# Patient Record
Sex: FEMALE | Race: WHITE | NOT HISPANIC OR LATINO | Employment: OTHER | URBAN - METROPOLITAN AREA
[De-identification: names, ages, dates, MRNs, and addresses within clinical notes are randomized per-mention and may not be internally consistent; named-entity substitution may affect disease eponyms.]

---

## 2017-01-16 ENCOUNTER — HOSPITAL ENCOUNTER (OUTPATIENT)
Dept: RADIOLOGY | Facility: HOSPITAL | Age: 77
Discharge: HOME | End: 2017-01-16
Attending: FAMILY MEDICINE
Payer: MEDICARE

## 2017-01-16 ENCOUNTER — OFFICE VISIT (OUTPATIENT)
Dept: FAMILY MEDICINE | Facility: CLINIC | Age: 77
End: 2017-01-16
Payer: MEDICARE

## 2017-01-16 VITALS
TEMPERATURE: 98 F | HEIGHT: 58 IN | BODY MASS INDEX: 32.12 KG/M2 | DIASTOLIC BLOOD PRESSURE: 64 MMHG | OXYGEN SATURATION: 98 % | SYSTOLIC BLOOD PRESSURE: 135 MMHG | WEIGHT: 153 LBS | HEART RATE: 63 BPM

## 2017-01-16 DIAGNOSIS — M81.0 OSTEOPOROSIS: ICD-10-CM

## 2017-01-16 DIAGNOSIS — E78.5 HYPERLIPIDEMIA, UNSPECIFIED HYPERLIPIDEMIA TYPE: ICD-10-CM

## 2017-01-16 DIAGNOSIS — I10 ESSENTIAL HYPERTENSION: ICD-10-CM

## 2017-01-16 DIAGNOSIS — E83.52 HYPERCALCEMIA: ICD-10-CM

## 2017-01-16 DIAGNOSIS — F32.A DEPRESSION, UNSPECIFIED DEPRESSION TYPE: ICD-10-CM

## 2017-01-16 DIAGNOSIS — R05.9 COUGH: ICD-10-CM

## 2017-01-16 DIAGNOSIS — Z86.69 HISTORY OF MIGRAINE HEADACHES: ICD-10-CM

## 2017-01-16 DIAGNOSIS — G47.00 INSOMNIA, UNSPECIFIED TYPE: ICD-10-CM

## 2017-01-16 DIAGNOSIS — R05.9 COUGH: Primary | ICD-10-CM

## 2017-01-16 DIAGNOSIS — F41.9 ANXIETY: ICD-10-CM

## 2017-01-16 PROCEDURE — 71020 XR CHEST PA AND LATERAL: CPT | Mod: TC,PO

## 2017-01-16 PROCEDURE — 99999 PR PBB SHADOW E&M-EST. PATIENT-LVL III: CPT | Mod: PBBFAC,,, | Performed by: FAMILY MEDICINE

## 2017-01-16 PROCEDURE — 99214 OFFICE O/P EST MOD 30 MIN: CPT | Mod: S$PBB,,, | Performed by: FAMILY MEDICINE

## 2017-01-16 PROCEDURE — 71020 XR CHEST PA AND LATERAL: CPT | Mod: 26,,, | Performed by: RADIOLOGY

## 2017-01-16 RX ORDER — TOPIRAMATE SPINKLE 25 MG/1
25 CAPSULE ORAL DAILY
Qty: 90 CAPSULE | Refills: 3 | Status: SHIPPED | OUTPATIENT
Start: 2017-01-16 | End: 2019-02-01 | Stop reason: SDUPTHER

## 2017-01-16 RX ORDER — ATORVASTATIN CALCIUM 40 MG/1
40 TABLET, FILM COATED ORAL DAILY
Qty: 90 TABLET | Refills: 3 | Status: SHIPPED | OUTPATIENT
Start: 2017-01-16 | End: 2017-04-28 | Stop reason: SDUPTHER

## 2017-01-16 RX ORDER — METOPROLOL TARTRATE 100 MG/1
100 TABLET ORAL 2 TIMES DAILY
Qty: 180 TABLET | Refills: 3 | Status: SHIPPED | OUTPATIENT
Start: 2017-01-16 | End: 2017-04-28 | Stop reason: SDUPTHER

## 2017-01-16 RX ORDER — TEMAZEPAM 30 MG/1
30 CAPSULE ORAL NIGHTLY
Qty: 90 CAPSULE | Refills: 1 | Status: SHIPPED | OUTPATIENT
Start: 2017-01-16 | End: 2017-07-27 | Stop reason: SDUPTHER

## 2017-01-16 RX ORDER — CITALOPRAM 10 MG/1
10 TABLET ORAL DAILY
Qty: 90 TABLET | Refills: 3 | Status: SHIPPED | OUTPATIENT
Start: 2017-01-16 | End: 2017-04-28 | Stop reason: SDUPTHER

## 2017-01-16 RX ORDER — AZITHROMYCIN 250 MG/1
TABLET, FILM COATED ORAL
Qty: 6 TABLET | Refills: 0 | Status: SHIPPED | OUTPATIENT
Start: 2017-01-16 | End: 2017-03-29

## 2017-01-16 NOTE — MR AVS SNAPSHOT
[facility]  [address] [facility] [address] [address]  Phone: [phone]  Fax: [phone]                  Suzie Dawkins   2017 11:20 AM   Office Visit    Description:  Female : 1940   Provider:  Eladio Marrero MD   Department:  [facility]           Diagnoses this Visit        Comments    Cough    -  Primary     Essential hypertension         Hyperlipidemia, unspecified hyperlipidemia type         Insomnia, unspecified type         Osteoporosis         Hypercalcemia         Anxiety         Depression, unspecified depression type         History of migraine headaches                To Do List           Future Appointments        Provider Department Dept Phone    2017 12:15 PM [facility] XR1 [facility] [phone]    2017 8:15 AM LABORATORY, [place] [facility] [phone]    3/24/2017 9:15 AM LABORATORY, [place] [facility] [phone]    3/24/2017 9:40 AM [facility] DEXA1 [facility] [phone]    3/31/2017 11:30 AM DO Serene Sood - Endocrinology [phone]      Goals (5 Years of Data)     None       These Medications        Disp Refills Start End    azithromycin (Z-NATALIE) 250 MG tablet 6 tablet 0 2017     Take as directed.    Pharmacy: [facility]  [facility] Ph #: [phone]       temazepam (RESTORIL) 30 mg capsule 90 capsule 1 2017     Take 1 capsule (30 mg total) by mouth nightly. - Oral    [facility] [place] [facility] [address] [facility] Ph #: [phone]       Notes to Pharmacy: This prescription was filled today. Any refills authorized will be placed on file.    atorvastatin (LIPITOR) 40 MG tablet 90 tablet 3 2017     Take 1 tablet (40 mg total) by mouth once daily. - Oral    Pharmacy: [facility] [address] [facility] Ph #: [phone]        Notes to Pharmacy: This prescription was filled today(12/26/2016). Any refills authorized will be placed on file.    citalopram (CELEXA) 10 MG tablet 90 tablet 3 1/16/2017     Take 1 tablet (10 mg total) by mouth once daily. - Oral    Pharmacy: [facility] [address] #: [phone]       Notes to Pharmacy: This prescription was filled today(12/26/2016). Any refills authorized will be placed on file.    metoprolol tartrate (LOPRESSOR) 100 MG tablet 180 tablet 3 1/16/2017     Take 1 tablet (100 mg total) by mouth 2 (two) times daily. - Oral    Pharmacy: [facility] [address] #: [phone]       Notes to Pharmacy: This prescription was filled today(12/26/2016). Any refills authorized will be placed on file.    topiramate 25 mg capsule 90 capsule 3 1/16/2017     Take 1 capsule (25 mg total) by mouth once daily. - Oral    Pharmacy: [facility] [address] #: [phone]         [facility]     [facility] - 24/7 Assistance  Registered nurses in the [facility] provide clinical advisement, health education, appointment booking, and other advisory services.  Call for this free service at [phone].             Medications           Message regarding Medications     Verify the changes and/or additions to your medication regime listed below are the same as discussed with your clinician today.  If any of these changes or additions are incorrect, please notify your healthcare provider.        START taking these NEW medications        Refills    azithromycin (Z-NATALIE) 250 MG tablet 0    Sig: Take as directed.    Class: Normal      CHANGE how you are taking these medications     Start Taking Instead of    atorvastatin (LIPITOR) 40 MG tablet atorvastatin (LIPITOR) 40 MG tablet    Dosage:  Take 1 tablet (40 mg total) by mouth once daily. Dosage:  TAKE ONE TABLET BY  MOUTH ONCE DAILY    Reason for Change:  Reorder     citalopram (CELEXA) 10 MG tablet citalopram (CELEXA) 10 MG tablet    Dosage:  Take 1 tablet (10 mg total) by mouth once daily. Dosage:  TAKE ONE TABLET BY MOUTH ONCE DAILY    Reason for Change:  Reorder     metoprolol tartrate (LOPRESSOR) 100 MG tablet metoprolol tartrate (LOPRESSOR) 100 MG tablet    Dosage:  Take 1 tablet (100 mg total) by mouth 2 (two) times daily. Dosage:  TAKE ONE TABLET BY MOUTH TWICE DAILY    Reason for Change:  Reorder            Verify that the below list of medications is an accurate representation of the medications you are currently taking.  If none reported, the list may be blank. If incorrect, please contact your healthcare provider. Carry this list with you in case of emergency.           Current Medications     alprazolam (XANAX) 0.25 MG tablet TAKE ONE TABLET THREE TIMES DAILY AS NEEDED FOR ANXIETY    atorvastatin (LIPITOR) 40 MG tablet Take 1 tablet (40 mg total) by mouth once daily.    calcium-vitamin D3 500 mg(1,250mg) -200 unit per tablet Take 1 tablet by mouth 2 (two) times daily with meals.    citalopram (CELEXA) 10 MG tablet Take 1 tablet (10 mg total) by mouth once daily.    diphenoxylate-atropine 2.5-0.025 mg (LOMOTIL) 2.5-0.025 mg per tablet Take 1 tablet by mouth 4 (four) times daily as needed.    losartan (COZAAR) 50 MG tablet TAKE ONE TABLET BY MOUTH ONCE DAILY    metoprolol tartrate (LOPRESSOR) 100 MG tablet Take 1 tablet (100 mg total) by mouth 2 (two) times daily.    ondansetron (ZOFRAN-ODT) 4 MG TbDL Every 6 hours prn nausea    temazepam (RESTORIL) 30 mg capsule Take 1 capsule (30 mg total) by mouth nightly.    topiramate 25 mg capsule Take 1 capsule (25 mg total) by mouth once daily.    azithromycin (Z-NATALIE) 250 MG tablet Take as directed.           Clinical Reference Information           Vital Signs - Last Recorded  Most recent update: 1/16/2017 11:20 AM by Ester Ghosh MA    BP Pulse Temp Ht Wt SpO2    135/64  "63 97.7 °F (36.5 °C) (Oral) 4' 10" (1.473 m) 69.4 kg (153 lb) 98%    BMI                31.98 kg/m2          Blood Pressure          Most Recent Value    BP  135/64      Allergies as of 1/16/2017     B12 [Cyanocobalamin-cobamamide]    Codeine    Iodine    Meperidine    Narcotic Antagonist    Penicillins    Propoxyphene    Sulfa (Sulfonamide Antibiotics)      Immunizations Administered on Date of Encounter - 1/16/2017     None      Orders Placed During Today's Visit     Future Labs/Procedures Expected by Expires    X-Ray Chest PA And Lateral  1/16/2017 (Approximate) 2/15/2017      "

## 2017-01-16 NOTE — PROGRESS NOTES
Subjective:      Patient ID: Suzie Dawkins is a 76 y.o. female.    Chief Complaint: cough    HPI Cough: Patient complains of nonproductive cough.  Symptoms began 1 week ago.  The cough is non-productive, without wheezing, dyspnea or hemoptysis and is aggravated by nothing Associated symptoms include:postnasal drip. Patient does not have new pets. Patient does not have a history of asthma. Patient does have a history of environmental allergens. Patient does not have recent travel. Patient does not have a history of smoking. Patient  has previous Chest X-ray.  She quit smoking in the 80's. She has used mucinex and it is not resolved.     The patient presents with hyperlipidemia.  The patient reports tolerating the medication well and is in excellent compliance.  There have been no medication side effects.  The patient denies chest pain, neuropathy, and myalgias.  The patient has reduced fat intake and has been exercising.  Current treatment has included the medications listed in the med card.    Lab Results   Component Value Date    CHOL 165 08/26/2016    CHOL 198 02/03/2016    CHOL 200 (H) 07/30/2015       Lab Results   Component Value Date    HDL 40 08/26/2016    HDL 54 02/03/2016    HDL 53 07/30/2015       Lab Results   Component Value Date    LDLCALC 104.8 08/26/2016    LDLCALC 115.2 02/03/2016    LDLCALC 124.8 07/30/2015       Lab Results   Component Value Date    TRIG 101 08/26/2016    TRIG 144 02/03/2016    TRIG 111 07/30/2015       Lab Results   Component Value Date    CHOLHDL 24.2 08/26/2016    CHOLHDL 27.3 02/03/2016    CHOLHDL 26.5 07/30/2015     Lab Results   Component Value Date    ALT 19 08/26/2016    ALT 17 08/26/2016    AST 21 08/26/2016    AST 21 08/26/2016    ALKPHOS 93 08/26/2016    ALKPHOS 94 08/26/2016    BILITOT 0.8 08/26/2016    BILITOT 0.8 08/26/2016       The patient presents with essential hypertension.  The patient is tolerating the medication well and is in excellent compliance.  The  "patient is experiencing no side effects.  Counseling was offered regarding low salt diets.  The patient has a reduced salt intake.  The patient denies chest pain, palpitations, shortness of breath, dyspnea on exertion, left or murmur neck pain, nausea, vomiting, diaphoresis, paroxysmal nocturnal dyspnea, and orthopnea.     Visit Vitals    /64    Pulse 63    Temp 97.7 °F (36.5 °C) (Oral)    Ht 4' 10" (1.473 m)    Wt 69.4 kg (153 lb)    SpO2 98%    BMI 31.98 kg/m2     She has had hypercalcemia and her last calcium is below:  Dr. Reddy is following this and has follow up scheduled.    CMP  Sodium   Date Value Ref Range Status   08/26/2016 140 136 - 145 mmol/L Final     Potassium   Date Value Ref Range Status   08/26/2016 4.2 3.5 - 5.1 mmol/L Final     Chloride   Date Value Ref Range Status   08/26/2016 108 95 - 110 mmol/L Final     CO2   Date Value Ref Range Status   08/26/2016 25 23 - 29 mmol/L Final     Glucose   Date Value Ref Range Status   08/26/2016 102 70 - 110 mg/dL Final     BUN, Bld   Date Value Ref Range Status   08/26/2016 22 8 - 23 mg/dL Final     Creatinine   Date Value Ref Range Status   08/26/2016 1.0 0.5 - 1.4 mg/dL Final     Calcium   Date Value Ref Range Status   08/26/2016 10.7 (H) 8.7 - 10.5 mg/dL Final     Total Protein   Date Value Ref Range Status   08/26/2016 6.8 6.0 - 8.4 g/dL Final   08/26/2016 6.9 6.0 - 8.4 g/dL Final     Albumin   Date Value Ref Range Status   08/26/2016 3.7 3.5 - 5.2 g/dL Final   08/26/2016 3.8 3.5 - 5.2 g/dL Final     Total Bilirubin   Date Value Ref Range Status   08/26/2016 0.8 0.1 - 1.0 mg/dL Final     Comment:     For infants and newborns, interpretation of results should be based  on gestational age, weight and in agreement with clinical  observations.  Premature Infant recommended reference ranges:  Up to 24 hours.............<8.0 mg/dL  Up to 48 hours............<12.0 mg/dL  3-5 days..................<15.0 mg/dL  6-29 days.................<15.0 mg/dL   "   08/26/2016 0.8 0.1 - 1.0 mg/dL Final     Comment:     For infants and newborns, interpretation of results should be based  on gestational age, weight and in agreement with clinical  observations.  Premature Infant recommended reference ranges:  Up to 24 hours.............<8.0 mg/dL  Up to 48 hours............<12.0 mg/dL  3-5 days..................<15.0 mg/dL  6-29 days.................<15.0 mg/dL       Alkaline Phosphatase   Date Value Ref Range Status   08/26/2016 93 55 - 135 U/L Final   08/26/2016 94 55 - 135 U/L Final     AST   Date Value Ref Range Status   08/26/2016 21 10 - 40 U/L Final   08/26/2016 21 10 - 40 U/L Final     ALT   Date Value Ref Range Status   08/26/2016 19 10 - 44 U/L Final   08/26/2016 17 10 - 44 U/L Final     Anion Gap   Date Value Ref Range Status   08/26/2016 7 (L) 8 - 16 mmol/L Final     eGFR if    Date Value Ref Range Status   08/26/2016 >60.0 >60 mL/min/1.73 m^2 Final     eGFR if non    Date Value Ref Range Status   08/26/2016 55.2 (A) >60 mL/min/1.73 m^2 Final     Comment:     Calculation used to obtain the estimated glomerular filtration  rate (eGFR) is the CKD-EPI equation. Since race is unknown   in our information system, the eGFR values for   -American and Non--American patients are given   for each creatinine result.       She has chronic insomnia and has been on restoril for many years. She is on 30 mg capsule.  She states taht she has tried a lower dose and she states that it is more expensive so she has been using this for a long time.  She is also on celexa and she has had this for depression and anxiety.  She has been stable with this for a long time also.      The patient presents with osteoporosis.  The patient denies back pain, bone pain, hip pain and has not had prior fractures.  The patient has not been performing weight bearing exercises.  Current treatment has not included medications for this condition.  The patient has  had a bone density in the past and it is due to be performed at this time.    She has a dexa scheduled already.  Dr. Reddy is following this and has follow up scheduled.    Health Maintenance Due   Topic Date Due    TETANUS VACCINE  1958    Mammogram  2017       Past Medical History:  Past Medical History   Diagnosis Date    Anxiety     Arthritis     Cancer      carcinoid of the terminal ileum    Depression     GERD (gastroesophageal reflux disease)     Hypercalcemia     Hyperlipidemia     Hypertension     Osteoporosis 10/7/2013    Vitamin D deficiency      Past Surgical History   Procedure Laterality Date    Colon surgery  2007     cancer removal    Hysterectomy       section       two    Tonsillectomy, adenoidectomy, bilateral myringotomy and tubes      Hemorrhoid surgery      Tonsillectomy      Adenoidectomy       Review of patient's allergies indicates:   Allergen Reactions    B12 [cyanocobalamin-cobamamide] Other (See Comments)     Caused flushing and caused skin to peel    Codeine      Other reaction(s): Muscle pain    Iodine      Other reaction(s): Hives    Meperidine      Other reaction(s): Muscle pain    Narcotic antagonist      Other reaction(s): Rash    Penicillins Nausea And Vomiting    Propoxyphene      Other reaction(s): Muscle pain    Sulfa (sulfonamide antibiotics)      Other reaction(s): Muscle pain  Other reaction(s): Hives     Current Outpatient Prescriptions on File Prior to Visit   Medication Sig Dispense Refill    alprazolam (XANAX) 0.25 MG tablet TAKE ONE TABLET THREE TIMES DAILY AS NEEDED FOR ANXIETY 90 tablet 1    calcium-vitamin D3 500 mg(1,250mg) -200 unit per tablet Take 1 tablet by mouth 2 (two) times daily with meals.      diphenoxylate-atropine 2.5-0.025 mg (LOMOTIL) 2.5-0.025 mg per tablet Take 1 tablet by mouth 4 (four) times daily as needed. 180 tablet 1    losartan (COZAAR) 50 MG tablet TAKE ONE TABLET BY MOUTH ONCE DAILY 30  tablet 0    ondansetron (ZOFRAN-ODT) 4 MG TbDL Every 6 hours prn nausea 60 tablet 3    [DISCONTINUED] atorvastatin (LIPITOR) 40 MG tablet TAKE ONE TABLET BY MOUTH ONCE DAILY 30 tablet 0    [DISCONTINUED] citalopram (CELEXA) 10 MG tablet TAKE ONE TABLET BY MOUTH ONCE DAILY 30 tablet 0    [DISCONTINUED] metoprolol tartrate (LOPRESSOR) 100 MG tablet TAKE ONE TABLET BY MOUTH TWICE DAILY 60 tablet 0    [DISCONTINUED] temazepam (RESTORIL) 30 mg capsule Take 1 capsule (30 mg total) by mouth nightly. 30 capsule 0    [DISCONTINUED] topiramate 25 mg capsule Take 1 capsule (25 mg total) by mouth once daily. 90 capsule 3     No current facility-administered medications on file prior to visit.      Social History     Social History    Marital status: Single     Spouse name: N/A    Number of children: N/A    Years of education: N/A     Occupational History    Not on file.     Social History Main Topics    Smoking status: Former Smoker     Types: Cigarettes     Quit date: 1/1/1989    Smokeless tobacco: Not on file    Alcohol use No    Drug use: No    Sexual activity: Not on file     Other Topics Concern    Not on file     Social History Narrative     Family History   Problem Relation Age of Onset    Cancer Father      lung    Arthritis Father     Cancer Maternal Aunt      breast    Arthritis Maternal Aunt     Cancer Maternal Uncle     Arthritis Paternal Aunt     Cancer Maternal Grandfather      glands cancer     Arthritis Paternal Aunt     Arthritis Maternal Aunt            Review of Systems   Constitutional: Negative for fatigue, fever and unexpected weight change.   HENT: Negative for congestion, ear pain, postnasal drip and sore throat.    Eyes: Negative for visual disturbance.   Respiratory: Positive for cough. Negative for chest tightness, shortness of breath and wheezing.    Cardiovascular: Negative for chest pain, palpitations and leg swelling.   Gastrointestinal: Negative for abdominal pain, blood  "in stool, constipation, diarrhea, nausea and vomiting.   Genitourinary: Negative for dysuria and hematuria.   Neurological: Negative for weakness and numbness.       Objective:     Visit Vitals    /64    Pulse 63    Temp 97.7 °F (36.5 °C) (Oral)    Ht 4' 10" (1.473 m)    Wt 69.4 kg (153 lb)    SpO2 98%    BMI 31.98 kg/m2       Physical Exam   Constitutional: She appears well-developed and well-nourished. She is cooperative.   HENT:   Head: Normocephalic and atraumatic.   Right Ear: Tympanic membrane, external ear and ear canal normal.   Left Ear: Tympanic membrane, external ear and ear canal normal.   Nose: Nose normal.   Mouth/Throat: Uvula is midline and mucous membranes are normal. No oral lesions. No oropharyngeal exudate, posterior oropharyngeal edema or posterior oropharyngeal erythema.   Eyes: EOM and lids are normal. Pupils are equal, round, and reactive to light. Right eye exhibits no discharge. Left eye exhibits no discharge. Right conjunctiva is not injected. Right conjunctiva has no hemorrhage. Left conjunctiva is not injected. Left conjunctiva has no hemorrhage. No scleral icterus. Right eye exhibits no nystagmus. Left eye exhibits no nystagmus.   Neck: Normal range of motion and full passive range of motion without pain. Neck supple. No JVD present. No tracheal tenderness present. Carotid bruit is not present. No tracheal deviation present. No thyroid mass and no thyromegaly present.   Cardiovascular: Normal rate, regular rhythm, S1 normal and S2 normal.    No murmur heard.  Pulses:       Carotid pulses are 2+ on the right side, and 2+ on the left side.       Radial pulses are 2+ on the right side, and 2+ on the left side.        Posterior tibial pulses are 2+ on the right side, and 2+ on the left side.   Pulmonary/Chest: Effort normal and breath sounds normal. No respiratory distress. She has no wheezes. She has no rhonchi. She has no rales.   Abdominal: Soft. Normal appearance, normal " aorta and bowel sounds are normal. She exhibits no distension, no abdominal bruit, no pulsatile midline mass and no mass. There is no hepatosplenomegaly. There is no tenderness. There is no rebound.   Musculoskeletal:        Right knee: She exhibits no swelling. No tenderness found.        Left knee: She exhibits no swelling. No tenderness found.   Lymphadenopathy:        Head (right side): No submental and no submandibular adenopathy present.        Head (left side): No submental and no submandibular adenopathy present.     She has no cervical adenopathy.   Neurological: She is alert. She has normal strength. No cranial nerve deficit or sensory deficit.   Skin: Skin is warm and dry. No rash noted. No cyanosis. Nails show no clubbing.   Psychiatric: She has a normal mood and affect. Her speech is normal and behavior is normal. Thought content normal. Cognition and memory are normal.       Assessment:     1. Cough    2. Essential hypertension    3. Hyperlipidemia, unspecified hyperlipidemia type    4. Insomnia, unspecified type    5. Osteoporosis    6. Hypercalcemia    7. Anxiety    8. Depression, unspecified depression type    9. History of migraine headaches        Plan:    Diagnoses and all orders for this visit:    Cough  -     X-Ray Chest PA And Lateral; Future  -     azithromycin (Z-NATALIE) 250 MG tablet; Take as directed.    Essential hypertension  -     metoprolol tartrate (LOPRESSOR) 100 MG tablet; Take 1 tablet (100 mg total) by mouth 2 (two) times daily.    Hyperlipidemia, unspecified hyperlipidemia type  -     atorvastatin (LIPITOR) 40 MG tablet; Take 1 tablet (40 mg total) by mouth once daily.    Insomnia, unspecified type    Osteoporosis    Hypercalcemia    Anxiety  -     citalopram (CELEXA) 10 MG tablet; Take 1 tablet (10 mg total) by mouth once daily.    Depression, unspecified depression type  -     citalopram (CELEXA) 10 MG tablet; Take 1 tablet (10 mg total) by mouth once daily.    History of migraine  headaches  -     topiramate 25 mg capsule; Take 1 capsule (25 mg total) by mouth once daily.    Other orders  -     temazepam (RESTORIL) 30 mg capsule; Take 1 capsule (30 mg total) by mouth nightly.      rtc in 6 months for a follow up on the insomnia.  Cont f/u with Dr. Reddy.

## 2017-01-26 RX ORDER — FLUCONAZOLE 150 MG/1
150 TABLET ORAL DAILY
Qty: 1 TABLET | Refills: 0 | Status: SHIPPED | OUTPATIENT
Start: 2017-01-26 | End: 2017-02-20 | Stop reason: SDUPTHER

## 2017-01-26 NOTE — TELEPHONE ENCOUNTER
----- Message from Aviva Donald sent at 1/26/2017  8:34 AM CST -----  Contact: Patient  Patient states that she has a yeast infection from the antibiotic- [facility]. Please call patient back if needed at [phone]. Thank you

## 2017-02-20 RX ORDER — FLUCONAZOLE 150 MG/1
TABLET ORAL
Qty: 1 TABLET | Refills: 0 | Status: SHIPPED | OUTPATIENT
Start: 2017-02-20 | End: 2017-03-29

## 2017-02-28 ENCOUNTER — LAB VISIT (OUTPATIENT)
Dept: LAB | Facility: HOSPITAL | Age: 77
End: 2017-02-28
Attending: FAMILY MEDICINE
Payer: MEDICARE

## 2017-02-28 DIAGNOSIS — E78.5 HYPERLIPIDEMIA, UNSPECIFIED HYPERLIPIDEMIA TYPE: ICD-10-CM

## 2017-02-28 LAB
ALBUMIN SERPL BCP-MCNC: 3.8 G/DL
ALP SERPL-CCNC: 88 U/L
ALT SERPL W/O P-5'-P-CCNC: 19 U/L
AST SERPL-CCNC: 20 U/L
BILIRUB DIRECT SERPL-MCNC: 0.3 MG/DL
BILIRUB SERPL-MCNC: 0.8 MG/DL
CHOLEST/HDLC SERPL: 3.4 {RATIO}
HDL/CHOLESTEROL RATIO: 29.6 %
HDLC SERPL-MCNC: 169 MG/DL
HDLC SERPL-MCNC: 50 MG/DL
LDLC SERPL CALC-MCNC: 104 MG/DL
NONHDLC SERPL-MCNC: 119 MG/DL
PROT SERPL-MCNC: 6.8 G/DL
TRIGL SERPL-MCNC: 75 MG/DL

## 2017-02-28 PROCEDURE — 80076 HEPATIC FUNCTION PANEL: CPT

## 2017-02-28 PROCEDURE — 80061 LIPID PANEL: CPT

## 2017-02-28 PROCEDURE — 36415 COLL VENOUS BLD VENIPUNCTURE: CPT | Mod: PO

## 2017-03-01 ENCOUNTER — TELEPHONE (OUTPATIENT)
Dept: FAMILY MEDICINE | Facility: CLINIC | Age: 77
End: 2017-03-01

## 2017-03-01 DIAGNOSIS — E78.5 HYPERLIPIDEMIA, UNSPECIFIED HYPERLIPIDEMIA TYPE: Primary | ICD-10-CM

## 2017-03-01 NOTE — TELEPHONE ENCOUNTER
I have signed for the following orders AND/OR meds.  Please call the patient and ask the patient to schedule the testing AND/OR inform about any medications that were sent.      Orders Placed This Encounter   Procedures    Lipid panel     Standing Status:   Future     Standing Expiration Date:   4/30/2018    Hepatic function panel     Standing Status:   Future     Standing Expiration Date:   4/30/2018

## 2017-03-06 ENCOUNTER — PATIENT MESSAGE (OUTPATIENT)
Dept: FAMILY MEDICINE | Facility: CLINIC | Age: 77
End: 2017-03-06

## 2017-03-24 ENCOUNTER — HOSPITAL ENCOUNTER (OUTPATIENT)
Dept: RADIOLOGY | Facility: HOSPITAL | Age: 77
Discharge: HOME | End: 2017-03-24
Attending: INTERNAL MEDICINE
Payer: MEDICARE

## 2017-03-24 DIAGNOSIS — M81.0 OSTEOPOROSIS: ICD-10-CM

## 2017-03-24 DIAGNOSIS — E21.3 HYPERPARATHYROIDISM: ICD-10-CM

## 2017-03-24 PROCEDURE — 77080 DXA BONE DENSITY AXIAL: CPT | Mod: TC,PO

## 2017-03-24 PROCEDURE — 77080 DXA BONE DENSITY AXIAL: CPT | Mod: 26,,, | Performed by: RADIOLOGY

## 2017-03-29 ENCOUNTER — OFFICE VISIT (OUTPATIENT)
Dept: FAMILY MEDICINE | Facility: CLINIC | Age: 77
End: 2017-03-29
Payer: MEDICARE

## 2017-03-29 VITALS
BODY MASS INDEX: 31.75 KG/M2 | TEMPERATURE: 98 F | HEIGHT: 58 IN | DIASTOLIC BLOOD PRESSURE: 73 MMHG | HEART RATE: 55 BPM | WEIGHT: 151.25 LBS | SYSTOLIC BLOOD PRESSURE: 165 MMHG

## 2017-03-29 DIAGNOSIS — N39.0 URINARY TRACT INFECTION WITHOUT HEMATURIA, SITE UNSPECIFIED: Primary | ICD-10-CM

## 2017-03-29 DIAGNOSIS — R30.0 DYSURIA: ICD-10-CM

## 2017-03-29 LAB
BACTERIA #/AREA URNS HPF: ABNORMAL /HPF
BILIRUB UR QL STRIP: NEGATIVE
CLARITY UR: ABNORMAL
COLOR UR: YELLOW
GLUCOSE UR QL STRIP: NEGATIVE
HGB UR QL STRIP: ABNORMAL
HYALINE CASTS #/AREA URNS LPF: 0 /LPF
KETONES UR QL STRIP: NEGATIVE
LEUKOCYTE ESTERASE UR QL STRIP: ABNORMAL
MICROSCOPIC COMMENT: ABNORMAL
NITRITE UR QL STRIP: NEGATIVE
PH UR STRIP: 6 [PH] (ref 5–8)
PROT UR QL STRIP: ABNORMAL
RBC #/AREA URNS HPF: >100 /HPF (ref 0–4)
SP GR UR STRIP: 1.01 (ref 1–1.03)
SQUAMOUS #/AREA URNS HPF: 3 /HPF
URN SPEC COLLECT METH UR: ABNORMAL
WBC #/AREA URNS HPF: >100 /HPF (ref 0–5)
WBC CLUMPS URNS QL MICRO: ABNORMAL

## 2017-03-29 PROCEDURE — 99213 OFFICE O/P EST LOW 20 MIN: CPT | Mod: S$PBB,,, | Performed by: NURSE PRACTITIONER

## 2017-03-29 PROCEDURE — 99999 PR PBB SHADOW E&M-EST. PATIENT-LVL IV: CPT | Mod: PBBFAC,,, | Performed by: NURSE PRACTITIONER

## 2017-03-29 PROCEDURE — 87088 URINE BACTERIA CULTURE: CPT

## 2017-03-29 PROCEDURE — 87077 CULTURE AEROBIC IDENTIFY: CPT

## 2017-03-29 PROCEDURE — 87086 URINE CULTURE/COLONY COUNT: CPT

## 2017-03-29 PROCEDURE — 81000 URINALYSIS NONAUTO W/SCOPE: CPT | Mod: PO

## 2017-03-29 PROCEDURE — 87186 SC STD MICRODIL/AGAR DIL: CPT

## 2017-03-29 PROCEDURE — 99214 OFFICE O/P EST MOD 30 MIN: CPT | Mod: PBBFAC,PO | Performed by: NURSE PRACTITIONER

## 2017-03-29 RX ORDER — CIPROFLOXACIN 500 MG/1
500 TABLET ORAL 2 TIMES DAILY
Qty: 6 TABLET | Refills: 0 | Status: SHIPPED | OUTPATIENT
Start: 2017-03-29 | End: 2017-04-01

## 2017-03-29 RX ORDER — PHENAZOPYRIDINE HYDROCHLORIDE 100 MG/1
100 TABLET, FILM COATED ORAL 2 TIMES DAILY PRN
Qty: 6 TABLET | Refills: 0 | Status: SHIPPED | OUTPATIENT
Start: 2017-03-29 | End: 2017-04-01

## 2017-03-29 RX ORDER — TEMAZEPAM 30 MG/1
CAPSULE ORAL
Qty: 90 CAPSULE | OUTPATIENT
Start: 2017-03-29

## 2017-03-29 NOTE — MR AVS SNAPSHOT
[facility]  [address] [facility] [address]  Phone: [phone]  Fax: [phone]                  Suzie Dawkins   3/29/2017 1:40 PM   Office Visit    Description:  Female : 1940   Provider:  Shandra Freire NP   Department:  [facility]           Diagnoses this Visit        Comments    Urinary tract infection without hematuria, site unspecified    -  Primary     Dysuria                To Do List           Future Appointments        Provider Department Dept Phone    3/31/2017 11:30 AM Anthony Reddy DO [place] - Endocrinology [phone]    2017 8:00 AM LABORATORY, [place] [facility] [phone]      Goals (5 Years of Data)     None       These Medications        Disp Refills Start End    ciprofloxacin HCl (CIPRO) 500 MG tablet 6 tablet 0 3/29/2017 2017    Take 1 tablet (500 mg total) by mouth 2 (two) times daily. - Oral    Pharmacy: [facility] [address] Ph #: [phone]       phenazopyridine (PYRIDIUM) 100 MG tablet 6 tablet 0 3/29/2017 2017    Take 1 tablet (100 mg total) by mouth 2 (two) times daily as needed. - Oral    [facility] [address] Ph #: [phone]         [facility]     [facility] -  Assistance  Registered nurses in the [facility] provide clinical advisement, health education, appointment booking, and other advisory services.  Call for this free service at [phone].             Medications           Message regarding Medications     Verify the changes and/or additions to your medication regime listed below are the same as discussed with your clinician today.  If any of these changes or additions are incorrect, please notify your healthcare provider.        START taking these NEW medications        Refills    ciprofloxacin HCl (CIPRO) 500 MG tablet 0     Sig: Take 1 tablet (500 mg total) by mouth 2 (two) times daily.    Class: Normal    Route: Oral    phenazopyridine (PYRIDIUM) 100 MG tablet 0    Sig: Take 1 tablet (100 mg total) by mouth 2 (two) times daily as needed.    Class: Normal    Route: Oral      STOP taking these medications     azithromycin (Z-NATALIE) 250 MG tablet Take as directed.    fluconazole (DIFLUCAN) 150 MG Tab Take 1 tablet (150 mg total) by mouth once daily.           Verify that the below list of medications is an accurate representation of the medications you are currently taking.  If none reported, the list may be blank. If incorrect, please contact your healthcare provider. Carry this list with you in case of emergency.           Current Medications     alprazolam (XANAX) 0.25 MG tablet TAKE ONE TABLET THREE TIMES DAILY AS NEEDED FOR ANXIETY    atorvastatin (LIPITOR) 40 MG tablet Take 1 tablet (40 mg total) by mouth once daily.    calcium-vitamin D3 500 mg(1,250mg) -200 unit per tablet Take 1 tablet by mouth 2 (two) times daily with meals.    citalopram (CELEXA) 10 MG tablet Take 1 tablet (10 mg total) by mouth once daily.    diphenoxylate-atropine 2.5-0.025 mg (LOMOTIL) 2.5-0.025 mg per tablet Take 1 tablet by mouth 4 (four) times daily as needed.    losartan (COZAAR) 50 MG tablet TAKE ONE TABLET BY MOUTH ONCE DAILY    metoprolol tartrate (LOPRESSOR) 100 MG tablet Take 1 tablet (100 mg total) by mouth 2 (two) times daily.    ondansetron (ZOFRAN-ODT) 4 MG TbDL Every 6 hours prn nausea    temazepam (RESTORIL) 30 mg capsule Take 1 capsule (30 mg total) by mouth nightly.    topiramate 25 mg capsule Take 1 capsule (25 mg total) by mouth once daily.    ciprofloxacin HCl (CIPRO) 500 MG tablet Take 1 tablet (500 mg total) by mouth 2 (two) times daily.    phenazopyridine (PYRIDIUM) 100 MG tablet Take 1 tablet (100 mg total) by mouth 2 (two) times daily as needed.           Clinical Reference Information           Your Vitals Were     BP Pulse Temp  "Height Weight BMI    165/73 55 98.1 °F (36.7 °C) 4' 10" (1.473 m) 68.6 kg (151 lb 3.8 oz) 31.61 kg/m2      Blood Pressure          Most Recent Value    BP  (!)  165/73      Allergies as of 3/29/2017     B12 [Cyanocobalamin-cobamamide]    Codeine    Iodine    Meperidine    Narcotic Antagonist    Penicillins    Propoxyphene    Sulfa (Sulfonamide Antibiotics)      Immunizations Administered on Date of Encounter - 3/29/2017     None      Orders Placed During Today's Visit      Normal Orders This Visit    Urinalysis Microscopic     Urinalysis     Urine culture       Language Assistance Services     ATTENTION: Language assistance services are available, free of charge. Please call [phone].      ATENCIÓN: Si leslie fitch, tiene a crow disposición servicios gratuitos de asistencia lingüística. Llame al [phone].     JAYNA Ý: N?u b?n nói Ti?ng Vi?t, có các d?ch v? h? tr? ngôn ng? mi?n phí dành cho b?n. G?i s? [phone].         [facility] complies with applicable Federal civil rights laws and does not discriminate on the basis of race, color, national origin, age, disability, or sex.        "

## 2017-03-29 NOTE — PROGRESS NOTES
Subjective:       Patient ID: Suzie Dawkins is a 76 y.o. female.    Chief Complaint: No chief complaint on file.    Dysuria    This is a new problem. The current episode started in the past 7 days. The problem occurs every urination. The problem has been unchanged. The quality of the pain is described as burning. The pain is mild. There has been no fever. She is not sexually active. There is a history of pyelonephritis. Associated symptoms include flank pain, frequency and urgency. Pertinent negatives include no behavior changes, chills, discharge, hematuria, hesitancy, nausea, possible pregnancy, sweats, vomiting, weight loss, bubble bath use, constipation, rash or withholding. She has tried nothing for the symptoms. The treatment provided no relief. Her past medical history is significant for hypertension. There is no history of catheterization, diabetes insipidus, diabetes mellitus, genitourinary reflux, kidney stones, recurrent UTIs, a single kidney, STD, urinary stasis or a urological procedure.     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    Depression     GERD (gastroesophageal reflux disease)     Hypercalcemia     Hyperlipidemia     Hypertension     Osteoporosis 10/7/2013    Vitamin D deficiency      Social History     Social History    Marital status: Single     Spouse name: N/A    Number of children: N/A    Years of education: N/A     Occupational History         Social History Main Topics    Smoking status: Former Smoker     Types: Cigarettes     Quit date: 1989    Smokeless tobacco: Not on file    Alcohol use No    Drug use: No    Sexual activity: Not on file     Social History Narrative     Past Surgical History:   Procedure Laterality Date    ADENOIDECTOMY       SECTION      two    COLON SURGERY  2007    cancer removal    HEMORRHOID SURGERY      HYSTERECTOMY      TONSILLECTOMY       TONSILLECTOMY, ADENOIDECTOMY, BILATERAL MYRINGOTOMY AND TUBES         Review of Systems   Constitutional: Negative.  Negative for chills and weight loss.   HENT: Negative.    Eyes: Negative.    Respiratory: Negative.    Cardiovascular: Negative.    Gastrointestinal: Negative.  Negative for constipation, nausea and vomiting.   Endocrine: Negative.    Genitourinary: Positive for dysuria, flank pain, frequency and urgency. Negative for hematuria and hesitancy.   Skin: Negative.  Negative for rash.   Allergic/Immunologic: Negative.    Neurological: Negative.    Psychiatric/Behavioral: Negative.        Objective:      Physical Exam   Constitutional: She is oriented to person, place, and time. She appears well-developed and well-nourished.   HENT:   Head: Normocephalic.   Right Ear: External ear normal.   Left Ear: External ear normal.   Nose: Nose normal.   Mouth/Throat: Oropharynx is clear and moist.   Eyes: Conjunctivae are normal. Pupils are equal, round, and reactive to light.   Neck: Normal range of motion. Neck supple.   Cardiovascular: Normal rate, regular rhythm and normal heart sounds.    Pulmonary/Chest: Effort normal and breath sounds normal.   Abdominal: Soft. Bowel sounds are normal. There is no CVA tenderness.   Musculoskeletal: Normal range of motion.   Neurological: She is alert and oriented to person, place, and time.   Skin: Skin is warm and dry.   Psychiatric: She has a normal mood and affect. Her behavior is normal. Judgment and thought content normal.   Nursing note and vitals reviewed.      Assessment:       1. Urinary tract infection without hematuria, site unspecified    2. Dysuria        Plan:           Diagnoses and all orders for this visit:    Urinary tract infection without hematuria, site unspecified  Dysuria  -     Urine culture  -     Urinalysis  -     Urinalysis Microscopic  -     ciprofloxacin HCl (CIPRO) 500 MG tablet; Take 1 tablet (500 mg total) by mouth 2 (two) times daily.  -      phenazopyridine (PYRIDIUM) 100 MG tablet; Take 1 tablet (100 mg total) by mouth 2 (two) times daily as needed.

## 2017-03-31 ENCOUNTER — OFFICE VISIT (OUTPATIENT)
Dept: ENDOCRINOLOGY | Facility: CLINIC | Age: 77
End: 2017-03-31
Payer: MEDICARE

## 2017-03-31 VITALS
RESPIRATION RATE: 18 BRPM | HEART RATE: 64 BPM | BODY MASS INDEX: 32.21 KG/M2 | SYSTOLIC BLOOD PRESSURE: 158 MMHG | WEIGHT: 154.13 LBS | DIASTOLIC BLOOD PRESSURE: 78 MMHG

## 2017-03-31 DIAGNOSIS — E21.3 HYPERPARATHYROIDISM: Primary | ICD-10-CM

## 2017-03-31 DIAGNOSIS — M81.0 OSTEOPOROSIS: ICD-10-CM

## 2017-03-31 DIAGNOSIS — E55.9 VITAMIN D DEFICIENCY: ICD-10-CM

## 2017-03-31 PROCEDURE — 99999 PR PBB SHADOW E&M-EST. PATIENT-LVL II: CPT | Mod: PBBFAC,,, | Performed by: INTERNAL MEDICINE

## 2017-03-31 PROCEDURE — 99214 OFFICE O/P EST MOD 30 MIN: CPT | Mod: S$PBB,,, | Performed by: INTERNAL MEDICINE

## 2017-03-31 PROCEDURE — 99212 OFFICE O/P EST SF 10 MIN: CPT | Mod: PBBFAC,PO | Performed by: INTERNAL MEDICINE

## 2017-03-31 NOTE — PROGRESS NOTES
CHIEF COMPLAINT: Hypercalcemia  76 year old being seen as a f/u. Discovered to have a high calcium in Oct/13. On Fosamax approx 2 years. Taking 2000 OTC Vit D. Fosamax on hold due to dental work. Has completed top dentures and has a bottom bridge. Has no invasive procedures planned. No fractures. No kidney stones. No falls or issues with balance. Does not want fosamax.               PAST MEDICAL HISTORY/PAST SURGICAL HISTORY:  Reviewed in [facility]    SOCIAL HISTORY: No T/A    FAMILY HISTORY:  No Ca disorders. + osteoporosis. No hip fractures. No thyroid disease or DM.     MEDICATIONS/ALLERGIES: The patient's MedCard has been updated and reviewed.      ROS:   Constitutional: weight stable.   Eyes: No recent visual changes  ENT: No dysphagia  Cardiovascular: Denies current anginal symptoms  Respiratory: Denies current respiratory difficulty  Gastrointestinal: No diarrhea or constipation.   GenitoUrinary - Being treated for UTI  Skin: No new skin rash  Neurologic: No focal neurologic complaints  Remainder ROS negative        PE:    GENERAL: Well developed, well nourished.  NECK: Supple, trachea midline, no palpable thyroid nodules  CHEST: Resp even and unlabored, CTA bilateral.  CARDIAC: RRR, S1, S2 heard, no murmurs, rubs, S3, or S4      Results for URIEL REDDING (MRN [id number]) as of 3/31/2017 11:25   Ref. Range 3/24/2017 09:16   Sodium Latest Ref Range: 136 - 145 mmol/L 144   Potassium Latest Ref Range: 3.5 - 5.1 mmol/L 4.0   Chloride Latest Ref Range: 95 - 110 mmol/L 111 (H)   CO2 Latest Ref Range: 23 - 29 mmol/L 24   Anion Gap Latest Ref Range: 8 - 16 mmol/L 9   BUN, Bld Latest Ref Range: 8 - 23 mg/dL 13   Creatinine Latest Ref Range: 0.5 - 1.4 mg/dL 1.0   eGFR if non African American Latest Ref Range: >60 mL/min/1.73 m^2 55.2 (A)   eGFR if African American Latest Ref Range: >60 mL/min/1.73 m^2 >60.0   Glucose Latest Ref Range: 70 - 110 mg/dL 83   Calcium Latest Ref Range: 8.7 - 10.5 mg/dL 10.0   Phosphorus Latest Ref  Range: 2.7 - 4.5 mg/dL 2.9   Alkaline Phosphatase Latest Ref Range: 55 - 135 U/L 87   Total Protein Latest Ref Range: 6.0 - 8.4 g/dL 6.7   Albumin Latest Ref Range: 3.5 - 5.2 g/dL 3.7   Total Bilirubin Latest Ref Range: 0.1 - 1.0 mg/dL 0.7   AST Latest Ref Range: 10 - 40 U/L 22   ALT Latest Ref Range: 10 - 44 U/L 18   PTH Latest Ref Range: 9.0 - 77.0 pg/mL 95.0 (H)     DEXA:  DXA scan was performed over the left hip and lumbar spine.  Review of the images confirms satisfactory positioning and technique.  Direct data comparison with prior examinations is unavailable.    The L1 to L4 vertebral bone mineral density is equal to 0.875 g/cm squared with a T score of -1.6 and a Z score of 0.9.      The left femoral neck bone mineral density is equal to 0.490 g/cm squared with a T score of -3.2 and a Z score of -1.1.       Impression       Osteoporosis -- there is a 21% risk of a major osteoporotic fracture and a 8.4% risk of hip fracture in the next 10 years (FRAX).             ASSESSMENT/PLAN:  1. Elevated Parathyroid- PTH elevated with a mildly elevated Ca. Based on elevated Ca most likely has primary hyperparathyroidism. As she does not want to do surgery we are treating bone loss. If Ca increases can use sensipar    2. Osteopeorosis- No Hx of fracture. Off Fosamax due to it effecting dentition. Discussed potential loss of bone if has hyperparathyroidism. Will try Prolia. No further dental procedures planned.     3. Vitamin D deficiency-Continue to take take OTC Vit D 2000 daily. Check Vit D at f/u    FOLLOWUP  Prolia  F/U 6 months prior to Prolia with CMP, PTH, Phos, Vit D

## 2017-04-03 ENCOUNTER — TELEPHONE (OUTPATIENT)
Dept: FAMILY MEDICINE | Facility: CLINIC | Age: 77
End: 2017-04-03

## 2017-04-03 NOTE — TELEPHONE ENCOUNTER
----- Message from Gianna Loyd sent at 4/3/2017  2:35 PM CDT -----  Call pt at [phone]//returning your call//kimberley dominique

## 2017-04-12 LAB — BACTERIA UR CULT: NORMAL

## 2017-04-28 ENCOUNTER — INFUSION (OUTPATIENT)
Dept: INFUSION THERAPY | Facility: HOSPITAL | Age: 77
End: 2017-04-28
Attending: INTERNAL MEDICINE
Payer: MEDICARE

## 2017-04-28 DIAGNOSIS — I10 ESSENTIAL HYPERTENSION: ICD-10-CM

## 2017-04-28 DIAGNOSIS — F41.9 ANXIETY: ICD-10-CM

## 2017-04-28 DIAGNOSIS — M81.0 AGE-RELATED OSTEOPOROSIS WITHOUT CURRENT PATHOLOGICAL FRACTURE: Primary | ICD-10-CM

## 2017-04-28 DIAGNOSIS — E78.5 HYPERLIPIDEMIA: ICD-10-CM

## 2017-04-28 DIAGNOSIS — F32.A DEPRESSION: ICD-10-CM

## 2017-04-28 PROCEDURE — 96372 THER/PROPH/DIAG INJ SC/IM: CPT | Mod: PN

## 2017-04-28 PROCEDURE — 63600175 PHARM REV CODE 636 W HCPCS: Mod: PN | Performed by: INTERNAL MEDICINE

## 2017-04-28 RX ORDER — CITALOPRAM 10 MG/1
TABLET ORAL
Qty: 30 TABLET | Refills: 11 | Status: SHIPPED | OUTPATIENT
Start: 2017-04-28 | End: 2018-07-26 | Stop reason: SDUPTHER

## 2017-04-28 RX ORDER — ATORVASTATIN CALCIUM 40 MG/1
TABLET, FILM COATED ORAL
Qty: 30 TABLET | Refills: 11 | Status: SHIPPED | OUTPATIENT
Start: 2017-04-28 | End: 2018-07-26 | Stop reason: SDUPTHER

## 2017-04-28 RX ORDER — LOSARTAN POTASSIUM 50 MG/1
TABLET ORAL
Qty: 30 TABLET | Refills: 11 | Status: SHIPPED | OUTPATIENT
Start: 2017-04-28 | End: 2018-01-18 | Stop reason: SDUPTHER

## 2017-04-28 RX ORDER — METOPROLOL TARTRATE 100 MG/1
TABLET ORAL
Qty: 60 TABLET | Refills: 11 | Status: SHIPPED | OUTPATIENT
Start: 2017-04-28 | End: 2018-07-26 | Stop reason: SDUPTHER

## 2017-04-28 RX ADMIN — DENOSUMAB 60 MG: 60 INJECTION SUBCUTANEOUS at 12:04

## 2017-04-28 NOTE — PLAN OF CARE
Problem: Patient Care Overview  Goal: Plan of Care Review  Outcome: Ongoing (interventions implemented as appropriate)  Pt tolerated Prolia injection well.   Reminded to take daily Vit D and Calcium supplements.  Instructed to call MD with any problems.

## 2017-04-28 NOTE — MR AVS SNAPSHOT
Patient Information     Patient Name Sex Suzie Alexander Female 1940      Visit Information        Provider Department Dept Phone Center    2017 12:30 PM CHAIR 28, [facility] [phone] [facility]      Patient Instructions     None      Your Current Medications Are     alprazolam (XANAX) 0.25 MG tablet    atorvastatin (LIPITOR) 40 MG tablet    calcium-vitamin D3 500 mg(1,250mg) -200 unit per tablet    citalopram (CELEXA) 10 MG tablet    diphenoxylate-atropine 2.5-0.025 mg (LOMOTIL) 2.5-0.025 mg per tablet    losartan (COZAAR) 50 MG tablet    metoprolol tartrate (LOPRESSOR) 100 MG tablet    ondansetron (ZOFRAN-ODT) 4 MG TbDL    temazepam (RESTORIL) 30 mg capsule    topiramate 25 mg capsule    atorvastatin (LIPITOR) 40 MG tablet (Discontinued)    citalopram (CELEXA) 10 MG tablet (Discontinued)    losartan (COZAAR) 50 MG tablet (Discontinued)    metoprolol tartrate (LOPRESSOR) 100 MG tablet (Discontinued)      Facility-Administered Medications     denosumab (PROLIA) injection 60 mg      Appointments for Next Year     2017  8:00 AM FASTING LAB (5 min.) [facility] LABORATORY, [place]    1. Do not eat or drink anything for TEN HOURS (10) PRIOR TO TEST. Do not chew gum or eat candy mints, even those claiming to be sugar free. Water is allowed but do not drink any other fluids 2. Take your regular daily medicines as your doctor has ordered. If you are diabetic, do not take your insulin or other diabetic medication until your blood is drawn and you are ready to eat. Your physician may have special instructions for diabetics. Check with your doctor if you have any questions.3. Alcoholic beverages are not allowed starting at 6:00pm the evening before your appointment.    10/31/2017  4:00 PM INFUSION 060 MIN (60 min.) [facility] CHAIR 08, [facility]    Arrive at check-in approximately 15 minutes before your scheduled  appointment time. Bring all outside medical records and imaging, along with a list of your current medications and insurance card.    1st Floor         Default Flowsheet Data (last 24 hours)      Amb Complex Vitals [place]        04/28/17 1250                Pain Assessment    Pain Score Six        Pain Frequency 2 Continuous        Pain Loc BACK                Allergies     B12 [Cyanocobalamin-cobamamide] Other (See Comments)    Caused flushing and caused skin to peel    Codeine     Other reaction(s): Muscle pain    Iodine     Other reaction(s): Hives    Meperidine     Other reaction(s): Muscle pain    Narcotic Antagonist     Other reaction(s): Rash    Penicillins Nausea And Vomiting    Propoxyphene     Other reaction(s): Muscle pain    Sulfa (Sulfonamide Antibiotics)     Other reaction(s): Muscle pain  Other reaction(s): Hives      Medications You Received from 04/27/2017 1256 to 04/28/2017 1256        Date/Time Order Dose Route Action     04/28/2017 1253 denosumab (PROLIA) injection 60 mg 60 mg Subcutaneous Given      Current Discharge Medication List     Cannot display discharge medications since this is not an admission.

## 2017-05-08 ENCOUNTER — TELEPHONE (OUTPATIENT)
Dept: ENDOCRINOLOGY | Facility: CLINIC | Age: 77
End: 2017-05-08

## 2017-05-08 NOTE — TELEPHONE ENCOUNTER
Called pt regarding message about Prolia appointment.  Pt stated that she received a letter in the mail, after reading me the letter she realized that she had previously misunderstood the letter.  The appointment letter was for her future apt with Dr. Reddy in October.  Pt apologized for the mix up and verbalized understanding.

## 2017-05-08 NOTE — TELEPHONE ENCOUNTER
----- Message from Sunita Diaz sent at 5/8/2017  9:41 AM CDT -----  Pt states that the appointments done for the prolia injections / please call pt at [phone] to explain what happened ?

## 2017-05-10 ENCOUNTER — TELEPHONE (OUTPATIENT)
Dept: ENDOCRINOLOGY | Facility: CLINIC | Age: 77
End: 2017-05-10

## 2017-05-10 NOTE — TELEPHONE ENCOUNTER
----- Message from June Wood sent at 5/10/2017 12:23 PM CDT -----  Contact: self  Patient would like to speak to a nurse She has questions   Please call her at     Thanks

## 2017-05-10 NOTE — TELEPHONE ENCOUNTER
The patient wanted to know if Prolia would interfere with her upcoming cataract surgery. I told her it would not.

## 2017-06-12 ENCOUNTER — HOSPITAL ENCOUNTER (OUTPATIENT)
Dept: RADIOLOGY | Facility: HOSPITAL | Age: 77
Discharge: HOME | End: 2017-06-12
Attending: FAMILY MEDICINE
Payer: MEDICARE

## 2017-06-12 ENCOUNTER — OFFICE VISIT (OUTPATIENT)
Dept: FAMILY MEDICINE | Facility: CLINIC | Age: 77
End: 2017-06-12
Payer: MEDICARE

## 2017-06-12 VITALS — HEIGHT: 58 IN | WEIGHT: 153 LBS | BODY MASS INDEX: 32.12 KG/M2

## 2017-06-12 VITALS
HEIGHT: 58 IN | HEART RATE: 50 BPM | SYSTOLIC BLOOD PRESSURE: 129 MMHG | DIASTOLIC BLOOD PRESSURE: 65 MMHG | TEMPERATURE: 99 F | WEIGHT: 153.13 LBS | BODY MASS INDEX: 32.14 KG/M2

## 2017-06-12 DIAGNOSIS — Z86.010 HISTORY OF COLON POLYPS: ICD-10-CM

## 2017-06-12 DIAGNOSIS — H53.9 VISUAL DISTURBANCE: ICD-10-CM

## 2017-06-12 DIAGNOSIS — R94.31 ABNORMAL EKG: ICD-10-CM

## 2017-06-12 DIAGNOSIS — Z86.012 HISTORY OF BENIGN CARCINOID TUMOR: ICD-10-CM

## 2017-06-12 DIAGNOSIS — R00.1 BRADYCARDIA: ICD-10-CM

## 2017-06-12 DIAGNOSIS — I10 ESSENTIAL HYPERTENSION: ICD-10-CM

## 2017-06-12 DIAGNOSIS — K21.9 GASTROESOPHAGEAL REFLUX DISEASE, ESOPHAGITIS PRESENCE NOT SPECIFIED: ICD-10-CM

## 2017-06-12 DIAGNOSIS — Z01.818 PREOPERATIVE CLEARANCE: Primary | ICD-10-CM

## 2017-06-12 DIAGNOSIS — H26.9 CATARACT, UNSPECIFIED CATARACT TYPE, UNSPECIFIED LATERALITY: ICD-10-CM

## 2017-06-12 DIAGNOSIS — Z12.31 ENCOUNTER FOR SCREENING MAMMOGRAM FOR BREAST CANCER: ICD-10-CM

## 2017-06-12 DIAGNOSIS — F41.9 ANXIETY: ICD-10-CM

## 2017-06-12 PROBLEM — Z86.0100 HISTORY OF COLON POLYPS: Status: ACTIVE | Noted: 2017-06-12

## 2017-06-12 PROBLEM — Z80.9 FAMILY HISTORY OF CARCINOID TUMOR: Status: ACTIVE | Noted: 2017-06-12

## 2017-06-12 PROCEDURE — 77063 BREAST TOMOSYNTHESIS BI: CPT | Mod: 26,,, | Performed by: RADIOLOGY

## 2017-06-12 PROCEDURE — 99214 OFFICE O/P EST MOD 30 MIN: CPT | Mod: S$PBB,,, | Performed by: FAMILY MEDICINE

## 2017-06-12 PROCEDURE — 1159F MED LIST DOCD IN RCRD: CPT | Mod: ,,, | Performed by: FAMILY MEDICINE

## 2017-06-12 PROCEDURE — 93010 ELECTROCARDIOGRAM REPORT: CPT | Mod: ,,, | Performed by: NUCLEAR MEDICINE

## 2017-06-12 PROCEDURE — 77067 SCR MAMMO BI INCL CAD: CPT | Mod: 26,,, | Performed by: RADIOLOGY

## 2017-06-12 PROCEDURE — 99999 PR PBB SHADOW E&M-EST. PATIENT-LVL IV: CPT | Mod: PBBFAC,,, | Performed by: FAMILY MEDICINE

## 2017-06-12 PROCEDURE — 1126F AMNT PAIN NOTED NONE PRSNT: CPT | Mod: ,,, | Performed by: FAMILY MEDICINE

## 2017-06-12 PROCEDURE — 77067 SCR MAMMO BI INCL CAD: CPT | Mod: TC

## 2017-06-12 RX ORDER — PROMETHAZINE HYDROCHLORIDE 25 MG/1
25 TABLET ORAL EVERY 6 HOURS PRN
Qty: 6 TABLET | Refills: 0 | Status: SHIPPED | OUTPATIENT
Start: 2017-06-12 | End: 2017-06-20 | Stop reason: SDUPTHER

## 2017-06-12 RX ORDER — SODIUM, POTASSIUM,MAG SULFATES 17.5-3.13G
SOLUTION, RECONSTITUTED, ORAL ORAL
Qty: 354 ML | Refills: 0 | Status: SHIPPED | OUTPATIENT
Start: 2017-06-12 | End: 2017-06-12 | Stop reason: SDUPTHER

## 2017-06-12 RX ORDER — PROMETHAZINE HYDROCHLORIDE 25 MG/1
25 TABLET ORAL EVERY 6 HOURS PRN
Qty: 6 TABLET | Refills: 0 | Status: SHIPPED | OUTPATIENT
Start: 2017-06-12 | End: 2017-06-12 | Stop reason: SDUPTHER

## 2017-06-12 RX ORDER — SODIUM, POTASSIUM,MAG SULFATES 17.5-3.13G
SOLUTION, RECONSTITUTED, ORAL ORAL
Qty: 354 ML | Refills: 0 | Status: SHIPPED | OUTPATIENT
Start: 2017-06-12 | End: 2018-05-18

## 2017-06-12 NOTE — PROGRESS NOTES
Subjective:      Patient ID: Suzie Dawkins is a 76 y.o. female.    Chief Complaint: Pre-op Exam    HPI   The patient is here for a preop clearance to have surgery on both eyes for cataracts.    The physician that is performing the surgery is Dr. Barriga.    The surgery is being planned for 6/15/2017.    I will send a copy of the referral to the surgeon at fax # [phone].    The patient states that there has not been previous problems with sedation.  She is having visual disturbances.     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    Depression     GERD (gastroesophageal reflux disease)     Hypercalcemia     Hyperlipidemia     Hypertension     Osteoporosis 10/7/2013    Vitamin D deficiency      Past Surgical History:   Procedure Laterality Date    ADENOIDECTOMY       SECTION      two    COLON SURGERY  2007    cancer removal    HEMORRHOID SURGERY      HYSTERECTOMY      TONSILLECTOMY      TONSILLECTOMY, ADENOIDECTOMY, BILATERAL MYRINGOTOMY AND TUBES       Review of patient's allergies indicates:   Allergen Reactions    B12 [cyanocobalamin-cobamamide] Other (See Comments)     Caused flushing and caused skin to peel    Codeine      Other reaction(s): Muscle pain    Iodine      Other reaction(s): Hives    Meperidine      Other reaction(s): Muscle pain    Narcotic antagonist      Other reaction(s): Rash    Penicillins Nausea And Vomiting    Propoxyphene      Other reaction(s): Muscle pain    Sulfa (sulfonamide antibiotics)      Other reaction(s): Muscle pain  Other reaction(s): Hives     Current Outpatient Prescriptions on File Prior to Visit   Medication Sig Dispense Refill    alprazolam (XANAX) 0.25 MG tablet TAKE ONE TABLET THREE TIMES DAILY AS NEEDED FOR ANXIETY 90 tablet 1    atorvastatin (LIPITOR) 40 MG tablet TAKE ONE TABLET BY MOUTH ONCE DAILY 30 tablet 11    calcium-vitamin D3 500  mg(1,250mg) -200 unit per tablet Take 1 tablet by mouth 2 (two) times daily with meals.      citalopram (CELEXA) 10 MG tablet TAKE ONE TABLET BY MOUTH ONCE DAILY 30 tablet 11    diphenoxylate-atropine 2.5-0.025 mg (LOMOTIL) 2.5-0.025 mg per tablet Take 1 tablet by mouth 4 (four) times daily as needed. 180 tablet 1    losartan (COZAAR) 50 MG tablet TAKE ONE TABLET BY MOUTH ONCE DAILY 30 tablet 11    metoprolol tartrate (LOPRESSOR) 100 MG tablet TAKE ONE TABLET BY MOUTH TWICE DAILY 60 tablet 11    ondansetron (ZOFRAN-ODT) 4 MG TbDL Every 6 hours prn nausea 60 tablet 3    temazepam (RESTORIL) 30 mg capsule Take 1 capsule (30 mg total) by mouth nightly. 90 capsule 1    topiramate 25 mg capsule Take 1 capsule (25 mg total) by mouth once daily. 90 capsule 3     No current facility-administered medications on file prior to visit.      Social History     Social History    Marital status: Single     Spouse name: N/A    Number of children: N/A    Years of education: N/A     Occupational History    Not on file.     Social History Main Topics    Smoking status: Former Smoker     Types: Cigarettes     Quit date: 1/1/1989    Smokeless tobacco: Not on file    Alcohol use No    Drug use: No    Sexual activity: Not on file     Other Topics Concern    Not on file     Social History Narrative    No narrative on file     Family History   Problem Relation Age of Onset    Cancer Father      lung    Arthritis Father     Cancer Maternal Aunt      breast    Arthritis Maternal Aunt     Cancer Maternal Uncle     Arthritis Paternal Aunt     Cancer Maternal Grandfather      glands cancer     Arthritis Paternal Aunt     Arthritis Maternal Aunt          Review of Systems   Constitutional: Negative for fatigue, fever and unexpected weight change.   HENT: Negative for congestion, ear pain, postnasal drip and sore throat.    Eyes: Positive for visual disturbance.   Respiratory: Negative for cough, chest tightness,  "shortness of breath and wheezing.    Cardiovascular: Negative for chest pain, palpitations and leg swelling.   Gastrointestinal: Negative for abdominal pain, blood in stool, constipation, diarrhea, nausea and vomiting.   Genitourinary: Negative for dysuria and hematuria.   Neurological: Negative for weakness and numbness.       Objective:   /65   Pulse (!) 50   Temp 98.5 °F (36.9 °C) (Oral)   Ht 4' 10" (1.473 m)   Wt 69.4 kg (153 lb 1.8 oz)   BMI 32.00 kg/m²     Physical Exam   Constitutional: She appears well-developed and well-nourished. She is cooperative.   HENT:   Head: Normocephalic and atraumatic.   Right Ear: Tympanic membrane, external ear and ear canal normal.   Left Ear: Tympanic membrane, external ear and ear canal normal.   Nose: Nose normal.   Mouth/Throat: Uvula is midline and mucous membranes are normal. No oral lesions. No oropharyngeal exudate, posterior oropharyngeal edema or posterior oropharyngeal erythema.   Eyes: EOM and lids are normal. Pupils are equal, round, and reactive to light. Right eye exhibits no discharge. Left eye exhibits no discharge. Right conjunctiva is not injected. Right conjunctiva has no hemorrhage. Left conjunctiva is not injected. Left conjunctiva has no hemorrhage. No scleral icterus. Right eye exhibits no nystagmus. Left eye exhibits no nystagmus.   Neck: Normal range of motion and full passive range of motion without pain. Neck supple. No JVD present. No tracheal tenderness present. Carotid bruit is not present. No tracheal deviation present. No thyroid mass and no thyromegaly present.   Cardiovascular: Normal rate, regular rhythm, S1 normal and S2 normal.    No murmur heard.  Pulses:       Carotid pulses are 2+ on the right side, and 2+ on the left side.       Radial pulses are 2+ on the right side, and 2+ on the left side.        Posterior tibial pulses are 2+ on the right side, and 2+ on the left side.   Pulmonary/Chest: Effort normal and breath sounds " normal. No respiratory distress. She has no wheezes. She has no rhonchi. She has no rales.   Abdominal: Soft. Normal appearance, normal aorta and bowel sounds are normal. She exhibits no distension, no abdominal bruit, no pulsatile midline mass and no mass. There is no hepatosplenomegaly. There is no tenderness. There is no rebound.   Musculoskeletal:        Right knee: She exhibits no swelling. No tenderness found.        Left knee: She exhibits no swelling. No tenderness found.   Lymphadenopathy:        Head (right side): No submental and no submandibular adenopathy present.        Head (left side): No submental and no submandibular adenopathy present.     She has no cervical adenopathy.   Neurological: She is alert. She has normal strength. No cranial nerve deficit or sensory deficit.   Skin: Skin is warm and dry. No rash noted. No cyanosis. Nails show no clubbing.   Psychiatric: She has a normal mood and affect. Her speech is normal and behavior is normal. Thought content normal. Cognition and memory are normal.     The EKG shows a bradycardia of 49 and there are some areas that a P wave is not discernable but in other leads, she appears to have p waves.  This led to the machine interpreting that she had a junctional rhythm.  Assessment:     1. Preoperative clearance    2. Visual disturbance    3. Cataract, unspecified cataract type, unspecified laterality    4. Essential hypertension    5. Gastroesophageal reflux disease, esophagitis presence not specified    6. Anxiety    7. Encounter for screening mammogram for breast cancer    8. History of colon polyps    9. History of benign carcinoid tumor    10. Bradycardia    11. Abnormal EKG        Plan:   Suzie was seen today for pre-op exam.    Diagnoses and all orders for this visit:    Preoperative clearance  -     EKG 12-lead; Future    Visual disturbance    Cataract, unspecified cataract type, unspecified laterality    Essential hypertension    Gastroesophageal  reflux disease, esophagitis presence not specified    Anxiety    Encounter for screening mammogram for breast cancer  -     Mammo Digital Screening Bilat with CAD; Future    History of colon polyps  -     Case request GI: COLONOSCOPY    History of benign carcinoid tumor  -     Case request GI: COLONOSCOPY    Bradycardia  -     Ambulatory referral to Cardiology    Abnormal EKG  -     Ambulatory referral to Cardiology    Other orders  -     sodium,potassium,mag sulfates (SUPREP BOWEL PREP KIT) 17.5-3.13-1.6 gram SolR; Take as instructed on prep sheet  -     promethazine (PHENERGAN) 25 MG tablet; Take 1 tablet (25 mg total) by mouth every 6 (six) hours as needed for Nausea.    I will have Dr. Kraus look at her and her EKG tomorrow AM for surgical clearance.  I feel that this is probably the fact that the EKG machine has low lead and they could not be changed and she is on a beta blocker.  HOwever, I would like for her to be evaluated and cleared for her surgery.

## 2017-06-13 ENCOUNTER — OFFICE VISIT (OUTPATIENT)
Dept: CARDIOLOGY | Facility: CLINIC | Age: 77
End: 2017-06-13
Payer: MEDICARE

## 2017-06-13 ENCOUNTER — TELEPHONE (OUTPATIENT)
Dept: FAMILY MEDICINE | Facility: CLINIC | Age: 77
End: 2017-06-13

## 2017-06-13 VITALS
BODY MASS INDEX: 31.93 KG/M2 | SYSTOLIC BLOOD PRESSURE: 128 MMHG | HEIGHT: 58 IN | DIASTOLIC BLOOD PRESSURE: 67 MMHG | WEIGHT: 152.13 LBS | HEART RATE: 87 BPM

## 2017-06-13 DIAGNOSIS — Z01.810 PREOP CARDIOVASCULAR EXAM: ICD-10-CM

## 2017-06-13 DIAGNOSIS — E78.5 HYPERLIPIDEMIA, UNSPECIFIED HYPERLIPIDEMIA TYPE: Primary | ICD-10-CM

## 2017-06-13 DIAGNOSIS — R00.1 SINUS BRADYCARDIA BY ELECTROCARDIOGRAM: ICD-10-CM

## 2017-06-13 PROCEDURE — 99999 PR PBB SHADOW E&M-EST. PATIENT-LVL III: CPT | Mod: PBBFAC,,, | Performed by: INTERNAL MEDICINE

## 2017-06-13 PROCEDURE — 99213 OFFICE O/P EST LOW 20 MIN: CPT | Mod: PBBFAC,PO | Performed by: INTERNAL MEDICINE

## 2017-06-13 PROCEDURE — 1159F MED LIST DOCD IN RCRD: CPT | Mod: ,,, | Performed by: INTERNAL MEDICINE

## 2017-06-13 PROCEDURE — 99204 OFFICE O/P NEW MOD 45 MIN: CPT | Mod: S$PBB,,, | Performed by: INTERNAL MEDICINE

## 2017-06-13 PROCEDURE — 1126F AMNT PAIN NOTED NONE PRSNT: CPT | Mod: ,,, | Performed by: INTERNAL MEDICINE

## 2017-06-13 NOTE — LETTER
June 13, 2017      Eladio Marrero MD  [address] [facility] [address]           [facility]  [address] [facility] [address]  Phone: [phone]  Fax: [phone]          Patient: Suzie Dawkins   MR Number: [id number]   YOB: 1940   Date of Visit: 6/13/2017       Dear Dr. Eladio Marrero:    Thank you for referring Suzie Dawkins to me for evaluation. Attached you will find relevant portions of my assessment and plan of care.    If you have questions, please do not hesitate to call me. I look forward to following Suzie Dawkins along with you.    Sincerely,    Shane Kraus Jr., MD    Enclosure  CC:  No Recipients    If you would like to receive this communication electronically, please contact [email or web address] or [phone] to request more information on [facility] [employer] access.    For providers and/or their staff who would like to refer a patient to [facility], please contact us through our one-stop-shop provider referral line, [facility], at [phone].    If you feel you have received this communication in error or would no longer like to receive these types of communications, please e-mail [email or web address]

## 2017-06-13 NOTE — TELEPHONE ENCOUNTER
This patient has had a cardiac evaluation.  She is cleared for surgery Thursday.  Please print this, my last note and the cardiology notes and fax it to her surgeon.

## 2017-06-20 NOTE — TELEPHONE ENCOUNTER
----- Message from Jazzy Kinney sent at 6/20/2017  2:46 PM CDT -----  Contact: patient  States she called for a medication for nausea and pharmacy said they never received the order. Please call patient ASAP today @ [phone]./ thanks, avery      [facility] [place] [address]  [address] [facility]  [place] [address]  Phone: [phone] Fax: [phone]

## 2017-06-21 RX ORDER — PROMETHAZINE HYDROCHLORIDE 25 MG/1
25 TABLET ORAL EVERY 6 HOURS PRN
Qty: 6 TABLET | Refills: 0 | Status: SHIPPED | OUTPATIENT
Start: 2017-06-21 | End: 2018-08-09 | Stop reason: SDUPTHER

## 2017-06-30 RX ORDER — ONDANSETRON 4 MG/1
TABLET, ORALLY DISINTEGRATING ORAL
Qty: 30 TABLET | Refills: 0 | Status: SHIPPED | OUTPATIENT
Start: 2017-06-30 | End: 2018-09-10 | Stop reason: SDUPTHER

## 2017-07-27 RX ORDER — TEMAZEPAM 30 MG/1
30 CAPSULE ORAL NIGHTLY
Qty: 90 CAPSULE | Refills: 1 | Status: SHIPPED | OUTPATIENT
Start: 2017-07-27 | End: 2018-01-18 | Stop reason: SDUPTHER

## 2017-09-01 ENCOUNTER — LAB VISIT (OUTPATIENT)
Dept: LAB | Facility: HOSPITAL | Age: 77
End: 2017-09-01
Attending: FAMILY MEDICINE
Payer: MEDICARE

## 2017-09-01 DIAGNOSIS — E78.5 HYPERLIPIDEMIA, UNSPECIFIED HYPERLIPIDEMIA TYPE: ICD-10-CM

## 2017-09-01 LAB
ALBUMIN SERPL BCP-MCNC: 3.6 G/DL
ALP SERPL-CCNC: 77 U/L
ALT SERPL W/O P-5'-P-CCNC: 19 U/L
AST SERPL-CCNC: 22 U/L
BILIRUB DIRECT SERPL-MCNC: 0.3 MG/DL
BILIRUB SERPL-MCNC: 0.9 MG/DL
CHOLEST SERPL-MCNC: 163 MG/DL
CHOLEST/HDLC SERPL: 3.3 {RATIO}
HDLC SERPL-MCNC: 50 MG/DL
HDLC SERPL: 30.7 %
LDLC SERPL CALC-MCNC: 92.6 MG/DL
NONHDLC SERPL-MCNC: 113 MG/DL
PROT SERPL-MCNC: 6.9 G/DL
TRIGL SERPL-MCNC: 102 MG/DL

## 2017-09-01 PROCEDURE — 80076 HEPATIC FUNCTION PANEL: CPT

## 2017-09-01 PROCEDURE — 80061 LIPID PANEL: CPT

## 2017-09-01 PROCEDURE — 36415 COLL VENOUS BLD VENIPUNCTURE: CPT | Mod: PO

## 2017-10-12 ENCOUNTER — LAB VISIT (OUTPATIENT)
Dept: LAB | Facility: HOSPITAL | Age: 77
End: 2017-10-12
Attending: FAMILY MEDICINE
Payer: MEDICARE

## 2017-10-12 DIAGNOSIS — E21.3 HYPERPARATHYROIDISM: ICD-10-CM

## 2017-10-12 DIAGNOSIS — E55.9 VITAMIN D DEFICIENCY: ICD-10-CM

## 2017-10-12 DIAGNOSIS — M81.0 OSTEOPOROSIS: ICD-10-CM

## 2017-10-12 LAB
25(OH)D3+25(OH)D2 SERPL-MCNC: 61 NG/ML
ALBUMIN SERPL BCP-MCNC: 3.8 G/DL
ALP SERPL-CCNC: 77 U/L
ALT SERPL W/O P-5'-P-CCNC: 22 U/L
ANION GAP SERPL CALC-SCNC: 6 MMOL/L
AST SERPL-CCNC: 24 U/L
BILIRUB SERPL-MCNC: 0.7 MG/DL
BUN SERPL-MCNC: 19 MG/DL
CALCIUM SERPL-MCNC: 10.8 MG/DL
CHLORIDE SERPL-SCNC: 105 MMOL/L
CO2 SERPL-SCNC: 30 MMOL/L
CREAT SERPL-MCNC: 1.2 MG/DL
EST. GFR  (AFRICAN AMERICAN): 50.7 ML/MIN/1.73 M^2
EST. GFR  (NON AFRICAN AMERICAN): 44 ML/MIN/1.73 M^2
GLUCOSE SERPL-MCNC: 137 MG/DL
PHOSPHATE SERPL-MCNC: 3.7 MG/DL
POTASSIUM SERPL-SCNC: 4.7 MMOL/L
PROT SERPL-MCNC: 7.2 G/DL
PTH-INTACT SERPL-MCNC: 98 PG/ML
SODIUM SERPL-SCNC: 141 MMOL/L

## 2017-10-12 PROCEDURE — 36415 COLL VENOUS BLD VENIPUNCTURE: CPT | Mod: PO

## 2017-10-12 PROCEDURE — 84100 ASSAY OF PHOSPHORUS: CPT

## 2017-10-12 PROCEDURE — 82306 VITAMIN D 25 HYDROXY: CPT

## 2017-10-12 PROCEDURE — 83970 ASSAY OF PARATHORMONE: CPT

## 2017-10-12 PROCEDURE — 80053 COMPREHEN METABOLIC PANEL: CPT

## 2017-10-19 ENCOUNTER — OFFICE VISIT (OUTPATIENT)
Dept: ENDOCRINOLOGY | Facility: CLINIC | Age: 77
End: 2017-10-19
Payer: MEDICARE

## 2017-10-19 VITALS
BODY MASS INDEX: 32.04 KG/M2 | HEART RATE: 52 BPM | HEIGHT: 58 IN | DIASTOLIC BLOOD PRESSURE: 72 MMHG | WEIGHT: 152.63 LBS | SYSTOLIC BLOOD PRESSURE: 130 MMHG

## 2017-10-19 DIAGNOSIS — M81.0 AGE-RELATED OSTEOPOROSIS WITHOUT CURRENT PATHOLOGICAL FRACTURE: Primary | ICD-10-CM

## 2017-10-19 DIAGNOSIS — E21.3 HYPERPARATHYROIDISM: ICD-10-CM

## 2017-10-19 DIAGNOSIS — E83.52 HYPERCALCEMIA: ICD-10-CM

## 2017-10-19 DIAGNOSIS — E55.9 VITAMIN D DEFICIENCY: ICD-10-CM

## 2017-10-19 PROCEDURE — 99999 PR PBB SHADOW E&M-EST. PATIENT-LVL III: CPT | Mod: PBBFAC,,, | Performed by: INTERNAL MEDICINE

## 2017-10-19 PROCEDURE — 99213 OFFICE O/P EST LOW 20 MIN: CPT | Mod: PBBFAC,PO | Performed by: INTERNAL MEDICINE

## 2017-10-19 PROCEDURE — 99214 OFFICE O/P EST MOD 30 MIN: CPT | Mod: S$PBB,,, | Performed by: INTERNAL MEDICINE

## 2017-10-19 NOTE — PROGRESS NOTES
CHIEF COMPLAINT: Hypercalcemia    76 year old being seen as a f/u. Discovered to have a high calcium in Oct/13. Previously on Fosamax for two years then started Prolia in April. Next injection will be 10/31/2017. States she started having joint pain in her hips in her back about 1 week after the injection. States she is having issues with balance and walking since the injection. No steriod injections. She is not planning on having any dental procedures soon and has not had any in the last three months. No exercise due to cost of joining a gym.  Has completed top dentures and has a bottom bridge. Taking 2000 OTC Vit D. No fractures or falls. No kidney stones.     PAST MEDICAL HISTORY/PAST SURGICAL HISTORY:  Reviewed in EPIC    SOCIAL HISTORY: No T/A    FAMILY HISTORY:  No Ca disorders. + osteoporosis. No hip fractures. No thyroid disease or DM.     MEDICATIONS/ALLERGIES: The patient's MedCard has been updated and reviewed.      ROS:   Constitutional: weight stable, no changes in energy.    Eyes: No recent visual changes, recent cataract surgery in August and September.   ENT: + postnasal drip, No dysphagia.  Cardiovascular: Denies chest pain, palpitaions, leg swelling.  Respiratory: + cough, no SOB.   Gastrointestinal: + intermittent diarrhea (due to colon surgery), no  N/v.  GenitoUrinary - Being treated for UTI  Skin: No new skin rash  Neurologic: + vertigo and headaces.  Remainder ROS negative    PE:    GENERAL: Well developed, well nourished.  NECK: Supple, trachea midline, no palpable thyroid nodules.  CHEST: Resp even and unlabored, CTA bilateral.  CARDIAC: RRR, S1, S2 heard, no murmurs, rubs or gallops, trace edema BLE.    Results for URIEL REDDING (MRN [id number]) as of 10/19/2017 08:56   Ref. Range 10/12/2017 10:09   Sodium Latest Ref Range: 136 - 145 mmol/L 141   Potassium Latest Ref Range: 3.5 - 5.1 mmol/L 4.7   Chloride Latest Ref Range: 95 - 110 mmol/L 105   CO2 Latest Ref Range: 23 - 29 mmol/L 30 (H)    Anion Gap Latest Ref Range: 8 - 16 mmol/L 6 (L)   BUN, Bld Latest Ref Range: 8 - 23 mg/dL 19   Creatinine Latest Ref Range: 0.5 - 1.4 mg/dL 1.2   eGFR if non African American Latest Ref Range: >60 mL/min/1.73 m^2 44.0 (A)   eGFR if African American Latest Ref Range: >60 mL/min/1.73 m^2 50.7 (A)   Glucose Latest Ref Range: 70 - 110 mg/dL 137 (H)   Calcium Latest Ref Range: 8.7 - 10.5 mg/dL 10.8 (H)   Phosphorus Latest Ref Range: 2.7 - 4.5 mg/dL 3.7   Alkaline Phosphatase Latest Ref Range: 55 - 135 U/L 77   Total Protein Latest Ref Range: 6.0 - 8.4 g/dL 7.2   Albumin Latest Ref Range: 3.5 - 5.2 g/dL 3.8   Total Bilirubin Latest Ref Range: 0.1 - 1.0 mg/dL 0.7   AST Latest Ref Range: 10 - 40 U/L 24   ALT Latest Ref Range: 10 - 44 U/L 22   Vit D, 25-Hydroxy Latest Ref Range: 30 - 96 ng/mL 61   PTH Latest Ref Range: 9.0 - 77.0 pg/mL 98.0 (H)     DEXA:  DXA scan was performed over the left hip and lumbar spine.  Review of the images confirms satisfactory positioning and technique.  Direct data comparison with prior examinations is unavailable.    The L1 to L4 vertebral bone mineral density is equal to 0.875 g/cm squared with a T score of -1.6 and a Z score of 0.9.      The left femoral neck bone mineral density is equal to 0.490 g/cm squared with a T score of -3.2 and a Z score of -1.1.       Impression       Osteoporosis -- there is a 21% risk of a major osteoporotic fracture and a 8.4% risk of hip fracture in the next 10 years (FRAX).       ASSESSMENT/PLAN:  1. Osteopeorosis- No Hx of fracture. She will try Prolia one more time to see if she still experiences side effects. Advised to take tylenol for joint pain. Discussed potential loss of bone if has hyperparathyroidism. No further dental procedures planned. Discussed resistance exercises. She is taking calcium and Vitamin D.    2. Hyperparathyroidism/Hypercalcemia- PTH elevated with a mildly elevated Ca. She most likely has primary hyperparathyroidism and does not  want surgery. May use sensipar if Ca increases.    3. Vitamin D deficiency-Continue to take OTC Vit D 2000 daily. Vitamin D WNL.    FOLLOWUP  F/U 6 months prior to Prolia with CMP, PTH, Phos, DEXA      ATTESTATION:  Independently examined patient. She had some joint pain after Prolia but also has OA. Cannot take oral bisphosphonates. At this point will try Prolia again and monitor closely for symptoms. Ca adequate for prolia. Ca mildly elevated. Continue to monitor. Vit D WNL.

## 2017-10-30 ENCOUNTER — TELEPHONE (OUTPATIENT)
Dept: ENDOCRINOLOGY | Facility: CLINIC | Age: 77
End: 2017-10-30

## 2017-10-30 ENCOUNTER — TELEPHONE (OUTPATIENT)
Dept: INFUSION THERAPY | Facility: HOSPITAL | Age: 77
End: 2017-10-30

## 2017-10-30 NOTE — TELEPHONE ENCOUNTER
----- Message from Ilene Penn RN sent at 10/30/2017 10:53 AM CDT -----  Please see message below, would ya'll like us to hold the prolia injection?  ----- Message -----  From: Yasmin Mitchell MA  Sent: 10/30/2017   9:51 AM  To: [facility]    Pt has a cold and she is wondering if that affects her prolia shot.  [phone]

## 2017-10-30 NOTE — TELEPHONE ENCOUNTER
Dr. Barry Odom says it should be ok to get the Prolia now, but if pt prefers to wait a week or 2 that will be fine.

## 2017-10-30 NOTE — TELEPHONE ENCOUNTER
, would there be any contraindications for pt getting Prolia if she is sick with a cold?  Should she reschedule?

## 2017-11-27 ENCOUNTER — TELEPHONE (OUTPATIENT)
Dept: ENDOCRINOLOGY | Facility: CLINIC | Age: 77
End: 2017-11-27

## 2017-11-27 NOTE — TELEPHONE ENCOUNTER
----- Message from Geovanna Cheema sent at 11/27/2017 11:48 AM CST -----  please call patient in regards to Prolia injection, she states she can't get the injection scheduled &# needs help in doing so, [phone] (home)

## 2017-11-27 NOTE — TELEPHONE ENCOUNTER
Pt needs to be rescheduled for Prolia.  The appt was cancelled due to illness.  Please call her with appt info.  Thanks!

## 2017-11-29 ENCOUNTER — TELEPHONE (OUTPATIENT)
Dept: INFUSION THERAPY | Facility: HOSPITAL | Age: 77
End: 2017-11-29

## 2017-11-29 NOTE — TELEPHONE ENCOUNTER
----- Message from Zoie Christy sent at 11/13/2017  1:47 PM CST -----  Contact: patient  Patient Suzie Dawkins, [phone] is calling to schedule her PROLIA injection.  Patient had an appointment on 10/31, but was sick and had to cancel.  Please advise.  Thanks!

## 2017-12-05 ENCOUNTER — INFUSION (OUTPATIENT)
Dept: INFUSION THERAPY | Facility: HOSPITAL | Age: 77
End: 2017-12-05
Attending: INTERNAL MEDICINE
Payer: MEDICARE

## 2017-12-05 VITALS
DIASTOLIC BLOOD PRESSURE: 69 MMHG | WEIGHT: 153.19 LBS | OXYGEN SATURATION: 98 % | HEIGHT: 58 IN | RESPIRATION RATE: 16 BRPM | TEMPERATURE: 98 F | BODY MASS INDEX: 32.16 KG/M2 | SYSTOLIC BLOOD PRESSURE: 143 MMHG | HEART RATE: 54 BPM

## 2017-12-05 DIAGNOSIS — M81.0 AGE-RELATED OSTEOPOROSIS WITHOUT CURRENT PATHOLOGICAL FRACTURE: Primary | ICD-10-CM

## 2017-12-05 PROCEDURE — 63600175 PHARM REV CODE 636 W HCPCS: Mod: PN | Performed by: INTERNAL MEDICINE

## 2017-12-05 PROCEDURE — 96372 THER/PROPH/DIAG INJ SC/IM: CPT | Mod: PN

## 2017-12-05 RX ADMIN — DENOSUMAB 60 MG: 60 INJECTION SUBCUTANEOUS at 12:12

## 2017-12-05 NOTE — PATIENT INSTRUCTIONS
"  Preventing Falls: Are You At Risk of Falling?     Ask for help to reduce risk of falling in your home.     As you get older, you're not as steady on your feet as you once were. And you may have health problems you didn't have when you were younger. So, it's not surprising that older people are more likely to trip and fall. Falling can be very serious. It can change your overall health and quality of life. That's why it's important to be aware of your own risk of falling.  The dangers of falling  Falls are one of the main causes of injury in people over age 65. An older person who falls may take longer to get better than a younger person. And, after a fall, an older person is more likely to have problems that don't go away. So, preventing falls can help you avoid serious health problems.  Are you at risk of falling?  Answer these questions to rate your level of risk.  · Are you a woman?  · Have you fallen or stumbled in the last year?  · Are you over age 65?  · Are you ever dizzy or lightheaded with standing?  · Do you have a hard time getting in and out of the bathtub or on and off the toilet?  · Do you lean on objects to help you get around? Or do you use a cane or walker?  · Do you have vision or hearing problems? For example, do you need new glasses or hearing aids?  · Do you have 2 or more long-lasting (chronic) medical conditions?  · Do you take 3 or more medicines?  · Have you felt depressed recently?  · Have you had more trouble with your memory in recent months?  · Are there hazards in your home that might cause you to fall, such as loose rugs or poor lighting?  · Do you have a pet that jumps on you or might trip you?  · Have you stopped getting regular exercise?  · Do you have diabetes?   · Do you have a neurologic disease, such as Parkinson or Alzheimer disease?   · Do you drink alcohol?  · Do you wear athletic shoes or slippers, or go barefoot at home?  You can help prevent falls  If you answered "yes" " to any of the above questions, you should take steps to reduce your risk of a fall. Monitoring health conditions and keeping walkways in your home free of clutter are just 2 ways. Changing is sometimes easier said than done. But keep in mind that even small changes can make you less likely to fall.  The fear of falling  It's normal to be scared of falling, especially if you've fallen before. But being afraid can actually make you more likely to fall. This is because:  · Fear might cause you to become less active. Being less active can lead to a loss of strength and balance.  · Fear can lead to isolation from others, depression, or the use of more medicines or alcohol. And all these things make falling even more likely.  To break the cycle, learn more about ways to avoid falling. As you take control, you may find yourself feeling less afraid.   Date Last Reviewed: 6/12/2015  © 6182-1823 [employer]. [address] [place] [address]. All rights reserved. This information is not intended as a substitute for professional medical care. Always follow your healthcare professional's instructions.        What Is Osteoporosis?  Osteoporosis is a disease that weakens the bones. Weakened bones are more likely to fracture (break). Osteoporosis affects men and women, but postmenopausal women are most at risk. To help prevent osteoporosis, you need to exercise and nourish your bones throughout your life.    Childhood  The body builds the most bone during these years. That's why boys and girls need foods rich in calcium. They also need plenty of exercise. A healthy diet and exercise helps bones grow strong.  Young adulthood to age 30  During young adulthood, bones become their strongest. This is called peak bone mass. The same good habits that kept bones healthy in childhood help keep bone healthy in adulthood.  Age 30 to menopause  Bone mass declines slightly during these years. Your body makes just enough  new bone to maintain peak bone mass. To keep your bones at their peak mass, be sure to exercise and get plenty of calcium.  After menopause  Menopause is when a woman stops having monthly periods. After menopause, the body makes less estrogen (female hormone). This increases bone loss. At this point, treatment may be needed to reduce the risk for fracture. Exercise and calcium can also help keep your bones strong.  Later in life  In later years, both men and women need to take extra care of their bones. By this point, the body loses more bone than it makes. If too much bone is lost, you may be at risk for fractures. With age, the quality and quantity of bone declines. You can lessen bone loss by staying active and increasing your calcium intake. Calcium supplements and other osteoporosis treatments do have risks, so talk to your healthcare provider if you have concerns. If you have osteoporosis, you can also learn ways to increase everyday safety.  Date Last Reviewed: 10/17/2015  ©2007 [employer]. [address] [place] [address], [place] [address]. All Rights reserved. This information is not intended as a substitute for professional medical care. Always follow your healthcare providers instructions.        Denosumab injection  What is this medicine?  DENOSUMAB (den oh harry mab) slows bone breakdown. Prolia is used to treat osteoporosis in women after menopause and in men. Xgeva is used to prevent bone fractures and other bone problems caused by cancer bone metastases. Xgeva is also used to treat giant cell tumor of the bone.  How should I use this medicine?  This medicine is for injection under the skin. It is given by a health care professional in a hospital or clinic setting.  If you are getting Prolia, a special MedGuide will be given to you by the pharmacist with each prescription and refill. Be sure to read this information carefully each time.  For Prolia, talk to your pediatrician regarding the use of  this medicine in children. Special care may be needed. For Xgeva, talk to your pediatrician regarding the use of this medicine in children. While this drug may be prescribed for children as young as 13 years for selected conditions, precautions do apply.  What side effects may I notice from receiving this medicine?  Side effects that you should report to your doctor or health care professional as soon as possible:  · allergic reactions like skin rash, itching or hives, swelling of the face, lips, or tongue  · breathing problems  · chest pain  · fast, irregular heartbeat  · feeling faint or lightheaded, falls  · fever, chills, or any other sign of infection  · muscle spasms, tightening, or twitches  · numbness or tingling  · skin blisters or bumps, or is dry, peels, or red  · slow healing or unexplained pain in the mouth or jaw  · unusual bleeding or bruising  Side effects that usually do not require medical attention (Report these to your doctor or health care professional if they continue or are bothersome.):  · muscle pain  · stomach upset, gas  What may interact with this medicine?  Do not take this medicine with any of the following medications:  · other medicines containing denosumab  This medicine may also interact with the following medications:  · medicines that suppress the immune system  · medicines that treat cancer  · steroid medicines like prednisone or cortisone  What if I miss a dose?  It is important not to miss your dose. Call your doctor or health care professional if you are unable to keep an appointment.  Where should I keep my medicine?  This medicine is only given in a clinic, doctor's office, or other health care setting and will not be stored at home.  What should I tell my health care provider before I take this medicine?  They need to know if you have any of these conditions:  · dental disease  · eczema  · infection or history of infections  · kidney disease or on dialysis  · low blood  calcium or vitamin D  · malabsorption syndrome  · scheduled to have surgery or tooth extraction  · taking medicine that contains denosumab  · thyroid or parathyroid disease  · an unusual reaction to denosumab, other medicines, foods, dyes, or preservatives  · pregnant or trying to get pregnant  · breast-feeding  What should I watch for while using this medicine?  Visit your doctor or health care professional for regular checks on your progress. Your doctor or health care professional may order blood tests and other tests to see how you are doing.  Call your doctor or health care professional if you get a cold or other infection while receiving this medicine. Do not treat yourself. This medicine may decrease your body's ability to fight infection.  You should make sure you get enough calcium and vitamin D while you are taking this medicine, unless your doctor tells you not to. Discuss the foods you eat and the vitamins you take with your health care professional.  See your dentist regularly. Brush and floss your teeth as directed. Before you have any dental work done, tell your dentist you are receiving this medicine.  Do not become pregnant while taking this medicine or for 5 months after stopping it. Women should inform their doctor if they wish to become pregnant or think they might be pregnant. There is a potential for serious side effects to an unborn child. Talk to your health care professional or pharmacist for more information.  NOTE:This sheet is a summary. It may not cover all possible information. If you have questions about this medicine, talk to your doctor, pharmacist, or health care provider. Copyright© 2017 [employer]

## 2017-12-05 NOTE — PLAN OF CARE
Problem: Patient Care Overview  Goal: Plan of Care Review  Outcome: Ongoing (interventions implemented as appropriate)  Pt tolerated injection well without complaints. D/C to home with steady gait. AVS printed and reviewed. Pt verbalized understanding.

## 2018-01-18 ENCOUNTER — PATIENT MESSAGE (OUTPATIENT)
Dept: FAMILY MEDICINE | Facility: CLINIC | Age: 78
End: 2018-01-18

## 2018-01-18 DIAGNOSIS — I10 ESSENTIAL HYPERTENSION: ICD-10-CM

## 2018-01-18 RX ORDER — LOSARTAN POTASSIUM 50 MG/1
TABLET ORAL
Qty: 90 TABLET | Refills: 0 | Status: SHIPPED | OUTPATIENT
Start: 2018-01-18 | End: 2018-07-26 | Stop reason: SDUPTHER

## 2018-01-18 RX ORDER — TEMAZEPAM 30 MG/1
30 CAPSULE ORAL NIGHTLY
Qty: 90 CAPSULE | Refills: 0 | Status: SHIPPED | OUTPATIENT
Start: 2018-01-18 | End: 2018-05-29 | Stop reason: SDUPTHER

## 2018-01-18 NOTE — TELEPHONE ENCOUNTER
I have refilled the patient's requested medication x 1 month.  However, the patient is due for an evaluation in the office.  Call the patient on the phone and book the patient with EITHER Danica Joaquin NP or Fabien Espinoza NP for a visit.    PLEASE DOCUMENT THE FACT THAT YOU HAVE CONTACTED THE PATIENT IN THE CHART FOR FUTURE REFERENCE.    Health Maintenance Due   Topic Date Due    TETANUS VACCINE  08[birth year]17/1959    Colonoscopy  02/08/2014    Influenza Vaccine  08/01/2017

## 2018-02-09 RX ORDER — FLUCONAZOLE 150 MG/1
TABLET ORAL
Qty: 1 TABLET | Refills: 1 | Status: SHIPPED | OUTPATIENT
Start: 2018-02-09 | End: 2018-05-18

## 2018-03-28 ENCOUNTER — HOSPITAL ENCOUNTER (OUTPATIENT)
Dept: RADIOLOGY | Facility: HOSPITAL | Age: 78
Discharge: HOME | End: 2018-03-28
Attending: PHYSICIAN ASSISTANT
Payer: MEDICARE

## 2018-03-28 DIAGNOSIS — K52.9 CHRONIC DIARRHEA: ICD-10-CM

## 2018-03-28 DIAGNOSIS — E21.3 HYPERPARATHYROIDISM: ICD-10-CM

## 2018-03-28 PROCEDURE — 77080 DXA BONE DENSITY AXIAL: CPT | Mod: TC,PO

## 2018-03-28 PROCEDURE — 77080 DXA BONE DENSITY AXIAL: CPT | Mod: 26,,, | Performed by: RADIOLOGY

## 2018-03-28 RX ORDER — DIPHENOXYLATE HYDROCHLORIDE AND ATROPINE SULFATE 2.5; .025 MG/1; MG/1
TABLET ORAL
Qty: 180 TABLET | Refills: 1 | Status: SHIPPED | OUTPATIENT
Start: 2018-03-28 | End: 2019-02-19 | Stop reason: SDUPTHER

## 2018-04-03 ENCOUNTER — TELEPHONE (OUTPATIENT)
Dept: ENDOCRINOLOGY | Facility: CLINIC | Age: 78
End: 2018-04-03

## 2018-04-03 NOTE — TELEPHONE ENCOUNTER
----- Message from Bethany Yeung sent at 4/3/2018 12:30 PM CDT -----  Contact: SELF  Patient would like to reschedule her appointment that is scheduled for April 4, 2018. Please call the patient.

## 2018-04-13 ENCOUNTER — OFFICE VISIT (OUTPATIENT)
Dept: ENDOCRINOLOGY | Facility: CLINIC | Age: 78
End: 2018-04-13
Payer: MEDICARE

## 2018-04-13 DIAGNOSIS — M81.0 OSTEOPOROSIS, UNSPECIFIED OSTEOPOROSIS TYPE, UNSPECIFIED PATHOLOGICAL FRACTURE PRESENCE: Primary | ICD-10-CM

## 2018-04-13 DIAGNOSIS — E55.9 VITAMIN D DEFICIENCY: ICD-10-CM

## 2018-04-13 DIAGNOSIS — E21.3 HYPERPARATHYROIDISM: ICD-10-CM

## 2018-04-13 PROCEDURE — 99999 PR PBB SHADOW E&M-EST. PATIENT-LVL I: CPT | Mod: PBBFAC,,, | Performed by: INTERNAL MEDICINE

## 2018-04-13 PROCEDURE — 99211 OFF/OP EST MAY X REQ PHY/QHP: CPT | Mod: PBBFAC,PO | Performed by: INTERNAL MEDICINE

## 2018-04-13 PROCEDURE — 99214 OFFICE O/P EST MOD 30 MIN: CPT | Mod: S$PBB,,, | Performed by: INTERNAL MEDICINE

## 2018-04-13 RX ORDER — CHOLECALCIFEROL (VITAMIN D3) 125 MCG
5000 CAPSULE ORAL DAILY
COMMUNITY
End: 2018-08-09

## 2018-04-13 NOTE — PROGRESS NOTES
CHIEF COMPLAINT: Hypercalcemia  77 year old being seen as a f/u. Discovered to have a high calcium in Oct/13. Previously on Fosamax for two years then started Prolia. States she started having joint pain in her hips in her back about 1 week after the injection. Next prolia Injection in June. No fractures. No kidney stones. Taking Ca + D. He does do stretches but no significant exercise. No falls. No dental procedures.         PAST MEDICAL HISTORY/PAST SURGICAL HISTORY:  Reviewed in [facility]    SOCIAL HISTORY: No T/A    FAMILY HISTORY:  No Ca disorders. + osteoporosis. No hip fractures. No thyroid disease or DM.     MEDICATIONS/ALLERGIES: The patient's MedCard has been updated and reviewed.      ROS:   Constitutional: weight stable, no changes in energy.    Eyes: No recent visual changes, recent cataract surgery in August and September.   ENT: + postnasal drip, No dysphagia.  Cardiovascular: Denies chest pain, palpitaions, leg swelling.  Respiratory: + cough, no SOB.   Gastrointestinal: + intermittent diarrhea (due to colon surgery), no  N/v.  GenitoUrinary - Being treated for UTI  Skin: No new skin rash  Neurologic: + vertigo and headaces.  Remainder ROS negative    PE:    GENERAL: Well developed, well nourished.  NECK: Supple, trachea midline, no palpable thyroid nodules.  CHEST: Resp even and unlabored, CTA bilateral.  CARDIAC: RRR, S1, S2 heard, no murmurs, rubs or gallops, trace edema BLE.      Results for URIEL REDDING (MRN [id number]) as of 4/13/2018 13:58   Ref. Range 3/28/2018 09:00   Sodium Latest Ref Range: 136 - 145 mmol/L 143   Potassium Latest Ref Range: 3.5 - 5.1 mmol/L 4.1   Chloride Latest Ref Range: 95 - 110 mmol/L 107   CO2 Latest Ref Range: 23 - 29 mmol/L 31 (H)   Anion Gap Latest Ref Range: 8 - 16 mmol/L 5 (L)   BUN, Bld Latest Ref Range: 8 - 23 mg/dL 12   Creatinine Latest Ref Range: 0.5 - 1.4 mg/dL 1.2   eGFR if non African American Latest Ref Range: >60 mL/min/1.73 m^2 44.0 (A)   eGFR if African  American Latest Ref Range: >60 mL/min/1.73 m^2 50.7 (A)   Glucose Latest Ref Range: 70 - 110 mg/dL 98   Calcium Latest Ref Range: 8.7 - 10.5 mg/dL 10.8 (H)   Phosphorus Latest Ref Range: 2.7 - 4.5 mg/dL 3.6   Alkaline Phosphatase Latest Ref Range: 55 - 135 U/L 74   Total Protein Latest Ref Range: 6.0 - 8.4 g/dL 7.2   Albumin Latest Ref Range: 3.5 - 5.2 g/dL 4.1   Total Bilirubin Latest Ref Range: 0.1 - 1.0 mg/dL 1.0   AST Latest Ref Range: 10 - 40 U/L 28   ALT Latest Ref Range: 10 - 44 U/L 27   PTH Latest Ref Range: 9.0 - 77.0 pg/mL 93.0 (H)           ASSESSMENT/PLAN:  1. Osteopeorosis- No Hx of fracture. Discussed options limited on tx due to CKD. Would continue Prolia. There is some mild loss on DEXA.     2. Hyperparathyroidism/Hypercalcemia- PTH elevated with a mildly elevated Ca. She most likely has primary hyperparathyroidism and does not want surgery. May use sensipar if Ca increases.    3. Vitamin D deficiency-Continue to take OTC Vit D 2000 daily. Vitamin D WNL.    FOLLOWUP  F/U 6 months prior to Prolia with CMP, PTH, Phos

## 2018-04-26 ENCOUNTER — TELEPHONE (OUTPATIENT)
Dept: FAMILY MEDICINE | Facility: CLINIC | Age: 78
End: 2018-04-26

## 2018-04-26 NOTE — TELEPHONE ENCOUNTER
----- Message from Annia Marshall sent at 4/26/2018 12:05 PM CDT -----  Contact: [employer]  Request a call concerning the pt Temazepam medication, states the info is due today, can be reached at [phone]///[email or web address]

## 2018-04-26 NOTE — TELEPHONE ENCOUNTER
Returned call to [facility] and answered the clinical questions needed.

## 2018-05-18 ENCOUNTER — LAB VISIT (OUTPATIENT)
Dept: LAB | Facility: HOSPITAL | Age: 78
End: 2018-05-18
Attending: FAMILY MEDICINE
Payer: MEDICARE

## 2018-05-18 ENCOUNTER — TELEPHONE (OUTPATIENT)
Dept: FAMILY MEDICINE | Facility: CLINIC | Age: 78
End: 2018-05-18

## 2018-05-18 ENCOUNTER — OFFICE VISIT (OUTPATIENT)
Dept: FAMILY MEDICINE | Facility: CLINIC | Age: 78
End: 2018-05-18
Payer: MEDICARE

## 2018-05-18 VITALS
SYSTOLIC BLOOD PRESSURE: 126 MMHG | DIASTOLIC BLOOD PRESSURE: 71 MMHG | WEIGHT: 154.63 LBS | HEART RATE: 54 BPM | BODY MASS INDEX: 32.46 KG/M2 | TEMPERATURE: 98 F | HEIGHT: 58 IN

## 2018-05-18 DIAGNOSIS — R30.0 DYSURIA: ICD-10-CM

## 2018-05-18 DIAGNOSIS — Z86.010 HISTORY OF COLON POLYPS: ICD-10-CM

## 2018-05-18 DIAGNOSIS — R30.0 DYSURIA: Primary | ICD-10-CM

## 2018-05-18 DIAGNOSIS — Z12.31 ENCOUNTER FOR SCREENING MAMMOGRAM FOR BREAST CANCER: ICD-10-CM

## 2018-05-18 DIAGNOSIS — N30.00 ACUTE CYSTITIS WITHOUT HEMATURIA: Primary | ICD-10-CM

## 2018-05-18 LAB
AMORPH CRY URNS QL MICRO: ABNORMAL
BACTERIA #/AREA URNS HPF: ABNORMAL /HPF
BILIRUB UR QL STRIP: NEGATIVE
CLARITY UR: CLEAR
COLOR UR: YELLOW
GLUCOSE UR QL STRIP: NEGATIVE
HGB UR QL STRIP: NEGATIVE
HYALINE CASTS #/AREA URNS LPF: 2 /LPF
KETONES UR QL STRIP: NEGATIVE
LEUKOCYTE ESTERASE UR QL STRIP: ABNORMAL
MICROSCOPIC COMMENT: ABNORMAL
NITRITE UR QL STRIP: NEGATIVE
NON-SQ EPI CELLS #/AREA URNS HPF: 2 /HPF
PH UR STRIP: 8 [PH] (ref 5–8)
PROT UR QL STRIP: ABNORMAL
RBC #/AREA URNS HPF: 1 /HPF (ref 0–4)
SP GR UR STRIP: 1.01 (ref 1–1.03)
SQUAMOUS #/AREA URNS HPF: 5 /HPF
URN SPEC COLLECT METH UR: ABNORMAL
WBC #/AREA URNS HPF: 8 /HPF (ref 0–5)

## 2018-05-18 PROCEDURE — 81000 URINALYSIS NONAUTO W/SCOPE: CPT | Mod: PO

## 2018-05-18 PROCEDURE — 99213 OFFICE O/P EST LOW 20 MIN: CPT | Mod: PBBFAC,PO | Performed by: FAMILY MEDICINE

## 2018-05-18 PROCEDURE — 87086 URINE CULTURE/COLONY COUNT: CPT

## 2018-05-18 PROCEDURE — 99213 OFFICE O/P EST LOW 20 MIN: CPT | Mod: S$PBB,,, | Performed by: FAMILY MEDICINE

## 2018-05-18 PROCEDURE — 99999 PR PBB SHADOW E&M-EST. PATIENT-LVL III: CPT | Mod: PBBFAC,,, | Performed by: FAMILY MEDICINE

## 2018-05-18 RX ORDER — PROMETHAZINE HYDROCHLORIDE 25 MG/1
25 TABLET ORAL EVERY 6 HOURS PRN
Qty: 6 TABLET | Refills: 0 | Status: SHIPPED | OUTPATIENT
Start: 2018-05-18 | End: 2020-06-02

## 2018-05-18 RX ORDER — NITROFURANTOIN 25; 75 MG/1; MG/1
100 CAPSULE ORAL 2 TIMES DAILY
Qty: 20 CAPSULE | Refills: 0 | Status: SHIPPED | OUTPATIENT
Start: 2018-05-18 | End: 2018-07-31 | Stop reason: SDUPTHER

## 2018-05-18 RX ORDER — SODIUM, POTASSIUM,MAG SULFATES 17.5-3.13G
SOLUTION, RECONSTITUTED, ORAL ORAL
Qty: 354 ML | Refills: 0 | Status: SHIPPED | OUTPATIENT
Start: 2018-05-18 | End: 2020-06-02

## 2018-05-18 NOTE — TELEPHONE ENCOUNTER
----- Message from Skylar Rhodes sent at 5/18/2018  4:13 PM CDT -----  Contact: pt  She's calling in regards to Rx pt stated all the computers are down at the pharm.  pls call pt back at [phone] (home)

## 2018-05-18 NOTE — PROGRESS NOTES
CC:Dysuria.  Suzie Dawkins is a 77 y.o. female with a new complaint of dysuria, lower abdominal pain, urinary incontinence, urinary urgency and lower back pain .   The patient denies chills, fever  , nausea, urinary retention and vomiting.  Symptoms have been present for 1 week(s).   Treatments tried include:pyridium and this has not resolved but it did temporarily help the symptoms.  The patient's Health Maintenance was reviewed and the following appears to be due at this time:  Health Maintenance Due   Topic Date Due    TETANUS VACCINE  08[birth year]17/1958    Colonoscopy  02/08/2014    Mammogram  06/12/2018       The current allergy list that we have since it was last reconciled is as follows:  Review of patient's allergies indicates:   Allergen Reactions    B12 [cyanocobalamin-cobamamide] Other (See Comments)     Caused flushing and caused skin to peel    Codeine      Other reaction(s): Muscle pain    Iodine      Other reaction(s): Hives    Meperidine      Other reaction(s): Muscle pain    Narcotic antagonist      Other reaction(s): Rash    Penicillins Nausea And Vomiting    Propoxyphene      Other reaction(s): Muscle pain    Sulfa (sulfonamide antibiotics)      Other reaction(s): Muscle pain  Other reaction(s): Hives     Outpatient Medications Prior to Visit   Medication Sig Dispense Refill    alprazolam (XANAX) 0.25 MG tablet TAKE ONE TABLET THREE TIMES DAILY AS NEEDED FOR ANXIETY 90 tablet 1    atorvastatin (LIPITOR) 40 MG tablet TAKE ONE TABLET BY MOUTH ONCE DAILY 30 tablet 11    calcium-vitamin D3 500 mg(1,250mg) -200 unit per tablet Take 1 tablet by mouth 2 (two) times daily with meals.      cholecalciferol, vitamin D3, 5,000 unit capsule Take 5,000 Units by mouth once daily.      citalopram (CELEXA) 10 MG tablet TAKE ONE TABLET BY MOUTH ONCE DAILY 30 tablet 11    diphenoxylate-atropine 2.5-0.025 mg (LOMOTIL) 2.5-0.025 mg per tablet TAKE ONE TABLET BY MOUTH FOUR TIMES DAILY AS NEEDED 180 tablet 1  "   losartan (COZAAR) 50 MG tablet TAKE ONE TABLET BY MOUTH ONCE DAILY 90 tablet 0    metoprolol tartrate (LOPRESSOR) 100 MG tablet TAKE ONE TABLET BY MOUTH TWICE DAILY 60 tablet 11    MULTIVIT,CALC,MINS/IRON/FOLIC (ONE-A-DAY WOMENS FORMULA ORAL) Take 1 tablet by mouth once daily.      ondansetron (ZOFRAN-ODT) 4 MG TbDL DISSOLVE ONE TABLET BY MOUTH EVERY 6 HOURS AS NEEDED FOR NAUSEA 30 tablet 0    promethazine (PHENERGAN) 25 MG tablet Take 1 tablet (25 mg total) by mouth every 6 (six) hours as needed for Nausea. 6 tablet 0    temazepam (RESTORIL) 30 mg capsule Take 1 capsule (30 mg total) by mouth nightly. 90 capsule 0    topiramate 25 mg capsule Take 1 capsule (25 mg total) by mouth once daily. 90 capsule 3    VITAMIN E ACETATE (VITAMIN E ORAL) Take 1 capsule by mouth once daily.      fluconazole (DIFLUCAN) 150 MG Tab TAKE ONE TABLET BY MOUTH ONCE DAILY 1 tablet 1    sodium,potassium,mag sulfates (SUPREP BOWEL PREP KIT) 17.5-3.13-1.6 gram SolR Take as instructed on prep sheet 354 mL 0     No facility-administered medications prior to visit.         Physical Exam   /71 (BP Location: Left arm, Patient Position: Sitting, BP Method: Small (Automatic))   Pulse (!) 54   Temp 97.8 °F (36.6 °C)   Ht 4' 10" (1.473 m)   Wt 70.1 kg (154 lb 9.6 oz)   BMI 32.31 kg/m²   Constitutional: The patient appears well-developed and well-nourished. No distress.   Cardiovascular: Normal rate, regular rhythm and normal heart sounds.  Exam reveals no gallop and no friction rub.    No murmur heard.  Pulmonary/Chest: Effort normal and breath sounds normal. No respiratory distress. She has no wheezes. She has no rales.   Abdominal: Soft. Bowel sounds are normal. The patient exhibits no distension and no mass. There is tenderness in the suprapubic area. There is no rebound, no guarding and no CVA tenderness. No hernia.   Skin: The patient is not diaphoretic.     The patient had a urinalysis performed today and it was " abnormal.     Encounter Diagnosis   Name Primary?    Acute cystitis without hematuria Yes       PLAN:  I have discontinued Ms. Dawkins's (sodium,potassium,mag sulfates) and fluconazole. I am also having her start on nitrofurantoin (macrocrystal-monohydrate), promethazine, and (sodium,potassium,mag sulfates). Additionally, I am having her maintain her calcium-vitamin D3, ALPRAZolam, topiramate, atorvastatin, citalopram, metoprolol tartrate, promethazine, ondansetron, losartan, temazepam, diphenoxylate-atropine 2.5-0.025 mg, VITAMIN E ACETATE (VITAMIN E ORAL), cholecalciferol (vitamin D3), and (MULTIVIT,CALC,MINS/IRON/FOLIC (ONE-A-DAY WOMENS FORMULA ORAL)).    Suzie was seen today for dysuria, back pain and urinary incontinence.    Diagnoses and all orders for this visit:    Acute cystitis without hematuria  -     nitrofurantoin, macrocrystal-monohydrate, (MACROBID) 100 MG capsule; Take 1 capsule (100 mg total) by mouth 2 (two) times daily.  -     Urine culture; Future         The patient was instructed to stop the following meds:  Medications Discontinued During This Encounter   Medication Reason    fluconazole (DIFLUCAN) 150 MG Tab Patient no longer taking    sodium,potassium,mag sulfates (SUPREP BOWEL PREP KIT) 17.5-3.13-1.6 gram SolR Patient no longer taking     Orders Placed This Encounter   Procedures    Urine culture     Standing Status:   Future     Standing Expiration Date:   7/17/2019    Mammo Digital Screening Bilat with CAD     Standing Status:   Future     Standing Expiration Date:   5/18/2019     Order Specific Question:   Reason for Exam:     Answer:   screening     RTC if symptoms are worsening or changing significantly or if not improved by the end of therapy.

## 2018-05-19 LAB — BACTERIA UR CULT: NORMAL

## 2018-05-30 RX ORDER — TEMAZEPAM 30 MG/1
30 CAPSULE ORAL NIGHTLY
Qty: 90 CAPSULE | Refills: 0 | Status: SHIPPED | OUTPATIENT
Start: 2018-05-30 | End: 2018-06-01 | Stop reason: SDUPTHER

## 2018-06-02 RX ORDER — TEMAZEPAM 30 MG/1
30 CAPSULE ORAL NIGHTLY
Qty: 90 CAPSULE | Refills: 0 | Status: SHIPPED | OUTPATIENT
Start: 2018-06-02 | End: 2018-11-26 | Stop reason: SDUPTHER

## 2018-06-08 ENCOUNTER — TELEPHONE (OUTPATIENT)
Dept: FAMILY MEDICINE | Facility: CLINIC | Age: 78
End: 2018-06-08

## 2018-06-08 ENCOUNTER — TELEPHONE (OUTPATIENT)
Dept: INFUSION THERAPY | Facility: HOSPITAL | Age: 78
End: 2018-06-08

## 2018-06-08 RX ORDER — SCOLOPAMINE TRANSDERMAL SYSTEM 1 MG/1
1 PATCH, EXTENDED RELEASE TRANSDERMAL
Qty: 4 PATCH | Refills: 0 | Status: SHIPPED | OUTPATIENT
Start: 2018-06-08 | End: 2018-10-31

## 2018-06-08 NOTE — TELEPHONE ENCOUNTER
----- Message from Jaci Loyd sent at 6/8/2018 11:26 AM CDT -----  Contact: pt  She's calling stating that she will be going on a cruise and she wanted to see if she can get nausea patches prescribed, please advise [phone] (home)

## 2018-06-08 NOTE — TELEPHONE ENCOUNTER
Smiley Garg, RN  [facility] Staff (Endo)   Cc: [facility]             Pt coming for prolia 6/12/18.  Needs labs prior (last labs done in March too old to use)     Thanks

## 2018-06-08 NOTE — TELEPHONE ENCOUNTER
Pt states she is having some trouble with urine incontinency. She asks if there is a medication that she can take to help this so that she doesn't have this problem while she is on her cruise.

## 2018-06-08 NOTE — TELEPHONE ENCOUNTER
I have signed for the following orders AND/OR meds.  Please call the patient and ask the patient to schedule the testing AND/OR inform about any medications that were sent.      No orders of the defined types were placed in this encounter.        Medications Ordered This Encounter      scopolamine (TRANSDERM-SCOP) 1.3-1.5 mg (1 mg over 3 days)          Sig: Place 1 patch onto the skin every 72 hours.          Dispense:  4 patch          Refill:  0

## 2018-06-11 ENCOUNTER — TELEPHONE (OUTPATIENT)
Dept: INFUSION THERAPY | Facility: HOSPITAL | Age: 78
End: 2018-06-11

## 2018-06-11 DIAGNOSIS — M85.80 BONE LOSS: Primary | ICD-10-CM

## 2018-06-11 NOTE — TELEPHONE ENCOUNTER
----- Message from Francisco J Daugherty LPN sent at 6/11/2018  1:47 PM CDT -----  Regarding: RE: labs needed  Pt does not want to take the medication  States she is having issues feels it is hurting her more instead of helping   ----- Message -----  From: Smiley Garg RN  Sent: 6/8/2018   5:03 PM  To: [facility], #  Subject: labs needed                                      Pt coming for prolia 6/12/18.  Needs labs prior (last labs done in March too old to use)    Thanks

## 2018-06-15 ENCOUNTER — HOSPITAL ENCOUNTER (OUTPATIENT)
Dept: RADIOLOGY | Facility: HOSPITAL | Age: 78
Discharge: HOME | End: 2018-06-15
Attending: FAMILY MEDICINE
Payer: MEDICARE

## 2018-06-15 VITALS — WEIGHT: 154.56 LBS | BODY MASS INDEX: 32.44 KG/M2 | HEIGHT: 58 IN

## 2018-06-15 DIAGNOSIS — Z12.31 ENCOUNTER FOR SCREENING MAMMOGRAM FOR BREAST CANCER: ICD-10-CM

## 2018-06-15 PROCEDURE — 77063 BREAST TOMOSYNTHESIS BI: CPT | Mod: 26,,, | Performed by: RADIOLOGY

## 2018-06-15 PROCEDURE — 77067 SCR MAMMO BI INCL CAD: CPT | Mod: 26,,, | Performed by: RADIOLOGY

## 2018-06-15 PROCEDURE — 77067 SCR MAMMO BI INCL CAD: CPT | Mod: TC,PO

## 2018-06-16 ENCOUNTER — PATIENT MESSAGE (OUTPATIENT)
Dept: FAMILY MEDICINE | Facility: CLINIC | Age: 78
End: 2018-06-16

## 2018-06-19 ENCOUNTER — PATIENT MESSAGE (OUTPATIENT)
Dept: FAMILY MEDICINE | Facility: CLINIC | Age: 78
End: 2018-06-19

## 2018-06-20 ENCOUNTER — PATIENT MESSAGE (OUTPATIENT)
Dept: FAMILY MEDICINE | Facility: CLINIC | Age: 78
End: 2018-06-20

## 2018-06-20 ENCOUNTER — LAB VISIT (OUTPATIENT)
Dept: LAB | Facility: HOSPITAL | Age: 78
End: 2018-06-20
Attending: FAMILY MEDICINE
Payer: MEDICARE

## 2018-06-20 ENCOUNTER — TELEPHONE (OUTPATIENT)
Dept: FAMILY MEDICINE | Facility: CLINIC | Age: 78
End: 2018-06-20

## 2018-06-20 DIAGNOSIS — R30.0 DYSURIA: Primary | ICD-10-CM

## 2018-06-20 DIAGNOSIS — R30.0 DYSURIA: ICD-10-CM

## 2018-06-20 LAB
AMORPH CRY URNS QL MICRO: ABNORMAL
BACTERIA #/AREA URNS HPF: ABNORMAL /HPF
BILIRUB UR QL STRIP: NEGATIVE
CLARITY UR: CLEAR
COLOR UR: YELLOW
GLUCOSE UR QL STRIP: NEGATIVE
HGB UR QL STRIP: ABNORMAL
KETONES UR QL STRIP: NEGATIVE
LEUKOCYTE ESTERASE UR QL STRIP: ABNORMAL
MICROSCOPIC COMMENT: ABNORMAL
NITRITE UR QL STRIP: NEGATIVE
PH UR STRIP: 7 [PH] (ref 5–8)
PROT UR QL STRIP: NEGATIVE
RBC #/AREA URNS HPF: 2 /HPF (ref 0–4)
SP GR UR STRIP: 1.01 (ref 1–1.03)
SQUAMOUS #/AREA URNS HPF: 7 /HPF
URN SPEC COLLECT METH UR: ABNORMAL
WBC #/AREA URNS HPF: 20 /HPF (ref 0–5)

## 2018-06-20 PROCEDURE — 81000 URINALYSIS NONAUTO W/SCOPE: CPT | Mod: PO

## 2018-06-20 RX ORDER — VALACYCLOVIR HYDROCHLORIDE 1 G/1
1000 TABLET, FILM COATED ORAL 2 TIMES DAILY
Qty: 20 TABLET | Refills: 0 | Status: SHIPPED | OUTPATIENT
Start: 2018-06-20 | End: 2018-08-09

## 2018-06-20 RX ORDER — FLUCONAZOLE 150 MG/1
150 TABLET ORAL ONCE
Qty: 1 TABLET | Refills: 0 | Status: SHIPPED | OUTPATIENT
Start: 2018-06-20 | End: 2018-06-20

## 2018-06-21 NOTE — PROGRESS NOTES
This is a contaminated specimen with cells from outside the bladder.  No decision can be made on this. Please repeat the urinalysis tomorrow to determine if this is normal or not.

## 2018-07-26 ENCOUNTER — TELEPHONE (OUTPATIENT)
Dept: FAMILY MEDICINE | Facility: CLINIC | Age: 78
End: 2018-07-26

## 2018-07-26 DIAGNOSIS — F32.A DEPRESSION, UNSPECIFIED DEPRESSION TYPE: ICD-10-CM

## 2018-07-26 DIAGNOSIS — F32.A DEPRESSION: ICD-10-CM

## 2018-07-26 DIAGNOSIS — E78.5 HYPERLIPIDEMIA: ICD-10-CM

## 2018-07-26 DIAGNOSIS — E78.5 HYPERLIPIDEMIA, UNSPECIFIED HYPERLIPIDEMIA TYPE: ICD-10-CM

## 2018-07-26 DIAGNOSIS — I10 ESSENTIAL HYPERTENSION: ICD-10-CM

## 2018-07-26 DIAGNOSIS — F41.9 ANXIETY: ICD-10-CM

## 2018-07-26 RX ORDER — ATORVASTATIN CALCIUM 40 MG/1
40 TABLET, FILM COATED ORAL DAILY
Qty: 30 TABLET | Refills: 11 | Status: SHIPPED | OUTPATIENT
Start: 2018-07-26 | End: 2018-07-26

## 2018-07-26 RX ORDER — METOPROLOL TARTRATE 100 MG/1
100 TABLET ORAL 2 TIMES DAILY
Qty: 60 TABLET | Refills: 11 | Status: SHIPPED | OUTPATIENT
Start: 2018-07-26 | End: 2019-02-18 | Stop reason: SDUPTHER

## 2018-07-26 RX ORDER — LOSARTAN POTASSIUM 50 MG/1
TABLET ORAL
Qty: 90 TABLET | Refills: 3 | Status: SHIPPED | OUTPATIENT
Start: 2018-07-26 | End: 2019-05-21

## 2018-07-26 RX ORDER — ATORVASTATIN CALCIUM 40 MG/1
TABLET, FILM COATED ORAL
Qty: 90 TABLET | Refills: 3 | Status: SHIPPED | OUTPATIENT
Start: 2018-07-26 | End: 2019-02-18 | Stop reason: SDUPTHER

## 2018-07-26 RX ORDER — CITALOPRAM 10 MG/1
10 TABLET ORAL DAILY
Qty: 30 TABLET | Refills: 11 | Status: SHIPPED | OUTPATIENT
Start: 2018-07-26 | End: 2018-07-26

## 2018-07-26 RX ORDER — LOSARTAN POTASSIUM 50 MG/1
50 TABLET ORAL DAILY
Qty: 90 TABLET | Refills: 0 | Status: SHIPPED | OUTPATIENT
Start: 2018-07-26 | End: 2018-07-26

## 2018-07-26 RX ORDER — CITALOPRAM 10 MG/1
TABLET ORAL
Qty: 90 TABLET | Refills: 3 | Status: SHIPPED | OUTPATIENT
Start: 2018-07-26 | End: 2019-05-21

## 2018-07-26 NOTE — TELEPHONE ENCOUNTER
----- Message from Aviva Grimes sent at 7/26/2018 10:31 AM CDT -----  Contact: Patient  Patient is checking on status of her 3 blood pressure medications, she does not know then name, please call her back at  [phone]. Thank you

## 2018-07-26 NOTE — TELEPHONE ENCOUNTER
----- Message from Brandee Lambert sent at 7/26/2018  3:27 PM CDT -----  Contact: [facility]  Caller needs call back dom pt rx refill [phone]

## 2018-07-31 ENCOUNTER — OFFICE VISIT (OUTPATIENT)
Dept: FAMILY MEDICINE | Facility: CLINIC | Age: 78
End: 2018-07-31
Payer: MEDICARE

## 2018-07-31 VITALS
HEIGHT: 58 IN | SYSTOLIC BLOOD PRESSURE: 155 MMHG | HEART RATE: 55 BPM | TEMPERATURE: 99 F | WEIGHT: 152 LBS | DIASTOLIC BLOOD PRESSURE: 85 MMHG | BODY MASS INDEX: 31.91 KG/M2

## 2018-07-31 DIAGNOSIS — N30.01 ACUTE CYSTITIS WITH HEMATURIA: Primary | ICD-10-CM

## 2018-07-31 DIAGNOSIS — N39.41 URGE INCONTINENCE OF URINE: ICD-10-CM

## 2018-07-31 DIAGNOSIS — I10 ESSENTIAL HYPERTENSION: ICD-10-CM

## 2018-07-31 LAB
AMORPH CRY URNS QL MICRO: ABNORMAL
BACTERIA #/AREA URNS HPF: ABNORMAL /HPF
BILIRUB UR QL STRIP: NEGATIVE
CLARITY UR: ABNORMAL
COLOR UR: YELLOW
GLUCOSE UR QL STRIP: NEGATIVE
HGB UR QL STRIP: ABNORMAL
HYALINE CASTS #/AREA URNS LPF: 0 /LPF
KETONES UR QL STRIP: NEGATIVE
LEUKOCYTE ESTERASE UR QL STRIP: ABNORMAL
MICROSCOPIC COMMENT: ABNORMAL
NITRITE UR QL STRIP: NEGATIVE
PH UR STRIP: 8 [PH] (ref 5–8)
PROT UR QL STRIP: ABNORMAL
RBC #/AREA URNS HPF: 4 /HPF (ref 0–4)
SP GR UR STRIP: 1.01 (ref 1–1.03)
SQUAMOUS #/AREA URNS HPF: 9 /HPF
URN SPEC COLLECT METH UR: ABNORMAL
WBC #/AREA URNS HPF: 60 /HPF (ref 0–5)
WBC CLUMPS URNS QL MICRO: ABNORMAL

## 2018-07-31 PROCEDURE — 87077 CULTURE AEROBIC IDENTIFY: CPT

## 2018-07-31 PROCEDURE — 87186 SC STD MICRODIL/AGAR DIL: CPT

## 2018-07-31 PROCEDURE — 87086 URINE CULTURE/COLONY COUNT: CPT

## 2018-07-31 PROCEDURE — 99213 OFFICE O/P EST LOW 20 MIN: CPT | Mod: S$PBB,,, | Performed by: NURSE PRACTITIONER

## 2018-07-31 PROCEDURE — 99214 OFFICE O/P EST MOD 30 MIN: CPT | Mod: PBBFAC,PO | Performed by: NURSE PRACTITIONER

## 2018-07-31 PROCEDURE — 81000 URINALYSIS NONAUTO W/SCOPE: CPT | Mod: PO

## 2018-07-31 PROCEDURE — 87088 URINE BACTERIA CULTURE: CPT

## 2018-07-31 PROCEDURE — 99999 PR PBB SHADOW E&M-EST. PATIENT-LVL IV: CPT | Mod: PBBFAC,,, | Performed by: NURSE PRACTITIONER

## 2018-07-31 RX ORDER — HYOSCYAMINE SULFATE 0.125 MG
125 TABLET ORAL EVERY 4 HOURS PRN
Qty: 30 TABLET | Refills: 0 | Status: SHIPPED | OUTPATIENT
Start: 2018-07-31 | End: 2020-07-22 | Stop reason: CLARIF

## 2018-07-31 RX ORDER — NITROFURANTOIN 25; 75 MG/1; MG/1
100 CAPSULE ORAL 2 TIMES DAILY
Qty: 20 CAPSULE | Refills: 0 | Status: SHIPPED | OUTPATIENT
Start: 2018-07-31 | End: 2018-08-10

## 2018-07-31 NOTE — PROGRESS NOTES
"Subjective:      Patient ID: Suzie Dawkins is a 77 y.o. female.    Chief Complaint: Dysuria    Dysuria    This is a new problem. The current episode started in the past 7 days. The problem occurs every urination. The problem has been gradually worsening. Quality: spasms. The pain is at a severity of 5/10. The pain is moderate. There has been no fever. She is not sexually active. Associated symptoms include frequency, hesitancy and urgency. Pertinent negatives include no chills, discharge, flank pain, hematuria, constipation or rash. Associated symptoms comments: Urge incontinence. She has tried nothing for the symptoms. The treatment provided no relief. Her past medical history is significant for recurrent UTIs.     Review of Systems   Constitutional: Negative for chills, fatigue and fever.   Respiratory: Negative for cough, shortness of breath and wheezing.    Cardiovascular: Negative for chest pain, palpitations and leg swelling.   Gastrointestinal: Negative for constipation.   Genitourinary: Positive for difficulty urinating, dysuria, frequency, hesitancy and urgency. Negative for flank pain and hematuria.        Urge incontinence    Skin: Negative for rash and wound.   Neurological: Negative for weakness and numbness.   Psychiatric/Behavioral: The patient is not nervous/anxious.        Objective:     BP (!) 155/85   Pulse (!) 55   Temp 98.5 °F (36.9 °C) (Oral)   Ht 4' 10" (1.473 m)   Wt 68.9 kg (152 lb)   BMI 31.77 kg/m²     Physical Exam   Constitutional: She is oriented to person, place, and time. She appears well-developed and well-nourished.   HENT:   Head: Normocephalic.   Eyes: Pupils are equal, round, and reactive to light.   Neck: Normal range of motion. Neck supple.   Cardiovascular: Normal rate, regular rhythm and normal heart sounds.    Pulmonary/Chest: Effort normal and breath sounds normal. No respiratory distress. She has no decreased breath sounds. She has no wheezes. She has no rhonchi. " She has no rales.   Abdominal: Soft. Bowel sounds are normal. She exhibits no distension and no mass. There is no tenderness. There is no rigidity, no rebound, no guarding and no CVA tenderness.   Neurological: She is alert and oriented to person, place, and time.   Skin: Skin is warm and dry. No rash noted.   Psychiatric: She has a normal mood and affect. Her behavior is normal. Judgment and thought content normal.   Vitals reviewed.    Assessment:     1. Acute cystitis with hematuria    2. Urge incontinence of urine    3. Essential hypertension        Plan:     Problem List Items Addressed This Visit        Cardiac/Vascular    Hypertension      Other Visit Diagnoses     Acute cystitis with hematuria    -  Primary    Relevant Medications    nitrofurantoin, macrocrystal-monohydrate, (MACROBID) 100 MG capsule    hyoscyamine (ANASPAZ,LEVSIN) 0.125 mg Tab    Other Relevant Orders    Urinalysis (Completed)    Urine culture    Urinalysis Microscopic (Completed)    Ambulatory referral to Urology    Urge incontinence of urine        Relevant Medications    hyoscyamine (ANASPAZ,LEVSIN) 0.125 mg Tab    Other Relevant Orders    Ambulatory referral to Urology      Hydrate  Reduce sodium intake  F/u 2 weeks for blood pressure check  Symptomatic care discussed and educational handout provided  Report to ER if symptoms worsen    Follow-up in about 2 weeks (around 8/14/2018), or if symptoms worsen or fail to improve.        Parts of this note was dictated using voice recognition software. Please excuse any grammatical or typographical errors.

## 2018-07-31 NOTE — PATIENT INSTRUCTIONS

## 2018-08-03 LAB — BACTERIA UR CULT: NORMAL

## 2018-08-09 ENCOUNTER — INITIAL CONSULT (OUTPATIENT)
Dept: UROLOGY | Facility: CLINIC | Age: 78
End: 2018-08-09
Payer: MEDICARE

## 2018-08-09 ENCOUNTER — PATIENT MESSAGE (OUTPATIENT)
Dept: FAMILY MEDICINE | Facility: CLINIC | Age: 78
End: 2018-08-09

## 2018-08-09 VITALS
DIASTOLIC BLOOD PRESSURE: 72 MMHG | WEIGHT: 151.88 LBS | SYSTOLIC BLOOD PRESSURE: 151 MMHG | HEIGHT: 58 IN | HEART RATE: 52 BPM | BODY MASS INDEX: 31.88 KG/M2

## 2018-08-09 DIAGNOSIS — N95.2 ATROPHIC VAGINITIS: ICD-10-CM

## 2018-08-09 DIAGNOSIS — N39.0 URINARY TRACT INFECTION WITHOUT HEMATURIA, SITE UNSPECIFIED: Primary | ICD-10-CM

## 2018-08-09 LAB
BILIRUB SERPL-MCNC: NORMAL MG/DL
BLOOD URINE, POC: NORMAL
COLOR, POC UA: YELLOW
GLUCOSE UR QL STRIP: NORMAL
KETONES UR QL STRIP: NORMAL
LEUKOCYTE ESTERASE URINE, POC: NORMAL
NITRITE, POC UA: NORMAL
PH, POC UA: 8.5
PROTEIN, POC: 100
SPECIFIC GRAVITY, POC UA: 1.01
UROBILINOGEN, POC UA: 0.2

## 2018-08-09 PROCEDURE — 99999 PR PBB SHADOW E&M-EST. PATIENT-LVL III: CPT | Mod: PBBFAC,,, | Performed by: UROLOGY

## 2018-08-09 PROCEDURE — 81002 URINALYSIS NONAUTO W/O SCOPE: CPT | Mod: PBBFAC,PO | Performed by: UROLOGY

## 2018-08-09 PROCEDURE — 99213 OFFICE O/P EST LOW 20 MIN: CPT | Mod: PBBFAC,PO | Performed by: UROLOGY

## 2018-08-09 PROCEDURE — 99204 OFFICE O/P NEW MOD 45 MIN: CPT | Mod: S$PBB,,, | Performed by: UROLOGY

## 2018-08-09 PROCEDURE — 87086 URINE CULTURE/COLONY COUNT: CPT

## 2018-08-09 RX ORDER — CHOLECALCIFEROL (VITAMIN D3) 25 MCG
1000 TABLET ORAL DAILY
COMMUNITY
End: 2023-05-24

## 2018-08-09 RX ORDER — CIPROFLOXACIN 500 MG/1
500 TABLET ORAL 2 TIMES DAILY
Qty: 20 TABLET | Refills: 0 | Status: SHIPPED | OUTPATIENT
Start: 2018-08-09 | End: 2018-08-19

## 2018-08-09 RX ORDER — ESTRADIOL 0.1 MG/G
CREAM VAGINAL
COMMUNITY
End: 2018-10-31

## 2018-08-09 NOTE — LETTER
August 9, 2018      [place]  [address] [facility] [address]           [facility]  [address] [facility] [address] [place] [address]  Phone: [phone]          Patient: Suzie Dawkins   MR Number: [id number]   YOB: 1940   Date of Visit: 8/9/2018       Dear Fabien Espinoza:    Thank you for referring Suzie Dawkins to me for evaluation. Attached you will find relevant portions of my assessment and plan of care.    If you have questions, please do not hesitate to call me. I look forward to following Suzie Dawkins along with you.    Sincerely,    Dylan Sibley MD    Enclosure  CC:  No Recipients    If you would like to receive this communication electronically, please contact [email or web address] or [phone] to request more information on [facility] access.    For providers and/or their staff who would like to refer a patient to [facility], please contact us through our one-stop-shop provider referral line, Ana Kay, at [phone].    If you feel you have received this communication in error or would no longer like to receive these types of communications, please e-mail [email or web address]

## 2018-08-09 NOTE — PROGRESS NOTES
[facility] [place]  8 9 18    Cc cystitis with hematuria    Age  78 , comes in with concern that she recently started six weeks ago with pressure on the suprapubic area, off and on, and burning during voiding. Has having urgency and urge incontinence. Was not having any fever, nausea, vomiting. Went to her PCP, who checked her urine and told her that she had a uti, and gave her macrobid. She completed the course of antibiotics but after that she felt she was still infected, with continued symptoms. She was seen again and was given a new prescription for the same medication and pt is taking it but she still does not feel like she is getting well. Her culture was positive for E coli esbl, sensitive to macrobid.     At the current time she is still on the macrobid and put also on hyosciamine 0.125 mg po q 4 hours as needed. Pt says her medication is not helping.     Pt had a hysterectomy in her 30s and never took any hormonal replacement.     PMH    Surgical:  has a past surgical history that includes Colon surgery (); Hysterectomy;  section; TONSILLECTOMY, ADENOIDECTOMY, BILATERAL yringotomy and tubes; Hemorrhoid surgery; Tonsillectomy; Adenoidectomy; and Oophorectomy.    Medical:  has a past medical history of Anxiety; Arthritis; Cancer; Depression; GERD (gastroesophageal reflux disease); Hypercalcemia; Hyperlipidemia; Hypertension; Osteoporosis; and Vitamin D deficiency.    Familial: father had lung cancer, maternal grandfather had lymphoma    Social: , lives in [place], [place]    Meds:   Current Outpatient Prescriptions on File Prior to Visit   Medication Sig Dispense Refill    atorvastatin (LIPITOR) 40 MG tablet TAKE ONE TABLET BY MOUTH 90 tablet 3    calcium-vitamin D3 500 mg(1,250mg) -200 uni Take 1 tablet by mouth 2 (two) times      cholecalciferol, vitamin D3, 5,000 unit capsule Take 5,000 Units by mouth once daily.      citalopram (CELEXA) 10 MG tablet TAKE ONE TABLET BY 90 tablet 3     diphenoxylate-atropine 2.5-0.025 mg (LOMOTI TAKE ONE TABLET BY YOLANDA 180 tablet 1    hyoscyamine (ANASPAZ,LEVSIN) 0.12 Take 1 tablet (125 mcg total) by mouth  30 tablet 0    losartan (COZAAR)  TAKE ONE 90 tablet 3    metoprolol tartrate (LOPRESSOR) 100 MG tablet Take 1 tablet (100 mg total) by mouth 2  60 tablet 11    MULTIVIT,CALC,MINS/IRON/FOLIC (ONE-A-DAY WOME Take 1 tablet by mouth once daily.      nitrofurantoin, macrocrystal-monohydrate, (MACROBID) 100 MG capsule Take 1 capsule (100 mg tota 20 capsule 0    ondansetron (ZOFRAN-ODT) 4 MG TbDL DISSOLVE ONE TABLET B 30 tablet 0    promethazine (PHENERGAN) 25 MG tablet Take 1 tablet (25 mg total) by mouth michael 6 tablet 0    promethazine (PHENERGAN) 25 MG tablet Take 1 tablet (25 mg tot. 6 tablet 0    scopolamine (TRANSDERM-SCOP) 1.3-1.5 mg (1 mg over 3 days) Place 1 patch onto the skin every 72 hours. 4 patch 0    sodium,potassium,mag sulfates (SUPREP CHAVEZ Take as instruct 354 mL 0    temazepam (RESTORIL) 30 mg capsule Take 1 capsule (3 90 capsule 0    topiramate 25 mg capsule Take 1 capsule  90 capsule 3    valACYclovir (VALTREX) 1000 MG tablet Take 1 tablet (1,000 mg total) by  20 tablet 0    VITAMIN E ACETATE (VITAMIN E ORAL) Take 1 capsule by mouth once daily.         REVIEW OF SYSTEMS  GENERAL: has some headache history but better now. Having vertigo  HEENT: vision Wears glasses. Sinuses: has complaints.   CARDIOPULMONARY: No swelling of the legs; no chest pain. No shortness of breath, no wheezing.   GASTROINTESTINAL: has heartburn. Has diarrhea depending on what she eats; occasionally constipation, no blood or mucus in stools.   GENITOURINARY: feels like she has a uti.  MUSCULOSKELETAL: has arthritic complaints such as pain or stiffness.   PSYCHIATRIC: No history of depression or anxiety.   ENDOCRINOLOGIC: No reports of sweating, cold or heat intolerance. No polyuria or polydipsia.   NEUROLOGICAL: No dizziness, syncope, paralysis  LYMPHATICS: No  enlarged nodes. No history of splenectomy.  ==    Pt alert, oriented, no distress  HEENT: wnl.  Neck: supple, no JVD, no lymphadenopathy  Chest: CV NSR  Lungs: normal chest expansion, no labored breathing  Abdomen flat, nontender, no organomegaly, no masses.  Extremities: no edema, peripheral pulses nl  Neuro: preserved    IMP  uti with recurrence   Her urine still has pyuria today, I am sending a second culture  I am starting her on cipro 500 bid to replace her macrobid  I am also starting her on estrace vaginal cream to apply three nights a week with fingertip  RTC 6 mo

## 2018-08-11 LAB — BACTERIA UR CULT: NO GROWTH

## 2018-09-10 RX ORDER — ONDANSETRON 4 MG/1
TABLET, ORALLY DISINTEGRATING ORAL
Qty: 60 TABLET | Refills: 1 | Status: SHIPPED | OUTPATIENT
Start: 2018-09-10 | End: 2020-07-20 | Stop reason: SDUPTHER

## 2018-10-22 ENCOUNTER — PES CALL (OUTPATIENT)
Dept: ADMINISTRATIVE | Facility: CLINIC | Age: 78
End: 2018-10-22

## 2018-10-24 ENCOUNTER — LAB VISIT (OUTPATIENT)
Dept: LAB | Facility: HOSPITAL | Age: 78
End: 2018-10-24
Attending: INTERNAL MEDICINE
Payer: MEDICARE

## 2018-10-24 DIAGNOSIS — M81.0 OSTEOPOROSIS, UNSPECIFIED OSTEOPOROSIS TYPE, UNSPECIFIED PATHOLOGICAL FRACTURE PRESENCE: ICD-10-CM

## 2018-10-24 DIAGNOSIS — E21.3 HYPERPARATHYROIDISM: ICD-10-CM

## 2018-10-24 LAB
ALBUMIN SERPL BCP-MCNC: 3.9 G/DL
ALP SERPL-CCNC: 83 U/L
ALT SERPL W/O P-5'-P-CCNC: 22 U/L
ANION GAP SERPL CALC-SCNC: 7 MMOL/L
AST SERPL-CCNC: 24 U/L
BILIRUB SERPL-MCNC: 0.7 MG/DL
BUN SERPL-MCNC: 13 MG/DL
CALCIUM SERPL-MCNC: 11.4 MG/DL
CHLORIDE SERPL-SCNC: 107 MMOL/L
CO2 SERPL-SCNC: 27 MMOL/L
CREAT SERPL-MCNC: 1.1 MG/DL
EST. GFR  (AFRICAN AMERICAN): 56 ML/MIN/1.73 M^2
EST. GFR  (NON AFRICAN AMERICAN): 48.5 ML/MIN/1.73 M^2
GLUCOSE SERPL-MCNC: 100 MG/DL
PHOSPHATE SERPL-MCNC: 3.5 MG/DL
POTASSIUM SERPL-SCNC: 3.6 MMOL/L
PROT SERPL-MCNC: 7 G/DL
PTH-INTACT SERPL-MCNC: 60 PG/ML
SODIUM SERPL-SCNC: 141 MMOL/L

## 2018-10-24 PROCEDURE — 83970 ASSAY OF PARATHORMONE: CPT

## 2018-10-24 PROCEDURE — 84100 ASSAY OF PHOSPHORUS: CPT

## 2018-10-24 PROCEDURE — 80053 COMPREHEN METABOLIC PANEL: CPT

## 2018-10-24 PROCEDURE — 36415 COLL VENOUS BLD VENIPUNCTURE: CPT | Mod: PO

## 2018-10-31 ENCOUNTER — OFFICE VISIT (OUTPATIENT)
Dept: ENDOCRINOLOGY | Facility: CLINIC | Age: 78
End: 2018-10-31
Payer: MEDICARE

## 2018-10-31 VITALS
HEIGHT: 58 IN | SYSTOLIC BLOOD PRESSURE: 132 MMHG | HEART RATE: 57 BPM | DIASTOLIC BLOOD PRESSURE: 62 MMHG | WEIGHT: 153.31 LBS | BODY MASS INDEX: 32.18 KG/M2

## 2018-10-31 DIAGNOSIS — M81.0 OSTEOPOROSIS, UNSPECIFIED OSTEOPOROSIS TYPE, UNSPECIFIED PATHOLOGICAL FRACTURE PRESENCE: Primary | ICD-10-CM

## 2018-10-31 DIAGNOSIS — E83.52 HYPERCALCEMIA: ICD-10-CM

## 2018-10-31 DIAGNOSIS — E21.3 HYPERPARATHYROIDISM: ICD-10-CM

## 2018-10-31 PROCEDURE — 99213 OFFICE O/P EST LOW 20 MIN: CPT | Mod: PBBFAC,PO | Performed by: INTERNAL MEDICINE

## 2018-10-31 PROCEDURE — 99214 OFFICE O/P EST MOD 30 MIN: CPT | Mod: S$PBB,,, | Performed by: INTERNAL MEDICINE

## 2018-10-31 PROCEDURE — 99999 PR PBB SHADOW E&M-EST. PATIENT-LVL III: CPT | Mod: PBBFAC,,, | Performed by: INTERNAL MEDICINE

## 2018-10-31 RX ORDER — CINACALCET 30 MG/1
30 TABLET, FILM COATED ORAL
Qty: 30 TABLET | Refills: 11 | Status: SHIPPED | OUTPATIENT
Start: 2018-10-31 | End: 2020-07-22 | Stop reason: CLARIF

## 2018-10-31 NOTE — PROGRESS NOTES
CHIEF COMPLAINT: Hypercalcemia  78 year old being seen as a f/u. Discovered to have a high calcium in Oct/13. Previously on Fosamax for two years then started Prolia. States she started having joint pain in her hips in her back about 1 week after the injection. Last dose was approx 2 months ago. Has been urinating more. No n/v          PAST MEDICAL HISTORY/PAST SURGICAL HISTORY:  Reviewed in [facility]    SOCIAL HISTORY: No T/A    FAMILY HISTORY:  No Ca disorders. + osteoporosis. No hip fractures. No thyroid disease or DM.     MEDICATIONS/ALLERGIES: The patient's MedCard has been updated and reviewed.      ROS:   Constitutional: weight stable,  Eyes: No recent visual changes  ENT: No dysphagia.  Cardiovascular: Denies chest pain  Respiratory: No SOB  Gastrointestinal: No N/V currently  GenitoUrinary - No dysuria  Skin: No new skin rash  Neurologic: No headaces.  Remainder ROS negative    PE:    GENERAL: Well developed, well nourished.  NECK: Supple, trachea midline, no palpable thyroid nodules.  CHEST: Resp even and unlabored, CTA bilateral.  CARDIAC: RRR, S1, S2 heard, no murmurs, rubs or gallops, trace     Results for URIEL REDDING (MRN [id number]) as of 10/31/2018 13:51   Ref. Range 10/24/2018 10:02   Sodium Latest Ref Range: 136 - 145 mmol/L 141   Potassium Latest Ref Range: 3.5 - 5.1 mmol/L 3.6   Chloride Latest Ref Range: 95 - 110 mmol/L 107   CO2 Latest Ref Range: 23 - 29 mmol/L 27   Anion Gap Latest Ref Range: 8 - 16 mmol/L 7 (L)   BUN, Bld Latest Ref Range: 8 - 23 mg/dL 13   Creatinine Latest Ref Range: 0.5 - 1.4 mg/dL 1.1   eGFR if non African American Latest Ref Range: >60 mL/min/1.73 m^2 48.5 (A)   eGFR if African American Latest Ref Range: >60 mL/min/1.73 m^2 56.0 (A)   Glucose Latest Ref Range: 70 - 110 mg/dL 100   Calcium Latest Ref Range: 8.7 - 10.5 mg/dL 11.4 (H)   Phosphorus Latest Ref Range: 2.7 - 4.5 mg/dL 3.5   Alkaline Phosphatase Latest Ref Range: 55 - 135 U/L 83   Total Protein Latest Ref Range: 6.0  - 8.4 g/dL 7.0   Albumin Latest Ref Range: 3.5 - 5.2 g/dL 3.9   Total Bilirubin Latest Ref Range: 0.1 - 1.0 mg/dL 0.7   AST Latest Ref Range: 10 - 40 U/L 24   ALT Latest Ref Range: 10 - 44 U/L 22   PTH Latest Ref Range: 9.0 - 77.0 pg/mL 60.0           ASSESSMENT/PLAN:  1. Osteopeorosis- No Hx of fracture. Discussed options limited on tx due to CKD. appears she stopped Prolia. Discussed risks of vertebral compression fractures with stopping Prolia. At this point with CKD and Increased Ca will start sensipar 1st. Can rediscuss Prolia after seeing what Ca levels are after starting sensipar.     2. Hyperparathyroidism/Hypercalcemia- PTH elevated with a mildly elevated Ca. She most likely has primary hyperparathyroidism and does not want surgery. Redicussed surgery. Will start sensipar 30 mg daily Discussed s/e.     3. Vitamin D deficiency-Continue to take OTC Vit D 2000 daily. Vitamin D WNL.    FOLLOWUP  1 month CMP,   F/U 6 months

## 2018-11-01 ENCOUNTER — PATIENT MESSAGE (OUTPATIENT)
Dept: FAMILY MEDICINE | Facility: CLINIC | Age: 78
End: 2018-11-01

## 2018-11-12 ENCOUNTER — TELEPHONE (OUTPATIENT)
Dept: ENDOCRINOLOGY | Facility: CLINIC | Age: 78
End: 2018-11-12

## 2018-11-12 NOTE — TELEPHONE ENCOUNTER
Can she go back to being on half tablet.   This is the only medication. I am not aware of a generic. Could do pharmacy patient assistance to see if can get the med cheaper  The only other option would be surgery.

## 2018-11-12 NOTE — TELEPHONE ENCOUNTER
----- Message from Ameena Shaikh sent at 11/12/2018 12:16 PM CST -----  Contact: Suzie  Type: Needs Medical Advice    Who Called:  patient  Symptoms (please be specific):  Restlessness, shaking  How long has patient had these symptoms:  Three days  Pharmacy name and phone #:  na  Best Call Back Number: [phone]  Additional Information:  Please call before prescribing any other medication. Thanks!

## 2018-11-12 NOTE — TELEPHONE ENCOUNTER
"Pt states she has been taking 1/2 of sensipar as directed then progressed to whole pill. Since then, three days ago, she has had hand trembling, restless and jittery, "feel like i'm going to burst"  States she can't take it (stopped today).   Please advise    Also requesting meds moving forward pls Rx generic (due to cost)  Reported constipations- given relief recommendations  Verbalized understanding  "

## 2018-11-13 NOTE — TELEPHONE ENCOUNTER
Left message per Dr. Reddy for pt:  Can she go back to being on half tablet.   This is the only medication. I am not aware of a generic. Could do pharmacy patient assistance to see if can get the med cheaper  The only other option would be surgery.     Asked pt to call back clinic to discuss

## 2018-11-16 ENCOUNTER — TELEPHONE (OUTPATIENT)
Dept: ENDOCRINOLOGY | Facility: CLINIC | Age: 78
End: 2018-11-16

## 2018-11-16 ENCOUNTER — TELEPHONE (OUTPATIENT)
Dept: PHARMACY | Facility: AMBULARY SURGERY CENTER | Age: 78
End: 2018-11-16

## 2018-11-16 NOTE — TELEPHONE ENCOUNTER
----- Message from James Willoughby sent at 11/16/2018  9:29 AM CST -----  Contact: patient  Type:  Patient Returning Call    Who Called:  patient  Who Left Message for Patient:  portia  Does the patient know what this is regarding?:  yes  Best Call Back Number:  [phone]  Additional Information:  Requesting a call back

## 2018-11-16 NOTE — TELEPHONE ENCOUNTER
Spoke with pt about Dr. Reddy's instructions  States she has side effects (nausea) but will cont to tolerate half sensipar to avoid surgery.    Kimberlyn - per Dr. Reddy could you please see if pt qualifies for pharmacy assistance for Sensipar?

## 2018-11-26 RX ORDER — TEMAZEPAM 30 MG/1
30 CAPSULE ORAL NIGHTLY
Qty: 90 CAPSULE | Refills: 0 | Status: SHIPPED | OUTPATIENT
Start: 2018-11-26 | End: 2019-02-19 | Stop reason: SDUPTHER

## 2018-11-29 ENCOUNTER — PATIENT MESSAGE (OUTPATIENT)
Dept: FAMILY MEDICINE | Facility: CLINIC | Age: 78
End: 2018-11-29

## 2018-11-29 DIAGNOSIS — E78.5 HYPERLIPIDEMIA, UNSPECIFIED HYPERLIPIDEMIA TYPE: Primary | ICD-10-CM

## 2018-11-30 ENCOUNTER — LAB VISIT (OUTPATIENT)
Dept: LAB | Facility: HOSPITAL | Age: 78
End: 2018-11-30
Attending: INTERNAL MEDICINE
Payer: MEDICARE

## 2018-11-30 DIAGNOSIS — M85.80 BONE LOSS: ICD-10-CM

## 2018-11-30 LAB
ALBUMIN SERPL BCP-MCNC: 4 G/DL
ALP SERPL-CCNC: 84 U/L
ALT SERPL W/O P-5'-P-CCNC: 18 U/L
ANION GAP SERPL CALC-SCNC: 8 MMOL/L
AST SERPL-CCNC: 24 U/L
BILIRUB SERPL-MCNC: 0.8 MG/DL
BUN SERPL-MCNC: 12 MG/DL
CALCIUM SERPL-MCNC: 10 MG/DL
CHLORIDE SERPL-SCNC: 106 MMOL/L
CO2 SERPL-SCNC: 29 MMOL/L
CREAT SERPL-MCNC: 1.1 MG/DL
EST. GFR  (AFRICAN AMERICAN): 56 ML/MIN/1.73 M^2
EST. GFR  (NON AFRICAN AMERICAN): 48.5 ML/MIN/1.73 M^2
GLUCOSE SERPL-MCNC: 101 MG/DL
POTASSIUM SERPL-SCNC: 3.8 MMOL/L
PROT SERPL-MCNC: 7.2 G/DL
SODIUM SERPL-SCNC: 143 MMOL/L

## 2018-11-30 PROCEDURE — 80053 COMPREHEN METABOLIC PANEL: CPT

## 2018-12-01 ENCOUNTER — PATIENT MESSAGE (OUTPATIENT)
Dept: FAMILY MEDICINE | Facility: CLINIC | Age: 78
End: 2018-12-01

## 2018-12-07 RX ORDER — FLUCONAZOLE 150 MG/1
150 TABLET ORAL ONCE
Qty: 1 TABLET | Refills: 1 | Status: SHIPPED | OUTPATIENT
Start: 2018-12-07 | End: 2018-12-07

## 2018-12-07 RX ORDER — FLUCONAZOLE 150 MG/1
150 TABLET ORAL DAILY
Refills: 1 | COMMUNITY
Start: 2018-12-06 | End: 2018-12-07 | Stop reason: SDUPTHER

## 2019-02-01 DIAGNOSIS — Z86.69 HISTORY OF MIGRAINE HEADACHES: ICD-10-CM

## 2019-02-01 RX ORDER — TOPIRAMATE SPINKLE 25 MG/1
25 CAPSULE ORAL DAILY
Qty: 90 CAPSULE | Refills: 3 | Status: SHIPPED | OUTPATIENT
Start: 2019-02-01 | End: 2019-11-12 | Stop reason: SDUPTHER

## 2019-02-18 DIAGNOSIS — I10 ESSENTIAL HYPERTENSION: ICD-10-CM

## 2019-02-18 DIAGNOSIS — E78.5 HYPERLIPIDEMIA, UNSPECIFIED HYPERLIPIDEMIA TYPE: ICD-10-CM

## 2019-02-18 RX ORDER — ATORVASTATIN CALCIUM 40 MG/1
40 TABLET, FILM COATED ORAL DAILY
Qty: 90 TABLET | Refills: 3 | Status: SHIPPED | OUTPATIENT
Start: 2019-02-18 | End: 2020-06-02 | Stop reason: SDUPTHER

## 2019-02-18 RX ORDER — METOPROLOL TARTRATE 100 MG/1
100 TABLET ORAL 2 TIMES DAILY
Qty: 180 TABLET | Refills: 3 | Status: SHIPPED | OUTPATIENT
Start: 2019-02-18 | End: 2020-03-19

## 2019-02-19 DIAGNOSIS — K52.9 CHRONIC DIARRHEA: ICD-10-CM

## 2019-02-19 RX ORDER — DIPHENOXYLATE HYDROCHLORIDE AND ATROPINE SULFATE 2.5; .025 MG/1; MG/1
TABLET ORAL
Qty: 180 TABLET | Refills: 1 | Status: SHIPPED | OUTPATIENT
Start: 2019-02-19 | End: 2020-02-11

## 2019-02-19 RX ORDER — TEMAZEPAM 30 MG/1
30 CAPSULE ORAL NIGHTLY
Qty: 90 CAPSULE | Refills: 3 | Status: SHIPPED | OUTPATIENT
Start: 2019-02-19 | End: 2019-11-12 | Stop reason: SDUPTHER

## 2019-02-28 ENCOUNTER — TELEPHONE (OUTPATIENT)
Dept: FAMILY MEDICINE | Facility: CLINIC | Age: 79
End: 2019-02-28

## 2019-02-28 NOTE — TELEPHONE ENCOUNTER
----- Message from Radha Mack sent at 2/28/2019  3:33 PM CST -----  Type:  Needs Medical Advice    Who Called: pt  Symptoms (please be specific): uti  How long has patient had these symptoms:  2 days  Pharmacy name and phone #: .  [facility] [place] [address]  [address] [facility] [address]  Phone: [phone] Fax: [phone]    Would the patient rather a call back or a response via [facility]? call  Best Call Back Number: .[phone] (home)   Additonal Information: please work in tomorrow if need be but prefers meds

## 2019-03-01 ENCOUNTER — PATIENT MESSAGE (OUTPATIENT)
Dept: FAMILY MEDICINE | Facility: CLINIC | Age: 79
End: 2019-03-01

## 2019-03-01 ENCOUNTER — TELEPHONE (OUTPATIENT)
Dept: FAMILY MEDICINE | Facility: CLINIC | Age: 79
End: 2019-03-01

## 2019-03-01 RX ORDER — NITROFURANTOIN 25; 75 MG/1; MG/1
100 CAPSULE ORAL 2 TIMES DAILY
Qty: 20 CAPSULE | Refills: 0 | Status: SHIPPED | OUTPATIENT
Start: 2019-03-01 | End: 2019-03-12

## 2019-03-01 NOTE — TELEPHONE ENCOUNTER
Pt states:     I  have a urinary tract infection and want you to please call in a prescription as I can't come in because I'm am urinating on myself and I'm embarrassed .        Please Advise.

## 2019-03-01 NOTE — TELEPHONE ENCOUNTER
I have signed for the following orders AND/OR meds.  Please call the patient and ask the patient to schedule the testing AND/OR inform about any medications that were sent.      No orders of the defined types were placed in this encounter.      Medications Ordered This Encounter   Medications    nitrofurantoin, macrocrystal-monohydrate, (MACROBID) 100 MG capsule     Sig: Take 1 capsule (100 mg total) by mouth 2 (two) times daily. for 10 days     Dispense:  20 capsule     Refill:  0

## 2019-03-11 ENCOUNTER — PATIENT MESSAGE (OUTPATIENT)
Dept: FAMILY MEDICINE | Facility: CLINIC | Age: 79
End: 2019-03-11

## 2019-03-12 ENCOUNTER — OFFICE VISIT (OUTPATIENT)
Dept: FAMILY MEDICINE | Facility: CLINIC | Age: 79
End: 2019-03-12
Payer: MEDICARE

## 2019-03-12 VITALS
SYSTOLIC BLOOD PRESSURE: 136 MMHG | TEMPERATURE: 98 F | BODY MASS INDEX: 30.32 KG/M2 | WEIGHT: 150.38 LBS | DIASTOLIC BLOOD PRESSURE: 77 MMHG | HEART RATE: 50 BPM | HEIGHT: 59 IN

## 2019-03-12 DIAGNOSIS — N39.0 URINARY TRACT INFECTION WITHOUT HEMATURIA, SITE UNSPECIFIED: Primary | ICD-10-CM

## 2019-03-12 LAB
BILIRUB UR QL STRIP: NEGATIVE
CLARITY UR: CLEAR
COLOR UR: YELLOW
GLUCOSE UR QL STRIP: NEGATIVE
HGB UR QL STRIP: ABNORMAL
KETONES UR QL STRIP: NEGATIVE
LEUKOCYTE ESTERASE UR QL STRIP: NEGATIVE
NITRITE UR QL STRIP: NEGATIVE
PH UR STRIP: 7 [PH] (ref 5–8)
PROT UR QL STRIP: NEGATIVE
SP GR UR STRIP: 1.01 (ref 1–1.03)
URN SPEC COLLECT METH UR: ABNORMAL

## 2019-03-12 PROCEDURE — 99999 PR PBB SHADOW E&M-EST. PATIENT-LVL III: ICD-10-PCS | Mod: PBBFAC,,, | Performed by: NURSE PRACTITIONER

## 2019-03-12 PROCEDURE — 99213 PR OFFICE/OUTPT VISIT, EST, LEVL III, 20-29 MIN: ICD-10-PCS | Mod: S$PBB,,, | Performed by: NURSE PRACTITIONER

## 2019-03-12 PROCEDURE — 87086 URINE CULTURE/COLONY COUNT: CPT

## 2019-03-12 PROCEDURE — 87077 CULTURE AEROBIC IDENTIFY: CPT

## 2019-03-12 PROCEDURE — 99999 PR PBB SHADOW E&M-EST. PATIENT-LVL III: CPT | Mod: PBBFAC,,, | Performed by: NURSE PRACTITIONER

## 2019-03-12 PROCEDURE — 87088 URINE BACTERIA CULTURE: CPT

## 2019-03-12 PROCEDURE — 99213 OFFICE O/P EST LOW 20 MIN: CPT | Mod: PBBFAC,PO | Performed by: NURSE PRACTITIONER

## 2019-03-12 PROCEDURE — 99213 OFFICE O/P EST LOW 20 MIN: CPT | Mod: S$PBB,,, | Performed by: NURSE PRACTITIONER

## 2019-03-12 PROCEDURE — 81002 URINALYSIS NONAUTO W/O SCOPE: CPT | Mod: PO

## 2019-03-12 PROCEDURE — 87186 SC STD MICRODIL/AGAR DIL: CPT

## 2019-03-12 RX ORDER — PHENAZOPYRIDINE HYDROCHLORIDE 200 MG/1
200 TABLET, FILM COATED ORAL 3 TIMES DAILY PRN
Qty: 9 TABLET | Refills: 0 | Status: SHIPPED | OUTPATIENT
Start: 2019-03-12 | End: 2019-03-22

## 2019-03-12 NOTE — PROGRESS NOTES
Subjective:       Patient ID: Suzie Dawkins is a 78 y.o. female.    Chief Complaint: Urinary Tract Infection (complains of bladder spasms causing bladder leakage)    Urinary Tract Infection    The current episode started in the past 7 days. The problem occurs every urination. The problem has been gradually improving. The quality of the pain is described as burning. The pain is at a severity of 2/10. There has been no fever. Associated symptoms include frequency and urgency. Pertinent negatives include no vomiting or rash. Treatments tried: macrobid. The treatment provided no relief.       Review of Systems   Constitutional: Negative for fatigue, fever and unexpected weight change.   HENT: Negative for ear pain and sore throat.    Eyes: Negative for pain and visual disturbance.   Respiratory: Negative for cough and shortness of breath.    Cardiovascular: Negative for chest pain and palpitations.   Gastrointestinal: Negative for abdominal pain, diarrhea and vomiting.   Genitourinary: Positive for dysuria, frequency and urgency.   Musculoskeletal: Negative for arthralgias and myalgias.   Skin: Negative for color change and rash.   Neurological: Negative for dizziness and headaches.   Psychiatric/Behavioral: Negative for dysphoric mood and sleep disturbance. The patient is not nervous/anxious.        Vitals:    03/12/19 1441   BP: 136/77   Pulse: (!) 50   Temp: 98 °F (36.7 °C)       Objective:     Current Outpatient Medications   Medication Sig Dispense Refill    atorvastatin (LIPITOR) 40 MG tablet Take 1 tablet (40 mg total) by mouth once daily. 90 tablet 3    calcium carbonate (CALCIUM 500 ORAL) Take by mouth.      cinacalcet (SENSIPAR) 30 MG Tab Take 1 tablet (30 mg total) by mouth daily with breakfast. 30 tablet 11    citalopram (CELEXA) 10 MG tablet TAKE ONE TABLET BY MOUTH ONCE DAILY 90 tablet 3    diphenoxylate-atropine 2.5-0.025 mg (LOMOTIL) 2.5-0.025 mg per tablet TAKE ONE TABLET BY MOUTH FOUR TIMES  DAILY AS NEEDED 180 tablet 1    losartan (COZAAR) 50 MG tablet TAKE ONE TABLET BY MOUTH ONCE DAILY 90 tablet 3    metoprolol tartrate (LOPRESSOR) 100 MG tablet Take 1 tablet (100 mg total) by mouth 2 (two) times daily. 180 tablet 3    MULTIVIT,CALC,MINS/IRON/FOLIC (ONE-A-DAY WOMENS FORMULA ORAL) Take 1 tablet by mouth once daily.      ondansetron (ZOFRAN-ODT) 4 MG TbDL DISSOLVE ONE TABLET BY MOUTH EVERY 6 HOURS AS NEEDED FOR NAUSEA 60 tablet 1    promethazine (PHENERGAN) 25 MG tablet Take 1 tablet (25 mg total) by mouth every 6 (six) hours as needed for Nausea. 6 tablet 0    sodium,potassium,mag sulfates (SUPREP BOWEL PREP KIT) 17.5-3.13-1.6 gram SolR Take as instructed on prep sheet 354 mL 0    temazepam (RESTORIL) 30 mg capsule Take 1 capsule (30 mg total) by mouth nightly. 90 capsule 3    topiramate 25 mg capsule Take 1 capsule (25 mg total) by mouth once daily. 90 capsule 3    vitamin D 1000 units Tab Take 1,000 Units by mouth once daily.      VITAMIN E ACETATE (VITAMIN E ORAL) Take 1 capsule by mouth once daily.      hyoscyamine (ANASPAZ,LEVSIN) 0.125 mg Tab Take 1 tablet (125 mcg total) by mouth every 4 (four) hours as needed. 30 tablet 0    phenazopyridine (PYRIDIUM) 200 MG tablet Take 1 tablet (200 mg total) by mouth 3 (three) times daily as needed for Pain. 9 tablet 0     No current facility-administered medications for this visit.        Physical Exam   Constitutional: She is oriented to person, place, and time. She appears well-developed and well-nourished. No distress.   HENT:   Head: Normocephalic and atraumatic.   Eyes: EOM are normal. Pupils are equal, round, and reactive to light.   Neck: Normal range of motion. Neck supple.   Cardiovascular: Normal rate and regular rhythm.   Pulmonary/Chest: Effort normal and breath sounds normal.   Abdominal: Soft. Normal appearance and bowel sounds are normal. There is tenderness in the suprapubic area.   Musculoskeletal: Normal range of motion.    Neurological: She is alert and oriented to person, place, and time.   Skin: Skin is warm and dry. No rash noted.   Psychiatric: She has a normal mood and affect. Judgment normal.   Nursing note and vitals reviewed.      Lab Results   Component Value Date    COLORU Yellow 03/12/2019    APPEARANCEUA Clear 03/12/2019    GLUCUA Negative 03/12/2019    SPECGRAV 1.010 03/12/2019    PHUR 7.0 03/12/2019    WBCUR trace 08/09/2018    RBCUR n 08/09/2018    NITRITE Negative 03/12/2019    KETONESU Negative 03/12/2019    BILIRUBINUA Negative 03/12/2019    UROBILINOGEN 0.2 08/09/2018    PROTEINUR 100 08/09/2018    OCCULTUA Trace (A) 03/12/2019    LEUKOCYTESUR Negative 03/12/2019    GLUCOSEUR n 08/09/2018     Assessment:       1. Urinary tract infection without hematuria, site unspecified        Plan:   Urinary tract infection without hematuria, site unspecified  -     Urinalysis  -     Urine culture    Other orders  -     phenazopyridine (PYRIDIUM) 200 MG tablet; Take 1 tablet (200 mg total) by mouth 3 (three) times daily as needed for Pain.  Dispense: 9 tablet; Refill: 0        No Follow-up on file.    There are no Patient Instructions on file for this visit.

## 2019-03-14 LAB — BACTERIA UR CULT: NORMAL

## 2019-03-18 RX ORDER — NITROFURANTOIN 25; 75 MG/1; MG/1
100 CAPSULE ORAL 2 TIMES DAILY
Qty: 14 CAPSULE | Refills: 0 | Status: SHIPPED | OUTPATIENT
Start: 2019-03-18 | End: 2019-05-13

## 2019-04-01 ENCOUNTER — PATIENT MESSAGE (OUTPATIENT)
Dept: FAMILY MEDICINE | Facility: CLINIC | Age: 79
End: 2019-04-01

## 2019-04-01 RX ORDER — FLUCONAZOLE 200 MG/1
200 TABLET ORAL ONCE
Qty: 1 TABLET | Refills: 0 | Status: SHIPPED | OUTPATIENT
Start: 2019-04-01 | End: 2019-04-01

## 2019-05-13 ENCOUNTER — OFFICE VISIT (OUTPATIENT)
Dept: ENDOCRINOLOGY | Facility: CLINIC | Age: 79
End: 2019-05-13
Payer: MEDICARE

## 2019-05-13 VITALS
SYSTOLIC BLOOD PRESSURE: 128 MMHG | BODY MASS INDEX: 30.04 KG/M2 | WEIGHT: 149 LBS | DIASTOLIC BLOOD PRESSURE: 82 MMHG | HEIGHT: 59 IN | HEART RATE: 51 BPM

## 2019-05-13 DIAGNOSIS — E55.9 VITAMIN D DEFICIENCY: ICD-10-CM

## 2019-05-13 DIAGNOSIS — E21.0 PRIMARY HYPERPARATHYROIDISM: Primary | ICD-10-CM

## 2019-05-13 DIAGNOSIS — M81.0 OSTEOPOROSIS, UNSPECIFIED OSTEOPOROSIS TYPE, UNSPECIFIED PATHOLOGICAL FRACTURE PRESENCE: ICD-10-CM

## 2019-05-13 PROCEDURE — 99999 PR PBB SHADOW E&M-EST. PATIENT-LVL III: CPT | Mod: PBBFAC,,, | Performed by: INTERNAL MEDICINE

## 2019-05-13 PROCEDURE — 99214 PR OFFICE/OUTPT VISIT, EST, LEVL IV, 30-39 MIN: ICD-10-PCS | Mod: S$PBB,,, | Performed by: INTERNAL MEDICINE

## 2019-05-13 PROCEDURE — 99213 OFFICE O/P EST LOW 20 MIN: CPT | Mod: PBBFAC,PO | Performed by: INTERNAL MEDICINE

## 2019-05-13 PROCEDURE — 99999 PR PBB SHADOW E&M-EST. PATIENT-LVL III: ICD-10-PCS | Mod: PBBFAC,,, | Performed by: INTERNAL MEDICINE

## 2019-05-13 PROCEDURE — 99214 OFFICE O/P EST MOD 30 MIN: CPT | Mod: S$PBB,,, | Performed by: INTERNAL MEDICINE

## 2019-05-13 NOTE — PROGRESS NOTES
CHIEF COMPLAINT: Hypercalcemia  78 year old being seen as a f/u. Discovered to have a high calcium in Oct/13. Previously on Fosamax for two years then started Prolia. Was on sensipar. States could not tolerate the 1/2 pill of sensipar. States had nausea. No Fractures. No kidney stones. No current N/V            PAST MEDICAL HISTORY/PAST SURGICAL HISTORY:  Reviewed in [facility]    SOCIAL HISTORY: No T/A    FAMILY HISTORY:  No Ca disorders. + osteoporosis. No hip fractures. No thyroid disease or DM.     MEDICATIONS/ALLERGIES: The patient's MedCard has been updated and reviewed.      ROS:   Constitutional: weight stable,  Eyes: No recent visual changes  ENT: No dysphagia.  Cardiovascular: Denies chest pain  Respiratory: No SOB  Gastrointestinal: No N/V currently  GenitoUrinary - No dysuria  Skin: No new skin rash  Neurologic: No headaces.  Remainder ROS negative    PE:    GENERAL: Well developed, well nourished.  NECK: Supple, trachea midline, no palpable thyroid nodules.  CHEST: Resp even and unlabored, CTA bilateral.  CARDIAC: RRR, S1, S2 heard, no murmurs, rubs or gallops, trace               ASSESSMENT/PLAN:  1. Osteopeorosis- No Hx of fracture. States could not tolerate Prolia. Has difficulty tolerating multiple medications. Will rediscuss after DEXA     2. Hyperparathyroidism/Hypercalcemia- States cannot tolerate sensipar to Tx hypercalcemia. Does not want surgery. Will redo w/u as seen below. Discussed no other options to treat other than surgery or can use sensipar for hypercalcemia.     3. Vitamin D deficiency-Continue to take OTC Vit D 2000 daily. Vitamin D WNL.    FOLLOWUP  DEXA  CMP, PTH, Phos, 24 hour urine Ca/Cr

## 2019-05-21 DIAGNOSIS — F41.9 ANXIETY: ICD-10-CM

## 2019-05-21 DIAGNOSIS — I10 ESSENTIAL HYPERTENSION: ICD-10-CM

## 2019-05-21 DIAGNOSIS — F32.A DEPRESSION, UNSPECIFIED DEPRESSION TYPE: ICD-10-CM

## 2019-05-21 RX ORDER — LOSARTAN POTASSIUM 50 MG/1
50 TABLET ORAL DAILY
Qty: 90 TABLET | Refills: 0 | Status: SHIPPED | OUTPATIENT
Start: 2019-05-21 | End: 2019-11-12 | Stop reason: SDUPTHER

## 2019-05-21 RX ORDER — CITALOPRAM 10 MG/1
10 TABLET ORAL DAILY
Qty: 90 TABLET | Refills: 3 | Status: SHIPPED | OUTPATIENT
Start: 2019-05-21 | End: 2020-06-02 | Stop reason: SDUPTHER

## 2019-11-12 DIAGNOSIS — I10 ESSENTIAL HYPERTENSION: ICD-10-CM

## 2019-11-12 DIAGNOSIS — Z86.69 HISTORY OF MIGRAINE HEADACHES: ICD-10-CM

## 2019-11-12 RX ORDER — TOPIRAMATE 25 MG/1
TABLET ORAL
Qty: 90 TABLET | Refills: 0 | Status: SHIPPED | OUTPATIENT
Start: 2019-11-12 | End: 2020-03-19

## 2019-11-12 RX ORDER — TEMAZEPAM 30 MG/1
CAPSULE ORAL
Qty: 90 CAPSULE | Refills: 0 | Status: SHIPPED | OUTPATIENT
Start: 2019-11-12 | End: 2020-03-19

## 2019-11-12 RX ORDER — LOSARTAN POTASSIUM 50 MG/1
50 TABLET ORAL DAILY
Qty: 90 TABLET | Refills: 0 | Status: SHIPPED | OUTPATIENT
Start: 2019-11-12 | End: 2019-12-17

## 2019-11-12 NOTE — TELEPHONE ENCOUNTER
I refilled the requested medication x 1 month.    The patient is due for an visit in the office.  Call the patient on the phone and book the patient with Danica Joaquin NP for a visit.     PLEASE DOCUMENT THE FACT THAT YOU HAVE CONTACTED THE PATIENT IN THE CHART FOR FUTURE REFERENCE.    Health Maintenance Due   Topic Date Due    TETANUS VACCINE  08/17/1959    Colonoscopy  02/08/2014    Mammogram  06/15/2019

## 2019-12-03 ENCOUNTER — TELEPHONE (OUTPATIENT)
Dept: FAMILY MEDICINE | Facility: CLINIC | Age: 79
End: 2019-12-03

## 2019-12-03 ENCOUNTER — OFFICE VISIT (OUTPATIENT)
Dept: FAMILY MEDICINE | Facility: CLINIC | Age: 79
End: 2019-12-03
Payer: MEDICARE

## 2019-12-03 VITALS
SYSTOLIC BLOOD PRESSURE: 160 MMHG | WEIGHT: 156.19 LBS | TEMPERATURE: 98 F | HEIGHT: 59 IN | BODY MASS INDEX: 31.49 KG/M2 | HEART RATE: 53 BPM | DIASTOLIC BLOOD PRESSURE: 82 MMHG

## 2019-12-03 DIAGNOSIS — R31.9 HEMATURIA, UNSPECIFIED TYPE: Primary | ICD-10-CM

## 2019-12-03 DIAGNOSIS — N39.0 RECURRENT UTI: ICD-10-CM

## 2019-12-03 PROBLEM — R25.1 TREMOR: Status: ACTIVE | Noted: 2018-09-27

## 2019-12-03 PROBLEM — E78.5 HYPERLIPIDEMIA: Status: ACTIVE | Noted: 2018-09-27

## 2019-12-03 PROBLEM — I10 ESSENTIAL HYPERTENSION: Status: ACTIVE | Noted: 2018-09-27

## 2019-12-03 LAB
BACTERIA #/AREA URNS HPF: ABNORMAL /HPF
BILIRUB UR QL STRIP: NEGATIVE
CLARITY UR: ABNORMAL
COLOR UR: YELLOW
GLUCOSE UR QL STRIP: NEGATIVE
HGB UR QL STRIP: ABNORMAL
HYALINE CASTS #/AREA URNS LPF: 0 /LPF
KETONES UR QL STRIP: NEGATIVE
LEUKOCYTE ESTERASE UR QL STRIP: ABNORMAL
MICROSCOPIC COMMENT: ABNORMAL
NITRITE UR QL STRIP: NEGATIVE
NON-SQ EPI CELLS #/AREA URNS HPF: 1 /HPF
PH UR STRIP: 7.5 [PH] (ref 5–8)
PROT UR QL STRIP: ABNORMAL
RBC #/AREA URNS HPF: >100 /HPF (ref 0–4)
SP GR UR STRIP: 1.01 (ref 1–1.03)
SQUAMOUS #/AREA URNS HPF: 1 /HPF
URN SPEC COLLECT METH UR: ABNORMAL
WBC #/AREA URNS HPF: >100 /HPF (ref 0–5)
WBC CLUMPS URNS QL MICRO: ABNORMAL

## 2019-12-03 PROCEDURE — 1159F PR MEDICATION LIST DOCUMENTED IN MEDICAL RECORD: ICD-10-PCS | Mod: ,,, | Performed by: FAMILY MEDICINE

## 2019-12-03 PROCEDURE — 1125F AMNT PAIN NOTED PAIN PRSNT: CPT | Mod: ,,, | Performed by: FAMILY MEDICINE

## 2019-12-03 PROCEDURE — 1125F PR PAIN SEVERITY QUANTIFIED, PAIN PRESENT: ICD-10-PCS | Mod: ,,, | Performed by: FAMILY MEDICINE

## 2019-12-03 PROCEDURE — 99213 PR OFFICE/OUTPT VISIT, EST, LEVL III, 20-29 MIN: ICD-10-PCS | Mod: S$PBB,,, | Performed by: FAMILY MEDICINE

## 2019-12-03 PROCEDURE — 81000 URINALYSIS NONAUTO W/SCOPE: CPT | Mod: PO

## 2019-12-03 PROCEDURE — 99214 OFFICE O/P EST MOD 30 MIN: CPT | Mod: PBBFAC,PO | Performed by: FAMILY MEDICINE

## 2019-12-03 PROCEDURE — 1159F MED LIST DOCD IN RCRD: CPT | Mod: ,,, | Performed by: FAMILY MEDICINE

## 2019-12-03 PROCEDURE — 99213 OFFICE O/P EST LOW 20 MIN: CPT | Mod: S$PBB,,, | Performed by: FAMILY MEDICINE

## 2019-12-03 PROCEDURE — 99999 PR PBB SHADOW E&M-EST. PATIENT-LVL IV: ICD-10-PCS | Mod: PBBFAC,,, | Performed by: FAMILY MEDICINE

## 2019-12-03 PROCEDURE — 99999 PR PBB SHADOW E&M-EST. PATIENT-LVL IV: CPT | Mod: PBBFAC,,, | Performed by: FAMILY MEDICINE

## 2019-12-03 RX ORDER — NITROFURANTOIN 25; 75 MG/1; MG/1
100 CAPSULE ORAL 2 TIMES DAILY
Qty: 20 CAPSULE | Refills: 0 | Status: SHIPPED | OUTPATIENT
Start: 2019-12-03 | End: 2019-12-13

## 2019-12-03 RX ORDER — PHENAZOPYRIDINE HYDROCHLORIDE 200 MG/1
200 TABLET, FILM COATED ORAL 3 TIMES DAILY PRN
Qty: 9 TABLET | Refills: 0 | Status: SHIPPED | OUTPATIENT
Start: 2019-12-03 | End: 2019-12-13

## 2019-12-03 RX ORDER — NITROFURANTOIN 25; 75 MG/1; MG/1
100 CAPSULE ORAL 2 TIMES DAILY
Qty: 20 CAPSULE | Refills: 0 | Status: SHIPPED | OUTPATIENT
Start: 2019-12-03 | End: 2019-12-03

## 2019-12-03 RX ORDER — PHENAZOPYRIDINE HYDROCHLORIDE 200 MG/1
200 TABLET, FILM COATED ORAL 3 TIMES DAILY PRN
Qty: 9 TABLET | Refills: 0 | Status: SHIPPED | OUTPATIENT
Start: 2019-12-03 | End: 2019-12-03

## 2019-12-03 NOTE — PROGRESS NOTES
PLAN:      Problem List Items Addressed This Visit     Hematuria - Primary    Relevant Medications    nitrofurantoin, macrocrystal-monohydrate, (MACROBID) 100 MG capsule    phenazopyridine (PYRIDIUM) 200 MG tablet    Other Relevant Orders    URINALYSIS (Completed)    Urinalysis Microscopic (Completed)    Urine culture    Ambulatory referral to Urology    Recurrent UTI     Urine culture was recently performed earlier this year was sensitive to Macrobid.  Patient cannot take Bactrim.  Of note patient has had ESBL in 2017.    Will follow urine culture.  Referral to Urology.         Relevant Medications    nitrofurantoin, macrocrystal-monohydrate, (MACROBID) 100 MG capsule    phenazopyridine (PYRIDIUM) 200 MG tablet    Other Relevant Orders    Ambulatory referral to Urology        Future Appointments     Date Provider Specialty Appt Notes    12/3/2019 Bunny Moses MD Family Medicine blood in urine/ok per dr moses    12/17/2019  Family Medicine bp check           Medication List with Changes/Refills   New Medications    NITROFURANTOIN, MACROCRYSTAL-MONOHYDRATE, (MACROBID) 100 MG CAPSULE    Take 1 capsule (100 mg total) by mouth 2 (two) times daily. for 10 days    PHENAZOPYRIDINE (PYRIDIUM) 200 MG TABLET    Take 1 tablet (200 mg total) by mouth 3 (three) times daily as needed for Pain.   Current Medications    ATORVASTATIN (LIPITOR) 40 MG TABLET    Take 1 tablet (40 mg total) by mouth once daily.    CALCIUM CARBONATE (CALCIUM 500 ORAL)    Take by mouth.    CINACALCET (SENSIPAR) 30 MG TAB    Take 1 tablet (30 mg total) by mouth daily with breakfast.    CITALOPRAM (CELEXA) 10 MG TABLET    Take 1 tablet (10 mg total) by mouth once daily.    DIPHENOXYLATE-ATROPINE 2.5-0.025 MG (LOMOTIL) 2.5-0.025 MG PER TABLET    TAKE ONE TABLET BY MOUTH FOUR TIMES DAILY AS NEEDED    FLUAD 1039-2963, 65 YR UP,,PF, 45 MCG (15 MCG X 3)/0.5 ML SYRG    ADM 0.5ML IM UTD    HYOSCYAMINE (ANASPAZ,LEVSIN) 0.125 MG TAB    Take 1 tablet (125  mcg total) by mouth every 4 (four) hours as needed.    LOSARTAN (COZAAR) 50 MG TABLET    Take 1 tablet (50 mg total) by mouth once daily.    METOPROLOL TARTRATE (LOPRESSOR) 100 MG TABLET    Take 1 tablet (100 mg total) by mouth 2 (two) times daily.    MULTIVIT,CALC,MINS/IRON/FOLIC (ONE-A-DAY WOMENS FORMULA ORAL)    Take 1 tablet by mouth once daily.    ONDANSETRON (ZOFRAN-ODT) 4 MG TBDL    DISSOLVE ONE TABLET BY MOUTH EVERY 6 HOURS AS NEEDED FOR NAUSEA    PROMETHAZINE (PHENERGAN) 25 MG TABLET    Take 1 tablet (25 mg total) by mouth every 6 (six) hours as needed for Nausea.    SODIUM,POTASSIUM,MAG SULFATES (SUPREP BOWEL PREP KIT) 17.5-3.13-1.6 GRAM SOLR    Take as instructed on prep sheet    TEMAZEPAM (RESTORIL) 30 MG CAPSULE    TAKE ONE CAPSULE BY MOUTH NIGHTLY    TOPIRAMATE (TOPAMAX) 25 MG TABLET    TAKE ONE TABLET ONCE DAILY    VITAMIN D 1000 UNITS TAB    Take 1,000 Units by mouth once daily.    VITAMIN E ACETATE (VITAMIN E ORAL)    Take 1 capsule by mouth once daily.       Bunny Moses M.D.     ==========================================================================  Subjective:      Patient ID: Uriel Dawkins is a 79 y.o. female.  has a past medical history of Anxiety, Arthritis, Cancer (2007), Depression, GERD (gastroesophageal reflux disease), Hypercalcemia, Hyperlipidemia, Hypertension, Osteoporosis (10/7/2013), and Vitamin D deficiency.     Chief Complaint: Urinary Tract Infection      Problem List Items Addressed This Visit     Hematuria - Primary    Overview     Acute.  Started few days ago.  Recurrent.  Patient with recurrent UTIs.  Patient does not have a urologist.  Associated with burning and increased frequency.  Patient has not been on antibiotics within the last few weeks.         Recurrent UTI    Overview     Results for URIEL DAWKINS (MRN [id number]) as of 12/3/2019 14:25   Ref. Range 12/3/2019 13:23   Specimen UA Unknown Urine, Clean Catch   Color, UA Latest Ref Range: Yellow, Straw,  Magalis  Yellow   Appearance, UA Latest Ref Range: Clear  Cloudy (A)   Specific Avalon, UA Latest Ref Range: 1.005 - 1.030  1.015   pH, UA Latest Ref Range: 5.0 - 8.0  7.5   Protein, UA Latest Ref Range: Negative  1+ (A)   Glucose, UA Latest Ref Range: Negative  Negative   Ketones, UA Latest Ref Range: Negative  Negative   Occult Blood UA Latest Ref Range: Negative  3+ (A)   NITRITE UA Latest Ref Range: Negative  Negative   Bilirubin (UA) Latest Ref Range: Negative  Negative   Leukocytes, UA Latest Ref Range: Negative  3+ (A)   RBC, UA Latest Ref Range: 0 - 4 /hpf >100 (H)   WBC, UA Latest Ref Range: 0 - 5 /hpf >100 (H)   Bacteria, UA Latest Ref Range: None-Occ /hpf Few (A)   Squam Epithel, UA Latest Units: /hpf 1   WBC Clumps, UA Latest Ref Range: None-Rare  Occasional (A)   Hyaline Casts, UA Latest Ref Range: 0-1/lpf /lpf 0   Non-Squam Epith Latest Ref Range: <1/hpf /hpf 1 (A)   Microscopic Comment Unknown SEE COMMENT            Current Assessment & Plan     Urine culture was recently performed earlier this year was sensitive to Macrobid.  Patient cannot take Bactrim.  Of note patient has had ESBL in 2017.    Will follow urine culture.  Referral to Urology.                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    Depression     GERD (gastroesophageal reflux disease)     Hypercalcemia     Hyperlipidemia     Hypertension     Osteoporosis 10/7/2013    Vitamin D deficiency      Past Surgical History:   Procedure Laterality Date    ADENOIDECTOMY       SECTION      two    COLON SURGERY  2007    cancer removal    HEMORRHOID SURGERY      HYSTERECTOMY      OOPHORECTOMY      TONSILLECTOMY      TONSILLECTOMY, ADENOIDECTOMY, BILATERAL MYRINGOTOMY AND TUBES       Review of patient's allergies indicates:   Allergen Reactions    Latex Hives    B12 [cyanocobalamin-cobamamide] Other (See Comments)      Caused flushing and caused skin to peel    Codeine      Other reaction(s): Muscle pain    Iodine      Other reaction(s): Hives    Meperidine      Other reaction(s): Muscle pain    Narcotic antagonist      Other reaction(s): Rash    Penicillins Nausea And Vomiting    Propoxyphene      Other reaction(s): Muscle pain    Sulfa (sulfonamide antibiotics)      Other reaction(s): Muscle pain  Other reaction(s): Hives     Medication List with Changes/Refills   New Medications    NITROFURANTOIN, MACROCRYSTAL-MONOHYDRATE, (MACROBID) 100 MG CAPSULE    Take 1 capsule (100 mg total) by mouth 2 (two) times daily. for 10 days    PHENAZOPYRIDINE (PYRIDIUM) 200 MG TABLET    Take 1 tablet (200 mg total) by mouth 3 (three) times daily as needed for Pain.   Current Medications    ATORVASTATIN (LIPITOR) 40 MG TABLET    Take 1 tablet (40 mg total) by mouth once daily.    CALCIUM CARBONATE (CALCIUM 500 ORAL)    Take by mouth.    CINACALCET (SENSIPAR) 30 MG TAB    Take 1 tablet (30 mg total) by mouth daily with breakfast.    CITALOPRAM (CELEXA) 10 MG TABLET    Take 1 tablet (10 mg total) by mouth once daily.    DIPHENOXYLATE-ATROPINE 2.5-0.025 MG (LOMOTIL) 2.5-0.025 MG PER TABLET    TAKE ONE TABLET BY MOUTH FOUR TIMES DAILY AS NEEDED    FLUAD 5616-4830, 65 YR UP,,PF, 45 MCG (15 MCG X 3)/0.5 ML SYRG    ADM 0.5ML IM UTD    HYOSCYAMINE (ANASPAZ,LEVSIN) 0.125 MG TAB    Take 1 tablet (125 mcg total) by mouth every 4 (four) hours as needed.    LOSARTAN (COZAAR) 50 MG TABLET    Take 1 tablet (50 mg total) by mouth once daily.    METOPROLOL TARTRATE (LOPRESSOR) 100 MG TABLET    Take 1 tablet (100 mg total) by mouth 2 (two) times daily.    MULTIVIT,CALC,MINS/IRON/FOLIC (ONE-A-DAY WOMENS FORMULA ORAL)    Take 1 tablet by mouth once daily.    ONDANSETRON (ZOFRAN-ODT) 4 MG TBDL    DISSOLVE ONE TABLET BY MOUTH EVERY 6 HOURS AS NEEDED FOR NAUSEA    PROMETHAZINE (PHENERGAN) 25 MG TABLET    Take 1 tablet (25 mg total) by mouth every 6 (six) hours as  needed for Nausea.    SODIUM,POTASSIUM,MAG SULFATES (SUPREP BOWEL PREP KIT) 17.5-3.13-1.6 GRAM SOLR    Take as instructed on prep sheet    TEMAZEPAM (RESTORIL) 30 MG CAPSULE    TAKE ONE CAPSULE BY MOUTH NIGHTLY    TOPIRAMATE (TOPAMAX) 25 MG TABLET    TAKE ONE TABLET ONCE DAILY    VITAMIN D 1000 UNITS TAB    Take 1,000 Units by mouth once daily.    VITAMIN E ACETATE (VITAMIN E ORAL)    Take 1 capsule by mouth once daily.      Social History     Tobacco Use    Smoking status: Former Smoker     Types: Cigarettes     Last attempt to quit: 1989     Years since quittin.9    Smokeless tobacco: Never Used   Substance Use Topics    Alcohol use: No      Family History   Problem Relation Age of Onset    Cancer Father         lung    Arthritis Father     Cancer Maternal Aunt         breast    Arthritis Maternal Aunt     Breast cancer Maternal Aunt     Cancer Maternal Uncle     Arthritis Paternal Aunt     Cancer Maternal Grandfather         glands cancer     Arthritis Paternal Aunt     Arthritis Maternal Aunt        I have reviewed the complete PMH, social history, surgical history, allergies and medications.  As well as family history.    Review of Systems   Constitutional: Negative for activity change and fatigue.   HENT: Negative for congestion and sinus pain.    Eyes: Negative for visual disturbance.   Respiratory: Negative for chest tightness and shortness of breath.    Cardiovascular: Negative for palpitations and leg swelling.   Gastrointestinal: Negative for abdominal pain, diarrhea and nausea.   Endocrine: Negative for polyuria.   Genitourinary: Positive for dysuria, hematuria and urgency. Negative for difficulty urinating and frequency.   Musculoskeletal: Negative for arthralgias and joint swelling.   Skin: Negative for rash.   Neurological: Negative for dizziness and headaches.   Psychiatric/Behavioral: Negative for agitation. The patient is not nervous/anxious.      Objective:   BP (!) 160/82  "Comment: manual  Pulse (!) 53   Temp 98.2 °F (36.8 °C) (Oral)   Ht 4' 11" (1.499 m)   Wt 70.9 kg (156 lb 3.2 oz)   BMI 31.55 kg/m²   Physical Exam   Constitutional: She is oriented to person, place, and time. She appears well-developed and well-nourished. No distress.   HENT:   Head: Normocephalic and atraumatic.   Eyes: Pupils are equal, round, and reactive to light. EOM are normal.   Neck: Normal range of motion. Neck supple.   Cardiovascular: Normal rate, regular rhythm, normal heart sounds and intact distal pulses.   No murmur heard.  Pulmonary/Chest: Effort normal and breath sounds normal. No respiratory distress. She has no wheezes.   Abdominal: Soft. Bowel sounds are normal.   Musculoskeletal: Normal range of motion. She exhibits no edema.   Neurological: She is alert and oriented to person, place, and time. No cranial nerve deficit.   Skin: Skin is warm and dry. Capillary refill takes less than 2 seconds.   Psychiatric: She has a normal mood and affect. Her behavior is normal.   Nursing note and vitals reviewed.      Assessment:     1. Hematuria, unspecified type    2. Recurrent UTI      MDM:     I have Reviewed and summarized old records.  I have reviewed labs and discussed with patient.  All questions were answered.    I have signed for the following orders AND/OR meds.  Orders Placed This Encounter   Procedures    Urine culture     Standing Status:   Future     Number of Occurrences:   1     Standing Expiration Date:   12/2/2020    URINALYSIS     Order Specific Question:   Collection Type     Answer:   Urine, Clean Catch    Urinalysis Microscopic     Order Specific Question:   Specimen Source     Answer:   Urine    Ambulatory referral to Urology     Referral Priority:   Routine     Referral Type:   Consultation     Referral Reason:   Specialty Services Required     Referred to Provider:   Kameron Sunshine MD     Requested Specialty:   Urology     Number of Visits Requested:   1     Medications " Ordered This Encounter   Medications    nitrofurantoin, macrocrystal-monohydrate, (MACROBID) 100 MG capsule     Sig: Take 1 capsule (100 mg total) by mouth 2 (two) times daily. for 10 days     Dispense:  20 capsule     Refill:  0    phenazopyridine (PYRIDIUM) 200 MG tablet     Sig: Take 1 tablet (200 mg total) by mouth 3 (three) times daily as needed for Pain.     Dispense:  9 tablet     Refill:  0        Follow up if symptoms worsen or fail to improve.    If no improvement in symptoms or symptoms worsen, advised to call/follow-up at clinic or go to ER. Patient voiced understanding and all questions/concerns were addressed.     DISCLAIMER: This note was compiled by using a speech recognition dictation system and therefore please be aware that typographical / speech recognition errors can and do occur.  Please contact me if you see any errors specifically.    Bunny Moses M.D.       Office: [phone]   [address] [place] [address]  FAX: [phone]

## 2019-12-03 NOTE — PATIENT INSTRUCTIONS
Follow up if symptoms worsen or fail to improve.     If no improvement in symptoms or symptoms worsen, please be advised to call MD, follow-up at clinic and/or go to ER if becomes severe.    Bunny Moses M.D.         We Offer TELEHEATLH & Same Day Appointments!   Book your Telehealth appointment with me through my nurse or   Clinic appointments on [facility]!    [address] [place] [address]    Office: [phone]     FAX: [phone]    Check out my Facebook Page and Follow Me at: CLICK HERE    Check out my website at [employer] by clicking on: CLICK HERE    To Schedule appointments online, go to [facility]: CLICK HERE     Location: [email or web address]

## 2019-12-03 NOTE — TELEPHONE ENCOUNTER
----- Message from Kasey Morin sent at 12/3/2019 10:15 AM CST -----  Contact: Pt   Pt is calling .Type:  Patient Returning Call    Who Called: Pt   Who Left Message for Patient: Nurse   Does the patient know what this is regarding?: Returning missed call   Would the patient rather a call back or a response via [facility]? Call back   Best Call Back Number:[phone]           .Thank You  Kasey Morin

## 2019-12-03 NOTE — ASSESSMENT & PLAN NOTE
Urine culture was recently performed earlier this year was sensitive to Macrobid.  Patient cannot take Bactrim.  Of note patient has had ESBL in 2017.    Will follow urine culture.  Referral to Urology.

## 2019-12-03 NOTE — TELEPHONE ENCOUNTER
----- Message from Evangelina Cantor sent at 12/3/2019  9:29 AM CST -----  Contact: pt  Type:  Same Day Appointment Request    Caller is requesting a same day appointment.  Caller declined first available appointment listed below.    Name of Caller:Patient  When is the first available appointment?12/4  Symptoms:UTI,bleeding,burning  Best Call Back Number:[phone]  Additional Information: na

## 2019-12-04 NOTE — PROGRESS NOTES
Please call to make sure patient get the results.Your urinalysis is abnormal.  Showing protein, blood, leukocytes consistent with urinary tract infection.  Please take your antibiotics as prescribed.  We sent the urine for culture so we should have that data in a few days.  Please let me know if you are not feeling better.

## 2019-12-17 ENCOUNTER — TELEPHONE (OUTPATIENT)
Dept: ENDOSCOPY | Facility: HOSPITAL | Age: 79
End: 2019-12-17

## 2019-12-17 ENCOUNTER — CLINICAL SUPPORT (OUTPATIENT)
Dept: FAMILY MEDICINE | Facility: CLINIC | Age: 79
End: 2019-12-17
Payer: MEDICARE

## 2019-12-17 VITALS — DIASTOLIC BLOOD PRESSURE: 70 MMHG | HEART RATE: 56 BPM | SYSTOLIC BLOOD PRESSURE: 141 MMHG

## 2019-12-17 DIAGNOSIS — Z01.30 BP CHECK: Primary | ICD-10-CM

## 2019-12-17 PROCEDURE — 99999 PR PBB SHADOW E&M-EST. PATIENT-LVL III: ICD-10-PCS | Mod: PBBFAC,,,

## 2019-12-17 PROCEDURE — 99999 PR PBB SHADOW E&M-EST. PATIENT-LVL III: CPT | Mod: PBBFAC,,,

## 2019-12-17 PROCEDURE — 99213 OFFICE O/P EST LOW 20 MIN: CPT | Mod: PBBFAC,PO

## 2019-12-17 RX ORDER — LOSARTAN POTASSIUM 100 MG/1
100 TABLET ORAL DAILY
Qty: 30 TABLET | Refills: 11 | Status: SHIPPED | OUTPATIENT
Start: 2019-12-17 | End: 2020-03-19

## 2019-12-17 NOTE — TELEPHONE ENCOUNTER
Her blood pressure remains high.  I change her losartan from 50 mg once a day to 100 mg once a day.  Please call her and ask her to double up on what she has at this time taking 2 of the 50 mg tablets once a day until she runs out and she can get the new ones at her pharmacy.  In addition, schedule her with Keri next week to be recheck on the blood pressure on the new dosage so that we can document a normal blood pressure before the end of the year.

## 2019-12-27 ENCOUNTER — OFFICE VISIT (OUTPATIENT)
Dept: FAMILY MEDICINE | Facility: CLINIC | Age: 79
End: 2019-12-27
Payer: MEDICARE

## 2019-12-27 VITALS
HEIGHT: 59 IN | DIASTOLIC BLOOD PRESSURE: 71 MMHG | BODY MASS INDEX: 30.84 KG/M2 | HEART RATE: 48 BPM | TEMPERATURE: 98 F | SYSTOLIC BLOOD PRESSURE: 154 MMHG | WEIGHT: 153 LBS

## 2019-12-27 DIAGNOSIS — I10 ESSENTIAL HYPERTENSION: ICD-10-CM

## 2019-12-27 DIAGNOSIS — N39.0 RECURRENT UTI: Primary | ICD-10-CM

## 2019-12-27 LAB
BACTERIA #/AREA URNS HPF: ABNORMAL /HPF
BILIRUB UR QL STRIP: NEGATIVE
CLARITY UR: CLEAR
COLOR UR: YELLOW
GLUCOSE UR QL STRIP: NEGATIVE
HGB UR QL STRIP: ABNORMAL
KETONES UR QL STRIP: NEGATIVE
LEUKOCYTE ESTERASE UR QL STRIP: ABNORMAL
MICROSCOPIC COMMENT: ABNORMAL
NITRITE UR QL STRIP: NEGATIVE
PH UR STRIP: 6 [PH] (ref 5–8)
PROT UR QL STRIP: ABNORMAL
RBC #/AREA URNS HPF: 4 /HPF (ref 0–4)
SP GR UR STRIP: 1.02 (ref 1–1.03)
SQUAMOUS #/AREA URNS HPF: 7 /HPF
URN SPEC COLLECT METH UR: ABNORMAL
WBC #/AREA URNS HPF: 15 /HPF (ref 0–5)

## 2019-12-27 PROCEDURE — 99213 OFFICE O/P EST LOW 20 MIN: CPT | Mod: S$PBB,,, | Performed by: INTERNAL MEDICINE

## 2019-12-27 PROCEDURE — 87086 URINE CULTURE/COLONY COUNT: CPT

## 2019-12-27 PROCEDURE — 99214 OFFICE O/P EST MOD 30 MIN: CPT | Mod: PBBFAC,PO | Performed by: INTERNAL MEDICINE

## 2019-12-27 PROCEDURE — 1159F MED LIST DOCD IN RCRD: CPT | Mod: ,,, | Performed by: INTERNAL MEDICINE

## 2019-12-27 PROCEDURE — 1126F AMNT PAIN NOTED NONE PRSNT: CPT | Mod: ,,, | Performed by: INTERNAL MEDICINE

## 2019-12-27 PROCEDURE — 99999 PR PBB SHADOW E&M-EST. PATIENT-LVL IV: CPT | Mod: PBBFAC,,, | Performed by: INTERNAL MEDICINE

## 2019-12-27 PROCEDURE — 1159F PR MEDICATION LIST DOCUMENTED IN MEDICAL RECORD: ICD-10-PCS | Mod: ,,, | Performed by: INTERNAL MEDICINE

## 2019-12-27 PROCEDURE — 1126F PR PAIN SEVERITY QUANTIFIED, NO PAIN PRESENT: ICD-10-PCS | Mod: ,,, | Performed by: INTERNAL MEDICINE

## 2019-12-27 PROCEDURE — 99213 PR OFFICE/OUTPT VISIT, EST, LEVL III, 20-29 MIN: ICD-10-PCS | Mod: S$PBB,,, | Performed by: INTERNAL MEDICINE

## 2019-12-27 PROCEDURE — 99999 PR PBB SHADOW E&M-EST. PATIENT-LVL IV: ICD-10-PCS | Mod: PBBFAC,,, | Performed by: INTERNAL MEDICINE

## 2019-12-27 PROCEDURE — 81000 URINALYSIS NONAUTO W/SCOPE: CPT | Mod: PO

## 2019-12-27 RX ORDER — AMLODIPINE BESYLATE 5 MG/1
5 TABLET ORAL DAILY
Qty: 30 TABLET | Refills: 11 | Status: SHIPPED | OUTPATIENT
Start: 2019-12-27 | End: 2020-01-07 | Stop reason: SDUPTHER

## 2019-12-27 NOTE — ASSESSMENT & PLAN NOTE
Blood pressure still elevated despite increasing Losartan to 100mg. Also reports that it is elevated at home as well, SBP up to 180.  Remains on lopressor 100mg BID. Noted to be bradycardic today. Patient reports normal HR at home. Remains asymptomatic. Plan to start on Norvasc 5mg daily. Return for BP check with nursing in 2 weeks. Also asked patient to bring home BP cuff with her to that visit to calibrate. If pulse still low at nursing visit, may need to decrease beta blocker dose

## 2019-12-27 NOTE — PROGRESS NOTES
Assessment/Plan:    Essential hypertension  Blood pressure still elevated despite increasing Losartan to 100mg. Also reports that it is elevated at home as well, SBP up to 180.  Remains on lopressor 100mg BID. Noted to be bradycardic today. Patient reports normal HR at home. Remains asymptomatic. Plan to start on Norvasc 5mg daily. Return for BP check with nursing in 2 weeks. Also asked patient to bring home BP cuff with her to that visit to calibrate. If pulse still low at nursing visit, may need to decrease beta blocker dose    _____________________________________________________________________________________________________________________________________________________    Orders this visit:    Recurrent UTI  -     Urinalysis, Reflex to Urine Culture Urine, Clean Catch    Essential hypertension  -     amLODIPine (NORVASC) 5 MG tablet; Take 1 tablet (5 mg total) by mouth once daily.  Dispense: 30 tablet; Refill: 11    Other orders  -     Urinalysis Microscopic  -     Urine culture      Follow up in about 2 weeks (around 1/10/2020).    Yeny Mcqueen MD  _____________________________________________________________________________________________________________________________________________________    HPI:    Patient is in clinic today as an established patient, new to me, here for follow up of her blood pressure.  Patient has had recent difficulty with blood pressure control.  Recently had losartan dose increased 100 mg.  Remains on Lopressor 100 mg b.i.d..  Patient here today and blood pressure is elevated.  Denies symptoms of headache, chest pain, palpitations.  Patient also noted to be bradycardic today.  Denies any symptoms of fatigue, dizziness, lightheadedness, or presyncopal symptoms.    Patient also reports urinary incontinence.  She reports that this usually happens with bladder infections.  She has been treated multiple times recently for recurrent bladder infections.  She has CT scan scheduled  for next week for further evaluation.  She is requesting repeat urine studies today.    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    Depression     GERD (gastroesophageal reflux disease)     Hypercalcemia     Hyperlipidemia     Hypertension     Osteoporosis 10/7/2013    Vitamin D deficiency      Past Surgical History:   Procedure Laterality Date    ADENOIDECTOMY       SECTION      two    COLON SURGERY  2007    cancer removal    HEMORRHOID SURGERY      HYSTERECTOMY      OOPHORECTOMY      TONSILLECTOMY      TONSILLECTOMY, ADENOIDECTOMY, BILATERAL MYRINGOTOMY AND TUBES       Review of patient's allergies indicates:   Allergen Reactions    Latex Hives    B12 [cyanocobalamin-cobamamide] Other (See Comments)     Caused flushing and caused skin to peel    Codeine      Other reaction(s): Muscle pain    Iodine      Other reaction(s): Hives    Meperidine      Other reaction(s): Muscle pain    Narcotic antagonist      Other reaction(s): Rash    Penicillins Nausea And Vomiting    Propoxyphene      Other reaction(s): Muscle pain    Sulfa (sulfonamide antibiotics)      Other reaction(s): Muscle pain  Other reaction(s): Hives     Social History     Tobacco Use    Smoking status: Former Smoker     Types: Cigarettes     Last attempt to quit: 1989     Years since quittin.0    Smokeless tobacco: Never Used   Substance Use Topics    Alcohol use: No    Drug use: No     Family History   Problem Relation Age of Onset    Cancer Father         lung    Arthritis Father     Cancer Maternal Aunt         breast    Arthritis Maternal Aunt     Breast cancer Maternal Aunt     Cancer Maternal Uncle     Arthritis Paternal Aunt     Cancer Maternal Grandfather         glands cancer     Arthritis Paternal Aunt     Arthritis Maternal Aunt      Current Outpatient Medications on File Prior to Visit    Medication Sig Dispense Refill    atorvastatin (LIPITOR) 40 MG tablet Take 1 tablet (40 mg total) by mouth once daily. 90 tablet 3    calcium carbonate (CALCIUM 500 ORAL) Take by mouth.      citalopram (CELEXA) 10 MG tablet Take 1 tablet (10 mg total) by mouth once daily. 90 tablet 3    diphenoxylate-atropine 2.5-0.025 mg (LOMOTIL) 2.5-0.025 mg per tablet TAKE ONE TABLET BY MOUTH FOUR TIMES DAILY AS NEEDED 180 tablet 1    FLUAD 0248-6513, 65 YR UP,,PF, 45 mcg (15 mcg x 3)/0.5 mL Syrg ADM 0.5ML IM UTD  0    losartan (COZAAR) 100 MG tablet Take 1 tablet (100 mg total) by mouth once daily. 30 tablet 11    metoprolol tartrate (LOPRESSOR) 100 MG tablet Take 1 tablet (100 mg total) by mouth 2 (two) times daily. 180 tablet 3    MULTIVIT,CALC,MINS/IRON/FOLIC (ONE-A-DAY WOMENS FORMULA ORAL) Take 1 tablet by mouth once daily.      ondansetron (ZOFRAN-ODT) 4 MG TbDL DISSOLVE ONE TABLET BY MOUTH EVERY 6 HOURS AS NEEDED FOR NAUSEA 60 tablet 1    promethazine (PHENERGAN) 25 MG tablet Take 1 tablet (25 mg total) by mouth every 6 (six) hours as needed for Nausea. 6 tablet 0    sodium,potassium,mag sulfates (SUPREP BOWEL PREP KIT) 17.5-3.13-1.6 gram SolR Take as instructed on prep sheet 354 mL 0    temazepam (RESTORIL) 30 mg capsule TAKE ONE CAPSULE BY MOUTH NIGHTLY 90 capsule 0    topiramate (TOPAMAX) 25 MG tablet TAKE ONE TABLET ONCE DAILY 90 tablet 0    vitamin D 1000 units Tab Take 1,000 Units by mouth once daily.      VITAMIN E ACETATE (VITAMIN E ORAL) Take 1 capsule by mouth once daily.      cinacalcet (SENSIPAR) 30 MG Tab Take 1 tablet (30 mg total) by mouth daily with breakfast. 30 tablet 11    hyoscyamine (ANASPAZ,LEVSIN) 0.125 mg Tab Take 1 tablet (125 mcg total) by mouth every 4 (four) hours as needed. 30 tablet 0     No current facility-administered medications on file prior to visit.        Review of Systems   Constitutional: Negative for chills, diaphoresis, fatigue and fever.   HENT: Negative for  "congestion, ear pain, postnasal drip, sinus pain and sore throat.    Eyes: Negative for pain and redness.   Respiratory: Negative for cough, chest tightness and shortness of breath.    Cardiovascular: Negative for chest pain and leg swelling.   Gastrointestinal: Negative for abdominal pain, constipation, diarrhea, nausea and vomiting.   Genitourinary: Negative for dysuria and hematuria.   Musculoskeletal: Negative for arthralgias and joint swelling.   Skin: Negative for rash.   Neurological: Negative for dizziness, syncope and headaches.       Vitals:    12/27/19 1253   BP: (!) 154/71   Pulse: (!) 48   Temp: 97.8 °F (36.6 °C)   Weight: 69.4 kg (153 lb)   Height: 4' 11" (1.499 m)       Wt Readings from Last 3 Encounters:   12/27/19 69.4 kg (153 lb)   12/03/19 70.9 kg (156 lb 3.2 oz)   05/13/19 67.6 kg (149 lb)       Physical Exam   Constitutional: She is oriented to person, place, and time. She appears well-developed and well-nourished. No distress.   HENT:   Head: Normocephalic and atraumatic.   Eyes: Conjunctivae and EOM are normal.   Neck: Normal range of motion.   Cardiovascular: Normal rate, regular rhythm, normal heart sounds and intact distal pulses.   No murmur heard.  Pulmonary/Chest: Effort normal and breath sounds normal. No respiratory distress.   Abdominal: She exhibits no distension.   Musculoskeletal: Normal range of motion.   Neurological: She is alert and oriented to person, place, and time.   Skin: Skin is warm and dry.   Psychiatric: She has a normal mood and affect.       "

## 2019-12-29 LAB
BACTERIA UR CULT: NORMAL
BACTERIA UR CULT: NORMAL

## 2020-01-03 ENCOUNTER — CLINICAL SUPPORT (OUTPATIENT)
Dept: FAMILY MEDICINE | Facility: CLINIC | Age: 80
End: 2020-01-03
Payer: MEDICARE

## 2020-01-03 VITALS — SYSTOLIC BLOOD PRESSURE: 162 MMHG | DIASTOLIC BLOOD PRESSURE: 70 MMHG | HEART RATE: 57 BPM

## 2020-01-03 DIAGNOSIS — Z01.30 BP CHECK: Primary | ICD-10-CM

## 2020-01-03 PROCEDURE — 99999 PR PBB SHADOW E&M-EST. PATIENT-LVL III: ICD-10-PCS | Mod: PBBFAC,,,

## 2020-01-03 PROCEDURE — 99213 OFFICE O/P EST LOW 20 MIN: CPT | Mod: PBBFAC,PO

## 2020-01-03 PROCEDURE — 99999 PR PBB SHADOW E&M-EST. PATIENT-LVL III: CPT | Mod: PBBFAC,,,

## 2020-01-03 NOTE — PROGRESS NOTES
Pt in clinic for BP check and to compare home machine to clinic machine. Manual /70 HR 54. Home machine 162/70 HR 57. Pt denies any signs or symptoms of elevated BP. PCP notified. Advised pt per Dr Moses increase Amlodipine to 10 mg. Pt verbalized understanding. Advised pt to follow up with Dr Moses in 2 weeks to evaluate BP. Pt states she would like a sooner appointment for symptoms of UTI. Pt states she is having symptoms of UTI and believes this is causing increase in BP. Pt would like PCP notified that she had CT scan performed. Advised I would send visit note from today's appointment to Dr Moses. Pt appointment scheduled for 1/7/2020. Confirmed appointment date, time, and location. Pt verbalized understanding. Visit note sent to PCP per pt request.

## 2020-01-07 ENCOUNTER — OFFICE VISIT (OUTPATIENT)
Dept: FAMILY MEDICINE | Facility: CLINIC | Age: 80
End: 2020-01-07
Payer: MEDICARE

## 2020-01-07 VITALS
HEIGHT: 59 IN | SYSTOLIC BLOOD PRESSURE: 119 MMHG | WEIGHT: 155.19 LBS | TEMPERATURE: 98 F | DIASTOLIC BLOOD PRESSURE: 56 MMHG | HEART RATE: 54 BPM | BODY MASS INDEX: 31.28 KG/M2

## 2020-01-07 DIAGNOSIS — R33.9 INCOMPLETE EMPTYING OF BLADDER: ICD-10-CM

## 2020-01-07 DIAGNOSIS — D73.89 SPLENIC CALCIFICATION: ICD-10-CM

## 2020-01-07 DIAGNOSIS — R00.1 BRADYCARDIA: ICD-10-CM

## 2020-01-07 DIAGNOSIS — N28.1 SIMPLE RENAL CYST: ICD-10-CM

## 2020-01-07 DIAGNOSIS — I10 ESSENTIAL HYPERTENSION: Primary | Chronic | ICD-10-CM

## 2020-01-07 DIAGNOSIS — I70.0 ATHEROSCLEROSIS OF ABDOMINAL AORTA: ICD-10-CM

## 2020-01-07 DIAGNOSIS — M54.50 LUMBAR BACK PAIN: ICD-10-CM

## 2020-01-07 PROBLEM — Z85.9 PERSONAL HISTORY OF MALIGNANT CARCINOID TUMOR: Status: ACTIVE | Noted: 2019-12-10

## 2020-01-07 PROBLEM — R31.0 GROSS HEMATURIA: Status: ACTIVE | Noted: 2019-12-09

## 2020-01-07 PROCEDURE — 99214 PR OFFICE/OUTPT VISIT, EST, LEVL IV, 30-39 MIN: ICD-10-PCS | Mod: S$PBB,,, | Performed by: FAMILY MEDICINE

## 2020-01-07 PROCEDURE — 1159F MED LIST DOCD IN RCRD: CPT | Mod: ,,, | Performed by: FAMILY MEDICINE

## 2020-01-07 PROCEDURE — 99214 OFFICE O/P EST MOD 30 MIN: CPT | Mod: S$PBB,,, | Performed by: FAMILY MEDICINE

## 2020-01-07 PROCEDURE — 99999 PR PBB SHADOW E&M-EST. PATIENT-LVL III: CPT | Mod: PBBFAC,,, | Performed by: FAMILY MEDICINE

## 2020-01-07 PROCEDURE — 99999 PR PBB SHADOW E&M-EST. PATIENT-LVL III: ICD-10-PCS | Mod: PBBFAC,,, | Performed by: FAMILY MEDICINE

## 2020-01-07 PROCEDURE — 1159F PR MEDICATION LIST DOCUMENTED IN MEDICAL RECORD: ICD-10-PCS | Mod: ,,, | Performed by: FAMILY MEDICINE

## 2020-01-07 PROCEDURE — 99213 OFFICE O/P EST LOW 20 MIN: CPT | Mod: PBBFAC,PO | Performed by: FAMILY MEDICINE

## 2020-01-07 RX ORDER — AMLODIPINE BESYLATE 10 MG/1
10 TABLET ORAL DAILY
Qty: 90 TABLET | Refills: 3 | Status: ON HOLD | OUTPATIENT
Start: 2020-01-07 | End: 2020-07-24 | Stop reason: HOSPADM

## 2020-01-07 NOTE — PROGRESS NOTES
Patient doing well on 10 mg of amlodipine.  Saw patient today and blood pressure was normalized.  Patient still having urinary complaints and is scheduled for cystoscopy with Urology.  Patient had abnormal imaging of the bladder showing a potential mass.

## 2020-01-07 NOTE — PROGRESS NOTES
PLAN:      Problem List Items Addressed This Visit     Essential hypertension - Primary (Chronic)     Continue with amlodipine 10 mg.  Continue metoprolol and losartan.Discussed hypertension disease course, DASH-diet and exercise.  Discussed medication regimen importance of treating high blood pressure.  Patient advised of risk of untreated blood pressure.    ER precautions were given for symptoms of hypertensive urgency and emergency.           Relevant Medications    amLODIPine (NORVASC) 10 MG tablet    Incomplete emptying of bladder     Close follow-up with Urology concerning cystoscope and evaluation of mass.  Requesting records.  ER precautions.    Symptoms likely related to the mass of the bladder.  Last cultures are negative for any significant urinary tract infection.         Bradycardia     Continue to monitor.  Blood pressure is controlled on current regimen.  Patient not having any symptoms of dizziness or lightheadedness.  ER precautions.  If developing symptoms will decrease dose of metoprolol.         Lumbar back pain     Patient advised to use Tylenol and anti-inflammatories needed.  ER precautions.         Atherosclerosis of abdominal aorta    Simple renal cyst    Splenic calcification     Will monitor.Discussed condition course and signs and symptoms to expect.  ER precautions.  Call MD or follow-up to clinic if not improving or worsening symptoms.               Future Appointments     Date Provider Specialty Appt Notes    4/7/2020 Bunny Moses MD Family Medicine 3 mo f/u           Medication List with Changes/Refills   Current Medications    ATORVASTATIN (LIPITOR) 40 MG TABLET    Take 1 tablet (40 mg total) by mouth once daily.    CALCIUM CARBONATE (CALCIUM 500 ORAL)    Take by mouth.    CINACALCET (SENSIPAR) 30 MG TAB    Take 1 tablet (30 mg total) by mouth daily with breakfast.    CITALOPRAM (CELEXA) 10 MG TABLET    Take 1 tablet (10 mg total) by mouth once daily.    DIPHENOXYLATE-ATROPINE  2.5-0.025 MG (LOMOTIL) 2.5-0.025 MG PER TABLET    TAKE ONE TABLET BY MOUTH FOUR TIMES DAILY AS NEEDED    FLUAD 2150-0786, 65 YR UP,,PF, 45 MCG (15 MCG X 3)/0.5 ML SYRG    ADM 0.5ML IM UTD    HYOSCYAMINE (ANASPAZ,LEVSIN) 0.125 MG TAB    Take 1 tablet (125 mcg total) by mouth every 4 (four) hours as needed.    LOSARTAN (COZAAR) 100 MG TABLET    Take 1 tablet (100 mg total) by mouth once daily.    METOPROLOL TARTRATE (LOPRESSOR) 100 MG TABLET    Take 1 tablet (100 mg total) by mouth 2 (two) times daily.    MULTIVIT,CALC,MINS/IRON/FOLIC (ONE-A-DAY WOMENS FORMULA ORAL)    Take 1 tablet by mouth once daily.    ONDANSETRON (ZOFRAN-ODT) 4 MG TBDL    DISSOLVE ONE TABLET BY MOUTH EVERY 6 HOURS AS NEEDED FOR NAUSEA    PROMETHAZINE (PHENERGAN) 25 MG TABLET    Take 1 tablet (25 mg total) by mouth every 6 (six) hours as needed for Nausea.    SODIUM,POTASSIUM,MAG SULFATES (SUPREP BOWEL PREP KIT) 17.5-3.13-1.6 GRAM SOLR    Take as instructed on prep sheet    TEMAZEPAM (RESTORIL) 30 MG CAPSULE    TAKE ONE CAPSULE BY MOUTH NIGHTLY    TOPIRAMATE (TOPAMAX) 25 MG TABLET    TAKE ONE TABLET ONCE DAILY    VITAMIN D 1000 UNITS TAB    Take 1,000 Units by mouth once daily.    VITAMIN E ACETATE (VITAMIN E ORAL)    Take 1 capsule by mouth once daily.   Changed and/or Refilled Medications    Modified Medication Previous Medication    AMLODIPINE (NORVASC) 10 MG TABLET amLODIPine (NORVASC) 5 MG tablet       Take 1 tablet (10 mg total) by mouth once daily.    Take 1 tablet (5 mg total) by mouth once daily.       Bunny Moses M.D.     ==========================================================================  Subjective:      Patient ID: Suzie Dawkins is a 79 y.o. female.  has a past medical history of Anxiety, Arthritis, Cancer (2007), Depression, GERD (gastroesophageal reflux disease), Hypercalcemia, Hyperlipidemia, Hypertension, Osteoporosis (10/7/2013), and Vitamin D deficiency.     Chief Complaint: Follow-up (BP check); Results; and  Hypertension      Problem List Items Addressed This Visit     Essential hypertension - Primary (Chronic)    Overview     CHRONIC. STABLE. BP Reviewed.  Compliant with BP medications. No SE reported.   =======================================================  JANUARY 2020:  Patient currently on metoprolol 100 mg twice a day, losartan 100 mg daily, amlodipine 5 mg daily was recently increased to 10 mg after nursing visit blood pressure check.  ======================================================  (-) CP, SOB, palpitations, dizziness, lightheadedness, HA, arm numbness, tingling or weakness, syncope.  Creatinine   Date Value Ref Range Status   11/30/2018 1.1 0.5 - 1.4 mg/dL Final              Current Assessment & Plan     Continue with amlodipine 10 mg.  Continue metoprolol and losartan.Discussed hypertension disease course, DASH-diet and exercise.  Discussed medication regimen importance of treating high blood pressure.  Patient advised of risk of untreated blood pressure.    ER precautions were given for symptoms of hypertensive urgency and emergency.           Incomplete emptying of bladder    Overview     Patient continues to have urinary symptoms.  Patient reports that Urology is planning a cystoscope to evaluate mass that was found on imaging.  Will request records.         Current Assessment & Plan     Close follow-up with Urology concerning cystoscope and evaluation of mass.  Requesting records.  ER precautions.    Symptoms likely related to the mass of the bladder.  Last cultures are negative for any significant urinary tract infection.         Bradycardia    Overview     New problem.  Patient is on metoprolol 100 mg twice a day for hypertension.  Patient is asymptomatic in regards to dizziness or lightheadedness.  No falls noted.         Current Assessment & Plan     Continue to monitor.  Blood pressure is controlled on current regimen.  Patient not having any symptoms of dizziness or lightheadedness.  ER  precautions.  If developing symptoms will decrease dose of metoprolol.         Lumbar back pain    Overview     Chronic.  Abnormal finding on CT scan.         Current Assessment & Plan     Patient advised to use Tylenol and anti-inflammatories needed.  ER precautions.         Atherosclerosis of abdominal aorta    Simple renal cyst    Overview     Incidental finding on recent CT scan.  Will monitor.  Continue follow-up with Urology.         Splenic calcification    Overview     Incidental finding on CT scan.  Patient denies any history of TB or histoplasmosis.         Current Assessment & Plan     Will monitor.Discussed condition course and signs and symptoms to expect.  ER precautions.  Call MD or follow-up to clinic if not improving or worsening symptoms.                  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    Depression     GERD (gastroesophageal reflux disease)     Hypercalcemia     Hyperlipidemia     Hypertension     Osteoporosis 10/7/2013    Vitamin D deficiency      Past Surgical History:   Procedure Laterality Date    ADENOIDECTOMY       SECTION      two    COLON SURGERY  2007    cancer removal    HEMORRHOID SURGERY      HYSTERECTOMY      OOPHORECTOMY      TONSILLECTOMY      TONSILLECTOMY, ADENOIDECTOMY, BILATERAL MYRINGOTOMY AND TUBES       Review of patient's allergies indicates:   Allergen Reactions    Latex Hives    B12 [cyanocobalamin-cobamamide] Other (See Comments)     Caused flushing and caused skin to peel    Codeine      Other reaction(s): Muscle pain    Iodine      Other reaction(s): Hives    Meperidine      Other reaction(s): Muscle pain    Narcotic antagonist      Other reaction(s): Rash    Penicillins Nausea And Vomiting    Propoxyphene      Other reaction(s): Muscle pain    Sulfa (sulfonamide antibiotics)      Other reaction(s): Muscle pain  Other reaction(s):  Hives     Medication List with Changes/Refills   Current Medications    ATORVASTATIN (LIPITOR) 40 MG TABLET    Take 1 tablet (40 mg total) by mouth once daily.    CALCIUM CARBONATE (CALCIUM 500 ORAL)    Take by mouth.    CINACALCET (SENSIPAR) 30 MG TAB    Take 1 tablet (30 mg total) by mouth daily with breakfast.    CITALOPRAM (CELEXA) 10 MG TABLET    Take 1 tablet (10 mg total) by mouth once daily.    DIPHENOXYLATE-ATROPINE 2.5-0.025 MG (LOMOTIL) 2.5-0.025 MG PER TABLET    TAKE ONE TABLET BY MOUTH FOUR TIMES DAILY AS NEEDED    FLUAD 9060-3487, 65 YR UP,,PF, 45 MCG (15 MCG X 3)/0.5 ML SYRG    ADM 0.5ML IM UTD    HYOSCYAMINE (ANASPAZ,LEVSIN) 0.125 MG TAB    Take 1 tablet (125 mcg total) by mouth every 4 (four) hours as needed.    LOSARTAN (COZAAR) 100 MG TABLET    Take 1 tablet (100 mg total) by mouth once daily.    METOPROLOL TARTRATE (LOPRESSOR) 100 MG TABLET    Take 1 tablet (100 mg total) by mouth 2 (two) times daily.    MULTIVIT,CALC,MINS/IRON/FOLIC (ONE-A-DAY WOMENS FORMULA ORAL)    Take 1 tablet by mouth once daily.    ONDANSETRON (ZOFRAN-ODT) 4 MG TBDL    DISSOLVE ONE TABLET BY MOUTH EVERY 6 HOURS AS NEEDED FOR NAUSEA    PROMETHAZINE (PHENERGAN) 25 MG TABLET    Take 1 tablet (25 mg total) by mouth every 6 (six) hours as needed for Nausea.    SODIUM,POTASSIUM,MAG SULFATES (SUPREP BOWEL PREP KIT) 17.5-3.13-1.6 GRAM SOLR    Take as instructed on prep sheet    TEMAZEPAM (RESTORIL) 30 MG CAPSULE    TAKE ONE CAPSULE BY MOUTH NIGHTLY    TOPIRAMATE (TOPAMAX) 25 MG TABLET    TAKE ONE TABLET ONCE DAILY    VITAMIN D 1000 UNITS TAB    Take 1,000 Units by mouth once daily.    VITAMIN E ACETATE (VITAMIN E ORAL)    Take 1 capsule by mouth once daily.   Changed and/or Refilled Medications    Modified Medication Previous Medication    AMLODIPINE (NORVASC) 10 MG TABLET amLODIPine (NORVASC) 5 MG tablet       Take 1 tablet (10 mg total) by mouth once daily.    Take 1 tablet (5 mg total) by mouth once daily.      Social History  "    Tobacco Use    Smoking status: Former Smoker     Types: Cigarettes     Last attempt to quit: 1989     Years since quittin.0    Smokeless tobacco: Never Used   Substance Use Topics    Alcohol use: No      Family History   Problem Relation Age of Onset    Cancer Father         lung    Arthritis Father     Cancer Maternal Aunt         breast    Arthritis Maternal Aunt     Breast cancer Maternal Aunt     Cancer Maternal Uncle     Arthritis Paternal Aunt     Cancer Maternal Grandfather         glands cancer     Arthritis Paternal Aunt     Arthritis Maternal Aunt        I have reviewed the complete PMH, social history, surgical history, allergies and medications.  As well as family history.    Review of Systems   Constitutional: Negative for activity change and fatigue.   HENT: Negative for congestion and sinus pain.    Eyes: Negative for visual disturbance.   Respiratory: Negative for chest tightness and shortness of breath.    Cardiovascular: Negative for palpitations and leg swelling.   Gastrointestinal: Negative for abdominal pain, diarrhea and nausea.   Endocrine: Negative for polyuria.   Genitourinary: Positive for frequency and urgency. Negative for difficulty urinating.   Musculoskeletal: Negative for arthralgias and joint swelling.   Skin: Negative for rash.   Neurological: Negative for dizziness and headaches.   Psychiatric/Behavioral: Negative for agitation. The patient is not nervous/anxious.      Objective:   BP (!) 119/56   Pulse (!) 54   Temp 98.1 °F (36.7 °C) (Oral)   Ht 4' 11" (1.499 m)   Wt 70.4 kg (155 lb 3.2 oz)   BMI 31.35 kg/m²   Physical Exam   Constitutional: She is oriented to person, place, and time. She appears well-developed and well-nourished. No distress.   HENT:   Head: Normocephalic and atraumatic.   Eyes: Pupils are equal, round, and reactive to light. EOM are normal.   Neck: Normal range of motion. Neck supple.   Cardiovascular: Normal rate, regular rhythm, " normal heart sounds and intact distal pulses.   No murmur heard.  Pulmonary/Chest: Effort normal and breath sounds normal. No respiratory distress. She has no wheezes.   Abdominal: Soft. Bowel sounds are normal.   No CVA tenderness. No suprapubic tenderness.   Musculoskeletal: Normal range of motion. She exhibits no edema.   Neurological: She is alert and oriented to person, place, and time. No cranial nerve deficit.   Skin: Skin is warm and dry. Capillary refill takes less than 2 seconds.   Psychiatric: She has a normal mood and affect. Her behavior is normal.   Nursing note and vitals reviewed.    Imaging from Care everywhere:  REASON FOR EXAM: [R31.0]-Gross hematuria / gross hematuria, recurrent UTIs     TECHNICAL FACTORS: Multiple contiguous axial CT images were obtained of the abdomen and pelvis before intravenous contrast. Following administration of IV contrast images were obtained of the kidneys with a 70 second delay and of the abdomen and pelvis   with a10 minute delay. 2D reformatted images were performed. 3D images are reconstructed, requiring an independent work station and permanently archived in PACS. Automated exposure control was utilized for radiation dose reduction.     COMPARISON: None    FINDINGS:     Soft tissues/Musculature: Unremarkable  Osseous Structures: Multilevel degenerative disc disease. There is 7 mm (grade 1/2) anterolisthesis of L4 on L5. Multilevel facet arthropathy.  Lung bases: Mild bibasilar atelectasis. Mitral valve calcification.  Aorta/Vasculature: Moderate atherosclerotic disease.   Lymph nodes/Mesentery: Unremarkable  Liver: Unremarkable  Gallbladder/Biliary System: Unremarkable  Pancreas: Unremarkable  Spleen: Splenic calcifications suggest old granulomatous disease.  Adrenal glands: Unremarkable  Kidneys/Ureters: There is a 2 mm calcification immediately adjacent to the right mid ureter (2-62), likely a phlebolith. There are bilateral renal simple cysts, as well as  low-density lesions too small to characterize. The left ureter is poorly opacified   on the excretory phase, most likely felt to be due to physiologic peristalsis. The left renal papillae appear somewhat irregular, without evidence of focal mass or filling defect.  Urinary bladder: Concentric thickening of the bladder wall is likely at least in part due to underdistention. There is trace air within the bladder.  Reproductive organs: Status post hysterectomy  Bowel/Stomach: Status post right hemicolectomy. Small bowel and stomach are unremarkable.  Appendix: Surgically absent      IMPRESSION:  1. There is no evidence of focal, urothelial mass. The left renal papillae appear somewhat irregular, possibly due to scarring or acute tubular necrosis. Correlate clinically. Note that the distal bilateral ureters are poorly opacified on the excretory   phase.   2. Concentric thickening of the bladder wall is likely at least in part due to underdistention. There is trace air within the bladder, which may be due to recent instrumentation. Correlate with urinalysis.  3. There is 7 mm (Grade 1/2) anterolisthesis of L4 on L5.   4. Additional age-related and incidental findings, as above.        Electronically signed by Gabriela Simons MD on 12/30/2019 9:54 AM    Assessment:     1. Essential hypertension    2. Incomplete emptying of bladder    3. Bradycardia    4. Lumbar back pain    5. Atherosclerosis of abdominal aorta    6. Simple renal cyst    7. Splenic calcification      MDM:   Patient has moderate complexity  I have Reviewed and summarized old records.  I have performed thorough medication reconciliation today and discussed risk and benefits of each medication.  I have reviewed cultures and imaging and labs and discussed with patient.  All questions were answered.  I am requesting old records and will review them once they are available.    I have signed for the following orders AND/OR meds.  No orders of the defined types  were placed in this encounter.    Medications Ordered This Encounter   Medications    amLODIPine (NORVASC) 10 MG tablet     Sig: Take 1 tablet (10 mg total) by mouth once daily.     Dispense:  90 tablet     Refill:  3        Follow up in about 3 months (around 4/7/2020), or if symptoms worsen or fail to improve, for LAB RESULTS.    If no improvement in symptoms or symptoms worsen, advised to call/follow-up at clinic or go to ER. Patient voiced understanding and all questions/concerns were addressed.     DISCLAIMER: This note was compiled by using a speech recognition dictation system and therefore please be aware that typographical / speech recognition errors can and do occur.  Please contact me if you see any errors specifically.    Bunny Moses M.D.       Office: [phone] [address] [place] [address]  FAX: [phone]

## 2020-01-07 NOTE — PATIENT INSTRUCTIONS
Follow up in about 3 months (around 4/7/2020), or if symptoms worsen or fail to improve, for LAB RESULTS.     If no improvement in symptoms or symptoms worsen, please be advised to call MD, follow-up at clinic and/or go to ER if becomes severe.    Bunny Moses M.D.         We Offer TELEHEALTH & Same Day Appointments!   Book your Telehealth appointment with me through my nurse or   Clinic appointments on [employer]!    [address] [place] [address]    Office: [phone]     FAX: [phone]    Check out my Facebook Page and Follow Me at: CLICK HERE    Check out my website at [employer] by clicking on: CLICK HERE    To Schedule appointments online, go to [employer]: CLICK HERE     Location: [email or web address]

## 2020-01-09 PROBLEM — I70.0 ATHEROSCLEROSIS OF ABDOMINAL AORTA: Status: ACTIVE | Noted: 2020-01-09

## 2020-01-09 PROBLEM — D73.89 SPLENIC CALCIFICATION: Status: ACTIVE | Noted: 2020-01-09

## 2020-01-09 PROBLEM — R00.1 BRADYCARDIA: Status: ACTIVE | Noted: 2020-01-09

## 2020-01-09 PROBLEM — N28.1 SIMPLE RENAL CYST: Status: ACTIVE | Noted: 2020-01-09

## 2020-01-09 PROBLEM — M54.50 LUMBAR BACK PAIN: Status: ACTIVE | Noted: 2020-01-09

## 2020-01-09 NOTE — ASSESSMENT & PLAN NOTE
Continue to monitor.  Blood pressure is controlled on current regimen.  Patient not having any symptoms of dizziness or lightheadedness.  ER precautions.  If developing symptoms will decrease dose of metoprolol.

## 2020-01-09 NOTE — ASSESSMENT & PLAN NOTE
Will monitor.Discussed condition course and signs and symptoms to expect.  ER precautions.  Call MD or follow-up to clinic if not improving or worsening symptoms.

## 2020-01-09 NOTE — ASSESSMENT & PLAN NOTE
Close follow-up with Urology concerning cystoscope and evaluation of mass.  Requesting records.  ER precautions.    Symptoms likely related to the mass of the bladder.  Last cultures are negative for any significant urinary tract infection.

## 2020-01-09 NOTE — ASSESSMENT & PLAN NOTE
Continue with amlodipine 10 mg.  Continue metoprolol and losartan.Discussed hypertension disease course, DASH-diet and exercise.  Discussed medication regimen importance of treating high blood pressure.  Patient advised of risk of untreated blood pressure.    ER precautions were given for symptoms of hypertensive urgency and emergency.

## 2020-01-21 ENCOUNTER — TELEPHONE (OUTPATIENT)
Dept: FAMILY MEDICINE | Facility: CLINIC | Age: 80
End: 2020-01-21

## 2020-01-21 NOTE — TELEPHONE ENCOUNTER
Called and rescheduled the time of patient's appointment on 04/07/2020, patient verbalized understanding of the new time of her appointment.

## 2020-01-28 ENCOUNTER — TELEPHONE (OUTPATIENT)
Dept: FAMILY MEDICINE | Facility: CLINIC | Age: 80
End: 2020-01-28

## 2020-01-28 NOTE — TELEPHONE ENCOUNTER
Patient states that you recently started her on Amlodipine and since she began taking this medication her hands, feet and legs have been swollen.  Patient wants to discontinue this medication and see if you can give her something else in its place that will not cause her to retain fluid.  Patient states that she did not have these symptoms prior to starting this medication.  Please advise.

## 2020-01-28 NOTE — TELEPHONE ENCOUNTER
Please advise the patient to go back to 5 mg of amlodipine which she was on previous to the swelling. We will have to monitor her blood pressure to make sure that she is less than goal less than 150/90.  She can either cut these in half or we can send a new prescription to the pharmacy.  Please let me know what she decides.

## 2020-02-11 DIAGNOSIS — K52.9 CHRONIC DIARRHEA: ICD-10-CM

## 2020-02-11 RX ORDER — DIPHENOXYLATE HYDROCHLORIDE AND ATROPINE SULFATE 2.5; .025 MG/1; MG/1
TABLET ORAL
Qty: 180 TABLET | Refills: 0 | Status: SHIPPED | OUTPATIENT
Start: 2020-02-11 | End: 2020-06-02

## 2020-02-19 ENCOUNTER — TELEPHONE (OUTPATIENT)
Dept: FAMILY MEDICINE | Facility: CLINIC | Age: 80
End: 2020-02-19

## 2020-02-19 NOTE — TELEPHONE ENCOUNTER
----- Message from Ml Mendoza sent at 2/19/2020 11:15 AM CST -----  Contact: [facility]   [phone] - wendy Type:  Needs Medical Advice    Who Called:  wendy  Symptoms (please be specific):   How long has patient had these symptoms:   Pharmacy name and phone #:    Would the patient rather a call back or a response via [facility]? Call   Best Call Back Number: [phone]  Additional Information:   caller is working on the pt appeal for the med Lomotil

## 2020-02-19 NOTE — TELEPHONE ENCOUNTER
Spoke with Carmen @ [employer] to give additional information needed for Appeal on Lomotil.  They are processing the appeal and will notify us via fax of the determination.

## 2020-03-19 DIAGNOSIS — I10 ESSENTIAL HYPERTENSION: ICD-10-CM

## 2020-03-19 DIAGNOSIS — Z86.69 HISTORY OF MIGRAINE HEADACHES: ICD-10-CM

## 2020-03-19 RX ORDER — METOPROLOL TARTRATE 100 MG/1
TABLET ORAL
Qty: 180 TABLET | Refills: 3 | Status: SHIPPED | OUTPATIENT
Start: 2020-03-19 | End: 2020-06-02 | Stop reason: SDUPTHER

## 2020-03-19 RX ORDER — TOPIRAMATE 25 MG/1
TABLET ORAL
Qty: 90 TABLET | Refills: 0 | Status: SHIPPED | OUTPATIENT
Start: 2020-03-19 | End: 2020-06-02 | Stop reason: SDUPTHER

## 2020-03-19 RX ORDER — TEMAZEPAM 30 MG/1
CAPSULE ORAL
Qty: 90 CAPSULE | Refills: 0 | Status: SHIPPED | OUTPATIENT
Start: 2020-03-19 | End: 2020-06-02 | Stop reason: SDUPTHER

## 2020-03-19 RX ORDER — LOSARTAN POTASSIUM 50 MG/1
50 TABLET ORAL DAILY
Qty: 90 TABLET | Refills: 0 | Status: SHIPPED | OUTPATIENT
Start: 2020-03-19 | End: 2020-06-02 | Stop reason: SDUPTHER

## 2020-06-02 ENCOUNTER — OFFICE VISIT (OUTPATIENT)
Dept: FAMILY MEDICINE | Facility: CLINIC | Age: 80
End: 2020-06-02
Payer: MEDICARE

## 2020-06-02 VITALS
SYSTOLIC BLOOD PRESSURE: 139 MMHG | DIASTOLIC BLOOD PRESSURE: 62 MMHG | WEIGHT: 154 LBS | HEART RATE: 59 BPM | TEMPERATURE: 99 F | BODY MASS INDEX: 31.1 KG/M2

## 2020-06-02 DIAGNOSIS — Z79.899 ENCOUNTER FOR LONG-TERM (CURRENT) USE OF MEDICATIONS: Primary | ICD-10-CM

## 2020-06-02 DIAGNOSIS — Z86.69 HISTORY OF MIGRAINE HEADACHES: ICD-10-CM

## 2020-06-02 DIAGNOSIS — F41.9 ANXIETY: ICD-10-CM

## 2020-06-02 DIAGNOSIS — F32.A DEPRESSION, UNSPECIFIED DEPRESSION TYPE: ICD-10-CM

## 2020-06-02 DIAGNOSIS — Z12.31 ENCOUNTER FOR SCREENING MAMMOGRAM FOR BREAST CANCER: ICD-10-CM

## 2020-06-02 DIAGNOSIS — E78.5 HYPERLIPIDEMIA, UNSPECIFIED HYPERLIPIDEMIA TYPE: ICD-10-CM

## 2020-06-02 DIAGNOSIS — Z90.49 HISTORY OF BOWEL RESECTION: ICD-10-CM

## 2020-06-02 DIAGNOSIS — I10 ESSENTIAL HYPERTENSION: ICD-10-CM

## 2020-06-02 DIAGNOSIS — M81.0 AGE-RELATED OSTEOPOROSIS WITHOUT CURRENT PATHOLOGICAL FRACTURE: ICD-10-CM

## 2020-06-02 DIAGNOSIS — K52.9 CHRONIC DIARRHEA: ICD-10-CM

## 2020-06-02 DIAGNOSIS — Z13.6 ENCOUNTER FOR LIPID SCREENING FOR CARDIOVASCULAR DISEASE: ICD-10-CM

## 2020-06-02 DIAGNOSIS — Z13.220 ENCOUNTER FOR LIPID SCREENING FOR CARDIOVASCULAR DISEASE: ICD-10-CM

## 2020-06-02 PROBLEM — Z87.898 HISTORY OF GROSS HEMATURIA: Status: ACTIVE | Noted: 2020-02-17

## 2020-06-02 PROCEDURE — 99999 PR PBB SHADOW E&M-EST. PATIENT-LVL III: ICD-10-PCS | Mod: PBBFAC,,, | Performed by: FAMILY MEDICINE

## 2020-06-02 PROCEDURE — 99214 OFFICE O/P EST MOD 30 MIN: CPT | Mod: S$PBB,,, | Performed by: FAMILY MEDICINE

## 2020-06-02 PROCEDURE — 99213 OFFICE O/P EST LOW 20 MIN: CPT | Mod: PBBFAC,PO | Performed by: FAMILY MEDICINE

## 2020-06-02 PROCEDURE — 99999 PR PBB SHADOW E&M-EST. PATIENT-LVL III: CPT | Mod: PBBFAC,,, | Performed by: FAMILY MEDICINE

## 2020-06-02 PROCEDURE — 99214 PR OFFICE/OUTPT VISIT, EST, LEVL IV, 30-39 MIN: ICD-10-PCS | Mod: S$PBB,,, | Performed by: FAMILY MEDICINE

## 2020-06-02 RX ORDER — CITALOPRAM 10 MG/1
10 TABLET ORAL DAILY
Qty: 90 TABLET | Refills: 3 | Status: SHIPPED | OUTPATIENT
Start: 2020-06-02 | End: 2021-06-26

## 2020-06-02 RX ORDER — TEMAZEPAM 30 MG/1
30 CAPSULE ORAL NIGHTLY
Qty: 90 CAPSULE | Refills: 1 | Status: SHIPPED | OUTPATIENT
Start: 2020-06-02 | End: 2020-09-22 | Stop reason: SDUPTHER

## 2020-06-02 RX ORDER — ASPIRIN 81 MG/1
81 TABLET ORAL DAILY
Qty: 90 TABLET | Refills: 3 | Status: SHIPPED | OUTPATIENT
Start: 2020-06-02 | End: 2022-12-12 | Stop reason: SDUPTHER

## 2020-06-02 RX ORDER — ATORVASTATIN CALCIUM 40 MG/1
40 TABLET, FILM COATED ORAL DAILY
Qty: 90 TABLET | Refills: 3 | Status: SHIPPED | OUTPATIENT
Start: 2020-06-02 | End: 2021-06-26

## 2020-06-02 RX ORDER — METOPROLOL TARTRATE 100 MG/1
100 TABLET ORAL 2 TIMES DAILY
Qty: 180 TABLET | Refills: 3 | Status: SHIPPED | OUTPATIENT
Start: 2020-06-02 | End: 2020-12-23 | Stop reason: SDUPTHER

## 2020-06-02 RX ORDER — DIPHENOXYLATE HYDROCHLORIDE AND ATROPINE SULFATE 2.5; .025 MG/1; MG/1
1 TABLET ORAL 4 TIMES DAILY PRN
Qty: 90 TABLET | Refills: 5 | Status: SHIPPED | OUTPATIENT
Start: 2020-06-02 | End: 2021-04-30 | Stop reason: SDUPTHER

## 2020-06-02 RX ORDER — TOPIRAMATE 25 MG/1
25 TABLET ORAL DAILY
Qty: 90 TABLET | Refills: 3 | Status: SHIPPED | OUTPATIENT
Start: 2020-06-02 | End: 2021-08-28 | Stop reason: SDUPTHER

## 2020-06-02 RX ORDER — LOSARTAN POTASSIUM 50 MG/1
50 TABLET ORAL DAILY
Qty: 90 TABLET | Refills: 3 | Status: SHIPPED | OUTPATIENT
Start: 2020-06-02 | End: 2021-06-26

## 2020-06-02 NOTE — PATIENT INSTRUCTIONS
Follow up in about 6 months (around 12/2/2020), or if symptoms worsen or fail to improve, for Med refills, LAB RESULTS.     If no improvement in symptoms or symptoms worsen, please be advised to call MD, follow-up at clinic and/or go to ER if becomes severe.    Bunny Moses M.D.        We Offer TELEHEALTH & Same Day Appointments!   Book your Telehealth appointment with me through my nurse or   Clinic appointments on [employer]!    [address] [place] [address]    Office: [phone]   FAX: [phone]    Check out my Facebook Page and Follow Me at: [email or web address]    Check out my website at [employer] by clicking on: [email or web address]    To Schedule appointments online, go to [employer]: [email or web address]

## 2020-06-02 NOTE — ASSESSMENT & PLAN NOTE
Refill medications.  No medication abuse suspected.Discussed anxiety condition course.  Discussed SSRI as first-line treatment for this condition.  Discussed risk of discontinuing this medication without tapering.  Patient was educated, advised of side effects, and all questions were answered.  Patient voiced understanding.  Patient will follow up routinely and notify us if having any side effects or worsening or persistent symptoms.  ER precautions were given.

## 2020-06-02 NOTE — ASSESSMENT & PLAN NOTE
Update labs.Complete history and physical was completed today.  Complete and thorough medication reconciliation was performed.  Discussed risks and benefits of medications.  Advised patient on orders and health maintenance.  We discussed old records and old labs if available.  Will request any records not available through epic.  Continue current medications listed on your summary sheet.

## 2020-06-02 NOTE — ASSESSMENT & PLAN NOTE
Requesting records from Endocrinology.  Update bone density scan.  Continue vitamin-D.  Monitor calcium level.

## 2020-06-02 NOTE — ASSESSMENT & PLAN NOTE
Discussed hyperlipidemia disease course, healthy diet and increased need for exercise.  Discussed the risk of cardiovascular disease, increase stroke and heart attack risk.    Patient voiced understanding and understood the treatment plan. All questions were answered.

## 2020-06-02 NOTE — PROGRESS NOTES
PLAN:      Problem List Items Addressed This Visit     Essential hypertension (Chronic)     Continue with amlodipine 10 mg.  Continue metoprolol and losartan.Discussed hypertension disease course, DASH-diet and exercise.  Discussed medication regimen importance of treating high blood pressure.  Patient advised of risk of untreated blood pressure.    ER precautions were given for symptoms of hypertensive urgency and emergency.           Relevant Medications    metoprolol tartrate (LOPRESSOR) 100 MG tablet    losartan (COZAAR) 50 MG tablet    Depression (Chronic)    Relevant Medications    citalopram (CELEXA) 10 MG tablet    Anxiety (Chronic)     Refill medications.  No medication abuse suspected.Discussed anxiety condition course.  Discussed SSRI as first-line treatment for this condition.  Discussed risk of discontinuing this medication without tapering.  Patient was educated, advised of side effects, and all questions were answered.  Patient voiced understanding.  Patient will follow up routinely and notify us if having any side effects or worsening or persistent symptoms.  ER precautions were given.           Relevant Medications    citalopram (CELEXA) 10 MG tablet    Hyperlipidemia (Chronic)     Discussed hyperlipidemia disease course, healthy diet and increased need for exercise.  Discussed the risk of cardiovascular disease, increase stroke and heart attack risk.    Patient voiced understanding and understood the treatment plan. All questions were answered.              Relevant Medications    atorvastatin (LIPITOR) 40 MG tablet    aspirin (ECOTRIN) 81 MG EC tablet    Osteoporosis (Chronic)     Requesting records from Endocrinology.  Update bone density scan.  Continue vitamin-D.  Monitor calcium level.         Relevant Orders    DXA Bone Density Spine And Hip    Chronic diarrhea (Chronic)     Refill Lomotil         Relevant Medications    diphenoxylate-atropine 2.5-0.025 mg (LOMOTIL) 2.5-0.025 mg per tablet     Encounter for long-term (current) use of medications - Primary (Chronic)     Update labs.Complete history and physical was completed today.  Complete and thorough medication reconciliation was performed.  Discussed risks and benefits of medications.  Advised patient on orders and health maintenance.  We discussed old records and old labs if available.  Will request any records not available through epic.  Continue current medications listed on your summary sheet.           Relevant Medications    diphenoxylate-atropine 2.5-0.025 mg (LOMOTIL) 2.5-0.025 mg per tablet    citalopram (CELEXA) 10 MG tablet    metoprolol tartrate (LOPRESSOR) 100 MG tablet    losartan (COZAAR) 50 MG tablet    topiramate (TOPAMAX) 25 MG tablet    atorvastatin (LIPITOR) 40 MG tablet    temazepam (RESTORIL) 30 mg capsule    aspirin (ECOTRIN) 81 MG EC tablet    Other Relevant Orders    CBC Without Differential    TSH    Comprehensive metabolic panel    Hemoglobin A1C    Lipid Panel    DXA Bone Density Spine And Hip    History of bowel resection (Chronic)    Relevant Medications    diphenoxylate-atropine 2.5-0.025 mg (LOMOTIL) 2.5-0.025 mg per tablet      Other Visit Diagnoses     Encounter for lipid screening for cardiovascular disease        Relevant Orders    Lipid Panel    History of migraine headaches        Relevant Medications    topiramate (TOPAMAX) 25 MG tablet    Encounter for screening mammogram for breast cancer        Relevant Orders    Mammo Digital Screening Bilat        Future Appointments     Date Provider Specialty Appt Notes    6/23/2020  Radiology     6/23/2020  Radiology     6/23/2020  Lab     9/3/2020 Bunny Moses MD Family Medicine 3 mo f/u           Medication List with Changes/Refills   New Medications    ASPIRIN (ECOTRIN) 81 MG EC TABLET    Take 1 tablet (81 mg total) by mouth once daily.   Current Medications    AMLODIPINE (NORVASC) 10 MG TABLET    Take 1 tablet (10 mg total) by mouth once daily.    CALCIUM  CARBONATE (CALCIUM 500 ORAL)    Take by mouth.    CINACALCET (SENSIPAR) 30 MG TAB    Take 1 tablet (30 mg total) by mouth daily with breakfast.    HYOSCYAMINE (ANASPAZ,LEVSIN) 0.125 MG TAB    Take 1 tablet (125 mcg total) by mouth every 4 (four) hours as needed.    MULTIVIT,CALC,MINS/IRON/FOLIC (ONE-A-DAY WOMENS FORMULA ORAL)    Take 1 tablet by mouth once daily.    ONDANSETRON (ZOFRAN-ODT) 4 MG TBDL    DISSOLVE ONE TABLET BY MOUTH EVERY 6 HOURS AS NEEDED FOR NAUSEA    VITAMIN D 1000 UNITS TAB    Take 1,000 Units by mouth once daily.    VITAMIN E ACETATE (VITAMIN E ORAL)    Take 1 capsule by mouth once daily.   Changed and/or Refilled Medications    Modified Medication Previous Medication    ATORVASTATIN (LIPITOR) 40 MG TABLET atorvastatin (LIPITOR) 40 MG tablet       Take 1 tablet (40 mg total) by mouth once daily.    Take 1 tablet (40 mg total) by mouth once daily.    CITALOPRAM (CELEXA) 10 MG TABLET citalopram (CELEXA) 10 MG tablet       Take 1 tablet (10 mg total) by mouth once daily.    Take 1 tablet (10 mg total) by mouth once daily.    DIPHENOXYLATE-ATROPINE 2.5-0.025 MG (LOMOTIL) 2.5-0.025 MG PER TABLET diphenoxylate-atropine 2.5-0.025 mg (LOMOTIL) 2.5-0.025 mg per tablet       Take 1 tablet by mouth 4 (four) times daily as needed.    TAKE ONE TABLET BY MOUTH FOUR TIMES DAILY AS NEEDED    LOSARTAN (COZAAR) 50 MG TABLET losartan (COZAAR) 50 MG tablet       Take 1 tablet (50 mg total) by mouth once daily.    Take 1 tablet (50 mg total) by mouth once daily.    METOPROLOL TARTRATE (LOPRESSOR) 100 MG TABLET metoprolol tartrate (LOPRESSOR) 100 MG tablet       Take 1 tablet (100 mg total) by mouth 2 (two) times daily.    TAKE ONE TABLET BY MOUTH TWICE DAILY    TEMAZEPAM (RESTORIL) 30 MG CAPSULE temazepam (RESTORIL) 30 mg capsule       Take 1 capsule (30 mg total) by mouth nightly.    TAKE ONE CAPSULE BY MOUTH NIGHTLY    TOPIRAMATE (TOPAMAX) 25 MG TABLET topiramate (TOPAMAX) 25 MG tablet       Take 1 tablet (25 mg  total) by mouth once daily.    TAKE ONE TABLET ONCE DAILY   Discontinued Medications    FLUAD 7569-6416, 65 YR UP,,PF, 45 MCG (15 MCG X 3)/0.5 ML SYRG    ADM 0.5ML IM UTD    PROMETHAZINE (PHENERGAN) 25 MG TABLET    Take 1 tablet (25 mg total) by mouth every 6 (six) hours as needed for Nausea.    SODIUM,POTASSIUM,MAG SULFATES (SUPREP BOWEL PREP KIT) 17.5-3.13-1.6 GRAM SOLR    Take as instructed on prep sheet       Bunny Moses M.D.     ==========================================================================  Subjective:      Patient ID: Suzie Dawkins is a 79 y.o. female.  has a past medical history of Anxiety, Arthritis, Cancer (2007), Depression, GERD (gastroesophageal reflux disease), Hypercalcemia, Hyperlipidemia, Hypertension, Osteoporosis (10/7/2013), and Vitamin D deficiency.     Chief Complaint: Follow-up      Problem List Items Addressed This Visit     Essential hypertension (Chronic)    Overview     CHRONIC. STABLE. BP Reviewed.  Compliant with BP medications. No SE reported.   =======================================================  JUNE 2020:  Blood pressure is below goal today.  Continue current medication regimen.  ======================================================  =======================================================  JANUARY 2020:  Patient currently on metoprolol 100 mg twice a day, losartan 100 mg daily, amlodipine 5 mg daily was recently increased to 10 mg after nursing visit blood pressure check.  ======================================================  (-) CP, SOB, palpitations, dizziness, lightheadedness, HA, arm numbness, tingling or weakness, syncope.  Creatinine   Date Value Ref Range Status   11/30/2018 1.1 0.5 - 1.4 mg/dL Final              Current Assessment & Plan     Continue with amlodipine 10 mg.  Continue metoprolol and losartan.Discussed hypertension disease course, DASH-diet and exercise.  Discussed medication regimen importance of treating high blood  pressure.  Patient advised of risk of untreated blood pressure.    ER precautions were given for symptoms of hypertensive urgency and emergency.           Depression (Chronic)    Anxiety (Chronic)    Overview     Chronic.  Stable.  Patient using medication appropriately denies any side effects.         Current Assessment & Plan     Refill medications.  No medication abuse suspected.Discussed anxiety condition course.  Discussed SSRI as first-line treatment for this condition.  Discussed risk of discontinuing this medication without tapering.  Patient was educated, advised of side effects, and all questions were answered.  Patient voiced understanding.  Patient will follow up routinely and notify us if having any side effects or worsening or persistent symptoms.  ER precautions were given.           Hyperlipidemia (Chronic)    Overview     CHRONIC. STABLE. Lab analysis reviewed.   (-) CP, SOB, abdominal pain, N/V/D, constipation, jaundice, skin changes.  (-) Myalgias  Lab Results   Component Value Date    CHOL 175 11/30/2018    CHOL 163 09/01/2017    CHOL 169 02/28/2017     Lab Results   Component Value Date    HDL 61 11/30/2018    HDL 50 09/01/2017    HDL 50 02/28/2017     Lab Results   Component Value Date    LDLCALC 92.6 11/30/2018    LDLCALC 92.6 09/01/2017    LDLCALC 104.0 02/28/2017     Lab Results   Component Value Date    TRIG 107 11/30/2018    TRIG 102 09/01/2017    TRIG 75 02/28/2017     Lab Results   Component Value Date    CHOLHDL 34.9 11/30/2018    CHOLHDL 30.7 09/01/2017    CHOLHDL 29.6 02/28/2017     Lab Results   Component Value Date    TOTALCHOLEST 2.9 11/30/2018    TOTALCHOLEST 3.3 09/01/2017    TOTALCHOLEST 3.4 02/28/2017     Lab Results   Component Value Date    ALT 18 11/30/2018    AST 24 11/30/2018    ALKPHOS 84 11/30/2018    BILITOT 0.8 11/30/2018     ======================================================           Current Assessment & Plan     Discussed hyperlipidemia disease course, healthy  diet and increased need for exercise.  Discussed the risk of cardiovascular disease, increase stroke and heart attack risk.    Patient voiced understanding and understood the treatment plan. All questions were answered.              Osteoporosis (Chronic)    Overview     Chronic.  Control is uncertain.  Patient has been seeing Endocrinology.  Patient requesting updated bone density scan.         Current Assessment & Plan     Requesting records from Endocrinology.  Update bone density scan.  Continue vitamin-D.  Monitor calcium level.         Chronic diarrhea (Chronic)    Overview     Chronic.  Uncontrolled occasionally.  Patient needs Lomotil which was denied by insurance previously.  Patient with history of bowel resection in gets severe diarrhea that is not controlled with over-the-counter medications.         Current Assessment & Plan     Refill Lomotil         Encounter for long-term (current) use of medications - Primary (Chronic)    Overview     CHRONIC. Stable. Compliant with medications for managed conditions. See medication list. No SE reported.   Routine lab analysis is being monitored. Refills were addressed.  Lab Results   Component Value Date    WBC 9.68 10/08/2013    HGB 13.8 10/08/2013    HCT 41.6 10/08/2013    MCV 91 10/08/2013     10/08/2013         Chemistry        Component Value Date/Time     11/30/2018 1045    K 3.8 11/30/2018 1045     11/30/2018 1045    CO2 29 11/30/2018 1045    BUN 12 11/30/2018 1045    CREATININE 1.1 11/30/2018 1045     11/30/2018 1045        Component Value Date/Time    CALCIUM 10.0 11/30/2018 1045    ALKPHOS 84 11/30/2018 1045    AST 24 11/30/2018 1045    ALT 18 11/30/2018 1045    BILITOT 0.8 11/30/2018 1045    ESTGFRAFRICA 56.0 (A) 11/30/2018 1045    EGFRNONAA 48.5 (A) 11/30/2018 1045          Lab Results   Component Value Date    TSH 2.810 10/08/2013              Current Assessment & Plan     Update labs.Complete history and physical was  completed today.  Complete and thorough medication reconciliation was performed.  Discussed risks and benefits of medications.  Advised patient on orders and health maintenance.  We discussed old records and old labs if available.  Will request any records not available through epic.  Continue current medications listed on your summary sheet.           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    Depression     GERD (gastroesophageal reflux disease)     Hypercalcemia     Hyperlipidemia     Hypertension     Osteoporosis 10/7/2013    Vitamin D deficiency      Past Surgical History:   Procedure Laterality Date    ADENOIDECTOMY       SECTION      two    COLON SURGERY  2007    cancer removal    HEMORRHOID SURGERY      HYSTERECTOMY      OOPHORECTOMY      TONSILLECTOMY      TONSILLECTOMY, ADENOIDECTOMY, BILATERAL MYRINGOTOMY AND TUBES       Review of patient's allergies indicates:   Allergen Reactions    Latex Hives    B12 [cyanocobalamin-cobamamide] Other (See Comments)     Caused flushing and caused skin to peel    Codeine      Other reaction(s): Muscle pain    Iodine      Other reaction(s): Hives    Meperidine      Other reaction(s): Muscle pain    Narcotic antagonist      Other reaction(s): Rash    Penicillins Nausea And Vomiting    Propoxyphene      Other reaction(s): Muscle pain    Sulfa (sulfonamide antibiotics)      Other reaction(s): Muscle pain  Other reaction(s): Hives     Medication List with Changes/Refills   New Medications    ASPIRIN (ECOTRIN) 81 MG EC TABLET    Take 1 tablet (81 mg total) by mouth once  daily.   Current Medications    AMLODIPINE (NORVASC) 10 MG TABLET    Take 1 tablet (10 mg total) by mouth once daily.    CALCIUM CARBONATE (CALCIUM 500 ORAL)    Take by mouth.    CINACALCET (SENSIPAR) 30 MG TAB    Take 1 tablet (30 mg total) by mouth daily with breakfast.    HYOSCYAMINE (ANASPAZ,LEVSIN) 0.125 MG TAB    Take 1 tablet (125 mcg total) by mouth every 4 (four) hours as needed.    MULTIVIT,CALC,MINS/IRON/FOLIC (ONE-A-DAY WOMENS FORMULA ORAL)    Take 1 tablet by mouth once daily.    ONDANSETRON (ZOFRAN-ODT) 4 MG TBDL    DISSOLVE ONE TABLET BY MOUTH EVERY 6 HOURS AS NEEDED FOR NAUSEA    VITAMIN D 1000 UNITS TAB    Take 1,000 Units by mouth once daily.    VITAMIN E ACETATE (VITAMIN E ORAL)    Take 1 capsule by mouth once daily.   Changed and/or Refilled Medications    Modified Medication Previous Medication    ATORVASTATIN (LIPITOR) 40 MG TABLET atorvastatin (LIPITOR) 40 MG tablet       Take 1 tablet (40 mg total) by mouth once daily.    Take 1 tablet (40 mg total) by mouth once daily.    CITALOPRAM (CELEXA) 10 MG TABLET citalopram (CELEXA) 10 MG tablet       Take 1 tablet (10 mg total) by mouth once daily.    Take 1 tablet (10 mg total) by mouth once daily.    DIPHENOXYLATE-ATROPINE 2.5-0.025 MG (LOMOTIL) 2.5-0.025 MG PER TABLET diphenoxylate-atropine 2.5-0.025 mg (LOMOTIL) 2.5-0.025 mg per tablet       Take 1 tablet by mouth 4 (four) times daily as needed.    TAKE ONE TABLET BY MOUTH FOUR TIMES DAILY AS NEEDED    LOSARTAN (COZAAR) 50 MG TABLET losartan (COZAAR) 50 MG tablet       Take 1 tablet (50 mg total) by mouth once daily.    Take 1 tablet (50 mg total) by mouth once daily.    METOPROLOL TARTRATE (LOPRESSOR) 100 MG TABLET metoprolol tartrate (LOPRESSOR) 100 MG tablet       Take 1 tablet (100 mg total) by mouth 2 (two) times daily.    TAKE ONE TABLET BY MOUTH TWICE DAILY    TEMAZEPAM (RESTORIL) 30 MG CAPSULE temazepam (RESTORIL) 30 mg capsule       Take 1 capsule (30 mg total) by mouth nightly.    TAKE  ONE CAPSULE BY MOUTH NIGHTLY    TOPIRAMATE (TOPAMAX) 25 MG TABLET topiramate (TOPAMAX) 25 MG tablet       Take 1 tablet (25 mg total) by mouth once daily.    TAKE ONE TABLET ONCE DAILY   Discontinued Medications    FLUAD [id number], 65 YR UP,,PF, 45 MCG (15 MCG X 3)/0.5 ML SYRG    ADM 0.5ML IM UTD    PROMETHAZINE (PHENERGAN) 25 MG TABLET    Take 1 tablet (25 mg total) by mouth every 6 (six) hours as needed for Nausea.    SODIUM,POTASSIUM,MAG SULFATES (SUPREP BOWEL PREP KIT) 17.5-3.13-1.6 GRAM SOLR    Take as instructed on prep sheet      Social History     Tobacco Use    Smoking status: Former Smoker     Types: Cigarettes     Last attempt to quit: 1989     Years since quittin.4    Smokeless tobacco: Never Used   Substance Use Topics    Alcohol use: No      Family History   Problem Relation Age of Onset    Cancer Father         lung    Arthritis Father     Cancer Maternal Aunt         breast    Arthritis Maternal Aunt     Breast cancer Maternal Aunt     Cancer Maternal Uncle     Arthritis Paternal Aunt     Cancer Maternal Grandfather         glands cancer     Arthritis Paternal Aunt     Arthritis Maternal Aunt        I have reviewed the complete PMH, social history, surgical history, allergies and medications.  As well as family history.    Review of Systems see HPI otherwise negative  Objective:   /62   Pulse (!) 59   Temp 99.1 °F (37.3 °C)   Wt 69.9 kg (154 lb)   BMI 31.10 kg/m²   Physical Exam   Constitutional: She is oriented to person, place, and time. She appears well-developed and well-nourished. No distress.   HENT:   Head: Normocephalic and atraumatic.   Eyes: Pupils are equal, round, and reactive to light. EOM are normal.   Neck: Normal range of motion. Neck supple.   Cardiovascular: Regular rhythm, normal heart sounds and intact distal pulses. Bradycardia present.   No murmur heard.  Pulmonary/Chest: Effort normal and breath sounds normal. No respiratory distress. She has no  wheezes.   Abdominal: Soft. Bowel sounds are normal.   No CVA tenderness. No suprapubic tenderness.   Musculoskeletal: Normal range of motion. She exhibits no edema.   Neurological: She is alert and oriented to person, place, and time. No cranial nerve deficit.   Skin: Skin is warm and dry. Capillary refill takes less than 2 seconds.   Psychiatric: She has a normal mood and affect. Her behavior is normal.   Nursing note and vitals reviewed.      Assessment:     1. Encounter for long-term (current) use of medications    2. Encounter for lipid screening for cardiovascular disease    3. Age-related osteoporosis without current pathological fracture    4. Chronic diarrhea    5. Depression, unspecified depression type    6. Anxiety    7. Essential hypertension    8. History of migraine headaches    9. Hyperlipidemia, unspecified hyperlipidemia type    10. History of bowel resection    11. Encounter for screening mammogram for breast cancer      MDM:   Moderate complexity.  High risk.  I have Reviewed and summarized old records.  I have performed thorough medication reconciliation today and discussed risk and benefits of each medication.  I have reviewed labs and discussed with patient.  All questions were answered.  I am requesting old records and will review them once they are available.  Endocrinology    I have signed for the following orders AND/OR meds.  Orders Placed This Encounter   Procedures    DXA Bone Density Spine And Hip     Standing Status:   Future     Standing Expiration Date:   6/2/2023     Order Specific Question:   May the Radiologist modify the order per protocol to meet the clinical needs of the patient?     Answer:   Yes    Mammo Digital Screening Bilat     Standing Status:   Future     Standing Expiration Date:   8/4/2021    CBC Without Differential     Standing Status:   Standing     Number of Occurrences:   99     Standing Expiration Date:   8/1/2021    TSH     Standing Status:   Standing      Number of Occurrences:   99     Standing Expiration Date:   8/1/2021    Comprehensive metabolic panel     Standing Status:   Standing     Number of Occurrences:   99     Standing Expiration Date:   5/28/2040    Hemoglobin A1C     Standing Status:   Standing     Number of Occurrences:   99     Standing Expiration Date:   5/28/2040    Lipid Panel     Standing Status:   Standing     Number of Occurrences:   99     Standing Expiration Date:   5/28/2040     Medications Ordered This Encounter   Medications    aspirin (ECOTRIN) 81 MG EC tablet     Sig: Take 1 tablet (81 mg total) by mouth once daily.     Dispense:  90 tablet     Refill:  3    atorvastatin (LIPITOR) 40 MG tablet     Sig: Take 1 tablet (40 mg total) by mouth once daily.     Dispense:  90 tablet     Refill:  3     This prescription was filled on 2/18/2019. Any refills authorized will be placed on file.    citalopram (CELEXA) 10 MG tablet     Sig: Take 1 tablet (10 mg total) by mouth once daily.     Dispense:  90 tablet     Refill:  3    diphenoxylate-atropine 2.5-0.025 mg (LOMOTIL) 2.5-0.025 mg per tablet     Sig: Take 1 tablet by mouth 4 (four) times daily as needed.     Dispense:  90 tablet     Refill:  5    losartan (COZAAR) 50 MG tablet     Sig: Take 1 tablet (50 mg total) by mouth once daily.     Dispense:  90 tablet     Refill:  3     .    metoprolol tartrate (LOPRESSOR) 100 MG tablet     Sig: Take 1 tablet (100 mg total) by mouth 2 (two) times daily.     Dispense:  180 tablet     Refill:  3     .    temazepam (RESTORIL) 30 mg capsule     Sig: Take 1 capsule (30 mg total) by mouth nightly.     Dispense:  90 capsule     Refill:  1    topiramate (TOPAMAX) 25 MG tablet     Sig: Take 1 tablet (25 mg total) by mouth once daily.     Dispense:  90 tablet     Refill:  3        Follow up in about 6 months (around 12/2/2020), or if symptoms worsen or fail to improve, for Med refills, LAB RESULTS.    If no improvement in symptoms or symptoms worsen,  advised to call/follow-up at clinic or go to ER. Patient voiced understanding and all questions/concerns were addressed.     DISCLAIMER: This note was compiled by using a speech recognition dictation system and therefore please be aware that typographical / speech recognition errors can and do occur.  Please contact me if you see any errors specifically.    Bunny Moses M.D.       Office: [phone]   [address] [place] [address]  FAX: [phone]

## 2020-06-23 ENCOUNTER — HOSPITAL ENCOUNTER (OUTPATIENT)
Dept: RADIOLOGY | Facility: HOSPITAL | Age: 80
Discharge: HOME | End: 2020-06-23
Attending: FAMILY MEDICINE
Payer: MEDICARE

## 2020-06-23 VITALS — BODY MASS INDEX: 31.04 KG/M2 | WEIGHT: 154 LBS | HEIGHT: 59 IN

## 2020-06-23 DIAGNOSIS — Z79.899 ENCOUNTER FOR LONG-TERM (CURRENT) USE OF MEDICATIONS: ICD-10-CM

## 2020-06-23 DIAGNOSIS — M81.0 AGE-RELATED OSTEOPOROSIS WITHOUT CURRENT PATHOLOGICAL FRACTURE: ICD-10-CM

## 2020-06-23 DIAGNOSIS — Z12.31 ENCOUNTER FOR SCREENING MAMMOGRAM FOR BREAST CANCER: ICD-10-CM

## 2020-06-23 PROCEDURE — 77080 DXA BONE DENSITY AXIAL: CPT | Mod: 26,,, | Performed by: RADIOLOGY

## 2020-06-23 PROCEDURE — 77067 SCR MAMMO BI INCL CAD: CPT | Mod: 26,,, | Performed by: RADIOLOGY

## 2020-06-23 PROCEDURE — 77067 SCR MAMMO BI INCL CAD: CPT | Mod: TC,PO

## 2020-06-23 PROCEDURE — 77063 BREAST TOMOSYNTHESIS BI: CPT | Mod: 26,,, | Performed by: RADIOLOGY

## 2020-06-23 PROCEDURE — 77080 DEXA BONE DENSITY SPINE HIP: ICD-10-PCS | Mod: 26,,, | Performed by: RADIOLOGY

## 2020-06-23 PROCEDURE — 77080 DXA BONE DENSITY AXIAL: CPT | Mod: TC,PO

## 2020-06-23 PROCEDURE — 77063 MAMMO DIGITAL SCREENING BILAT WITH TOMOSYNTHESIS_CAD: ICD-10-PCS | Mod: 26,,, | Performed by: RADIOLOGY

## 2020-06-23 PROCEDURE — 77067 MAMMO DIGITAL SCREENING BILAT WITH TOMOSYNTHESIS_CAD: ICD-10-PCS | Mod: 26,,, | Performed by: RADIOLOGY

## 2020-06-25 ENCOUNTER — TELEPHONE (OUTPATIENT)
Dept: RHEUMATOLOGY | Facility: CLINIC | Age: 80
End: 2020-06-25

## 2020-06-25 DIAGNOSIS — E03.9 ACQUIRED HYPOTHYROIDISM: ICD-10-CM

## 2020-06-25 DIAGNOSIS — M81.0 AGE-RELATED OSTEOPOROSIS WITHOUT CURRENT PATHOLOGICAL FRACTURE: Primary | Chronic | ICD-10-CM

## 2020-06-25 DIAGNOSIS — E78.5 HYPERLIPIDEMIA, UNSPECIFIED HYPERLIPIDEMIA TYPE: Primary | ICD-10-CM

## 2020-06-25 RX ORDER — ALENDRONATE SODIUM 70 MG/1
35 TABLET ORAL
Qty: 2 TABLET | Refills: 11 | Status: SHIPPED | OUTPATIENT
Start: 2020-06-25 | End: 2020-07-20

## 2020-06-25 NOTE — PROGRESS NOTES
The bone density test (DEXA scan) is showing osteoporosis.    -Start oscal 500+d taking 1 by mouth three times a day which is an over the counter medication-put it on the medcard.   -Start FOSAMAX 70 mg orally once a week with a glass of water and no lying down or eating for 30 minutes after.  Give #12 and refill x 3. Send in a prescription to the pharmacy for that medicine.    -Recheck the DEXA scan in 2 years.

## 2020-06-25 NOTE — TELEPHONE ENCOUNTER
Left voice message to return call to clinic regarding scheduling NP appointment with Luli Escobar PA-C in [facility]

## 2020-06-29 ENCOUNTER — TELEPHONE (OUTPATIENT)
Dept: RHEUMATOLOGY | Facility: CLINIC | Age: 80
End: 2020-06-29

## 2020-06-29 NOTE — TELEPHONE ENCOUNTER
----- Message from Luli Escobar PA-C sent at 6/29/2020  7:46 AM CDT -----  New patient referral, already has dexa

## 2020-06-29 NOTE — TELEPHONE ENCOUNTER
----- Message from Beckie Key sent at 6/29/2020  4:14 PM CDT -----  Regarding: Return call  Type:  Patient Returning Call    Who Called:PT  Who Left Message for Patient:TONI  Does the patient know what this is regarding?:YES  Would the patient rather a call back or a response via [facility]? CALL BACK  Best Call Back Number:[phone]  Additional Information:

## 2020-07-20 ENCOUNTER — TELEPHONE (OUTPATIENT)
Dept: FAMILY MEDICINE | Facility: CLINIC | Age: 80
End: 2020-07-20

## 2020-07-20 DIAGNOSIS — R42 VERTIGO: Primary | ICD-10-CM

## 2020-07-20 DIAGNOSIS — J34.9 SINUS PROBLEM: ICD-10-CM

## 2020-07-20 RX ORDER — ONDANSETRON 4 MG/1
TABLET, ORALLY DISINTEGRATING ORAL
Qty: 60 TABLET | Refills: 1 | Status: SHIPPED | OUTPATIENT
Start: 2020-07-20 | End: 2022-12-14 | Stop reason: SDUPTHER

## 2020-07-20 RX ORDER — MECLIZINE HCL 12.5 MG 12.5 MG/1
12.5 TABLET ORAL 3 TIMES DAILY PRN
Qty: 30 TABLET | Refills: 0 | Status: SHIPPED | OUTPATIENT
Start: 2020-07-20 | End: 2020-07-21

## 2020-07-20 RX ORDER — MONTELUKAST SODIUM 10 MG/1
10 TABLET ORAL NIGHTLY
Qty: 30 TABLET | Refills: 1 | Status: SHIPPED | OUTPATIENT
Start: 2020-07-20 | End: 2020-08-19

## 2020-07-20 RX ORDER — ALENDRONATE SODIUM 35 MG/1
35 TABLET ORAL
COMMUNITY
Start: 2020-06-26 | End: 2022-06-23

## 2020-07-20 NOTE — TELEPHONE ENCOUNTER
Spoke with daughter and notified her of the medications that have been sent to the pharmacy for her mother.  I advised her that if the patient's symptoms worsen or do not improve that she will need to come in for further evaluation.

## 2020-07-20 NOTE — TELEPHONE ENCOUNTER
Patient started having issues with dizziness and was up all night.  Last week patient has been having issues her both ears popping.  Patient wants to know if you can call anything in for [facility] to [facility].Patient also wants Zofran refilled.

## 2020-07-21 DIAGNOSIS — R05.9 COUGH: ICD-10-CM

## 2020-07-21 DIAGNOSIS — R42 VERTIGO: Primary | ICD-10-CM

## 2020-07-21 RX ORDER — AZITHROMYCIN 250 MG/1
TABLET, FILM COATED ORAL
Qty: 6 TABLET | Refills: 0 | Status: ON HOLD | OUTPATIENT
Start: 2020-07-21 | End: 2020-07-24 | Stop reason: HOSPADM

## 2020-07-21 RX ORDER — MECLIZINE HYDROCHLORIDE 25 MG/1
25 TABLET ORAL 3 TIMES DAILY PRN
Qty: 30 TABLET | Refills: 0 | Status: SHIPPED | OUTPATIENT
Start: 2020-07-21 | End: 2021-04-30 | Stop reason: SDUPTHER

## 2020-07-22 ENCOUNTER — TELEPHONE (OUTPATIENT)
Dept: FAMILY MEDICINE | Facility: CLINIC | Age: 80
End: 2020-07-22

## 2020-07-22 DIAGNOSIS — R42 VERTIGO: Primary | ICD-10-CM

## 2020-07-22 PROBLEM — N18.30 CKD (CHRONIC KIDNEY DISEASE) STAGE 3, GFR 30-59 ML/MIN: Status: ACTIVE | Noted: 2020-07-22

## 2020-07-22 NOTE — TELEPHONE ENCOUNTER
Mom still extremely dizzy. What Do you suggest ? This has been since Sunday night.   If I put her in a vehicle she going to start throwing up.  I know it's only second day of medicine, just don't know if this is normal . Call me on my cell [phone]

## 2020-07-23 PROBLEM — N17.9 AKI (ACUTE KIDNEY INJURY): Status: ACTIVE | Noted: 2020-07-23

## 2020-07-23 PROBLEM — R26.89 BALANCE PROBLEMS: Status: ACTIVE | Noted: 2020-07-23

## 2020-07-23 PROBLEM — E87.8 ELECTROLYTE ABNORMALITY: Status: ACTIVE | Noted: 2020-07-23

## 2020-07-24 ENCOUNTER — TELEPHONE (OUTPATIENT)
Dept: FAMILY MEDICINE | Facility: CLINIC | Age: 80
End: 2020-07-24

## 2020-07-24 NOTE — TELEPHONE ENCOUNTER
----- Message from Tania Christopher sent at 7/24/2020  9:41 AM CDT -----  Regarding:  hosp  .Type:  Sooner Apoointment Request    Caller is requesting a sooner appointment.  Caller declined first available appointment listed below.  Caller will not accept being placed on the waitlist and is requesting a message be sent to doctor.  Name of Caller:   When is the first available appointment?8/24/20  Symptoms: 1wk hosp f/u   Would the patient rather a call back or a response via [facility]? Call back   Best Call Back Number:.[phone] (home)   Additional Information:       Thank You,   Tania Christopher

## 2020-07-24 NOTE — TELEPHONE ENCOUNTER
Returned call to patient, an appointment for a hospital follow up was scheduled with the patient for 07/31/2020 at 10:40 am.  Patient verbalized understanding of this.

## 2020-07-25 ENCOUNTER — TELEPHONE (OUTPATIENT)
Dept: FAMILY MEDICINE | Facility: CLINIC | Age: 80
End: 2020-07-25

## 2020-07-25 DIAGNOSIS — A49.9 BACTERIAL INFECTION: Primary | ICD-10-CM

## 2020-07-25 RX ORDER — CEFUROXIME AXETIL 250 MG/1
250 TABLET ORAL 2 TIMES DAILY
Qty: 14 TABLET | Refills: 0 | Status: SHIPPED | OUTPATIENT
Start: 2020-07-25 | End: 2020-08-01

## 2020-07-25 RX ORDER — PHENAZOPYRIDINE HYDROCHLORIDE 200 MG/1
200 TABLET, FILM COATED ORAL 3 TIMES DAILY PRN
Qty: 9 TABLET | Refills: 0 | Status: SHIPPED | OUTPATIENT
Start: 2020-07-25 | End: 2020-07-28

## 2020-07-25 NOTE — TELEPHONE ENCOUNTER
----- Message from Bunny Moses MD sent at 7/25/2020 10:14 AM CDT -----  Regarding: set up appt on Monday  Contact: [phone]  Please call patient or Marlena Cabral [phone] to set up an appointment on Monday at their convenience.

## 2020-07-25 NOTE — TELEPHONE ENCOUNTER
Paola spoke with Ms. Suzie and she does not want to come on Monday, she asked to be scheduled for Friday, July 31, appointment has been scheduled and patient is aware of date and time.

## 2020-08-07 ENCOUNTER — EXTERNAL HOME HEALTH (OUTPATIENT)
Dept: HOME HEALTH SERVICES | Facility: HOSPITAL | Age: 80
End: 2020-08-07
Payer: MEDICARE

## 2020-08-17 ENCOUNTER — DOCUMENT SCAN (OUTPATIENT)
Dept: HOME HEALTH SERVICES | Facility: HOSPITAL | Age: 80
End: 2020-08-17

## 2020-08-21 ENCOUNTER — TELEPHONE (OUTPATIENT)
Dept: FAMILY MEDICINE | Facility: CLINIC | Age: 80
End: 2020-08-21

## 2020-08-21 NOTE — TELEPHONE ENCOUNTER
----- Message from Raya Mathew sent at 8/21/2020  2:29 PM CDT -----  Contact: Suzie Larsen called in to let Dr. Moses know that she has physical therapy for another month and will call back to reschedule. Please call her back at [phone].    Thanks,  PB

## 2020-08-26 ENCOUNTER — DOCUMENT SCAN (OUTPATIENT)
Dept: HOME HEALTH SERVICES | Facility: HOSPITAL | Age: 80
End: 2020-08-26

## 2020-09-22 DIAGNOSIS — Z79.899 ENCOUNTER FOR LONG-TERM (CURRENT) USE OF MEDICATIONS: ICD-10-CM

## 2020-09-22 RX ORDER — TEMAZEPAM 30 MG/1
30 CAPSULE ORAL NIGHTLY
Qty: 90 CAPSULE | Refills: 1 | Status: SHIPPED | OUTPATIENT
Start: 2020-09-22 | End: 2021-04-01 | Stop reason: SDUPTHER

## 2020-12-11 ENCOUNTER — OFFICE VISIT (OUTPATIENT)
Dept: FAMILY MEDICINE | Facility: CLINIC | Age: 80
End: 2020-12-11
Payer: MEDICARE

## 2020-12-11 VITALS
TEMPERATURE: 99 F | SYSTOLIC BLOOD PRESSURE: 138 MMHG | DIASTOLIC BLOOD PRESSURE: 65 MMHG | HEIGHT: 59 IN | WEIGHT: 151.19 LBS | BODY MASS INDEX: 30.48 KG/M2 | HEART RATE: 56 BPM

## 2020-12-11 DIAGNOSIS — J34.0 ABSCESS OF NOSE: Primary | ICD-10-CM

## 2020-12-11 PROCEDURE — 99999 PR PBB SHADOW E&M-EST. PATIENT-LVL IV: CPT | Mod: PBBFAC,,, | Performed by: FAMILY MEDICINE

## 2020-12-11 PROCEDURE — 99214 OFFICE O/P EST MOD 30 MIN: CPT | Mod: PBBFAC,PO | Performed by: FAMILY MEDICINE

## 2020-12-11 PROCEDURE — 99214 OFFICE O/P EST MOD 30 MIN: CPT | Mod: S$PBB,,, | Performed by: FAMILY MEDICINE

## 2020-12-11 PROCEDURE — 99214 PR OFFICE/OUTPT VISIT, EST, LEVL IV, 30-39 MIN: ICD-10-PCS | Mod: S$PBB,,, | Performed by: FAMILY MEDICINE

## 2020-12-11 PROCEDURE — 99999 PR PBB SHADOW E&M-EST. PATIENT-LVL IV: ICD-10-PCS | Mod: PBBFAC,,, | Performed by: FAMILY MEDICINE

## 2020-12-11 RX ORDER — MUPIROCIN 20 MG/G
OINTMENT TOPICAL 2 TIMES DAILY
Qty: 22 G | Refills: 1 | Status: SHIPPED | OUTPATIENT
Start: 2020-12-11 | End: 2020-12-21

## 2020-12-11 RX ORDER — CLINDAMYCIN HYDROCHLORIDE 150 MG/1
150 CAPSULE ORAL 3 TIMES DAILY
Qty: 30 CAPSULE | Refills: 0 | Status: SHIPPED | OUTPATIENT
Start: 2020-12-11 | End: 2021-04-30

## 2020-12-11 NOTE — PROGRESS NOTES
Subjective:      Patient ID: Suzie Dawkins is a 80 y.o. female.    Chief Complaint: Rash    HPI she has had pimples in her nose for the last week.  She has not had fever with this. She has not had medicine for this.  She has had rhinitis and no sore throat.  No loss of taste or smell.  No SOB,  No cough.  Nothing makes this worse or better.      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    Depression     GERD (gastroesophageal reflux disease)     Hypercalcemia     Hyperlipidemia     Hypertension     Osteoporosis 10/7/2013    Vitamin D deficiency      Past Surgical History:   Procedure Laterality Date    ADENOIDECTOMY       SECTION      two    COLON SURGERY  2007    cancer removal    HEMORRHOID SURGERY      HYSTERECTOMY      OOPHORECTOMY      TONSILLECTOMY      TONSILLECTOMY, ADENOIDECTOMY, BILATERAL MYRINGOTOMY AND TUBES       Review of patient's allergies indicates:   Allergen Reactions    Latex Hives    B12 [cyanocobalamin-cobamamide] Other (See Comments)     Caused flushing and caused skin to peel    Codeine      Other reaction(s): Muscle pain    Iodine      Other reaction(s): Hives    Meperidine      Other reaction(s): Muscle pain    Narcotic antagonist      Other reaction(s): Rash    Penicillins Nausea And Vomiting    Propoxyphene      Other reaction(s): Muscle pain    Sulfa (sulfonamide antibiotics)      Other reaction(s): Muscle pain  Other reaction(s): Hives     Current Outpatient Medications on File Prior to Visit   Medication Sig Dispense Refill    alendronate (FOSAMAX) 35 MG tablet Take 35 mg by mouth every Monday.       ascorbic acid, vitamin C, (VITAMIN C) 500 MG tablet Take 500 mg by mouth once daily.      aspirin (ECOTRIN)  81 MG EC tablet Take 1 tablet (81 mg total) by mouth once daily. 90 tablet 3    atorvastatin (LIPITOR) 40 MG tablet Take 1 tablet (40 mg total) by mouth once daily. 90 tablet 3    calcium carbonate (CALCIUM 500 ORAL) Take 2 tablets by mouth once daily.       citalopram (CELEXA) 10 MG tablet Take 1 tablet (10 mg total) by mouth once daily. 90 tablet 3    cranberry fruit extract (CRANBERRY ORAL) Take 1 tablet by mouth once daily.      diphenoxylate-atropine 2.5-0.025 mg (LOMOTIL) 2.5-0.025 mg per tablet Take 1 tablet by mouth 4 (four) times daily as needed. 90 tablet 5    ELDERBERRY FRUIT ORAL Take 1 tablet by mouth daily as needed.      fluticasone propionate (FLONASE) 50 mcg/actuation nasal spray 1 spray by Each Nostril route daily as needed for Rhinitis.      L.acidophil,parac-S.therm-Bif. (RISAQUAD) Cap capsule Take 1 capsule by mouth once daily.      losartan (COZAAR) 50 MG tablet Take 1 tablet (50 mg total) by mouth once daily. 90 tablet 3    meclizine (ANTIVERT) 25 mg tablet Take 1 tablet (25 mg total) by mouth 3 (three) times daily as needed for Dizziness. 30 tablet 0    metoprolol tartrate (LOPRESSOR) 100 MG tablet Take 1 tablet (100 mg total) by mouth 2 (two) times daily. 180 tablet 3    MULTIVIT,CALC,MINS/IRON/FOLIC (ONE-A-DAY WOMENS FORMULA ORAL) Take 1 tablet by mouth once daily.      ondansetron (ZOFRAN-ODT) 4 MG TbDL DISSOLVE ONE TABLET BY MOUTH EVERY 6 HOURS AS NEEDED FOR NAUSEA (Patient taking differently: Take 4 mg by mouth every 6 (six) hours as needed (nausea). ) 60 tablet 1    polyvinyl alcohol, artificial tears, (LIQUIFILM TEARS) 1.4 % ophthalmic solution Place 1 drop into both eyes as needed.      temazepam (RESTORIL) 30 mg capsule Take 1 capsule (30 mg total) by mouth nightly. 90 capsule 1    topiramate (TOPAMAX) 25 MG tablet Take 1 tablet (25 mg total) by mouth once daily. 90 tablet 3    vitamin D 1000 units Tab Take 1,000 Units by mouth once daily.      TRANSDERM-SCOP 1 mg  over 3 days Place 1 patch onto the skin every 72 hours. 4 patch 0     No current facility-administered medications on file prior to visit.      Social History     Socioeconomic History    Marital status: Single     Spouse name: Not on file    Number of children: Not on file    Years of education: Not on file    Highest education level: Not on file   Occupational History    Not on file   Social Needs    Financial resource strain: Not very hard    Food insecurity     Worry: Never true     Inability: Never true    Transportation needs     Medical: No     Non-medical: No   Tobacco Use    Smoking status: Former Smoker     Types: Cigarettes     Quit date: 1989     Years since quittin.9    Smokeless tobacco: Never Used   Substance and Sexual Activity    Alcohol use: No    Drug use: No    Sexual activity: Never   Lifestyle    Physical activity     Days per week: 5 days     Minutes per session: 30 min    Stress: Only a little   Relationships    Social connections     Talks on phone: More than three times a week     Gets together: More than three times a week     Attends [employer] service: More than 4 times per year     Active member of club or organization: No     Attends meetings of clubs or organizations: Never     Relationship status: Never    Other Topics Concern    Not on file   Social History Narrative    Not on file     Family History   Problem Relation Age of Onset    Cancer Father         lung    Arthritis Father     Cancer Maternal Aunt         breast    Arthritis Maternal Aunt     Breast cancer Maternal Aunt     Cancer Maternal Uncle     Arthritis Paternal Aunt     Cancer Maternal Grandfather         glands cancer     Arthritis Paternal Aunt     Arthritis Maternal Aunt            Review of Systems   Constitutional: Negative for fever.   HENT: Positive for postnasal drip.    Respiratory: Negative for cough and shortness of breath.    Cardiovascular: Negative for chest  "pain.   Skin: Positive for rash.       Objective:   /65   Pulse (!) 56   Temp 98.6 °F (37 °C)   Ht 4' 11" (1.499 m)   Wt 68.6 kg (151 lb 3.2 oz)   BMI 30.54 kg/m²     Physical Exam  Constitutional:       General: She is not in acute distress.     Appearance: She is well-developed. She is not diaphoretic.   HENT:      Head:     Pulmonary:      Effort: Pulmonary effort is normal. No respiratory distress.   Neurological:      Mental Status: She is alert and oriented to person, place, and time.   Psychiatric:         Behavior: Behavior normal.         Thought Content: Thought content normal.         Judgment: Judgment normal.         Assessment:     1. Abscess of nose        Plan:   Suzie was seen today for rash.    Diagnoses and all orders for this visit:    Abscess of nose  -     mupirocin (BACTROBAN) 2 % ointment; Apply topically 2 (two) times daily. for 10 days  -     clindamycin (CLEOCIN) 150 MG capsule; Take 1 capsule (150 mg total) by mouth 3 (three) times daily.      She asked me today to be put back on my panel as she states that someone just changed her and she did not want to change.  Use warm compresses.      "

## 2020-12-18 ENCOUNTER — PATIENT MESSAGE (OUTPATIENT)
Dept: FAMILY MEDICINE | Facility: CLINIC | Age: 80
End: 2020-12-18

## 2020-12-18 NOTE — TELEPHONE ENCOUNTER
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  She has chronic diarrhea and has treated this with Lomotil for almost a decade or more.  This needs to be covered.

## 2020-12-28 ENCOUNTER — PATIENT MESSAGE (OUTPATIENT)
Dept: FAMILY MEDICINE | Facility: CLINIC | Age: 80
End: 2020-12-28

## 2020-12-30 ENCOUNTER — PATIENT MESSAGE (OUTPATIENT)
Dept: FAMILY MEDICINE | Facility: CLINIC | Age: 80
End: 2020-12-30

## 2021-01-03 ENCOUNTER — PATIENT MESSAGE (OUTPATIENT)
Dept: FAMILY MEDICINE | Facility: CLINIC | Age: 81
End: 2021-01-03

## 2021-01-04 ENCOUNTER — PATIENT MESSAGE (OUTPATIENT)
Dept: FAMILY MEDICINE | Facility: CLINIC | Age: 81
End: 2021-01-04

## 2021-03-03 ENCOUNTER — PATIENT MESSAGE (OUTPATIENT)
Dept: FAMILY MEDICINE | Facility: CLINIC | Age: 81
End: 2021-03-03

## 2021-03-03 DIAGNOSIS — K21.9 GASTROESOPHAGEAL REFLUX DISEASE, UNSPECIFIED WHETHER ESOPHAGITIS PRESENT: Primary | ICD-10-CM

## 2021-03-03 RX ORDER — ESOMEPRAZOLE MAGNESIUM 40 MG/1
40 CAPSULE, DELAYED RELEASE ORAL
Qty: 90 CAPSULE | Refills: 3 | Status: SHIPPED | OUTPATIENT
Start: 2021-03-03 | End: 2023-01-24

## 2021-03-23 DIAGNOSIS — F41.9 ANXIETY: ICD-10-CM

## 2021-03-23 DIAGNOSIS — E78.5 HYPERLIPIDEMIA, UNSPECIFIED HYPERLIPIDEMIA TYPE: ICD-10-CM

## 2021-03-23 DIAGNOSIS — Z79.899 ENCOUNTER FOR LONG-TERM (CURRENT) USE OF MEDICATIONS: ICD-10-CM

## 2021-03-23 DIAGNOSIS — F32.A DEPRESSION, UNSPECIFIED DEPRESSION TYPE: ICD-10-CM

## 2021-03-23 DIAGNOSIS — I10 ESSENTIAL HYPERTENSION: ICD-10-CM

## 2021-03-23 RX ORDER — ATORVASTATIN CALCIUM 40 MG/1
40 TABLET, FILM COATED ORAL DAILY
Qty: 90 TABLET | Refills: 3 | Status: CANCELLED | OUTPATIENT
Start: 2021-03-23

## 2021-03-23 RX ORDER — TEMAZEPAM 30 MG/1
30 CAPSULE ORAL NIGHTLY
Qty: 90 CAPSULE | Refills: 1 | Status: CANCELLED | OUTPATIENT
Start: 2021-03-23

## 2021-03-23 RX ORDER — CITALOPRAM 10 MG/1
10 TABLET ORAL DAILY
Qty: 90 TABLET | Refills: 3 | Status: CANCELLED | OUTPATIENT
Start: 2021-03-23

## 2021-03-23 RX ORDER — LOSARTAN POTASSIUM 50 MG/1
50 TABLET ORAL DAILY
Qty: 90 TABLET | Refills: 3 | Status: CANCELLED | OUTPATIENT
Start: 2021-03-23

## 2021-03-25 ENCOUNTER — TELEPHONE (OUTPATIENT)
Dept: FAMILY MEDICINE | Facility: CLINIC | Age: 81
End: 2021-03-25

## 2021-04-01 ENCOUNTER — OFFICE VISIT (OUTPATIENT)
Dept: FAMILY MEDICINE | Facility: CLINIC | Age: 81
End: 2021-04-01
Payer: MEDICARE

## 2021-04-01 VITALS
DIASTOLIC BLOOD PRESSURE: 65 MMHG | TEMPERATURE: 98 F | HEART RATE: 51 BPM | BODY MASS INDEX: 30.84 KG/M2 | SYSTOLIC BLOOD PRESSURE: 140 MMHG | WEIGHT: 153 LBS | HEIGHT: 59 IN

## 2021-04-01 DIAGNOSIS — Z79.899 ENCOUNTER FOR LONG-TERM (CURRENT) USE OF MEDICATIONS: ICD-10-CM

## 2021-04-01 DIAGNOSIS — F41.9 ANXIETY: Primary | Chronic | ICD-10-CM

## 2021-04-01 DIAGNOSIS — J30.1 ALLERGIC RHINITIS DUE TO POLLEN, UNSPECIFIED SEASONALITY: ICD-10-CM

## 2021-04-01 PROCEDURE — 99999 PR PBB SHADOW E&M-EST. PATIENT-LVL V: ICD-10-PCS | Mod: PBBFAC,,, | Performed by: NURSE PRACTITIONER

## 2021-04-01 PROCEDURE — 99214 PR OFFICE/OUTPT VISIT, EST, LEVL IV, 30-39 MIN: ICD-10-PCS | Mod: S$PBB,,, | Performed by: NURSE PRACTITIONER

## 2021-04-01 PROCEDURE — 99214 OFFICE O/P EST MOD 30 MIN: CPT | Mod: S$PBB,,, | Performed by: NURSE PRACTITIONER

## 2021-04-01 PROCEDURE — 99999 PR PBB SHADOW E&M-EST. PATIENT-LVL V: CPT | Mod: PBBFAC,,, | Performed by: NURSE PRACTITIONER

## 2021-04-01 PROCEDURE — 99215 OFFICE O/P EST HI 40 MIN: CPT | Mod: PBBFAC,PO | Performed by: NURSE PRACTITIONER

## 2021-04-01 RX ORDER — TEMAZEPAM 30 MG/1
30 CAPSULE ORAL NIGHTLY
Qty: 90 CAPSULE | Refills: 1 | Status: SHIPPED | OUTPATIENT
Start: 2021-04-01 | End: 2021-06-26

## 2021-04-01 RX ORDER — IPRATROPIUM BROMIDE 21 UG/1
2 SPRAY, METERED NASAL 2 TIMES DAILY PRN
Qty: 30 ML | Refills: 5 | Status: SHIPPED | OUTPATIENT
Start: 2021-04-01 | End: 2022-05-14

## 2021-04-12 ENCOUNTER — PATIENT MESSAGE (OUTPATIENT)
Dept: FAMILY MEDICINE | Facility: CLINIC | Age: 81
End: 2021-04-12

## 2021-04-12 DIAGNOSIS — M54.30 SCIATIC NERVE PAIN, UNSPECIFIED LATERALITY: Primary | ICD-10-CM

## 2021-04-28 ENCOUNTER — PATIENT MESSAGE (OUTPATIENT)
Dept: FAMILY MEDICINE | Facility: CLINIC | Age: 81
End: 2021-04-28

## 2021-04-29 ENCOUNTER — PATIENT MESSAGE (OUTPATIENT)
Dept: RESEARCH | Facility: HOSPITAL | Age: 81
End: 2021-04-29

## 2021-04-30 ENCOUNTER — OFFICE VISIT (OUTPATIENT)
Dept: FAMILY MEDICINE | Facility: CLINIC | Age: 81
End: 2021-04-30
Payer: MEDICARE

## 2021-04-30 ENCOUNTER — LAB VISIT (OUTPATIENT)
Dept: LAB | Facility: HOSPITAL | Age: 81
End: 2021-04-30
Attending: FAMILY MEDICINE
Payer: MEDICARE

## 2021-04-30 VITALS
TEMPERATURE: 99 F | SYSTOLIC BLOOD PRESSURE: 131 MMHG | DIASTOLIC BLOOD PRESSURE: 70 MMHG | HEIGHT: 59 IN | WEIGHT: 156 LBS | BODY MASS INDEX: 31.45 KG/M2 | OXYGEN SATURATION: 98 % | HEART RATE: 60 BPM

## 2021-04-30 DIAGNOSIS — Z85.9 PERSONAL HISTORY OF MALIGNANT CARCINOID TUMOR: ICD-10-CM

## 2021-04-30 DIAGNOSIS — R42 VERTIGO: ICD-10-CM

## 2021-04-30 DIAGNOSIS — Z79.899 ENCOUNTER FOR LONG-TERM (CURRENT) USE OF MEDICATIONS: ICD-10-CM

## 2021-04-30 DIAGNOSIS — N18.30 STAGE 3 CHRONIC KIDNEY DISEASE, UNSPECIFIED WHETHER STAGE 3A OR 3B CKD: ICD-10-CM

## 2021-04-30 DIAGNOSIS — Z00.00 WELL ADULT EXAM: Primary | ICD-10-CM

## 2021-04-30 DIAGNOSIS — Z90.49 HISTORY OF BOWEL RESECTION: ICD-10-CM

## 2021-04-30 DIAGNOSIS — I70.0 ATHEROSCLEROSIS OF ABDOMINAL AORTA: ICD-10-CM

## 2021-04-30 DIAGNOSIS — Z13.220 ENCOUNTER FOR LIPID SCREENING FOR CARDIOVASCULAR DISEASE: ICD-10-CM

## 2021-04-30 DIAGNOSIS — Z12.31 ENCOUNTER FOR SCREENING MAMMOGRAM FOR BREAST CANCER: ICD-10-CM

## 2021-04-30 DIAGNOSIS — E03.9 ACQUIRED HYPOTHYROIDISM: ICD-10-CM

## 2021-04-30 DIAGNOSIS — E21.0 PRIMARY HYPERPARATHYROIDISM: ICD-10-CM

## 2021-04-30 DIAGNOSIS — K52.9 CHRONIC DIARRHEA: ICD-10-CM

## 2021-04-30 DIAGNOSIS — Z13.6 ENCOUNTER FOR LIPID SCREENING FOR CARDIOVASCULAR DISEASE: ICD-10-CM

## 2021-04-30 LAB
ALBUMIN SERPL BCP-MCNC: 3.9 G/DL (ref 3.5–5.2)
ALP SERPL-CCNC: 79 U/L (ref 55–135)
ALT SERPL W/O P-5'-P-CCNC: 24 U/L (ref 10–44)
ANION GAP SERPL CALC-SCNC: 8 MMOL/L (ref 8–16)
AST SERPL-CCNC: 23 U/L (ref 10–40)
BILIRUB SERPL-MCNC: 0.8 MG/DL (ref 0.1–1)
BUN SERPL-MCNC: 11 MG/DL (ref 8–23)
CALCIUM SERPL-MCNC: 10.8 MG/DL (ref 8.7–10.5)
CHLORIDE SERPL-SCNC: 108 MMOL/L (ref 95–110)
CO2 SERPL-SCNC: 25 MMOL/L (ref 23–29)
CREAT SERPL-MCNC: 0.9 MG/DL (ref 0.5–1.4)
ERYTHROCYTE [DISTWIDTH] IN BLOOD BY AUTOMATED COUNT: 13.5 % (ref 11.5–14.5)
EST. GFR  (AFRICAN AMERICAN): >60 ML/MIN/1.73 M^2
EST. GFR  (NON AFRICAN AMERICAN): >60 ML/MIN/1.73 M^2
ESTIMATED AVG GLUCOSE: 108 MG/DL (ref 68–131)
GLUCOSE SERPL-MCNC: 93 MG/DL (ref 70–110)
HBA1C MFR BLD: 5.4 % (ref 4–5.6)
HCT VFR BLD AUTO: 41.6 % (ref 37–48.5)
HGB BLD-MCNC: 13.5 G/DL (ref 12–16)
MCH RBC QN AUTO: 30.8 PG (ref 27–31)
MCHC RBC AUTO-ENTMCNC: 32.5 G/DL (ref 32–36)
MCV RBC AUTO: 95 FL (ref 82–98)
PLATELET # BLD AUTO: 250 K/UL (ref 150–450)
PMV BLD AUTO: 10.9 FL (ref 9.2–12.9)
POTASSIUM SERPL-SCNC: 4 MMOL/L (ref 3.5–5.1)
PROT SERPL-MCNC: 7 G/DL (ref 6–8.4)
RBC # BLD AUTO: 4.38 M/UL (ref 4–5.4)
SODIUM SERPL-SCNC: 141 MMOL/L (ref 136–145)
WBC # BLD AUTO: 8.64 K/UL (ref 3.9–12.7)

## 2021-04-30 PROCEDURE — 99215 OFFICE O/P EST HI 40 MIN: CPT | Mod: PBBFAC,PO | Performed by: FAMILY MEDICINE

## 2021-04-30 PROCEDURE — 36415 COLL VENOUS BLD VENIPUNCTURE: CPT | Mod: PO | Performed by: FAMILY MEDICINE

## 2021-04-30 PROCEDURE — 99397 PER PM REEVAL EST PAT 65+ YR: CPT | Mod: S$PBB,,, | Performed by: FAMILY MEDICINE

## 2021-04-30 PROCEDURE — 84481 FREE ASSAY (FT-3): CPT | Performed by: FAMILY MEDICINE

## 2021-04-30 PROCEDURE — 99999 PR PBB SHADOW E&M-EST. PATIENT-LVL V: CPT | Mod: PBBFAC,,, | Performed by: FAMILY MEDICINE

## 2021-04-30 PROCEDURE — 99397 PR PREVENTIVE VISIT,EST,65 & OVER: ICD-10-PCS | Mod: S$PBB,,, | Performed by: FAMILY MEDICINE

## 2021-04-30 PROCEDURE — 80053 COMPREHEN METABOLIC PANEL: CPT | Performed by: FAMILY MEDICINE

## 2021-04-30 PROCEDURE — 84443 ASSAY THYROID STIM HORMONE: CPT | Performed by: FAMILY MEDICINE

## 2021-04-30 PROCEDURE — 99999 PR PBB SHADOW E&M-EST. PATIENT-LVL V: ICD-10-PCS | Mod: PBBFAC,,, | Performed by: FAMILY MEDICINE

## 2021-04-30 PROCEDURE — 83036 HEMOGLOBIN GLYCOSYLATED A1C: CPT | Performed by: FAMILY MEDICINE

## 2021-04-30 PROCEDURE — 84439 ASSAY OF FREE THYROXINE: CPT | Performed by: FAMILY MEDICINE

## 2021-04-30 PROCEDURE — 85027 COMPLETE CBC AUTOMATED: CPT | Performed by: FAMILY MEDICINE

## 2021-04-30 RX ORDER — MECLIZINE HYDROCHLORIDE 25 MG/1
25 TABLET ORAL 3 TIMES DAILY PRN
Qty: 30 TABLET | Refills: 12 | Status: SHIPPED | OUTPATIENT
Start: 2021-04-30 | End: 2022-06-23 | Stop reason: SDUPTHER

## 2021-04-30 RX ORDER — DIPHENOXYLATE HYDROCHLORIDE AND ATROPINE SULFATE 2.5; .025 MG/1; MG/1
1 TABLET ORAL 4 TIMES DAILY PRN
Qty: 90 TABLET | Refills: 5 | Status: SHIPPED | OUTPATIENT
Start: 2021-04-30 | End: 2022-07-15 | Stop reason: SDUPTHER

## 2021-05-01 LAB
T3FREE SERPL-MCNC: 2.2 PG/ML (ref 2.3–4.2)
T4 FREE SERPL-MCNC: 0.86 NG/DL (ref 0.71–1.51)
TSH SERPL DL<=0.005 MIU/L-ACNC: 1.79 UIU/ML (ref 0.4–4)

## 2021-06-26 DIAGNOSIS — E78.5 HYPERLIPIDEMIA, UNSPECIFIED HYPERLIPIDEMIA TYPE: ICD-10-CM

## 2021-06-26 DIAGNOSIS — I10 ESSENTIAL HYPERTENSION: ICD-10-CM

## 2021-06-26 DIAGNOSIS — F41.9 ANXIETY: ICD-10-CM

## 2021-06-26 DIAGNOSIS — Z79.899 ENCOUNTER FOR LONG-TERM (CURRENT) USE OF MEDICATIONS: ICD-10-CM

## 2021-06-26 DIAGNOSIS — F32.A DEPRESSION, UNSPECIFIED DEPRESSION TYPE: ICD-10-CM

## 2021-06-26 RX ORDER — CITALOPRAM 10 MG/1
10 TABLET ORAL DAILY
Qty: 90 TABLET | Refills: 4 | Status: SHIPPED | OUTPATIENT
Start: 2021-06-26 | End: 2022-04-01

## 2021-06-26 RX ORDER — ATORVASTATIN CALCIUM 40 MG/1
40 TABLET, FILM COATED ORAL DAILY
Qty: 90 TABLET | Refills: 4 | Status: SHIPPED | OUTPATIENT
Start: 2021-06-26 | End: 2022-09-15 | Stop reason: SDUPTHER

## 2021-06-26 RX ORDER — LOSARTAN POTASSIUM 50 MG/1
50 TABLET ORAL DAILY
Qty: 90 TABLET | Refills: 4 | Status: SHIPPED | OUTPATIENT
Start: 2021-06-26 | End: 2022-04-01 | Stop reason: SDUPTHER

## 2021-08-04 ENCOUNTER — PATIENT MESSAGE (OUTPATIENT)
Dept: FAMILY MEDICINE | Facility: CLINIC | Age: 81
End: 2021-08-04

## 2021-08-04 DIAGNOSIS — N30.00 ACUTE CYSTITIS WITHOUT HEMATURIA: Primary | ICD-10-CM

## 2021-08-05 ENCOUNTER — PATIENT MESSAGE (OUTPATIENT)
Dept: FAMILY MEDICINE | Facility: CLINIC | Age: 81
End: 2021-08-05

## 2021-08-05 RX ORDER — NITROFURANTOIN 25; 75 MG/1; MG/1
100 CAPSULE ORAL 2 TIMES DAILY
Qty: 20 CAPSULE | Refills: 0 | Status: SHIPPED | OUTPATIENT
Start: 2021-08-05 | End: 2021-08-06 | Stop reason: SINTOL

## 2021-08-06 ENCOUNTER — PATIENT MESSAGE (OUTPATIENT)
Dept: FAMILY MEDICINE | Facility: CLINIC | Age: 81
End: 2021-08-06

## 2021-08-06 DIAGNOSIS — N30.00 ACUTE CYSTITIS WITHOUT HEMATURIA: Primary | ICD-10-CM

## 2021-08-06 RX ORDER — PHENAZOPYRIDINE HYDROCHLORIDE 200 MG/1
200 TABLET, FILM COATED ORAL 3 TIMES DAILY PRN
Qty: 9 TABLET | Refills: 0 | Status: SHIPPED | OUTPATIENT
Start: 2021-08-06 | End: 2021-08-09

## 2021-08-06 RX ORDER — CIPROFLOXACIN 500 MG/1
500 TABLET ORAL 2 TIMES DAILY
Qty: 14 TABLET | Refills: 0 | Status: SHIPPED | OUTPATIENT
Start: 2021-08-06 | End: 2021-08-13

## 2021-08-19 ENCOUNTER — PATIENT MESSAGE (OUTPATIENT)
Dept: FAMILY MEDICINE | Facility: CLINIC | Age: 81
End: 2021-08-19

## 2021-08-19 DIAGNOSIS — Z20.822 COUGH WITH EXPOSURE TO COVID-19 VIRUS: Primary | ICD-10-CM

## 2021-08-19 DIAGNOSIS — R05.8 COUGH WITH EXPOSURE TO COVID-19 VIRUS: Primary | ICD-10-CM

## 2021-08-26 ENCOUNTER — INFUSION (OUTPATIENT)
Dept: INFECTIOUS DISEASES | Facility: HOSPITAL | Age: 81
End: 2021-08-26
Attending: INTERNAL MEDICINE
Payer: MEDICARE

## 2021-08-26 VITALS
RESPIRATION RATE: 17 BRPM | DIASTOLIC BLOOD PRESSURE: 78 MMHG | OXYGEN SATURATION: 98 % | TEMPERATURE: 98 F | HEART RATE: 52 BPM | SYSTOLIC BLOOD PRESSURE: 186 MMHG

## 2021-08-26 DIAGNOSIS — R05.8 COUGH WITH EXPOSURE TO COVID-19 VIRUS: ICD-10-CM

## 2021-08-26 DIAGNOSIS — Z20.822 COUGH WITH EXPOSURE TO COVID-19 VIRUS: ICD-10-CM

## 2021-08-26 PROCEDURE — M0243 CASIRIVI AND IMDEVI INFUSION: HCPCS | Performed by: INTERNAL MEDICINE

## 2021-08-26 PROCEDURE — 25000003 PHARM REV CODE 250: Performed by: INTERNAL MEDICINE

## 2021-08-26 PROCEDURE — 63600175 PHARM REV CODE 636 W HCPCS: Performed by: INTERNAL MEDICINE

## 2021-08-26 RX ORDER — ONDANSETRON 4 MG/1
4 TABLET, ORALLY DISINTEGRATING ORAL ONCE AS NEEDED
Status: DISCONTINUED | OUTPATIENT
Start: 2021-08-26 | End: 2022-04-01

## 2021-08-26 RX ORDER — SODIUM CHLORIDE 0.9 % (FLUSH) 0.9 %
10 SYRINGE (ML) INJECTION
Status: DISCONTINUED | OUTPATIENT
Start: 2021-08-26 | End: 2022-04-01

## 2021-08-26 RX ORDER — ALBUTEROL SULFATE 90 UG/1
2 AEROSOL, METERED RESPIRATORY (INHALATION)
Status: DISCONTINUED | OUTPATIENT
Start: 2021-08-26 | End: 2022-04-01

## 2021-08-26 RX ORDER — ACETAMINOPHEN 325 MG/1
650 TABLET ORAL ONCE AS NEEDED
Status: DISCONTINUED | OUTPATIENT
Start: 2021-08-26 | End: 2022-04-01

## 2021-08-26 RX ORDER — EPINEPHRINE 0.3 MG/.3ML
0.3 INJECTION SUBCUTANEOUS
Status: DISCONTINUED | OUTPATIENT
Start: 2021-08-26 | End: 2022-04-01

## 2021-08-26 RX ORDER — DIPHENHYDRAMINE HYDROCHLORIDE 50 MG/ML
25 INJECTION INTRAMUSCULAR; INTRAVENOUS ONCE AS NEEDED
Status: DISCONTINUED | OUTPATIENT
Start: 2021-08-26 | End: 2022-04-01

## 2021-08-26 RX ADMIN — CASIRIVIMAB AND IMDEVIMAB 600 MG: 600; 600 INJECTION, SOLUTION, CONCENTRATE INTRAVENOUS at 11:08

## 2021-08-28 ENCOUNTER — PATIENT MESSAGE (OUTPATIENT)
Dept: FAMILY MEDICINE | Facility: CLINIC | Age: 81
End: 2021-08-28

## 2021-08-28 DIAGNOSIS — Z86.69 HISTORY OF MIGRAINE HEADACHES: ICD-10-CM

## 2021-08-28 DIAGNOSIS — Z79.899 ENCOUNTER FOR LONG-TERM (CURRENT) USE OF MEDICATIONS: ICD-10-CM

## 2021-08-28 RX ORDER — TOPIRAMATE 25 MG/1
25 TABLET ORAL DAILY
Qty: 90 TABLET | Refills: 4 | Status: SHIPPED | OUTPATIENT
Start: 2021-08-28 | End: 2022-09-15 | Stop reason: SDUPTHER

## 2021-10-05 ENCOUNTER — PATIENT MESSAGE (OUTPATIENT)
Dept: FAMILY MEDICINE | Facility: CLINIC | Age: 81
End: 2021-10-05

## 2021-11-05 ENCOUNTER — PATIENT MESSAGE (OUTPATIENT)
Dept: FAMILY MEDICINE | Facility: CLINIC | Age: 81
End: 2021-11-05
Payer: MEDICARE

## 2021-11-30 RX ORDER — MIRABEGRON 25 MG/1
25 TABLET, FILM COATED, EXTENDED RELEASE ORAL DAILY
Qty: 30 TABLET | Refills: 0 | Status: SHIPPED | OUTPATIENT
Start: 2021-11-30 | End: 2022-01-28 | Stop reason: SDUPTHER

## 2021-11-30 RX ORDER — MIRABEGRON 25 MG/1
25 TABLET, FILM COATED, EXTENDED RELEASE ORAL DAILY
COMMUNITY
Start: 2021-11-02 | End: 2021-11-30 | Stop reason: SDUPTHER

## 2022-01-28 RX ORDER — MIRABEGRON 25 MG/1
25 TABLET, FILM COATED, EXTENDED RELEASE ORAL DAILY
Qty: 30 TABLET | Refills: 12 | Status: SHIPPED | OUTPATIENT
Start: 2022-01-28 | End: 2023-01-24

## 2022-01-28 RX ORDER — MIRABEGRON 25 MG/1
25 TABLET, FILM COATED, EXTENDED RELEASE ORAL DAILY
Qty: 30 TABLET | Refills: 0 | Status: SHIPPED | OUTPATIENT
Start: 2022-01-28 | End: 2022-01-28 | Stop reason: SDUPTHER

## 2022-01-28 NOTE — TELEPHONE ENCOUNTER
----- Message from Aviva Grimes sent at 1/28/2022  2:53 PM CST -----  Regarding: refill  Contact: patient  Type:  RX Refill Request    Who Called: patient  Refill or New Rx:refill  RX Name and Strength:Myrbetriq ER-25mg  How is the patient currently taking it? (ex. 1XDay):once daily  Is this a 30 day or 90 day RX:30  Preferred Pharmacy [facility] phone number[facility] [place]  Local or Mail Order:local  Ordering Provider:Dr Moses  Would the patient rather a call back or a response via [facility]? call  Best Call Back Number:[phone]  Additional Information:

## 2022-03-29 ENCOUNTER — PATIENT MESSAGE (OUTPATIENT)
Dept: FAMILY MEDICINE | Facility: CLINIC | Age: 82
End: 2022-03-29
Payer: MEDICARE

## 2022-04-01 ENCOUNTER — LAB VISIT (OUTPATIENT)
Dept: LAB | Facility: HOSPITAL | Age: 82
End: 2022-04-01
Attending: FAMILY MEDICINE
Payer: MEDICARE

## 2022-04-01 ENCOUNTER — OFFICE VISIT (OUTPATIENT)
Dept: FAMILY MEDICINE | Facility: CLINIC | Age: 82
End: 2022-04-01
Payer: MEDICARE

## 2022-04-01 VITALS
BODY MASS INDEX: 31.17 KG/M2 | HEIGHT: 59 IN | TEMPERATURE: 97 F | HEART RATE: 60 BPM | SYSTOLIC BLOOD PRESSURE: 154 MMHG | WEIGHT: 154.63 LBS | OXYGEN SATURATION: 98 % | DIASTOLIC BLOOD PRESSURE: 68 MMHG

## 2022-04-01 DIAGNOSIS — R53.83 FATIGUE, UNSPECIFIED TYPE: ICD-10-CM

## 2022-04-01 DIAGNOSIS — E21.0 PRIMARY HYPERPARATHYROIDISM: ICD-10-CM

## 2022-04-01 DIAGNOSIS — Z13.220 ENCOUNTER FOR LIPID SCREENING FOR CARDIOVASCULAR DISEASE: ICD-10-CM

## 2022-04-01 DIAGNOSIS — I10 ESSENTIAL HYPERTENSION: ICD-10-CM

## 2022-04-01 DIAGNOSIS — Z13.6 ENCOUNTER FOR LIPID SCREENING FOR CARDIOVASCULAR DISEASE: ICD-10-CM

## 2022-04-01 DIAGNOSIS — I70.0 ATHEROSCLEROSIS OF ABDOMINAL AORTA: ICD-10-CM

## 2022-04-01 DIAGNOSIS — Z79.899 ENCOUNTER FOR LONG-TERM (CURRENT) USE OF MEDICATIONS: ICD-10-CM

## 2022-04-01 DIAGNOSIS — R51.9 NONINTRACTABLE HEADACHE, UNSPECIFIED CHRONICITY PATTERN, UNSPECIFIED HEADACHE TYPE: ICD-10-CM

## 2022-04-01 DIAGNOSIS — R53.83 FATIGUE, UNSPECIFIED TYPE: Primary | ICD-10-CM

## 2022-04-01 DIAGNOSIS — N30.00 ACUTE CYSTITIS WITHOUT HEMATURIA: ICD-10-CM

## 2022-04-01 DIAGNOSIS — N18.30 STAGE 3 CHRONIC KIDNEY DISEASE, UNSPECIFIED WHETHER STAGE 3A OR 3B CKD: ICD-10-CM

## 2022-04-01 DIAGNOSIS — F32.A DEPRESSION, UNSPECIFIED DEPRESSION TYPE: ICD-10-CM

## 2022-04-01 DIAGNOSIS — F41.9 ANXIETY: ICD-10-CM

## 2022-04-01 DIAGNOSIS — R42 VERTIGO: ICD-10-CM

## 2022-04-01 PROBLEM — R33.9 RETENTION OF URINE, UNSPECIFIED: Status: ACTIVE | Noted: 2019-12-09

## 2022-04-01 PROBLEM — R39.15 URINARY URGENCY: Status: ACTIVE | Noted: 2021-09-13

## 2022-04-01 LAB
ALBUMIN SERPL BCP-MCNC: 3.8 G/DL (ref 3.5–5.2)
ALP SERPL-CCNC: 114 U/L (ref 55–135)
ALT SERPL W/O P-5'-P-CCNC: 34 U/L (ref 10–44)
ANION GAP SERPL CALC-SCNC: 9 MMOL/L (ref 8–16)
AST SERPL-CCNC: 32 U/L (ref 10–40)
BACTERIA #/AREA URNS HPF: NORMAL /HPF
BASOPHILS # BLD AUTO: 0.08 K/UL (ref 0–0.2)
BASOPHILS NFR BLD: 0.9 % (ref 0–1.9)
BILIRUB SERPL-MCNC: 0.5 MG/DL (ref 0.1–1)
BILIRUB UR QL STRIP: NEGATIVE
BUN SERPL-MCNC: 13 MG/DL (ref 8–23)
CALCIUM SERPL-MCNC: 10.2 MG/DL (ref 8.7–10.5)
CHLORIDE SERPL-SCNC: 107 MMOL/L (ref 95–110)
CHOLEST SERPL-MCNC: 183 MG/DL (ref 120–199)
CHOLEST/HDLC SERPL: 2.9 {RATIO} (ref 2–5)
CLARITY UR: CLEAR
CO2 SERPL-SCNC: 26 MMOL/L (ref 23–29)
COLOR UR: YELLOW
CREAT SERPL-MCNC: 0.9 MG/DL (ref 0.5–1.4)
DIFFERENTIAL METHOD: ABNORMAL
EOSINOPHIL # BLD AUTO: 0.1 K/UL (ref 0–0.5)
EOSINOPHIL NFR BLD: 1.4 % (ref 0–8)
ERYTHROCYTE [DISTWIDTH] IN BLOOD BY AUTOMATED COUNT: 13.1 % (ref 11.5–14.5)
EST. GFR  (AFRICAN AMERICAN): >60 ML/MIN/1.73 M^2
EST. GFR  (NON AFRICAN AMERICAN): >60 ML/MIN/1.73 M^2
ESTIMATED AVG GLUCOSE: 120 MG/DL (ref 68–131)
GLUCOSE SERPL-MCNC: 89 MG/DL (ref 70–110)
GLUCOSE UR QL STRIP: NEGATIVE
HBA1C MFR BLD: 5.8 % (ref 4–5.6)
HCT VFR BLD AUTO: 44.1 % (ref 37–48.5)
HDLC SERPL-MCNC: 63 MG/DL (ref 40–75)
HDLC SERPL: 34.4 % (ref 20–50)
HGB BLD-MCNC: 13.9 G/DL (ref 12–16)
HGB UR QL STRIP: ABNORMAL
IMM GRANULOCYTES # BLD AUTO: 0.02 K/UL (ref 0–0.04)
IMM GRANULOCYTES NFR BLD AUTO: 0.2 % (ref 0–0.5)
KETONES UR QL STRIP: NEGATIVE
LDLC SERPL CALC-MCNC: 103.4 MG/DL (ref 63–159)
LEUKOCYTE ESTERASE UR QL STRIP: ABNORMAL
LYMPHOCYTES # BLD AUTO: 2.7 K/UL (ref 1–4.8)
LYMPHOCYTES NFR BLD: 29.3 % (ref 18–48)
MCH RBC QN AUTO: 31.3 PG (ref 27–31)
MCHC RBC AUTO-ENTMCNC: 31.5 G/DL (ref 32–36)
MCV RBC AUTO: 99 FL (ref 82–98)
MICROSCOPIC COMMENT: NORMAL
MONOCYTES # BLD AUTO: 0.8 K/UL (ref 0.3–1)
MONOCYTES NFR BLD: 8.4 % (ref 4–15)
NEUTROPHILS # BLD AUTO: 5.5 K/UL (ref 1.8–7.7)
NEUTROPHILS NFR BLD: 59.8 % (ref 38–73)
NITRITE UR QL STRIP: NEGATIVE
NONHDLC SERPL-MCNC: 120 MG/DL
NRBC BLD-RTO: 0 /100 WBC
PH UR STRIP: 7 [PH] (ref 5–8)
PLATELET # BLD AUTO: 240 K/UL (ref 150–450)
PMV BLD AUTO: 11.5 FL (ref 9.2–12.9)
POTASSIUM SERPL-SCNC: 4.3 MMOL/L (ref 3.5–5.1)
PROT SERPL-MCNC: 7 G/DL (ref 6–8.4)
PROT UR QL STRIP: NEGATIVE
RBC # BLD AUTO: 4.44 M/UL (ref 4–5.4)
RBC #/AREA URNS HPF: 0 /HPF (ref 0–4)
SODIUM SERPL-SCNC: 142 MMOL/L (ref 136–145)
SP GR UR STRIP: 1.01 (ref 1–1.03)
SQUAMOUS #/AREA URNS HPF: 2 /HPF
TRIGL SERPL-MCNC: 83 MG/DL (ref 30–150)
TSH SERPL DL<=0.005 MIU/L-ACNC: 1.67 UIU/ML (ref 0.4–4)
URN SPEC COLLECT METH UR: ABNORMAL
WBC # BLD AUTO: 9.27 K/UL (ref 3.9–12.7)
WBC #/AREA URNS HPF: 3 /HPF (ref 0–5)

## 2022-04-01 PROCEDURE — 99999 PR PBB SHADOW E&M-EST. PATIENT-LVL V: ICD-10-PCS | Mod: PBBFAC,,, | Performed by: FAMILY MEDICINE

## 2022-04-01 PROCEDURE — 85025 COMPLETE CBC W/AUTO DIFF WBC: CPT | Performed by: FAMILY MEDICINE

## 2022-04-01 PROCEDURE — 99214 OFFICE O/P EST MOD 30 MIN: CPT | Mod: S$PBB,,, | Performed by: FAMILY MEDICINE

## 2022-04-01 PROCEDURE — 99215 OFFICE O/P EST HI 40 MIN: CPT | Mod: PBBFAC,PO | Performed by: FAMILY MEDICINE

## 2022-04-01 PROCEDURE — 36415 COLL VENOUS BLD VENIPUNCTURE: CPT | Mod: PO | Performed by: FAMILY MEDICINE

## 2022-04-01 PROCEDURE — 81000 URINALYSIS NONAUTO W/SCOPE: CPT | Mod: PO | Performed by: FAMILY MEDICINE

## 2022-04-01 PROCEDURE — 80061 LIPID PANEL: CPT | Performed by: FAMILY MEDICINE

## 2022-04-01 PROCEDURE — 99999 PR PBB SHADOW E&M-EST. PATIENT-LVL V: CPT | Mod: PBBFAC,,, | Performed by: FAMILY MEDICINE

## 2022-04-01 PROCEDURE — 80053 COMPREHEN METABOLIC PANEL: CPT | Performed by: FAMILY MEDICINE

## 2022-04-01 PROCEDURE — 83036 HEMOGLOBIN GLYCOSYLATED A1C: CPT | Performed by: FAMILY MEDICINE

## 2022-04-01 PROCEDURE — 99214 PR OFFICE/OUTPT VISIT, EST, LEVL IV, 30-39 MIN: ICD-10-PCS | Mod: S$PBB,,, | Performed by: FAMILY MEDICINE

## 2022-04-01 PROCEDURE — 84443 ASSAY THYROID STIM HORMONE: CPT | Performed by: FAMILY MEDICINE

## 2022-04-01 RX ORDER — CITALOPRAM 20 MG/1
20 TABLET, FILM COATED ORAL DAILY
Qty: 90 TABLET | Refills: 4 | Status: SHIPPED | OUTPATIENT
Start: 2022-04-01 | End: 2022-12-12

## 2022-04-01 RX ORDER — ESTRADIOL 0.1 MG/G
1 CREAM VAGINAL NIGHTLY
COMMUNITY
Start: 2021-10-11

## 2022-04-01 RX ORDER — LOSARTAN POTASSIUM 50 MG/1
50 TABLET ORAL DAILY
Qty: 90 TABLET | Refills: 4 | Status: SHIPPED | OUTPATIENT
Start: 2022-04-01 | End: 2022-12-12 | Stop reason: SDUPTHER

## 2022-04-01 NOTE — ASSESSMENT & PLAN NOTE
Recent headaches with elevated blood pressure readings.  However diastolic is within normal limits.  Continue monitoring for blood pressure less than 150/90.Counseled on importance of hypertension disease course, I recommend ongoing Education for DASH-diet and exercise.  Counseled on medication regimen importance of treating high blood pressure.  Please be advised of risk of untreated blood pressure as discussed.  Please advised of ER precautions were given for symptoms of hypertensive urgency and emergency.

## 2022-04-01 NOTE — PATIENT INSTRUCTIONS
Follow up in about 6 months (around 10/1/2022), or if symptoms worsen or fail to improve, for Annual Wellness Exam.     Dear patient,   As a result of recent federal legislation (The Federal Cures Act), you may receive lab or pathology results from your visit in your [facility] account before your physician is able to contact you. Your physician or their representative will relay the results to you with their recommendations at their soonest availability.     If no improvement in symptoms or symptoms worsen, please be advised to call MD, follow-up at clinic and/or go to ER if becomes severe.    Bunny Moses M.D.        We Offer TELEHEALTH & Same Day Appointments!   Book your Telehealth appointment with me through my nurse or   Clinic appointments on [facility]!    [address] [place] [address]    Office: [phone]   FAX: [phone]    Check out my Facebook Page and Follow Me at: [email or web address]    Check out my website at [employer] by clicking on: [email or web address]    To Schedule appointments online, go to [facility]: [email or web address]

## 2022-04-01 NOTE — ASSESSMENT & PLAN NOTE
Possibly from blood pressure.  Will monitor.  Continue Tylenol as needed.  ER precautions for severe symptoms.  Not typical of migraine.

## 2022-04-01 NOTE — PROGRESS NOTES
PLAN:      Problem List Items Addressed This Visit     Essential hypertension (Chronic)     Recent headaches with elevated blood pressure readings.  However diastolic is within normal limits.  Continue monitoring for blood pressure less than 150/90.Counseled on importance of hypertension disease course, I recommend ongoing Education for DASH-diet and exercise.  Counseled on medication regimen importance of treating high blood pressure.  Please be advised of risk of untreated blood pressure as discussed.  Please advised of ER precautions were given for symptoms of hypertensive urgency and emergency.             Relevant Medications    losartan (COZAAR) 50 MG tablet    Depression (Chronic)     Increase Celexa 20 milligrams.Please be advised of condition course.  SNRI/SSRI is first-line treatment for this condition.  Please be advised of the risk of discontinuing this medication without tapering/contacting MD.  Patient has been advised of side effects, and all questions were answered.  Patient voiced understanding.  Patient will follow up routinely and notify us if having any side effects or worsening or persistent symptoms.  ER precautions were given. Antidepressant/Antianxiety Medication Initiation:  Patient informed of risks, benefits, and potential side effects of medication and accepts informed consent.  Common side effects include nausea, fatigue, headache, insomnia, etc see medication insert for complete side effect profile.  Most importantly be advised of the possibility of new or worsening suicidal thoughts/depression/anxiety etcetera.  Please be advised to stop the medication immediately and seek urgent treatment if this occurs.  Therefore please do not to abruptly discontinue medication without physician guidance except in cases of sudden onset or worsening of SI.              Relevant Medications    citalopram (CELEXA) 20 MG tablet    Anxiety (Chronic)     See depression problem.           Relevant  Medications    citalopram (CELEXA) 20 MG tablet    Vertigo (Chronic)     Referral to audiology.  ER precautions for severe symptoms.           Primary hyperparathyroidism (Chronic)     Update labs.           Atherosclerosis of abdominal aorta (Chronic)     Incidental finding.  Patient is on Lipitor and aspirin.  Monitoring.           Encounter for long-term (current) use of medications (Chronic)     Complete history and physical was completed today.  Complete and thorough medication reconciliation was performed.  Discussed risks and benefits of medications.  Advised patient on orders and health maintenance.  We discussed old records and old labs if available.  Will request any records not available through epic.  Continue current medications listed on your summary sheet.             Relevant Medications    citalopram (CELEXA) 20 MG tablet    losartan (COZAAR) 50 MG tablet    Stage 3 chronic kidney disease (Chronic)     Monitoring renal function.  Patient is avoiding anti-inflammatory medications.  Currently having elevated systolic blood pressure.  Consider increasing losartan if GFR remains acceptable.  Increase hydration with water.           Nonintractable headache (Chronic)     Possibly from blood pressure.  Will monitor.  Continue Tylenol as needed.  ER precautions for severe symptoms.  Not typical of migraine.           Fatigue - Primary (Chronic)     Update labs.  Discussed lifestyle modification with diet and exercise.           Relevant Orders    CBC Auto Differential    TSH        Future Appointments     Date Provider Specialty Appt Notes    4/1/2022  Lab fasting labs - lob    10/3/2022 Bunny Moses MD Family Medicine 6m f/u         Medication Management for assessment above:   Medication List with Changes/Refills   Current Medications    ALENDRONATE (FOSAMAX) 35 MG TABLET    Take 35 mg by mouth every Monday.     ASCORBIC ACID, VITAMIN C, (VITAMIN C) 500 MG TABLET    Take 500 mg by mouth once  daily.    ASPIRIN (ECOTRIN) 81 MG EC TABLET    Take 1 tablet (81 mg total) by mouth once daily.    ATORVASTATIN (LIPITOR) 40 MG TABLET    Take 1 tablet (40 mg total) by mouth once daily.    CALCIUM CARBONATE (CALCIUM 500 ORAL)    Take 2 tablets by mouth once daily.     CRANBERRY FRUIT EXTRACT (CRANBERRY ORAL)    Take 1 tablet by mouth once daily.    DIPHENOXYLATE-ATROPINE 2.5-0.025 MG (LOMOTIL) 2.5-0.025 MG PER TABLET    Take 1 tablet by mouth 4 (four) times daily as needed for Diarrhea.    ELDERBERRY FRUIT ORAL    Take 1 tablet by mouth daily as needed.    ESOMEPRAZOLE (NEXIUM) 40 MG CAPSULE    Take 1 capsule (40 mg total) by mouth before breakfast.    ESTRADIOL (ESTRACE) 0.01 % (0.1 MG/GRAM) VAGINAL CREAM    SMARTSIG:Sparingly Vaginal Every Night    FLUTICASONE PROPIONATE (FLONASE) 50 MCG/ACTUATION NASAL SPRAY    1 spray by Each Nostril route daily as needed for Rhinitis.    IPRATROPIUM (ATROVENT) 0.03 % NASAL SPRAY    2 sprays by Nasal route 2 (two) times daily as needed for Rhinitis.    L.ACIDOPHIL,PARAC-S.THERM-BIF. (RISAQUAD) CAP CAPSULE    Take 1 capsule by mouth once daily.    MECLIZINE (ANTIVERT) 25 MG TABLET    Take 1 tablet (25 mg total) by mouth 3 (three) times daily as needed for Dizziness or Nausea.    METOPROLOL TARTRATE (LOPRESSOR) 100 MG TABLET    TAKE ONE TABLET BY MOUTH TWICE DAILY    MULTIVIT,CALC,MINS/IRON/FOLIC (ONE-A-DAY WOMENS FORMULA ORAL)    Take 1 tablet by mouth once daily.    MYRBETRIQ 25 MG TB24 ER TABLET    Take 1 tablet (25 mg total) by mouth once daily.    ONDANSETRON (ZOFRAN-ODT) 4 MG TBDL    DISSOLVE ONE TABLET BY MOUTH EVERY 6 HOURS AS NEEDED FOR NAUSEA    POLYVINYL ALCOHOL, ARTIFICIAL TEARS, (LIQUIFILM TEARS) 1.4 % OPHTHALMIC SOLUTION    Place 1 drop into both eyes as needed.    TEMAZEPAM (RESTORIL) 30 MG CAPSULE    Take 1 capsule (30 mg total) by mouth nightly.    TOPIRAMATE (TOPAMAX) 25 MG TABLET    Take 1 tablet (25 mg total) by mouth once daily.    TRANSDERM-SCOP 1 MG OVER 3  DAYS    Place 1 patch onto the skin every 72 hours.    VITAMIN D 1000 UNITS TAB    Take 1,000 Units by mouth once daily.   Changed and/or Refilled Medications    Modified Medication Previous Medication    CITALOPRAM (CELEXA) 20 MG TABLET citalopram (CELEXA) 10 MG tablet       Take 1 tablet (20 mg total) by mouth once daily.    Take 1 tablet (10 mg total) by mouth once daily.    LOSARTAN (COZAAR) 50 MG TABLET losartan (COZAAR) 50 MG tablet       Take 1 tablet (50 mg total) by mouth once daily.    Take 1 tablet (50 mg total) by mouth once daily.       Bunny Moses M.D.  ==========================================================================  Subjective:   Patient ID: Suzie Dawkins is a 81 y.o. female.  has a past medical history of Anxiety, Arthritis, Cancer (2007), Depression, GERD (gastroesophageal reflux disease), Hypercalcemia, Hyperlipidemia, Hypertension, Osteoporosis (10/7/2013), and Vitamin D deficiency.   Chief Complaint: Annual Exam      Problem List Items Addressed This Visit     Essential hypertension (Chronic)    Overview     CHRONIC.  March 2022:  Intermittent control. BP Reviewed.  She reports recent headaches.  They do improve with Tylenol.  Compliant with BP medications. No SE reported. JUNE 2020:  Blood pressure is below goal today.  Continue current medication regimen.JANUARY 2020:  Patient currently on metoprolol 100 mg twice a day, losartan 100 mg daily, amlodipine 5 mg daily was recently increased to 10 mg after nursing visit blood pressure check.(-) CP, SOB, palpitations, dizziness, lightheadedness,  arm numbness, tingling or weakness, syncope.  Creatinine   Date Value Ref Range Status   04/30/2021 0.9 0.5 - 1.4 mg/dL Final                Current Assessment & Plan     Recent headaches with elevated blood pressure readings.  However diastolic is within normal limits.  Continue monitoring for blood pressure less than 150/90.Counseled on importance of hypertension disease course, I  recommend ongoing Education for DASH-diet and exercise.  Counseled on medication regimen importance of treating high blood pressure.  Please be advised of risk of untreated blood pressure as discussed.  Please advised of ER precautions were given for symptoms of hypertensive urgency and emergency.             Depression (Chronic)    Overview     Chronic.  Comorbid with anxiety.  Patient on low-dose Celexa.  Reports increased symptoms recently.  She would like to go up on the medication.  No HI or SI or hallucinations.             Current Assessment & Plan     Increase Celexa 20 milligrams.Please be advised of condition course.  SNRI/SSRI is first-line treatment for this condition.  Please be advised of the risk of discontinuing this medication without tapering/contacting MD.  Patient has been advised of side effects, and all questions were answered.  Patient voiced understanding.  Patient will follow up routinely and notify us if having any side effects or worsening or persistent symptoms.  ER precautions were given. Antidepressant/Antianxiety Medication Initiation:  Patient informed of risks, benefits, and potential side effects of medication and accepts informed consent.  Common side effects include nausea, fatigue, headache, insomnia, etc see medication insert for complete side effect profile.  Most importantly be advised of the possibility of new or worsening suicidal thoughts/depression/anxiety etcetera.  Please be advised to stop the medication immediately and seek urgent treatment if this occurs.  Therefore please do not to abruptly discontinue medication without physician guidance except in cases of sudden onset or worsening of SI.              Anxiety (Chronic)    Overview     Chronic.  Intermittent control.  Patient using medication appropriately denies any side effects.  See depression problem.           Current Assessment & Plan     See depression problem.           Vertigo (Chronic)    Overview      Chronic.  Intermittent control.  Patient has been to physical therapy but has not had Epley maneuver performed.  She does get nauseated occasionally.  She has Zofran as needed.  She has tried meclizine in the past.  She is currently wearing a wrist device.           Current Assessment & Plan     Referral to audiology.  ER precautions for severe symptoms.           Primary hyperparathyroidism (Chronic)    Overview     Lab Results   Component Value Date    PTH 60.0 10/24/2018    CALCIUM 10.8 (H) 04/30/2021    CAION 5.4 (H) 11/09/2004    PHOS 3.4 07/23/2020                Current Assessment & Plan     Update labs.           Atherosclerosis of abdominal aorta (Chronic)    Overview     Seen on imaging.REASON FOR EXAM: [R31.0]-Gross hematuria / gross hematuria, recurrent UTIs     TECHNICAL FACTORS: Multiple contiguous axial CT images were obtained of the abdomen and pelvis before intravenous contrast. Following administration of IV contrast images were obtained of the kidneys with a 70 second delay and of the abdomen and pelvis   with a10 minute delay. 2D reformatted images were performed. 3D images are reconstructed, requiring an independent work station and permanently archived in PACS. Automated exposure control was utilized for radiation dose reduction.     COMPARISON: None     FINDINGS:     Soft tissues/Musculature: Unremarkable   Osseous Structures: Multilevel degenerative disc disease. There is 7 mm (grade 1/2) anterolisthesis of L4 on L5. Multilevel facet arthropathy.   Lung bases: Mild bibasilar atelectasis. Mitral valve calcification.   Aorta/Vasculature: Moderate atherosclerotic disease.   Lymph nodes/Mesentery: Unremarkable   Liver: Unremarkable   Gallbladder/Biliary System: Unremarkable   Pancreas: Unremarkable   Spleen: Splenic calcifications suggest old granulomatous disease.   Adrenal glands: Unremarkable   Kidneys/Ureters: There is a 2 mm calcification immediately adjacent to the right mid ureter (2-62),  likely a phlebolith. There are bilateral renal simple cysts, as well as low-density lesions too small to characterize. The left ureter is poorly opacified   on the excretory phase, most likely felt to be due to physiologic peristalsis. The left renal papillae appear somewhat irregular, without evidence of focal mass or filling defect.   Urinary bladder: Concentric thickening of the bladder wall is likely at least in part due to underdistention. There is trace air within the bladder.   Reproductive organs: Status post hysterectomy   Bowel/Stomach: Status post right hemicolectomy. Small bowel and stomach are unremarkable.   Appendix: Surgically absent       IMPRESSION:   1. There is no evidence of focal, urothelial mass. The left renal papillae appear somewhat irregular, possibly due to scarring or acute tubular necrosis. Correlate clinically. Note that the distal bilateral ureters are poorly opacified on the excretory   phase.   2. Concentric thickening of the bladder wall is likely at least in part due to underdistention. There is trace air within the bladder, which may be due to recent instrumentation. Correlate with urinalysis.   3. There is 7 mm (Grade 1/2) anterolisthesis of L4 on L5.   4. Additional age-related and incidental findings, as above.         Electronically signed by Gabriela Simons MD on 12/30/2019 9:54 AM             Current Assessment & Plan     Incidental finding.  Patient is on Lipitor and aspirin.  Monitoring.           Encounter for long-term (current) use of medications (Chronic)    Overview     CHRONIC. Stable. Compliant with medications for managed conditions. See medication list. No SE reported.   Routine lab analysis is being monitored. Refills were addressed.  April 2022:  CHRONIC. Stable. Compliant with medications for managed conditions. See medication list. No SE reported.   Routine lab analysis is being monitored. Refills were addressed.  Lab Results   Component Value Date    WBC  8.64 04/30/2021    HGB 13.5 04/30/2021    HCT 41.6 04/30/2021    MCV 95 04/30/2021     04/30/2021         Chemistry        Component Value Date/Time     04/30/2021 1145    K 4.0 04/30/2021 1145     04/30/2021 1145    CO2 25 04/30/2021 1145    BUN 11 04/30/2021 1145    CREATININE 0.9 04/30/2021 1145    GLU 93 04/30/2021 1145        Component Value Date/Time    CALCIUM 10.8 (H) 04/30/2021 1145    ALKPHOS 79 04/30/2021 1145    AST 23 04/30/2021 1145    ALT 24 04/30/2021 1145    BILITOT 0.8 04/30/2021 1145    ESTGFRAFRICA >60.0 04/30/2021 1145    EGFRNONAA >60.0 04/30/2021 1145          Lab Results   Component Value Date    TSH 1.791 04/30/2021    FREET4 0.86 04/30/2021    T3FREE 2.2 (L) 04/30/2021                Current Assessment & Plan     Complete history and physical was completed today.  Complete and thorough medication reconciliation was performed.  Discussed risks and benefits of medications.  Advised patient on orders and health maintenance.  We discussed old records and old labs if available.  Will request any records not available through epic.  Continue current medications listed on your summary sheet.             Stage 3 chronic kidney disease (Chronic)    Overview     Patient is avoiding anti-inflammatory medications.  BMP  Lab Results   Component Value Date     04/30/2021    K 4.0 04/30/2021     04/30/2021    CO2 25 04/30/2021    BUN 11 04/30/2021    CREATININE 0.9 04/30/2021    CALCIUM 10.8 (H) 04/30/2021    ANIONGAP 8 04/30/2021    ESTGFRAFRICA >60.0 04/30/2021    EGFRNONAA >60.0 04/30/2021                Current Assessment & Plan     Monitoring renal function.  Patient is avoiding anti-inflammatory medications.  Currently having elevated systolic blood pressure.  Consider increasing losartan if GFR remains acceptable.  Increase hydration with water.           Nonintractable headache (Chronic)    Overview     Chronic.  P coming more frequent.  Associated with elevated  blood pressure.  Denies any loss of vision.           Current Assessment & Plan     Possibly from blood pressure.  Will monitor.  Continue Tylenol as needed.  ER precautions for severe symptoms.  Not typical of migraine.           Fatigue - Primary (Chronic)    Overview     Chronic.  Worsening.  Patient is due for blood work.  Lab Results   Component Value Date    WBC 8.64 04/30/2021    HGB 13.5 04/30/2021    HCT 41.6 04/30/2021    MCV 95 04/30/2021     04/30/2021       Lab Results   Component Value Date    IRON 134 06/27/2012    TIBC 322.0 06/27/2012    FERRITIN 78 06/27/2012     Lab Results   Component Value Date    WPPCDCJD07 1053 (H) 06/27/2012     Lab Results   Component Value Date    FOLATE 15.3 06/27/2012     Lab Results   Component Value Date    TSH 1.791 04/30/2021    FREET4 0.86 04/30/2021                Current Assessment & Plan     Update labs.  Discussed lifestyle modification with diet and exercise.                  Review of patient's allergies indicates:   Allergen Reactions    Latex Hives    B12 [cyanocobalamin-cobamamide] Other (See Comments)     Caused flushing and caused skin to peel    Codeine      Other reaction(s): Muscle pain    Iodine      Other reaction(s): Hives    Meperidine      Other reaction(s): Muscle pain    Narcotic antagonist      Other reaction(s): Rash    Penicillins Nausea And Vomiting    Propoxyphene      Other reaction(s): Muscle pain    Sulfa (sulfonamide antibiotics)      Other reaction(s): Muscle pain  Other reaction(s): Hives     Current Outpatient Medications   Medication Instructions    alendronate (FOSAMAX) 35 mg, Oral, Every Monday    ascorbic acid (vitamin C) (VITAMIN C) 500 mg, Oral, Daily    aspirin (ECOTRIN) 81 mg, Oral, Daily    atorvastatin (LIPITOR) 40 mg, Oral, Daily    calcium carbonate (CALCIUM 500 ORAL) 2 tablets, Oral, Daily    citalopram (CELEXA) 20 mg, Oral, Daily    cranberry fruit extract (CRANBERRY ORAL) 1 tablet, Oral, Daily     diphenoxylate-atropine 2.5-0.025 mg (LOMOTIL) 2.5-0.025 mg per tablet 1 tablet, Oral, 4 times daily PRN    ELDERBERRY FRUIT ORAL 1 tablet, Oral, Daily PRN    esomeprazole (NEXIUM) 40 mg, Oral, Before breakfast    estradioL (ESTRACE) 0.01 % (0.1 mg/gram) vaginal cream SMARTSIG:Sparingly Vaginal Every Night    fluticasone propionate (FLONASE) 50 mcg/actuation nasal spray 1 spray, Each Nostril, Daily PRN    ipratropium (ATROVENT) 0.03 % nasal spray 2 sprays, Nasal, 2 times daily PRN    L.acidophil,parac-S.therm-Bif. (RISAQUAD) Cap capsule 1 capsule, Oral, Daily    losartan (COZAAR) 50 mg, Oral, Daily    meclizine (ANTIVERT) 25 mg, Oral, 3 times daily PRN    metoprolol tartrate (LOPRESSOR) 100 MG tablet TAKE ONE TABLET BY MOUTH TWICE DAILY    MULTIVIT,CALC,MINS/IRON/FOLIC (ONE-A-DAY WOMENS FORMULA ORAL) 1 tablet, Oral, Daily    MYRBETRIQ 25 mg, Oral, Daily    ondansetron (ZOFRAN-ODT) 4 MG TbDL DISSOLVE ONE TABLET BY MOUTH EVERY 6 HOURS AS NEEDED FOR NAUSEA    polyvinyl alcohol, artificial tears, (LIQUIFILM TEARS) 1.4 % ophthalmic solution 1 drop, Both Eyes, As needed (PRN)    temazepam (RESTORIL) 30 mg, Oral, Nightly    topiramate (TOPAMAX) 25 mg, Oral, Daily    TRANSDERM-SCOP 1 mg over 3 days Place 1 patch onto the skin every 72 hours.    vitamin D (VITAMIN D3) 1,000 Units, Oral, Daily      I have reviewed the PMH, social history, FamilyHx, surgical history, allergies and medications documented / confirmed by the patient at the time of this visit.  Review of Systems   Constitutional: Positive for fatigue. Negative for chills, fever and unexpected weight change.   HENT: Negative for ear pain and sore throat.    Eyes: Negative for redness and visual disturbance.   Respiratory: Negative for cough and shortness of breath.    Cardiovascular: Negative for chest pain and palpitations.   Gastrointestinal: Negative for nausea and vomiting.   Genitourinary: Negative for difficulty urinating and hematuria.  "  Musculoskeletal: Positive for arthralgias. Negative for myalgias.   Skin: Negative for rash and wound.   Neurological: Positive for headaches. Negative for weakness.   Psychiatric/Behavioral: Positive for dysphoric mood and sleep disturbance (Controlled on medication). The patient is nervous/anxious.      Objective:   BP (!) 154/68   Pulse 60   Temp 97.2 °F (36.2 °C) (Temporal)   Ht 4' 11" (1.499 m)   Wt 70.1 kg (154 lb 9.6 oz)   SpO2 98%   BMI 31.23 kg/m²   Physical Exam  Vitals and nursing note reviewed.   Constitutional:       General: She is not in acute distress.     Appearance: She is well-developed. She is not ill-appearing, toxic-appearing or diaphoretic.   HENT:      Head: Normocephalic and atraumatic.      Right Ear: Hearing, tympanic membrane, ear canal and external ear normal. There is no impacted cerumen.      Left Ear: Hearing, tympanic membrane, ear canal and external ear normal. There is no impacted cerumen.      Nose: Nose normal. No rhinorrhea.   Eyes:      General: Lids are normal.      Extraocular Movements: Extraocular movements intact.      Conjunctiva/sclera: Conjunctivae normal.      Pupils: Pupils are equal, round, and reactive to light.   Cardiovascular:      Rate and Rhythm: Normal rate.      Pulses: Normal pulses.   Pulmonary:      Effort: Pulmonary effort is normal. No respiratory distress.      Breath sounds: Normal breath sounds.   Abdominal:      General: Bowel sounds are normal.      Palpations: Abdomen is soft.   Musculoskeletal:         General: Tenderness and deformity present. Normal range of motion.      Cervical back: Normal range of motion and neck supple.   Skin:     General: Skin is warm and dry.      Capillary Refill: Capillary refill takes less than 2 seconds.      Coloration: Skin is not pale.   Neurological:      General: No focal deficit present.      Mental Status: She is alert and oriented to person, place, and time. She is not disoriented.   Psychiatric:    "      Attention and Perception: She is attentive.         Mood and Affect: Mood normal. Mood is not anxious or depressed.         Speech: Speech is not rapid and pressured or slurred.         Behavior: Behavior normal. Behavior is not agitated, aggressive or hyperactive. Behavior is cooperative.         Thought Content: Thought content normal. Thought content is not paranoid or delusional. Thought content does not include homicidal or suicidal ideation. Thought content does not include homicidal or suicidal plan.         Cognition and Memory: Memory is not impaired.         Judgment: Judgment normal.        Patient is wearing a mask which limits physical exam due to COVID restrictions.   Assessment:     1. Fatigue, unspecified type    2. Primary hyperparathyroidism    3. Atherosclerosis of abdominal aorta    4. Stage 3 chronic kidney disease, unspecified whether stage 3a or 3b CKD    5. Nonintractable headache, unspecified chronicity pattern, unspecified headache type    6. Depression, unspecified depression type    7. Anxiety    8. Encounter for long-term (current) use of medications    9. Essential hypertension    10. Vertigo      MDM:   Moderate complexity.  Moderate risk.  Total time: 31 minutes.  This includes total time spent on the encounter, which includes face to face time and non-face to face time preparing to see the patient (eg, review of previous medical records, tests), Obtaining and/or reviewing separately obtained history, documenting clinical information in the electronic or other health record, independently interpreting results (not separately reported)/communicating results to the patient/family/caregiver, and/or care coordination (not separately reported).    I have Reviewed and summarized old records.  I have performed thorough medication reconciliation today and discussed risk and benefits of medications.  I have reviewed labs and discussed with patient.  All questions were answered.  I am  requesting old records and will review them once they are available.Urology Renu Lawson, NP  PT [facility]      I have signed for the following orders AND/OR meds.  Orders Placed This Encounter   Procedures    CBC Auto Differential     Standing Status:   Future     Number of Occurrences:   1     Standing Expiration Date:   5/31/2023    TSH     Standing Status:   Future     Number of Occurrences:   1     Standing Expiration Date:   5/31/2023     Medications Ordered This Encounter   Medications    citalopram (CELEXA) 20 MG tablet     Sig: Take 1 tablet (20 mg total) by mouth once daily.     Dispense:  90 tablet     Refill:  4    losartan (COZAAR) 50 MG tablet     Sig: Take 1 tablet (50 mg total) by mouth once daily.     Dispense:  90 tablet     Refill:  4     .        Follow up in about 6 months (around 10/1/2022), or if symptoms worsen or fail to improve, for Annual Wellness Exam.    If no improvement in symptoms or symptoms worsen, advised to call/follow-up at clinic or go to ER. Patient voiced understanding and all questions/concerns were addressed.   DISCLAIMER: This note was compiled by using a speech recognition dictation system and therefore please be aware that typographical / speech recognition errors can and do occur.  Please contact me if you see any errors specifically.    Bunny Moses M.D.       Office: [phone]   [address] [place] [address]  FAX: [phone]

## 2022-04-01 NOTE — ASSESSMENT & PLAN NOTE
Increase Celexa 20 milligrams.Please be advised of condition course.  SNRI/SSRI is first-line treatment for this condition.  Please be advised of the risk of discontinuing this medication without tapering/contacting MD.  Patient has been advised of side effects, and all questions were answered.  Patient voiced understanding.  Patient will follow up routinely and notify us if having any side effects or worsening or persistent symptoms.  ER precautions were given. Antidepressant/Antianxiety Medication Initiation:  Patient informed of risks, benefits, and potential side effects of medication and accepts informed consent.  Common side effects include nausea, fatigue, headache, insomnia, etc see medication insert for complete side effect profile.  Most importantly be advised of the possibility of new or worsening suicidal thoughts/depression/anxiety etcetera.  Please be advised to stop the medication immediately and seek urgent treatment if this occurs.  Therefore please do not to abruptly discontinue medication without physician guidance except in cases of sudden onset or worsening of SI.

## 2022-04-01 NOTE — ASSESSMENT & PLAN NOTE
Monitoring renal function.  Patient is avoiding anti-inflammatory medications.  Currently having elevated systolic blood pressure.  Consider increasing losartan if GFR remains acceptable.  Increase hydration with water.

## 2022-04-02 NOTE — PROGRESS NOTES
Make follow-up lab appointment per recommendation below.  Check to see if patient has seen the results through my chart.  If not then,  #CALL THE PATIENT# to discuss results/see if they have questions and document verification of contact. Make F/U appt if needed. [phone]    #My interpretation that was sent to them through [facility]:  Suzie, I have reviewed your recent blood work.     Urinalysis is within normal limits under the microscope.  No infection.    Your complete blood count is normal, no anemia.    Your metabolic panel which shows your glucose, kidney function, electrolytes, and liver function is normal.   Thyroid study is normal.   Your cholesterol is improved from previous.  Continue atorvastatin.    Your hemoglobin A1c is elevated from previous.  This level is consistent with prediabetes.  I recommend a low-carbohydrate diet.  Increase hydration with water.  Recheck A1c in three months.  This test is gold standard screening test for diabetes.  It is a measures 3 months of your average blood sugar.    Recommendations as above.  Otherwise Repeat annual wellness labs in one year.  =========================  Also please address any outstanding health maintenance that may be due: TETANUS VACCINE Never done  Shingles Vaccine(2 of 3) due on 06/04/2016  Influenza Vaccine(1) due on 09/01/2021

## 2022-04-06 ENCOUNTER — PATIENT MESSAGE (OUTPATIENT)
Dept: FAMILY MEDICINE | Facility: CLINIC | Age: 82
End: 2022-04-06
Payer: MEDICARE

## 2022-04-08 ENCOUNTER — PATIENT MESSAGE (OUTPATIENT)
Dept: FAMILY MEDICINE | Facility: CLINIC | Age: 82
End: 2022-04-08
Payer: MEDICARE

## 2022-05-31 ENCOUNTER — PATIENT MESSAGE (OUTPATIENT)
Dept: FAMILY MEDICINE | Facility: CLINIC | Age: 82
End: 2022-05-31
Payer: MEDICARE

## 2022-06-21 ENCOUNTER — TELEPHONE (OUTPATIENT)
Dept: FAMILY MEDICINE | Facility: CLINIC | Age: 82
End: 2022-06-21
Payer: MEDICARE

## 2022-06-21 NOTE — TELEPHONE ENCOUNTER
----- Message from Bunny Moses MD sent at 6/21/2022 12:04 PM CDT -----  Regarding: Appointment on Thursday  Patient can only come to appointment on Thursday.  Please set up the patient with an appointment at 7:20 a.m. or 10 a.m. slot.  Patient can come and 9:00 a.m. I will see them when they get here.  Notify the patient.

## 2022-06-23 ENCOUNTER — OFFICE VISIT (OUTPATIENT)
Dept: FAMILY MEDICINE | Facility: CLINIC | Age: 82
End: 2022-06-23
Payer: MEDICARE

## 2022-06-23 ENCOUNTER — LAB VISIT (OUTPATIENT)
Dept: LAB | Facility: HOSPITAL | Age: 82
End: 2022-06-23
Attending: FAMILY MEDICINE
Payer: MEDICARE

## 2022-06-23 VITALS
HEIGHT: 59 IN | OXYGEN SATURATION: 95 % | WEIGHT: 158 LBS | BODY MASS INDEX: 31.85 KG/M2 | TEMPERATURE: 98 F | RESPIRATION RATE: 16 BRPM | DIASTOLIC BLOOD PRESSURE: 74 MMHG | HEART RATE: 60 BPM | SYSTOLIC BLOOD PRESSURE: 132 MMHG

## 2022-06-23 DIAGNOSIS — B37.9 YEAST INFECTION: ICD-10-CM

## 2022-06-23 DIAGNOSIS — R26.2 DIFFICULTY WALKING: ICD-10-CM

## 2022-06-23 DIAGNOSIS — J01.40 SUBACUTE PANSINUSITIS: ICD-10-CM

## 2022-06-23 DIAGNOSIS — Z79.899 ENCOUNTER FOR LONG-TERM (CURRENT) USE OF MEDICATIONS: ICD-10-CM

## 2022-06-23 DIAGNOSIS — M81.0 AGE-RELATED OSTEOPOROSIS WITHOUT CURRENT PATHOLOGICAL FRACTURE: ICD-10-CM

## 2022-06-23 DIAGNOSIS — R01.1 MURMUR: ICD-10-CM

## 2022-06-23 DIAGNOSIS — R53.1 WEAKNESS: ICD-10-CM

## 2022-06-23 DIAGNOSIS — J34.89 SINUS DRAINAGE: ICD-10-CM

## 2022-06-23 DIAGNOSIS — R42 VERTIGO: Primary | ICD-10-CM

## 2022-06-23 DIAGNOSIS — R42 VERTIGO: ICD-10-CM

## 2022-06-23 DIAGNOSIS — R11.2 NAUSEA AND VOMITING, INTRACTABILITY OF VOMITING NOT SPECIFIED, UNSPECIFIED VOMITING TYPE: ICD-10-CM

## 2022-06-23 LAB
ALBUMIN SERPL BCP-MCNC: 3.9 G/DL (ref 3.5–5.2)
ALP SERPL-CCNC: 109 U/L (ref 55–135)
ALT SERPL W/O P-5'-P-CCNC: 51 U/L (ref 10–44)
ANION GAP SERPL CALC-SCNC: 8 MMOL/L (ref 8–16)
AST SERPL-CCNC: 33 U/L (ref 10–40)
BILIRUB SERPL-MCNC: 0.6 MG/DL (ref 0.1–1)
BUN SERPL-MCNC: 21 MG/DL (ref 8–23)
CALCIUM SERPL-MCNC: 10.7 MG/DL (ref 8.7–10.5)
CHLORIDE SERPL-SCNC: 105 MMOL/L (ref 95–110)
CO2 SERPL-SCNC: 27 MMOL/L (ref 23–29)
CREAT SERPL-MCNC: 1 MG/DL (ref 0.5–1.4)
EST. GFR  (AFRICAN AMERICAN): >60 ML/MIN/1.73 M^2
EST. GFR  (NON AFRICAN AMERICAN): 53.3 ML/MIN/1.73 M^2
ESTIMATED AVG GLUCOSE: 105 MG/DL (ref 68–131)
GLUCOSE SERPL-MCNC: 82 MG/DL (ref 70–110)
HBA1C MFR BLD: 5.3 % (ref 4–5.6)
POTASSIUM SERPL-SCNC: 4.1 MMOL/L (ref 3.5–5.1)
PROT SERPL-MCNC: 6.7 G/DL (ref 6–8.4)
SODIUM SERPL-SCNC: 140 MMOL/L (ref 136–145)
TSH SERPL DL<=0.005 MIU/L-ACNC: 2.24 UIU/ML (ref 0.4–4)

## 2022-06-23 PROCEDURE — 83036 HEMOGLOBIN GLYCOSYLATED A1C: CPT | Performed by: FAMILY MEDICINE

## 2022-06-23 PROCEDURE — 36415 COLL VENOUS BLD VENIPUNCTURE: CPT | Mod: PO | Performed by: FAMILY MEDICINE

## 2022-06-23 PROCEDURE — 99999 PR PBB SHADOW E&M-EST. PATIENT-LVL V: CPT | Mod: PBBFAC,,, | Performed by: FAMILY MEDICINE

## 2022-06-23 PROCEDURE — 99215 PR OFFICE/OUTPT VISIT, EST, LEVL V, 40-54 MIN: ICD-10-PCS | Mod: S$PBB,,, | Performed by: FAMILY MEDICINE

## 2022-06-23 PROCEDURE — 84443 ASSAY THYROID STIM HORMONE: CPT | Performed by: FAMILY MEDICINE

## 2022-06-23 PROCEDURE — 99215 OFFICE O/P EST HI 40 MIN: CPT | Mod: PBBFAC,PO | Performed by: FAMILY MEDICINE

## 2022-06-23 PROCEDURE — 99215 OFFICE O/P EST HI 40 MIN: CPT | Mod: S$PBB,,, | Performed by: FAMILY MEDICINE

## 2022-06-23 PROCEDURE — 80053 COMPREHEN METABOLIC PANEL: CPT | Performed by: FAMILY MEDICINE

## 2022-06-23 PROCEDURE — 99999 PR PBB SHADOW E&M-EST. PATIENT-LVL V: ICD-10-PCS | Mod: PBBFAC,,, | Performed by: FAMILY MEDICINE

## 2022-06-23 RX ORDER — CEFDINIR 300 MG/1
300 CAPSULE ORAL 2 TIMES DAILY
Qty: 20 CAPSULE | Refills: 0 | Status: SHIPPED | OUTPATIENT
Start: 2022-06-23 | End: 2022-07-03

## 2022-06-23 RX ORDER — FLUCONAZOLE 150 MG/1
150 TABLET ORAL
Qty: 3 TABLET | Refills: 0 | Status: SHIPPED | OUTPATIENT
Start: 2022-06-23 | End: 2022-06-30

## 2022-06-23 RX ORDER — ALENDRONATE SODIUM 70 MG/1
70 TABLET ORAL
Qty: 12 TABLET | Refills: 3 | Status: SHIPPED | OUTPATIENT
Start: 2022-06-23 | End: 2023-03-10

## 2022-06-23 RX ORDER — MECLIZINE HYDROCHLORIDE 25 MG/1
25 TABLET ORAL 3 TIMES DAILY PRN
Qty: 30 TABLET | Refills: 12 | Status: SHIPPED | OUTPATIENT
Start: 2022-06-23

## 2022-06-23 RX ORDER — PROMETHAZINE HYDROCHLORIDE 25 MG/1
25 TABLET ORAL EVERY 6 HOURS PRN
Qty: 30 TABLET | Refills: 0 | Status: SHIPPED | OUTPATIENT
Start: 2022-06-23 | End: 2022-12-12 | Stop reason: SDUPTHER

## 2022-06-23 RX ORDER — PREDNISONE 20 MG/1
20 TABLET ORAL DAILY
Qty: 5 TABLET | Refills: 0 | Status: SHIPPED | OUTPATIENT
Start: 2022-06-23 | End: 2022-06-28

## 2022-06-23 NOTE — ASSESSMENT & PLAN NOTE
Carlos Cazares spoke with . Pt does have large amount of disease. Biopsy was done yesterday.  will reach out to our office when report comes back. Prophylactic yeast treatment for antibiotics.

## 2022-06-23 NOTE — PATIENT INSTRUCTIONS
Follow up in 6 weeks (on 8/4/2022), or if symptoms worsen or fail to improve, for Med refills, LAB RESULTS.     Dear patient,   As a result of recent federal legislation (The Federal Cures Act), you may receive lab or pathology results from your visit in your [facility] account before your physician is able to contact you. Your physician or their representative will relay the results to you with their recommendations at their soonest availability.     If no improvement in symptoms or symptoms worsen, please be advised to call MD, follow-up at clinic and/or go to ER if becomes severe.    Bunny Moses M.D.        We Offer TELEHEALTH & Same Day Appointments!   Book your Telehealth appointment with me through my nurse or   Clinic appointments on [facility]!    [address] [place] [address]    Office: [phone]   FAX: [phone]    Check out my Facebook Page [email or web address] Follow Me at: [email or web address]    Check out my website at [facility] by clicking on: [email or web address]    To Schedule appointments online, go to [facility]: [email or web address]

## 2022-06-23 NOTE — ASSESSMENT & PLAN NOTE
Start Omnicef which she has tolerated in the past.  Short prednisone burst orally. Discussed condition course and signs and symptoms to expect.  Patient advised takeTylenol for pain or fever.  ER precautions.  Call MD or follow-up to clinic if not improving or worsening symptoms.   - risk of corticosteroids reviewed (elevated BP/glucose, insomnia, psychosis, bone loss, etc) and patient expressed understanding

## 2022-06-23 NOTE — ASSESSMENT & PLAN NOTE
Start Phenergan.  Strongly recommend physical therapy or vestibular therapy through audiology for treatment of vertigo.  Follow-up sooner if no improvement.

## 2022-06-23 NOTE — PROGRESS NOTES
PLAN:      Problem List Items Addressed This Visit     Osteoporosis (Chronic)     Trial of Fosamax.  Patient is opposed to getting Prolia injections at this time.  If no improvement or patient cannot tolerate the Fosamax I recommend Endocrinology follow-up.  Update bone density scan after six months of being on Fosamax.  We discussed weight-bearing exercise increasing bone density mass with calcium vitamin-D supplementation.           Relevant Medications    alendronate (FOSAMAX) 70 MG tablet    Other Relevant Orders    DXA Bone Density Spine And Hip    Ambulatory referral/consult to Physical/Occupational Therapy    TSH (Completed)    Vertigo - Primary (Chronic)     Referral back to physical therapy for difficulty walking and weakness for evaluation.  No improvement recommend that she follow up with audiology hearing imbalance testing.  ENT is also an option.  I have sent in Phenergan for nausea and vomiting.           Relevant Medications    meclizine (ANTIVERT) 25 mg tablet    Other Relevant Orders    TSH (Completed)    Encounter for long-term (current) use of medications (Chronic)     Complete history and physical was completed today.  Complete and thorough medication reconciliation was performed.  Discussed risks and benefits of medications.  Advised patient on orders and health maintenance.  We discussed old records and old labs if available.  Will request any records not available through epic.  Continue current medications listed on your summary sheet.             Relevant Orders    TSH (Completed)    Difficulty walking (Chronic)     Referral to physical therapy for evaluation and treatment.           Relevant Orders    Ambulatory referral/consult to Physical/Occupational Therapy    TSH (Completed)    Murmur (Chronic)     Check echocardiogram.  If concerning will refer to Cardiology for further evaluation treatment.           Relevant Orders    Echo    TSH (Completed)    Weakness     Labs are stable.  Patient  is requesting to go back to physical therapy.  She does have comorbid osteoporosis and is a fall /fracture risk.           Relevant Orders    Ambulatory referral/consult to Physical/Occupational Therapy    TSH (Completed)    Yeast infection     Prophylactic yeast treatment for antibiotics.           Relevant Medications    fluconazole (DIFLUCAN) 150 MG Tab    Other Relevant Orders    TSH (Completed)    Sinus drainage    Relevant Medications    predniSONE (DELTASONE) 20 MG tablet    Other Relevant Orders    TSH (Completed)    Subacute pansinusitis     Start Omnicef which she has tolerated in the past.  Short prednisone burst orally. Discussed condition course and signs and symptoms to expect.  Patient advised takeTylenol for pain or fever.  ER precautions.  Call MD or follow-up to clinic if not improving or worsening symptoms.   - risk of corticosteroids reviewed (elevated BP/glucose, insomnia, psychosis, bone loss, etc) and patient expressed understanding             Relevant Medications    cefdinir (OMNICEF) 300 MG capsule    Other Relevant Orders    TSH (Completed)    Nausea and vomiting     Start Phenergan.  Strongly recommend physical therapy or vestibular therapy through audiology for treatment of vertigo.  Follow-up sooner if no improvement.           Relevant Medications    promethazine (PHENERGAN) 25 MG tablet    predniSONE (DELTASONE) 20 MG tablet    Other Relevant Orders    TSH (Completed)        Future Appointments     Date Provider Specialty Appt Notes    10/3/2022 Bunny Moses MD Family Medicine 6m f/u         Medication Management for assessment above:   Medication List with Changes/Refills   New Medications    ALENDRONATE (FOSAMAX) 70 MG TABLET    Take 1 tablet (70 mg total) by mouth every 7 days.    FLUCONAZOLE (DIFLUCAN) 150 MG TAB    Take 1 tablet (150 mg total) by mouth every 72 hours. for 3 doses    PREDNISONE (DELTASONE) 20 MG TABLET    Take 1 tablet (20 mg total) by mouth once daily. for  5 days    PROMETHAZINE (PHENERGAN) 25 MG TABLET    Take 1 tablet (25 mg total) by mouth every 6 (six) hours as needed for Nausea.   Current Medications    ASCORBIC ACID, VITAMIN C, (VITAMIN C) 500 MG TABLET    Take 500 mg by mouth once daily.    ASPIRIN (ECOTRIN) 81 MG EC TABLET    Take 1 tablet (81 mg total) by mouth once daily.    ATORVASTATIN (LIPITOR) 40 MG TABLET    Take 1 tablet (40 mg total) by mouth once daily.    CALCIUM CARBONATE (CALCIUM 500 ORAL)    Take 2 tablets by mouth once daily.     CITALOPRAM (CELEXA) 20 MG TABLET    Take 1 tablet (20 mg total) by mouth once daily.    CRANBERRY FRUIT EXTRACT (CRANBERRY ORAL)    Take 1 tablet by mouth once daily.    DIPHENOXYLATE-ATROPINE 2.5-0.025 MG (LOMOTIL) 2.5-0.025 MG PER TABLET    Take 1 tablet by mouth 4 (four) times daily as needed for Diarrhea.    ELDERBERRY FRUIT ORAL    Take 1 tablet by mouth daily as needed.    ESOMEPRAZOLE (NEXIUM) 40 MG CAPSULE    Take 1 capsule (40 mg total) by mouth before breakfast.    ESTRADIOL (ESTRACE) 0.01 % (0.1 MG/GRAM) VAGINAL CREAM    SMARTSIG:Sparingly Vaginal Every Night    FLUTICASONE PROPIONATE (FLONASE) 50 MCG/ACTUATION NASAL SPRAY    1 spray by Each Nostril route daily as needed for Rhinitis.    IPRATROPIUM (ATROVENT) 21 MCG (0.03 %) NASAL SPRAY    2 sprays by Nasal route 2 (two) times daily as needed for Rhinitis.    L.ACIDOPHIL,PARAC-S.THERM-BIF. (RISAQUAD) CAP CAPSULE    Take 1 capsule by mouth once daily.    LOSARTAN (COZAAR) 50 MG TABLET    Take 1 tablet (50 mg total) by mouth once daily.    METOPROLOL TARTRATE (LOPRESSOR) 100 MG TABLET    TAKE ONE TABLET BY MOUTH TWICE DAILY    MULTIVIT,CALC,MINS/IRON/FOLIC (ONE-A-DAY WOMENS FORMULA ORAL)    Take 1 tablet by mouth once daily.    MYRBETRIQ 25 MG TB24 ER TABLET    Take 1 tablet (25 mg total) by mouth once daily.    ONDANSETRON (ZOFRAN-ODT) 4 MG TBDL    DISSOLVE ONE TABLET BY MOUTH EVERY 6 HOURS AS NEEDED FOR NAUSEA    POLYVINYL ALCOHOL, ARTIFICIAL TEARS,  (LIQUIFILM TEARS) 1.4 % OPHTHALMIC SOLUTION    Place 1 drop into both eyes as needed.    TEMAZEPAM (RESTORIL) 30 MG CAPSULE    Take 1 capsule (30 mg total) by mouth nightly.    TOPIRAMATE (TOPAMAX) 25 MG TABLET    Take 1 tablet (25 mg total) by mouth once daily.    TRANSDERM-SCOP 1 MG OVER 3 DAYS    Place 1 patch onto the skin every 72 hours.    VITAMIN D 1000 UNITS TAB    Take 1,000 Units by mouth once daily.   Changed and/or Refilled Medications    Modified Medication Previous Medication    CEFDINIR (OMNICEF) 300 MG CAPSULE cefdinir (OMNICEF) 300 MG capsule       Take 1 capsule (300 mg total) by mouth 2 (two) times daily. for 10 days    Take 1 capsule (300 mg total) by mouth 2 (two) times daily.    MECLIZINE (ANTIVERT) 25 MG TABLET meclizine (ANTIVERT) 25 mg tablet       Take 1 tablet (25 mg total) by mouth 3 (three) times daily as needed for Dizziness or Nausea.    Take 1 tablet (25 mg total) by mouth 3 (three) times daily as needed for Dizziness or Nausea.   Discontinued Medications    ALENDRONATE (FOSAMAX) 35 MG TABLET    Take 35 mg by mouth every Monday.        Bunny Moses M.D.  ==========================================================================  Subjective:   Patient ID: Suzie Dawkins is a 81 y.o. female.  has a past medical history of Anxiety, Arthritis, Cancer (2007), Depression, GERD (gastroesophageal reflux disease), Hypercalcemia, Hyperlipidemia, Hypertension, Osteoporosis (10/7/2013), and Vitamin D deficiency.   Chief Complaint: Sinusitis and Dizziness      Problem List Items Addressed This Visit     Osteoporosis (Chronic)    Overview     Chronic.  Patient reports that she stopped previous medication and has been on Prolia in the past.  Patient has been seeing Endocrinology previously.      Narrative & Impression  EXAMINATION:  DEXA BONE DENSITY SPINE HIP     CLINICAL HISTORY:  Age-related osteoporosis without current pathological fracture     TECHNIQUE:  DXA scanning was performed over  the left hip and lumbar spine.  Review of the images confirms satisfactory positioning and technique.     COMPARISON:  Comparison to prior study from 03/28/2018 cannot be made as it was performed on a different machine.     FINDINGS:  The L1 to L4 vertebral bone mineral density is equal to 0.966 g/cm squared with a T score of -1.8.     The left femoral neck bone mineral density is equal to 0.618 g/cm squared with a T score of -3.0.     Impression:     Osteoporosis     Consider FDA approved medical therapies in postmenopausal women and men aged 50 years and older, based on the following:     *A hip or vertebral (clinical or morphometric) fracture  *T score less than or equal to -2.5 at the femoral neck or spine after appropriate evaluation to exclude secondary causes.  *Low bone mass -- also known as osteopenia (T score between -1.0 and -2.5 at the femoral neck or spine) and a 10 year probability of hip fracture greater than or equal to 3% or a 10 year probability of major osteoporosis-related fracture greater than or equal to 20% based on the [place]-adapted WHO algorithm.  *Clinicians judgment and/or patient preference may indicate treatment for people with 10 year fracture probabilities is above or below these levels.        Electronically signed by: Priyank Boo DO  Date:                                            06/23/2020  Time:                                           10:26             Exam Ended: 06/23/20 08:43                      Current Assessment & Plan     Trial of Fosamax.  Patient is opposed to getting Prolia injections at this time.  If no improvement or patient cannot tolerate the Fosamax I recommend Endocrinology follow-up.  Update bone density scan after six months of being on Fosamax.  We discussed weight-bearing exercise increasing bone density mass with calcium vitamin-D supplementation.           Vertigo - Primary (Chronic)    Overview     Chronic.  Intermittent control.  Patient has been to  physical therapy but has not had Epley maneuver performed.  She does get nauseated occasionally.  She has Zofran as needed.  She has tried meclizine in the past.  She is currently wearing a wrist device.  June 2022:  Patient reports that she did go to physical therapy and the vestibular therapy did help with her symptoms.  She does have success with meclizine.  It is still associated with nausea and vomiting.  Patient not like Zofran but prefers Phenergan.           Current Assessment & Plan     Referral back to physical therapy for difficulty walking and weakness for evaluation.  No improvement recommend that she follow up with audiology hearing imbalance testing.  ENT is also an option.  I have sent in Phenergan for nausea and vomiting.           Encounter for long-term (current) use of medications (Chronic)    Overview     CHRONIC. Stable. Compliant with medications for managed conditions. See medication list. No SE reported. Routine lab analysis is being monitored. Refills were addressed.April 2022:  CHRONIC. Stable. Compliant with medications for managed conditions. See medication list. No SE reported. Routine lab analysis is being monitored. Refills were addressed.  June 2022:  Reviewed labs.  Lab Results   Component Value Date    WBC 9.27 04/01/2022    HGB 13.9 04/01/2022    HCT 44.1 04/01/2022    MCV 99 (H) 04/01/2022     04/01/2022         Chemistry        Component Value Date/Time     06/23/2022 0926    K 4.1 06/23/2022 0926     06/23/2022 0926    CO2 27 06/23/2022 0926    BUN 21 06/23/2022 0926    CREATININE 1.0 06/23/2022 0926    GLU 82 06/23/2022 0926        Component Value Date/Time    CALCIUM 10.7 (H) 06/23/2022 0926    ALKPHOS 109 06/23/2022 0926    AST 33 06/23/2022 0926    ALT 51 (H) 06/23/2022 0926    BILITOT 0.6 06/23/2022 0926    ESTGFRAFRICA >60.0 06/23/2022 0926    EGFRNONAA 53.3 (A) 06/23/2022 0926          Lab Results   Component Value Date    TSH 2.243 06/23/2022     FREET4 0.86 04/30/2021    T3FREE 2.2 (L) 04/30/2021                Current Assessment & Plan     Complete history and physical was completed today.  Complete and thorough medication reconciliation was performed.  Discussed risks and benefits of medications.  Advised patient on orders and health maintenance.  We discussed old records and old labs if available.  Will request any records not available through epic.  Continue current medications listed on your summary sheet.             Difficulty walking (Chronic)    Overview     Chronic.  Intermittent control.  Worsening right now with dizziness and unsteady gait.           Current Assessment & Plan     Referral to physical therapy for evaluation and treatment.           Murmur (Chronic)    Overview     New problem.  Patient states that she has not known about having a murmur in the past.  She does not have occurred cardiologist currently.  She did see Dr. Kraus in the past for preop clearance.           Current Assessment & Plan     Check echocardiogram.  If concerning will refer to Cardiology for further evaluation treatment.           Weakness    Overview     Chronic.  Intermittent control.  Lab Results   Component Value Date    WBC 9.27 04/01/2022    HGB 13.9 04/01/2022    HCT 44.1 04/01/2022    MCV 99 (H) 04/01/2022     04/01/2022       Lab Results   Component Value Date    IRON 134 06/27/2012    TIBC 322.0 06/27/2012    FERRITIN 78 06/27/2012     Lab Results   Component Value Date    TNOYRYLD15 1053 (H) 06/27/2012     Lab Results   Component Value Date    FOLATE 15.3 06/27/2012     Lab Results   Component Value Date    TSH 2.243 06/23/2022    FREET4 0.86 04/30/2021                Current Assessment & Plan     Labs are stable.  Patient is requesting to go back to physical therapy.  She does have comorbid osteoporosis and is a fall /fracture risk.           Yeast infection    Overview     Patient reports that she gets yeast infections when she takes  antibiotics.           Current Assessment & Plan     Prophylactic yeast treatment for antibiotics.           Sinus drainage    Subacute pansinusitis    Overview     Subacute.  Patient reports sinus issues over the last few weeks.  She has tried over-the-counter medication with no improvement.  She is now having dizziness and vertigo that has been flared up.  Denies any fever but this morning she did have an episode of chills and generalized weakness.           Current Assessment & Plan     Start Omnicef which she has tolerated in the past.  Short prednisone burst orally. Discussed condition course and signs and symptoms to expect.  Patient advised takeTylenol for pain or fever.  ER precautions.  Call MD or follow-up to clinic if not improving or worsening symptoms.   - risk of corticosteroids reviewed (elevated BP/glucose, insomnia, psychosis, bone loss, etc) and patient expressed understanding             Nausea and vomiting    Overview     Associated with dizziness and vertigo.  Patient has tried Zofran but it does not work well.           Current Assessment & Plan     Start Phenergan.  Strongly recommend physical therapy or vestibular therapy through audiology for treatment of vertigo.  Follow-up sooner if no improvement.                  Review of patient's allergies indicates:   Allergen Reactions    Latex Hives    B12 [cyanocobalamin-cobamamide] Other (See Comments)     Caused flushing and caused skin to peel    Codeine      Other reaction(s): Muscle pain    Iodine      Other reaction(s): Hives    Meperidine      Other reaction(s): Muscle pain    Narcotic antagonist      Other reaction(s): Rash    Penicillins Nausea And Vomiting    Propoxyphene      Other reaction(s): Muscle pain    Sulfa (sulfonamide antibiotics)      Other reaction(s): Muscle pain  Other reaction(s): Hives     Current Outpatient Medications   Medication Instructions    alendronate (FOSAMAX) 70 mg, Oral, Every 7 days    ascorbic  acid (vitamin C) (VITAMIN C) 500 mg, Oral, Daily    aspirin (ECOTRIN) 81 mg, Oral, Daily    atorvastatin (LIPITOR) 40 mg, Oral, Daily    calcium carbonate (CALCIUM 500 ORAL) 2 tablets, Oral, Daily    cefdinir (OMNICEF) 300 mg, Oral, 2 times daily    citalopram (CELEXA) 20 mg, Oral, Daily    cranberry fruit extract (CRANBERRY ORAL) 1 tablet, Oral, Daily    diphenoxylate-atropine 2.5-0.025 mg (LOMOTIL) 2.5-0.025 mg per tablet 1 tablet, Oral, 4 times daily PRN    ELDERBERRY FRUIT ORAL 1 tablet, Oral, Daily PRN    esomeprazole (NEXIUM) 40 mg, Oral, Before breakfast    estradioL (ESTRACE) 0.01 % (0.1 mg/gram) vaginal cream SMARTSIG:Sparingly Vaginal Every Night    fluconazole (DIFLUCAN) 150 mg, Oral, Every 72 hours    fluticasone propionate (FLONASE) 50 mcg/actuation nasal spray 1 spray, Each Nostril, Daily PRN    ipratropium (ATROVENT) 21 mcg (0.03 %) nasal spray 2 sprays, Nasal, 2 times daily PRN    L.acidophil,parac-S.therm-Bif. (RISAQUAD) Cap capsule 1 capsule, Oral, Daily    losartan (COZAAR) 50 mg, Oral, Daily    meclizine (ANTIVERT) 25 mg, Oral, 3 times daily PRN    metoprolol tartrate (LOPRESSOR) 100 MG tablet TAKE ONE TABLET BY MOUTH TWICE DAILY    MULTIVIT,CALC,MINS/IRON/FOLIC (ONE-A-DAY WOMENS FORMULA ORAL) 1 tablet, Oral, Daily    MYRBETRIQ 25 mg, Oral, Daily    ondansetron (ZOFRAN-ODT) 4 MG TbDL DISSOLVE ONE TABLET BY MOUTH EVERY 6 HOURS AS NEEDED FOR NAUSEA    polyvinyl alcohol, artificial tears, (LIQUIFILM TEARS) 1.4 % ophthalmic solution 1 drop, Both Eyes, As needed (PRN)    predniSONE (DELTASONE) 20 mg, Oral, Daily    promethazine (PHENERGAN) 25 mg, Oral, Every 6 hours PRN    temazepam (RESTORIL) 30 mg, Oral, Nightly    topiramate (TOPAMAX) 25 mg, Oral, Daily    TRANSDERM-SCOP 1 mg over 3 days Place 1 patch onto the skin every 72 hours.    vitamin D (VITAMIN D3) 1,000 Units, Oral, Daily      I have reviewed the PMH, social history, FamilyHx, surgical history, allergies and  "medications documented / confirmed by the patient at the time of this visit.  Review of Systems   Constitutional: Positive for fatigue. Negative for chills, fever and unexpected weight change.   HENT: Negative for ear pain and sore throat.    Eyes: Negative for redness and visual disturbance.   Respiratory: Negative for cough and shortness of breath.    Cardiovascular: Negative for chest pain and palpitations.   Gastrointestinal: Negative for nausea and vomiting.   Genitourinary: Negative for difficulty urinating and hematuria.   Musculoskeletal: Positive for arthralgias. Negative for myalgias.   Skin: Negative for rash and wound.   Neurological: Positive for headaches. Negative for weakness.   Psychiatric/Behavioral: Positive for dysphoric mood and sleep disturbance (Controlled on medication). The patient is nervous/anxious.      Objective:   /74   Pulse 60   Temp 97.9 °F (36.6 °C) (Temporal)   Resp 16   Ht 4' 11" (1.499 m)   Wt 71.7 kg (158 lb)   SpO2 95%   BMI 31.91 kg/m²   Physical Exam  Vitals and nursing note reviewed.   Constitutional:       General: She is not in acute distress.     Appearance: She is well-developed. She is not ill-appearing, toxic-appearing or diaphoretic.   HENT:      Head: Normocephalic and atraumatic.      Right Ear: Hearing, ear canal and external ear normal. A middle ear effusion is present. There is no impacted cerumen. Tympanic membrane is bulging. Tympanic membrane is not injected or erythematous.      Left Ear: Hearing, tympanic membrane, ear canal and external ear normal. There is no impacted cerumen. Tympanic membrane is not injected or erythematous.      Nose: Nose normal. No rhinorrhea.      Mouth/Throat:      Mouth: Mucous membranes are not pale.      Pharynx: Uvula midline. Posterior oropharyngeal erythema present. No oropharyngeal exudate.      Tonsils: No tonsillar exudate or tonsillar abscesses.   Eyes:      General: Lids are normal.      Extraocular " Movements: Extraocular movements intact.      Conjunctiva/sclera: Conjunctivae normal.      Pupils: Pupils are equal, round, and reactive to light.   Cardiovascular:      Rate and Rhythm: Normal rate.      Pulses: Normal pulses.   Pulmonary:      Effort: Pulmonary effort is normal. No respiratory distress.      Breath sounds: Normal breath sounds.   Abdominal:      General: Bowel sounds are normal.      Palpations: Abdomen is soft.   Musculoskeletal:         General: Tenderness and deformity present. Normal range of motion.      Cervical back: Normal range of motion and neck supple.   Skin:     General: Skin is warm and dry.      Capillary Refill: Capillary refill takes less than 2 seconds.      Coloration: Skin is not pale.   Neurological:      General: No focal deficit present.      Mental Status: She is alert and oriented to person, place, and time. Mental status is at baseline. She is not disoriented.      Cranial Nerves: No cranial nerve deficit.      Motor: Weakness ( generalized) present.      Gait: Gait abnormal (Unsteady).   Psychiatric:         Attention and Perception: She is attentive.         Mood and Affect: Mood normal. Mood is not anxious or depressed.         Speech: Speech is not rapid and pressured or slurred.         Behavior: Behavior normal. Behavior is not agitated, aggressive or hyperactive. Behavior is cooperative.         Thought Content: Thought content normal. Thought content is not paranoid or delusional. Thought content does not include homicidal or suicidal ideation. Thought content does not include homicidal or suicidal plan.         Cognition and Memory: Memory is not impaired.         Judgment: Judgment normal.          Assessment:     1. Vertigo    2. Encounter for long-term (current) use of medications    3. Weakness    4. Sinus drainage    5. Yeast infection    6. Subacute pansinusitis    7. Nausea and vomiting, intractability of vomiting not specified, unspecified vomiting type     8. Age-related osteoporosis without current pathological fracture    9. Difficulty walking    10. Murmur      MDM:   Moderate to high complexity.  Moderate risk.  Total time: 45 minutes.  This includes total time spent on the encounter, which includes face to face time and non-face to face time preparing to see the patient (eg, review of previous medical records, tests), Obtaining and/or reviewing separately obtained history, documenting clinical information in the electronic or other health record, independently interpreting results (not separately reported)/communicating results to the patient/family/caregiver, and/or care coordination (not separately reported).    I have Reviewed and summarized old records.  I have performed thorough medication reconciliation today and discussed risk and benefits of medications.  I have reviewed labs and discussed with patient.  All questions were answered.  I am requesting old records and will review them once they are available.  PT Milagros Castillo      I have signed for the following orders AND/OR meds.  Orders Placed This Encounter   Procedures    DXA Bone Density Spine And Hip     Standing Status:   Future     Standing Expiration Date:   6/23/2025     Order Specific Question:   May the Radiologist modify the order per protocol to meet the clinical needs of the patient?     Answer:   Yes     Order Specific Question:   Release to patient     Answer:   Immediate    TSH     Standing Status:   Future     Number of Occurrences:   1     Standing Expiration Date:   6/24/2023    Ambulatory referral/consult to Physical/Occupational Therapy     Standing Status:   Future     Standing Expiration Date:   7/23/2023     Referral Priority:   Routine     Referral Type:   Physical Medicine     Referral Reason:   Specialty Services Required     Referred to Provider:   [facility]     Requested Specialty:   Physical Therapy     Number of Visits Requested:   1    Echo      Standing Status:   Future     Standing Expiration Date:   6/23/2023     Order Specific Question:   Release to patient     Answer:   Immediate     Medications Ordered This Encounter   Medications    alendronate (FOSAMAX) 70 MG tablet     Sig: Take 1 tablet (70 mg total) by mouth every 7 days.     Dispense:  12 tablet     Refill:  3    cefdinir (OMNICEF) 300 MG capsule     Sig: Take 1 capsule (300 mg total) by mouth 2 (two) times daily. for 10 days     Dispense:  20 capsule     Refill:  0    fluconazole (DIFLUCAN) 150 MG Tab     Sig: Take 1 tablet (150 mg total) by mouth every 72 hours. for 3 doses     Dispense:  3 tablet     Refill:  0    meclizine (ANTIVERT) 25 mg tablet     Sig: Take 1 tablet (25 mg total) by mouth 3 (three) times daily as needed for Dizziness or Nausea.     Dispense:  30 tablet     Refill:  12    predniSONE (DELTASONE) 20 MG tablet     Sig: Take 1 tablet (20 mg total) by mouth once daily. for 5 days     Dispense:  5 tablet     Refill:  0    promethazine (PHENERGAN) 25 MG tablet     Sig: Take 1 tablet (25 mg total) by mouth every 6 (six) hours as needed for Nausea.     Dispense:  30 tablet     Refill:  0        Follow up in 6 weeks (on 8/4/2022), or if symptoms worsen or fail to improve, for Med refills, LAB RESULTS.  Future Appointments     Date Provider Specialty Appt Notes    10/3/2022 Bunny Moses MD Family Medicine 6m f/u          If no improvement in symptoms or symptoms worsen, advised to call/follow-up at clinic or go to ER. Patient voiced understanding and all questions/concerns were addressed.   DISCLAIMER: This note was compiled by using a speech recognition dictation system and therefore please be aware that typographical / speech recognition errors can and do occur.  Please contact me if you see any errors specifically.    Bunny Moses M.D.       Office: [phone] [address] [place] [address]  FAX: [phone]

## 2022-06-23 NOTE — ASSESSMENT & PLAN NOTE
Labs are stable.  Patient is requesting to go back to physical therapy.  She does have comorbid osteoporosis and is a fall /fracture risk.

## 2022-06-23 NOTE — ASSESSMENT & PLAN NOTE
Trial of Fosamax.  Patient is opposed to getting Prolia injections at this time.  If no improvement or patient cannot tolerate the Fosamax I recommend Endocrinology follow-up.  Update bone density scan after six months of being on Fosamax.  We discussed weight-bearing exercise increasing bone density mass with calcium vitamin-D supplementation.

## 2022-06-23 NOTE — ASSESSMENT & PLAN NOTE
Referral back to physical therapy for difficulty walking and weakness for evaluation.  No improvement recommend that she follow up with audiology hearing imbalance testing.  ENT is also an option.  I have sent in Phenergan for nausea and vomiting.

## 2022-06-24 NOTE — PROGRESS NOTES
Make follow-up lab appointment per recommendation below.  Check to see if patient has seen the results through my chart.  If not then,  #CALL THE PATIENT# to discuss results/see if they have questions and document verification of contact. Make F/U appt if needed. [phone]    #My interpretation that was sent to them through [employer]:  Suzie, I have reviewed your recent blood work.     Your metabolic panel which shows your glucose, kidney function, electrolytes, and liver function is stable except for mild decrease kidney function, elevated calcium, elevated liver enzyme.  I recommend increase hydration with water.. Thyroid study is normal.   Your hemoglobin A1c is normal.  This test is gold standard screening test for diabetes.  It is a measures 3 months of your average blood sugar.  =========================  Also please address any outstanding health maintenance that may be due: TETANUS VACCINE Never done  Colonoscopy due on 02/08/2014  Shingles Vaccine(1 of 2) due on 06/04/2016  COVID-19 Vaccine(4 - Booster for Moderna series) due on 04/04/2022  DEXA Scan due on 06/23/2022

## 2022-06-25 ENCOUNTER — PATIENT MESSAGE (OUTPATIENT)
Dept: FAMILY MEDICINE | Facility: CLINIC | Age: 82
End: 2022-06-25
Payer: MEDICARE

## 2022-06-25 DIAGNOSIS — U07.1 COVID-19: Primary | ICD-10-CM

## 2022-06-25 DIAGNOSIS — U07.1 COVID: ICD-10-CM

## 2022-06-30 PROBLEM — U07.1 COVID: Status: ACTIVE | Noted: 2022-06-30

## 2022-06-30 RX ORDER — PROMETHAZINE HYDROCHLORIDE AND DEXTROMETHORPHAN HYDROBROMIDE 6.25; 15 MG/5ML; MG/5ML
5 SYRUP ORAL EVERY 4 HOURS PRN
Qty: 240 ML | Refills: 0 | Status: SHIPPED | OUTPATIENT
Start: 2022-06-30 | End: 2022-07-10

## 2022-06-30 NOTE — TELEPHONE ENCOUNTER
5     I received your message which was reviewed along with the the medication list and allergies that we have below.  Please review it for accuracy to make sure that we have the most recent records on your history.     Based on this, the following orders were placed AND/OR medicines were sent in.     Orders Placed This Encounter   Procedures    Ambulatory referral/consult to [facility]     Standing Status:   Future     Standing Expiration Date:   7/6/2022     Referral Priority:   Routine     Referral Type:   Consultation     Referral Reason:   Specialty Services Required     Number of Visits Requested:   1    COVID-19 Home Symptom Monitoring  - Duration (days): 14     Order Specific Question:   Duration (days)     Answer:   14       Medications written and sent at this time include:  Medications Ordered This Encounter   Medications    nirmatrelvir-ritonavir 150 mg x 2- 100 mg copackaged tablets (EUA)     Sig: Take 2 tablets by mouth 2 (two) times daily for 5 days. Each dose contains 1 nirmatrelvir (pink tablet) and 1 ritonavir (white tablet). Take both tablets together     Dispense:  20 tablet     Refill:  0     Order Specific Question:   Per [facility] criteria, patient has a positive COVID test AND symptom onset </= 5 days     Answer:   Yes    promethazine-dextromethorphan (PROMETHAZINE-DM) 6.25-15 mg/5 mL Syrp     Sig: Take 5 mLs by mouth every 4 (four) hours as needed (cough).     Dispense:  240 mL     Refill:  0       Your pharmacy(ies) of choice at this time on record include the list below and any medications would have been sent to the one at the top.    [facility] [place] - [address]  [address] [facility] [address]  Phone: [phone] Fax: [phone]    [facility] - [facility] - [place], [place] - [address]  [address] [facility] [address]  Phone: [phone] Fax: [phone]      Thank you for choosing us as your healthcare  provider!  Dr. Bunny Moses    ALLERGY LIST  Review of patient's allergies indicates:   Allergen Reactions    Latex Hives    B12 [cyanocobalamin-cobamamide] Other (See Comments)     Caused flushing and caused skin to peel    Codeine      Other reaction(s): Muscle pain    Iodine      Other reaction(s): Hives    Meperidine      Other reaction(s): Muscle pain    Narcotic antagonist      Other reaction(s): Rash    Penicillins Nausea And Vomiting    Propoxyphene      Other reaction(s): Muscle pain    Sulfa (sulfonamide antibiotics)      Other reaction(s): Muscle pain  Other reaction(s): Hives       MEDICATION LIST  Current Outpatient Medications on File Prior to Visit   Medication Sig Dispense Refill    alendronate (FOSAMAX) 70 MG tablet Take 1 tablet (70 mg total) by mouth every 7 days. 12 tablet 3    ascorbic acid, vitamin C, (VITAMIN C) 500 MG tablet Take 500 mg by mouth once daily.      aspirin (ECOTRIN) 81 MG EC tablet Take 1 tablet (81 mg total) by mouth once daily. 90 tablet 3    atorvastatin (LIPITOR) 40 MG tablet Take 1 tablet (40 mg total) by mouth once daily. 90 tablet 4    calcium carbonate (CALCIUM 500 ORAL) Take 2 tablets by mouth once daily.       cefdinir (OMNICEF) 300 MG capsule Take 1 capsule (300 mg total) by mouth 2 (two) times daily. for 10 days 20 capsule 0    citalopram (CELEXA) 20 MG tablet Take 1 tablet (20 mg total) by mouth once daily. 90 tablet 4    cranberry fruit extract (CRANBERRY ORAL) Take 1 tablet by mouth once daily.      diphenoxylate-atropine 2.5-0.025 mg (LOMOTIL) 2.5-0.025 mg per tablet Take 1 tablet by mouth 4 (four) times daily as needed for Diarrhea. 90 tablet 5    ELDERBERRY FRUIT ORAL Take 1 tablet by mouth daily as needed.      esomeprazole (NEXIUM) 40 MG capsule Take 1 capsule (40 mg total) by mouth before breakfast. 90 capsule 3    estradioL (ESTRACE) 0.01 % (0.1 mg/gram) vaginal cream SMARTSIG:Sparingly Vaginal Every Night      fluconazole  (DIFLUCAN) 150 MG Tab Take 1 tablet (150 mg total) by mouth every 72 hours. for 3 doses 3 tablet 0    fluticasone propionate (FLONASE) 50 mcg/actuation nasal spray 1 spray by Each Nostril route daily as needed for Rhinitis.      ipratropium (ATROVENT) 21 mcg (0.03 %) nasal spray 2 sprays by Nasal route 2 (two) times daily as needed for Rhinitis. 30 mL 5    L.acidophil,parac-S.therm-Bif. (RISAQUAD) Cap capsule Take 1 capsule by mouth once daily.      losartan (COZAAR) 50 MG tablet Take 1 tablet (50 mg total) by mouth once daily. 90 tablet 4    meclizine (ANTIVERT) 25 mg tablet Take 1 tablet (25 mg total) by mouth 3 (three) times daily as needed for Dizziness or Nausea. 30 tablet 12    metoprolol tartrate (LOPRESSOR) 100 MG tablet TAKE ONE TABLET BY MOUTH TWICE DAILY 180 tablet 3    MULTIVIT,CALC,MINS/IRON/FOLIC (ONE-A-DAY WOMENS FORMULA ORAL) Take 1 tablet by mouth once daily.      MYRBETRIQ 25 mg Tb24 ER tablet Take 1 tablet (25 mg total) by mouth once daily. 30 tablet 12    ondansetron (ZOFRAN-ODT) 4 MG TbDL DISSOLVE ONE TABLET BY MOUTH EVERY 6 HOURS AS NEEDED FOR NAUSEA 60 tablet 1    polyvinyl alcohol, artificial tears, (LIQUIFILM TEARS) 1.4 % ophthalmic solution Place 1 drop into both eyes as needed.      promethazine (PHENERGAN) 25 MG tablet Take 1 tablet (25 mg total) by mouth every 6 (six) hours as needed for Nausea. 30 tablet 0    temazepam (RESTORIL) 30 mg capsule Take 1 capsule (30 mg total) by mouth nightly. 90 capsule 1    topiramate (TOPAMAX) 25 MG tablet Take 1 tablet (25 mg total) by mouth once daily. 90 tablet 4    TRANSDERM-SCOP 1 mg over 3 days Place 1 patch onto the skin every 72 hours. 4 patch 0    vitamin D 1000 units Tab Take 1,000 Units by mouth once daily.       No current facility-administered medications on file prior to visit.       HEALTH MAINTENANCE THAT IS OVERDUE OR NEEDS TO BE UPDATED ON OUR CHART IS LISTED BELOW.  IF YOU HAVE HAD IT DONE ELSEWHERE, PLEASE SEND US DATES  AND RECORDS IF YOU HAVE THEM TO MAKE YOUR CHART ACCURATE.  IF YOU HAVE NOT HAD THESE DONE AND ARE READY FOR US TO SCHEDULE THEM, PLEASE SEND US A MESSAGE.  Health Maintenance Due   Topic Date Due    TETANUS VACCINE  Never done    Colonoscopy  02/08/2014    Shingles Vaccine (1 of 2) 06/04/2016    COVID-19 Vaccine (4 - Booster for Moderna series) 04/04/2022    DEXA Scan  06/23/2022       DISCLAIMER: This note was compiled by using a speech recognition dictation system and therefore please be aware that typographical / speech recognition errors can and do occur.  Please contact me if you see any errors specifically.    Bunny Moses MD  We Offer Telehealth & Same Day Appointments!   Book your Telehealth appointment with me through my nurse or   Clinic appointments on [employer]!  Alrahz-[phone]     Check out my Facebook Page and Follow Me at: CLICK HERE    Check out my website at [employer] by clicking on: CLICK HERE    To Schedule appointments online, go to [employer]: CLICK HERE     Location: [email or web address]    [address] [place] [address]    FAX: [phone]

## 2022-07-08 ENCOUNTER — PATIENT MESSAGE (OUTPATIENT)
Dept: CARDIOLOGY | Facility: HOSPITAL | Age: 82
End: 2022-07-08
Payer: MEDICARE

## 2022-07-10 ENCOUNTER — PATIENT MESSAGE (OUTPATIENT)
Dept: FAMILY MEDICINE | Facility: CLINIC | Age: 82
End: 2022-07-10
Payer: MEDICARE

## 2022-07-11 ENCOUNTER — OFFICE VISIT (OUTPATIENT)
Dept: FAMILY MEDICINE | Facility: CLINIC | Age: 82
End: 2022-07-11
Payer: MEDICARE

## 2022-07-11 VITALS
SYSTOLIC BLOOD PRESSURE: 132 MMHG | HEART RATE: 60 BPM | BODY MASS INDEX: 31.65 KG/M2 | HEIGHT: 59 IN | WEIGHT: 157 LBS | OXYGEN SATURATION: 98 % | DIASTOLIC BLOOD PRESSURE: 72 MMHG

## 2022-07-11 DIAGNOSIS — R21 RASH OF FACE: Primary | ICD-10-CM

## 2022-07-11 PROCEDURE — 99215 OFFICE O/P EST HI 40 MIN: CPT | Mod: PBBFAC,PO | Performed by: NURSE PRACTITIONER

## 2022-07-11 PROCEDURE — 99213 PR OFFICE/OUTPT VISIT, EST, LEVL III, 20-29 MIN: ICD-10-PCS | Mod: S$PBB,,, | Performed by: NURSE PRACTITIONER

## 2022-07-11 PROCEDURE — 99999 PR PBB SHADOW E&M-EST. PATIENT-LVL V: ICD-10-PCS | Mod: PBBFAC,,, | Performed by: NURSE PRACTITIONER

## 2022-07-11 PROCEDURE — 99213 OFFICE O/P EST LOW 20 MIN: CPT | Mod: S$PBB,,, | Performed by: NURSE PRACTITIONER

## 2022-07-11 PROCEDURE — 99999 PR PBB SHADOW E&M-EST. PATIENT-LVL V: CPT | Mod: PBBFAC,,, | Performed by: NURSE PRACTITIONER

## 2022-07-11 RX ORDER — PREDNISONE 20 MG/1
40 TABLET ORAL DAILY
Qty: 10 TABLET | Refills: 0 | Status: SHIPPED | OUTPATIENT
Start: 2022-07-11 | End: 2022-07-16

## 2022-07-11 RX ORDER — TRIAMCINOLONE ACETONIDE 1 MG/G
OINTMENT TOPICAL 2 TIMES DAILY
Qty: 15 G | Refills: 0 | Status: SHIPPED | OUTPATIENT
Start: 2022-07-11 | End: 2023-01-24

## 2022-07-13 ENCOUNTER — TELEPHONE (OUTPATIENT)
Dept: CARDIOLOGY | Facility: HOSPITAL | Age: 82
End: 2022-07-13
Payer: MEDICARE

## 2022-07-15 ENCOUNTER — PATIENT MESSAGE (OUTPATIENT)
Dept: CARDIOLOGY | Facility: HOSPITAL | Age: 82
End: 2022-07-15
Payer: MEDICARE

## 2022-07-15 DIAGNOSIS — Z79.899 ENCOUNTER FOR LONG-TERM (CURRENT) USE OF MEDICATIONS: ICD-10-CM

## 2022-07-15 DIAGNOSIS — K52.9 CHRONIC DIARRHEA: ICD-10-CM

## 2022-07-15 DIAGNOSIS — Z90.49 HISTORY OF BOWEL RESECTION: ICD-10-CM

## 2022-07-15 DIAGNOSIS — Z85.9 PERSONAL HISTORY OF MALIGNANT CARCINOID TUMOR: ICD-10-CM

## 2022-07-15 RX ORDER — DIPHENOXYLATE HYDROCHLORIDE AND ATROPINE SULFATE 2.5; .025 MG/1; MG/1
1 TABLET ORAL 4 TIMES DAILY PRN
Qty: 90 TABLET | Refills: 5 | Status: SHIPPED | OUTPATIENT
Start: 2022-07-15 | End: 2023-06-05

## 2022-07-16 ENCOUNTER — PATIENT MESSAGE (OUTPATIENT)
Dept: FAMILY MEDICINE | Facility: CLINIC | Age: 82
End: 2022-07-16
Payer: MEDICARE

## 2022-07-17 ENCOUNTER — PATIENT MESSAGE (OUTPATIENT)
Dept: FAMILY MEDICINE | Facility: CLINIC | Age: 82
End: 2022-07-17
Payer: MEDICARE

## 2022-07-17 DIAGNOSIS — B02.8 HERPES ZOSTER WITH COMPLICATION: Primary | ICD-10-CM

## 2022-07-17 RX ORDER — TRAMADOL HYDROCHLORIDE 50 MG/1
50 TABLET ORAL EVERY 6 HOURS PRN
Qty: 28 TABLET | Refills: 0 | Status: SHIPPED | OUTPATIENT
Start: 2022-07-17 | End: 2023-01-24

## 2022-07-17 RX ORDER — VALACYCLOVIR HYDROCHLORIDE 1 G/1
1000 TABLET, FILM COATED ORAL 2 TIMES DAILY
Qty: 20 TABLET | Refills: 0 | Status: SHIPPED | OUTPATIENT
Start: 2022-07-17 | End: 2023-01-24

## 2022-07-17 RX ORDER — GABAPENTIN 100 MG/1
100 CAPSULE ORAL 3 TIMES DAILY PRN
Qty: 90 CAPSULE | Refills: 1 | Status: SHIPPED | OUTPATIENT
Start: 2022-07-17 | End: 2022-12-12

## 2022-07-17 RX ORDER — VALACYCLOVIR HYDROCHLORIDE 1 G/1
TABLET, FILM COATED ORAL
COMMUNITY
Start: 2022-07-15 | End: 2023-01-24

## 2022-07-17 NOTE — PROGRESS NOTES
I received your message which was reviewed along with the the medication list and allergies that we have below.  Please review it for accuracy to make sure that we have the most recent records on your history.     Based on this, the following orders were placed AND/OR medicines were sent in.     No orders of the defined types were placed in this encounter.      Medications written and sent at this time include:  Medications Ordered This Encounter   Medications    gabapentin (NEURONTIN) 100 MG capsule     Sig: Take 1 capsule (100 mg total) by mouth 3 (three) times daily as needed (shingles pain).     Dispense:  90 capsule     Refill:  1    traMADoL (ULTRAM) 50 mg tablet     Sig: Take 1 tablet (50 mg total) by mouth every 6 (six) hours as needed (severe pain).     Dispense:  28 tablet     Refill:  0     Quantity prescribed more than 7 day supply? No     Order Specific Question:   I have reviewed the Prescription Drug Monitoring Program (PDMP) database for this patient prior to prescribing the above opioid medication     Answer:   Yes    valACYclovir (VALTREX) 1000 MG tablet     Sig: Take 1 tablet (1,000 mg total) by mouth 2 (two) times daily.     Dispense:  20 tablet     Refill:  0       Your pharmacy(ies) of choice at this time on record include the list below and any medications would have been sent to the one at the top.    [facility] - [facility] [address]  [address] [facility] [address]  Phone: [phone] Fax: [phone]    [facility]. - [facility] [address] [facility]  [address] [facility] [address]  Phone: [phone] Fax: [phone]    [id number] - [place], [place] - [address] [facility]  [address] [facility] [address]  Phone: [phone] Fax: [phone]      Thank you for choosing us as your healthcare provider!  Dr. Bunny Moses    ALLERGY LIST  Review of patient's allergies indicates:   Allergen Reactions     Latex Hives    B12 [cyanocobalamin-cobamamide] Other (See Comments)     Caused flushing and caused skin to peel    Codeine      Other reaction(s): Muscle pain    Iodine      Other reaction(s): Hives    Meperidine      Other reaction(s): Muscle pain    Narcotic antagonist      Other reaction(s): Rash    Penicillins Nausea And Vomiting    Propoxyphene      Other reaction(s): Muscle pain    Sulfa (sulfonamide antibiotics)      Other reaction(s): Muscle pain  Other reaction(s): Hives       MEDICATION LIST  Current Outpatient Medications on File Prior to Visit   Medication Sig Dispense Refill    alendronate (FOSAMAX) 70 MG tablet Take 1 tablet (70 mg total) by mouth every 7 days. 12 tablet 3    ascorbic acid, vitamin C, (VITAMIN C) 500 MG tablet Take 500 mg by mouth once daily.      aspirin (ECOTRIN) 81 MG EC tablet Take 1 tablet (81 mg total) by mouth once daily. 90 tablet 3    atorvastatin (LIPITOR) 40 MG tablet Take 1 tablet (40 mg total) by mouth once daily. 90 tablet 4    calcium carbonate (CALCIUM 500 ORAL) Take 2 tablets by mouth once daily.       citalopram (CELEXA) 20 MG tablet Take 1 tablet (20 mg total) by mouth once daily. 90 tablet 4    cranberry fruit extract (CRANBERRY ORAL) Take 1 tablet by mouth once daily.      diphenoxylate-atropine 2.5-0.025 mg (LOMOTIL) 2.5-0.025 mg per tablet Take 1 tablet by mouth 4 (four) times daily as needed for Diarrhea. 90 tablet 5    ELDERBERRY FRUIT ORAL Take 1 tablet by mouth daily as needed.      esomeprazole (NEXIUM) 40 MG capsule Take 1 capsule (40 mg total) by mouth before breakfast. 90 capsule 3    estradioL (ESTRACE) 0.01 % (0.1 mg/gram) vaginal cream SMARTSIG:Sparingly Vaginal Every Night      fluticasone propionate (FLONASE) 50 mcg/actuation nasal spray 1 spray by Each Nostril route daily as needed for Rhinitis.      ipratropium (ATROVENT) 21 mcg (0.03 %) nasal spray 2 sprays by Nasal route 2 (two) times daily as needed for Rhinitis. 30 mL 5     L.acidophil,parac-S.therm-Bif. (RISAQUAD) Cap capsule Take 1 capsule by mouth once daily.      losartan (COZAAR) 50 MG tablet Take 1 tablet (50 mg total) by mouth once daily. 90 tablet 4    meclizine (ANTIVERT) 25 mg tablet Take 1 tablet (25 mg total) by mouth 3 (three) times daily as needed for Dizziness or Nausea. 30 tablet 12    metoprolol tartrate (LOPRESSOR) 100 MG tablet TAKE ONE TABLET BY MOUTH TWICE DAILY 180 tablet 3    MULTIVIT,CALC,MINS/IRON/FOLIC (ONE-A-DAY WOMENS FORMULA ORAL) Take 1 tablet by mouth once daily.      MYRBETRIQ 25 mg Tb24 ER tablet Take 1 tablet (25 mg total) by mouth once daily. 30 tablet 12    ondansetron (ZOFRAN-ODT) 4 MG TbDL DISSOLVE ONE TABLET BY MOUTH EVERY 6 HOURS AS NEEDED FOR NAUSEA 60 tablet 1    polyvinyl alcohol, artificial tears, (LIQUIFILM TEARS) 1.4 % ophthalmic solution Place 1 drop into both eyes as needed.      [] predniSONE (DELTASONE) 20 MG tablet Take 2 tablets (40 mg total) by mouth once daily. for 5 days 10 tablet 0    promethazine (PHENERGAN) 25 MG tablet Take 1 tablet (25 mg total) by mouth every 6 (six) hours as needed for Nausea. 30 tablet 0    temazepam (RESTORIL) 30 mg capsule Take 1 capsule (30 mg total) by mouth nightly. 90 capsule 1    topiramate (TOPAMAX) 25 MG tablet Take 1 tablet (25 mg total) by mouth once daily. 90 tablet 4    TRANSDERM-SCOP 1 mg over 3 days Place 1 patch onto the skin every 72 hours. 4 patch 0    triamcinolone acetonide 0.1% (KENALOG) 0.1 % ointment Apply topically 2 (two) times daily. 15 g 0    valACYclovir (VALTREX) 1000 MG tablet       vitamin D 1000 units Tab Take 1,000 Units by mouth once daily.       No current facility-administered medications on file prior to visit.       HEALTH MAINTENANCE THAT IS OVERDUE OR NEEDS TO BE UPDATED ON OUR CHART IS LISTED BELOW.  IF YOU HAVE HAD IT DONE ELSEWHERE, PLEASE SEND US DATES AND RECORDS IF YOU HAVE THEM TO MAKE YOUR CHART ACCURATE.  IF YOU HAVE NOT HAD THESE DONE  AND ARE READY FOR US TO SCHEDULE THEM, PLEASE SEND US A MESSAGE.  Health Maintenance Due   Topic Date Due    TETANUS VACCINE  Never done    Colonoscopy  02/08/2014    Shingles Vaccine (1 of 2) 06/04/2016    COVID-19 Vaccine (4 - Booster for Moderna series) 04/04/2022    DEXA Scan  06/23/2022       DISCLAIMER: This note was compiled by using a speech recognition dictation system and therefore please be aware that typographical / speech recognition errors can and do occur.  Please contact me if you see any errors specifically.    Bunny Moses MD  We Offer Telehealth & Same Day Appointments!   Book your Telehealth appointment with me through my nurse or   Clinic appointments on [employer]!  [phone]     Check out my Facebook Page and Follow Me at: CLICK HERE    Check out my website at [employer] by clicking on: CLICK HERE    To Schedule appointments online, go to [employer]: CLICK HERE     Location: [email or web address]    [address] [place] [address]    FAX: [phone]

## 2022-08-25 ENCOUNTER — TELEPHONE (OUTPATIENT)
Dept: ADMINISTRATIVE | Facility: HOSPITAL | Age: 82
End: 2022-08-25
Payer: MEDICARE

## 2022-08-30 ENCOUNTER — PATIENT MESSAGE (OUTPATIENT)
Dept: FAMILY MEDICINE | Facility: CLINIC | Age: 82
End: 2022-08-30
Payer: MEDICARE

## 2022-08-30 DIAGNOSIS — R42 VERTIGO: Primary | ICD-10-CM

## 2022-08-30 DIAGNOSIS — R53.1 WEAKNESS: ICD-10-CM

## 2022-08-30 DIAGNOSIS — R26.2 DIFFICULTY WALKING: Chronic | ICD-10-CM

## 2022-08-30 RX ORDER — SCOLOPAMINE TRANSDERMAL SYSTEM 1 MG/1
PATCH, EXTENDED RELEASE TRANSDERMAL
Qty: 4 PATCH | Refills: 12 | Status: SHIPPED | OUTPATIENT
Start: 2022-08-30 | End: 2023-05-24

## 2022-08-31 NOTE — TELEPHONE ENCOUNTER
I received your message which was reviewed along with the the medication list and allergies that we have below.  Please review it for accuracy to make sure that we have the most recent records on your history.     Based on this, the following orders were placed AND/OR medicines were sent in.     Orders Placed This Encounter   Procedures    Ambulatory referral/consult to Physical/Occupational Therapy     Standing Status:   Future     Standing Expiration Date:   9/30/2023     Referral Priority:   Routine     Referral Type:   Physical Medicine     Referral Reason:   Specialty Services Required     Requested Specialty:   Physical Therapy     Number of Visits Requested:   1       Medications written and sent at this time include:  Medications Ordered This Encounter   Medications    scopolamine (TRANSDERM-SCOP) 1.3-1.5 mg (1 mg over 3 days)     Sig: Place 1 patch onto the skin every 72 hours. Strength: 1 mg over 3 days     Dispense:  4 patch     Refill:  12       Your pharmacy(ies) of choice at this time on record include the list below and any medications would have been sent to the one at the top.    [facility] [place] [address] [address] [address]  Phone: [phone] Fax: [phone]    [facility]. [facility] [address]  [address] [facility] [address]  Phone: [phone] Fax: [phone]    [id number] [place], [place] - [address]  [address] [facility] [address]  Phone: [phone] Fax: [phone]      Thank you for choosing us as your healthcare provider!  Dr. Bunny Moses    ALLERGY LIST  Review of patient's allergies indicates:   Allergen Reactions    Latex Hives    B12 [cyanocobalamin-cobamamide] Other (See Comments)     Caused flushing and caused skin to peel    Codeine      Other reaction(s): Muscle pain    Iodine      Other reaction(s): Hives    Meperidine      Other reaction(s):  Muscle pain    Narcotic antagonist      Other reaction(s): Rash    Penicillins Nausea And Vomiting    Propoxyphene      Other reaction(s): Muscle pain    Sulfa (sulfonamide antibiotics)      Other reaction(s): Muscle pain  Other reaction(s): Hives       MEDICATION LIST  Current Outpatient Medications on File Prior to Visit   Medication Sig Dispense Refill    alendronate (FOSAMAX) 70 MG tablet Take 1 tablet (70 mg total) by mouth every 7 days. 12 tablet 3    ascorbic acid, vitamin C, (VITAMIN C) 500 MG tablet Take 500 mg by mouth once daily.      aspirin (ECOTRIN) 81 MG EC tablet Take 1 tablet (81 mg total) by mouth once daily. 90 tablet 3    atorvastatin (LIPITOR) 40 MG tablet Take 1 tablet (40 mg total) by mouth once daily. 90 tablet 4    calcium carbonate (CALCIUM 500 ORAL) Take 2 tablets by mouth once daily.       citalopram (CELEXA) 20 MG tablet Take 1 tablet (20 mg total) by mouth once daily. 90 tablet 4    cranberry fruit extract (CRANBERRY ORAL) Take 1 tablet by mouth once daily.      diphenoxylate-atropine 2.5-0.025 mg (LOMOTIL) 2.5-0.025 mg per tablet Take 1 tablet by mouth 4 (four) times daily as needed for Diarrhea. 90 tablet 5    ELDERBERRY FRUIT ORAL Take 1 tablet by mouth daily as needed.      esomeprazole (NEXIUM) 40 MG capsule Take 1 capsule (40 mg total) by mouth before breakfast. 90 capsule 3    estradioL (ESTRACE) 0.01 % (0.1 mg/gram) vaginal cream SMARTSIG:Sparingly Vaginal Every Night      fluticasone propionate (FLONASE) 50 mcg/actuation nasal spray 1 spray by Each Nostril route daily as needed for Rhinitis.      gabapentin (NEURONTIN) 100 MG capsule Take 1 capsule (100 mg total) by mouth 3 (three) times daily as needed (shingles pain). 90 capsule 1    ipratropium (ATROVENT) 21 mcg (0.03 %) nasal spray 2 sprays by Nasal route 2 (two) times daily as needed for Rhinitis. 30 mL 5    L.acidophil,parac-S.therm-Bif. (RISAQUAD) Cap capsule Take 1 capsule by mouth once daily.      losartan (COZAAR) 50  MG tablet Take 1 tablet (50 mg total) by mouth once daily. 90 tablet 4    meclizine (ANTIVERT) 25 mg tablet Take 1 tablet (25 mg total) by mouth 3 (three) times daily as needed for Dizziness or Nausea. 30 tablet 12    metoprolol tartrate (LOPRESSOR) 100 MG tablet TAKE ONE TABLET BY MOUTH TWICE DAILY 180 tablet 3    MULTIVIT,CALC,MINS/IRON/FOLIC (ONE-A-DAY WOMENS FORMULA ORAL) Take 1 tablet by mouth once daily.      MYRBETRIQ 25 mg Tb24 ER tablet Take 1 tablet (25 mg total) by mouth once daily. 30 tablet 12    ondansetron (ZOFRAN-ODT) 4 MG TbDL DISSOLVE ONE TABLET BY MOUTH EVERY 6 HOURS AS NEEDED FOR NAUSEA 60 tablet 1    polyvinyl alcohol, artificial tears, (LIQUIFILM TEARS) 1.4 % ophthalmic solution Place 1 drop into both eyes as needed.      promethazine (PHENERGAN) 25 MG tablet Take 1 tablet (25 mg total) by mouth every 6 (six) hours as needed for Nausea. 30 tablet 0    temazepam (RESTORIL) 30 mg capsule Take 1 capsule (30 mg total) by mouth nightly. 90 capsule 1    topiramate (TOPAMAX) 25 MG tablet Take 1 tablet (25 mg total) by mouth once daily. 90 tablet 4    traMADoL (ULTRAM) 50 mg tablet Take 1 tablet (50 mg total) by mouth every 6 (six) hours as needed (severe pain). 28 tablet 0    triamcinolone acetonide 0.1% (KENALOG) 0.1 % ointment Apply topically 2 (two) times daily. 15 g 0    valACYclovir (VALTREX) 1000 MG tablet       valACYclovir (VALTREX) 1000 MG tablet Take 1 tablet (1,000 mg total) by mouth 2 (two) times daily. 20 tablet 0    vitamin D 1000 units Tab Take 1,000 Units by mouth once daily.      [DISCONTINUED] TRANSDERM-SCOP 1 mg over 3 days Place 1 patch onto the skin every 72 hours. 4 patch 0     No current facility-administered medications on file prior to visit.       HEALTH MAINTENANCE THAT IS OVERDUE OR NEEDS TO BE UPDATED ON OUR CHART IS LISTED BELOW.  IF YOU HAVE HAD IT DONE ELSEWHERE, PLEASE SEND US DATES AND RECORDS IF YOU HAVE THEM TO MAKE YOUR CHART ACCURATE.  IF YOU HAVE NOT HAD THESE  DONE AND ARE READY FOR US TO SCHEDULE THEM, PLEASE SEND US A MESSAGE.  Health Maintenance Due   Topic Date Due    TETANUS VACCINE  Never done    Colonoscopy  02/08/2014    Shingles Vaccine (1 of 2) 06/04/2016    COVID-19 Vaccine (4 - Booster for Moderna series) 04/04/2022    DEXA Scan  06/23/2022       DISCLAIMER: This note was compiled by using a speech recognition dictation system and therefore please be aware that typographical / speech recognition errors can and do occur.  Please contact me if you see any errors specifically.    Bunny Moses MD  We Offer Telehealth & Same Day Appointments!   Book your Telehealth appointment with me through my nurse or   Clinic appointments on [employer]!  [phone]     Check out my Facebook Page and Follow Me at: CLICK HERE    Check out my website at [employer] by clicking on: CLICK HERE    To Schedule appointments online, go to [employer]: CLICK HERE     Location: [email or web address]    [address] [place] [address]    FAX: [phone]

## 2022-09-07 ENCOUNTER — TELEPHONE (OUTPATIENT)
Dept: ADMINISTRATIVE | Facility: CLINIC | Age: 82
End: 2022-09-07
Payer: MEDICARE

## 2022-09-07 NOTE — TELEPHONE ENCOUNTER
Called pt, no answer; left message informing pt I was calling to remind pt of her in office EAWV on 9/8/22 and to return my call if she had any questions; left my name and number

## 2022-09-07 NOTE — TELEPHONE ENCOUNTER
Pt called in and stated she was returning my call in regards to awv appt on 9/8/22; pt stated she has another appt and needs to reschedule appt; appt rescheduled to 9/19/22 per pt request

## 2022-09-26 PROBLEM — J01.40 SUBACUTE PANSINUSITIS: Status: RESOLVED | Noted: 2022-06-23 | Resolved: 2022-09-26

## 2022-09-30 ENCOUNTER — PES CALL (OUTPATIENT)
Dept: ADMINISTRATIVE | Facility: CLINIC | Age: 82
End: 2022-09-30
Payer: MEDICARE

## 2022-10-05 ENCOUNTER — TELEPHONE (OUTPATIENT)
Dept: FAMILY MEDICINE | Facility: CLINIC | Age: 82
End: 2022-10-05
Payer: MEDICARE

## 2022-10-05 NOTE — TELEPHONE ENCOUNTER
----- Message from Janina Hernandez sent at 10/5/2022 10:20 AM CDT -----  Contact: self/[phone]  Pt called in regard to scheduling her bone density and echo in [place]. Call back    Please advise

## 2022-10-26 ENCOUNTER — HOSPITAL ENCOUNTER (OUTPATIENT)
Dept: RADIOLOGY | Facility: HOSPITAL | Age: 82
Discharge: HOME | End: 2022-10-26
Attending: FAMILY MEDICINE
Payer: MEDICARE

## 2022-10-26 DIAGNOSIS — M81.0 AGE-RELATED OSTEOPOROSIS WITHOUT CURRENT PATHOLOGICAL FRACTURE: ICD-10-CM

## 2022-10-26 PROCEDURE — 77080 DXA BONE DENSITY AXIAL: CPT | Mod: 26,,, | Performed by: RADIOLOGY

## 2022-10-26 PROCEDURE — 77080 DXA BONE DENSITY AXIAL: CPT | Mod: TC,PO

## 2022-10-26 PROCEDURE — 77080 DEXA BONE DENSITY SPINE HIP: ICD-10-PCS | Mod: 26,,, | Performed by: RADIOLOGY

## 2022-10-27 ENCOUNTER — TELEPHONE (OUTPATIENT)
Dept: FAMILY MEDICINE | Facility: CLINIC | Age: 82
End: 2022-10-27
Payer: MEDICARE

## 2022-10-27 DIAGNOSIS — M81.0 AGE-RELATED OSTEOPOROSIS WITHOUT CURRENT PATHOLOGICAL FRACTURE: Primary | Chronic | ICD-10-CM

## 2022-10-27 NOTE — PROGRESS NOTES
Call the patient to discuss. [phone]  Suzie Dawkins,  The bone density test (DEXA scan) shows osteoporosis which is unchanged from previous.    I recommend that you continue on calcium and vitamin-D.  Also recommend that you follow-up with Endocrinology or Rheumatology to discuss other treatment options for osteoporosis.

## 2022-10-27 NOTE — TELEPHONE ENCOUNTER
I have signed for the following orders AND/OR meds.  Please call the patient and ask the patient to schedule the testing AND/OR inform about any medications that were sent.      Orders Placed This Encounter   Procedures    Ambulatory referral/consult to Endocrinology     Standing Status:   Future     Standing Expiration Date:   11/27/2023     Referral Priority:   Routine     Referral Type:   Consultation     Referred to Provider:   Alvin Breen MD     Requested Specialty:   Endocrinology     Number of Visits Requested:   1

## 2022-12-11 ENCOUNTER — PATIENT MESSAGE (OUTPATIENT)
Dept: FAMILY MEDICINE | Facility: CLINIC | Age: 82
End: 2022-12-11
Payer: MEDICARE

## 2022-12-12 ENCOUNTER — PATIENT MESSAGE (OUTPATIENT)
Dept: FAMILY MEDICINE | Facility: CLINIC | Age: 82
End: 2022-12-12

## 2022-12-12 ENCOUNTER — CLINICAL SUPPORT (OUTPATIENT)
Dept: FAMILY MEDICINE | Facility: CLINIC | Age: 82
End: 2022-12-12
Payer: MEDICARE

## 2022-12-12 ENCOUNTER — OFFICE VISIT (OUTPATIENT)
Dept: FAMILY MEDICINE | Facility: CLINIC | Age: 82
End: 2022-12-12
Payer: MEDICARE

## 2022-12-12 VITALS
SYSTOLIC BLOOD PRESSURE: 167 MMHG | BODY MASS INDEX: 32.56 KG/M2 | WEIGHT: 161.5 LBS | DIASTOLIC BLOOD PRESSURE: 74 MMHG | TEMPERATURE: 99 F | HEART RATE: 51 BPM | HEIGHT: 59 IN | RESPIRATION RATE: 18 BRPM | OXYGEN SATURATION: 97 %

## 2022-12-12 DIAGNOSIS — R00.1 BRADYCARDIA, DRUG INDUCED: ICD-10-CM

## 2022-12-12 DIAGNOSIS — I10 ESSENTIAL HYPERTENSION: ICD-10-CM

## 2022-12-12 DIAGNOSIS — R53.1 WEAK: ICD-10-CM

## 2022-12-12 DIAGNOSIS — R11.2 NAUSEA AND VOMITING, UNSPECIFIED VOMITING TYPE: ICD-10-CM

## 2022-12-12 DIAGNOSIS — Z79.899 ENCOUNTER FOR LONG-TERM (CURRENT) USE OF MEDICATIONS: ICD-10-CM

## 2022-12-12 DIAGNOSIS — I10 ESSENTIAL HYPERTENSION: Primary | ICD-10-CM

## 2022-12-12 DIAGNOSIS — F32.A DEPRESSION, UNSPECIFIED DEPRESSION TYPE: ICD-10-CM

## 2022-12-12 DIAGNOSIS — E78.5 HYPERLIPIDEMIA, UNSPECIFIED HYPERLIPIDEMIA TYPE: ICD-10-CM

## 2022-12-12 DIAGNOSIS — F41.9 ANXIETY: ICD-10-CM

## 2022-12-12 DIAGNOSIS — T50.905A BRADYCARDIA, DRUG INDUCED: ICD-10-CM

## 2022-12-12 PROCEDURE — 93010 EKG 12-LEAD: ICD-10-PCS | Mod: S$PBB,,, | Performed by: STUDENT IN AN ORGANIZED HEALTH CARE EDUCATION/TRAINING PROGRAM

## 2022-12-12 PROCEDURE — 93005 ELECTROCARDIOGRAM TRACING: CPT | Mod: PBBFAC,PO | Performed by: STUDENT IN AN ORGANIZED HEALTH CARE EDUCATION/TRAINING PROGRAM

## 2022-12-12 PROCEDURE — 93010 ELECTROCARDIOGRAM REPORT: CPT | Mod: S$PBB,,, | Performed by: STUDENT IN AN ORGANIZED HEALTH CARE EDUCATION/TRAINING PROGRAM

## 2022-12-12 PROCEDURE — 99999 PR PBB SHADOW E&M-EST. PATIENT-LVL V: ICD-10-PCS | Mod: PBBFAC,,, | Performed by: FAMILY MEDICINE

## 2022-12-12 PROCEDURE — 99999 PR PBB SHADOW E&M-EST. PATIENT-LVL V: CPT | Mod: PBBFAC,,, | Performed by: FAMILY MEDICINE

## 2022-12-12 PROCEDURE — 99215 OFFICE O/P EST HI 40 MIN: CPT | Mod: S$PBB,,, | Performed by: FAMILY MEDICINE

## 2022-12-12 PROCEDURE — 99215 OFFICE O/P EST HI 40 MIN: CPT | Mod: PBBFAC,PO | Performed by: FAMILY MEDICINE

## 2022-12-12 PROCEDURE — 99215 PR OFFICE/OUTPT VISIT, EST, LEVL V, 40-54 MIN: ICD-10-PCS | Mod: S$PBB,,, | Performed by: FAMILY MEDICINE

## 2022-12-12 RX ORDER — METOPROLOL SUCCINATE 100 MG/1
100 TABLET, EXTENDED RELEASE ORAL DAILY
Qty: 30 TABLET | Refills: 11 | Status: SHIPPED | OUTPATIENT
Start: 2022-12-12 | End: 2023-01-11

## 2022-12-12 RX ORDER — ASPIRIN 81 MG/1
81 TABLET ORAL DAILY
Qty: 90 TABLET | Refills: 3 | Status: SHIPPED | OUTPATIENT
Start: 2022-12-12 | End: 2024-01-11

## 2022-12-12 RX ORDER — CITALOPRAM 20 MG/1
30 TABLET, FILM COATED ORAL DAILY
Qty: 135 TABLET | Refills: 4 | Status: SHIPPED | OUTPATIENT
Start: 2022-12-12 | End: 2023-05-24

## 2022-12-12 RX ORDER — PROMETHAZINE HYDROCHLORIDE 25 MG/1
25 TABLET ORAL EVERY 6 HOURS PRN
Qty: 30 TABLET | Refills: 0 | Status: SHIPPED | OUTPATIENT
Start: 2022-12-12 | End: 2023-05-24 | Stop reason: SDUPTHER

## 2022-12-12 RX ORDER — LOSARTAN POTASSIUM 100 MG/1
100 TABLET ORAL DAILY
Qty: 90 TABLET | Refills: 4 | Status: SHIPPED | OUTPATIENT
Start: 2022-12-12 | End: 2023-08-02

## 2022-12-12 NOTE — ASSESSMENT & PLAN NOTE
Increase Celexa to 30 milligrams daily.Please be advised of condition course.  SNRI/SSRI is first-line treatment for this condition.  Please be advised of the risk of discontinuing this medication without tapering/contacting MD.  Patient has been advised of side effects, and all questions were answered.  Patient voiced understanding.  Patient will follow up routinely and notify us if having any side effects or worsening or persistent symptoms.  ER precautions were given. Antidepressant/Antianxiety Medication Initiation:  Patient informed of risks, benefits, and potential side effects of medication and accepts informed consent.  Common side effects include nausea, fatigue, headache, insomnia, etc see medication insert for complete side effect profile.  Most importantly be advised of the possibility of new or worsening suicidal thoughts/depression/anxiety etcetera.  Please be advised to stop the medication immediately and seek urgent treatment if this occurs.  Therefore please do not to abruptly discontinue medication without physician guidance except in cases of sudden onset or worsening of SI.

## 2022-12-12 NOTE — ASSESSMENT & PLAN NOTE
Changing metoprolol 100 milligram extended release and increasing losartan to 100 milligram daily.  Monitor renal function.  Referral to Cardiology for further evaluation and treatment.Counseled on importance of hypertension disease course, I recommend ongoing Education for DASH-diet and exercise.  Counseled on medication regimen importance of treating high blood pressure.  Please be advised of risk of untreated blood pressure as discussed.  Please advised of ER precautions were given for symptoms of hypertensive urgency and emergency.

## 2022-12-12 NOTE — ASSESSMENT & PLAN NOTE
Possibly causing weakness fatigue.  Reduce metoprolol 100 milligrams once daily.  Follow-up with Cardiology as referred.

## 2022-12-12 NOTE — ASSESSMENT & PLAN NOTE
Starting baby aspirin for primary prevention.Complete history and physical was completed today.  Complete and thorough medication reconciliation was performed.  Discussed risks and benefits of medications.  Advised patient on orders and health maintenance.  We discussed old records and old labs if available.  Will request any records not available through epic.  Continue current medications listed on your summary sheet.

## 2022-12-12 NOTE — PROGRESS NOTES
PLAN:      Problem List Items Addressed This Visit       Essential hypertension - Primary (Chronic)     Changing metoprolol 100 milligram extended release and increasing losartan to 100 milligram daily.  Monitor renal function.  Referral to Cardiology for further evaluation and treatment.Counseled on importance of hypertension disease course, I recommend ongoing Education for DASH-diet and exercise.  Counseled on medication regimen importance of treating high blood pressure.  Please be advised of risk of untreated blood pressure as discussed.  Please advised of ER precautions were given for symptoms of hypertensive urgency and emergency.           Relevant Medications    losartan (COZAAR) 100 MG tablet    metoprolol succinate (TOPROL-XL) 100 MG 24 hr tablet    Other Relevant Orders    CBC Without Differential    Comprehensive Metabolic Panel    TSH    Hemoglobin A1C    Lipid Panel    Urinalysis, Reflex to Urine Culture Urine, Clean Catch (Completed)    EKG 12-lead    BNP    TROPONIN I    Sedimentation rate (Completed)    C-Reactive Protein    Ambulatory referral/consult to Cardiology    Depression (Chronic)     Increase Celexa to 30 milligrams daily.Please be advised of condition course.  SNRI/SSRI is first-line treatment for this condition.  Please be advised of the risk of discontinuing this medication without tapering/contacting MD.  Patient has been advised of side effects, and all questions were answered.  Patient voiced understanding.  Patient will follow up routinely and notify us if having any side effects or worsening or persistent symptoms.  ER precautions were given. Antidepressant/Antianxiety Medication Initiation:  Patient informed of risks, benefits, and potential side effects of medication and accepts informed consent.  Common side effects include nausea, fatigue, headache, insomnia, etc see medication insert for complete side effect profile.  Most importantly be advised of the possibility of new or  worsening suicidal thoughts/depression/anxiety etcetera.  Please be advised to stop the medication immediately and seek urgent treatment if this occurs.  Therefore please do not to abruptly discontinue medication without physician guidance except in cases of sudden onset or worsening of SI.            Relevant Medications    citalopram (CELEXA) 20 MG tablet    Anxiety (Chronic)     See depression problem.         Relevant Medications    citalopram (CELEXA) 20 MG tablet    Hyperlipidemia (Chronic)     Cholesterol has improved.  Continue   Hyperlipidemia Medications               atorvastatin (LIPITOR) 40 MG tablet Take 1 tablet (40 mg total) by mouth once daily.   Counseled on hyperlipidemia disease course, healthy diet and increased need for exercise.  Please be advised of the risk of cardiovascular disease, increase stroke and heart attack risk with uncontrolled/untreated hyperlipidemia.     Patient voiced understanding and understood the treatment plan. All questions were answered.              Relevant Medications    aspirin (ECOTRIN) 81 MG EC tablet    Encounter for long-term (current) use of medications (Chronic)     Starting baby aspirin for primary prevention.Complete history and physical was completed today.  Complete and thorough medication reconciliation was performed.  Discussed risks and benefits of medications.  Advised patient on orders and health maintenance.  We discussed old records and old labs if available.  Will request any records not available through epic.  Continue current medications listed on your summary sheet.           Relevant Medications    losartan (COZAAR) 100 MG tablet    aspirin (ECOTRIN) 81 MG EC tablet    citalopram (CELEXA) 20 MG tablet    Other Relevant Orders    CBC Without Differential    Comprehensive Metabolic Panel    TSH    Hemoglobin A1C    Lipid Panel    Urinalysis, Reflex to Urine Culture Urine, Clean Catch (Completed)    EKG 12-lead    BNP    TROPONIN I     Sedimentation rate (Completed)    C-Reactive Protein    Nausea and vomiting (Chronic)     Patient requesting refill on Phenergan.         Relevant Medications    promethazine (PHENERGAN) 25 MG tablet    Other Relevant Orders    CBC Without Differential    Comprehensive Metabolic Panel    TSH    Hemoglobin A1C    Lipid Panel    Urinalysis, Reflex to Urine Culture Urine, Clean Catch (Completed)    EKG 12-lead    BNP    TROPONIN I    Sedimentation rate (Completed)    C-Reactive Protein    Bradycardia, drug induced     Possibly causing weakness fatigue.  Reduce metoprolol 100 milligrams once daily.  Follow-up with Cardiology as referred.         Weak     Possibly and depression related.  Increasing Celexa.  Update labs.  Patient also with elevated blood pressure.  Referral to Cardiology for evaluation.         Relevant Orders    CBC Without Differential    Comprehensive Metabolic Panel    TSH    Hemoglobin A1C    Lipid Panel    Urinalysis, Reflex to Urine Culture Urine, Clean Catch (Completed)    EKG 12-lead    BNP    TROPONIN I    Sedimentation rate (Completed)    C-Reactive Protein     Future Appointments       Date Provider Specialty Appt Notes    1/11/2023 eRné Lutz MD Cardiology     3/20/2023 Bunny Moses MD Family Medicine High blood pressure           Medication Management for assessment above:   Medication List with Changes/Refills   New Medications    METOPROLOL SUCCINATE (TOPROL-XL) 100 MG 24 HR TABLET    Take 1 tablet (100 mg total) by mouth once daily.   Current Medications    ALENDRONATE (FOSAMAX) 70 MG TABLET    Take 1 tablet (70 mg total) by mouth every 7 days.    ASCORBIC ACID, VITAMIN C, (VITAMIN C) 500 MG TABLET    Take 500 mg by mouth once daily.    ATORVASTATIN (LIPITOR) 40 MG TABLET    Take 1 tablet (40 mg total) by mouth once daily.    CALCIUM CARBONATE (CALCIUM 500 ORAL)    Take 2 tablets by mouth once daily.     CRANBERRY FRUIT EXTRACT (CRANBERRY ORAL)    Take 1 tablet by mouth  once daily.    DIPHENOXYLATE-ATROPINE 2.5-0.025 MG (LOMOTIL) 2.5-0.025 MG PER TABLET    Take 1 tablet by mouth 4 (four) times daily as needed for Diarrhea.    ELDERBERRY FRUIT ORAL    Take 1 tablet by mouth daily as needed.    ESOMEPRAZOLE (NEXIUM) 40 MG CAPSULE    Take 1 capsule (40 mg total) by mouth before breakfast.    ESTRADIOL (ESTRACE) 0.01 % (0.1 MG/GRAM) VAGINAL CREAM    SMARTSIG:Sparingly Vaginal Every Night    FLUTICASONE PROPIONATE (FLONASE) 50 MCG/ACTUATION NASAL SPRAY    1 spray by Each Nostril route daily as needed for Rhinitis.    IPRATROPIUM (ATROVENT) 21 MCG (0.03 %) NASAL SPRAY    2 sprays by Nasal route 2 (two) times daily as needed for Rhinitis.    L.ACIDOPHIL,PARAC-S.THERM-BIF. (RISAQUAD) CAP CAPSULE    Take 1 capsule by mouth once daily.    MECLIZINE (ANTIVERT) 25 MG TABLET    Take 1 tablet (25 mg total) by mouth 3 (three) times daily as needed for Dizziness or Nausea.    MULTIVIT,CALC,MINS/IRON/FOLIC (ONE-A-DAY WOMENS FORMULA ORAL)    Take 1 tablet by mouth once daily.    MYRBETRIQ 25 MG TB24 ER TABLET    Take 1 tablet (25 mg total) by mouth once daily.    ONDANSETRON (ZOFRAN-ODT) 4 MG TBDL    DISSOLVE ONE TABLET BY MOUTH EVERY 6 HOURS AS NEEDED FOR NAUSEA    POLYVINYL ALCOHOL, ARTIFICIAL TEARS, (LIQUIFILM TEARS) 1.4 % OPHTHALMIC SOLUTION    Place 1 drop into both eyes as needed.    SCOPOLAMINE (TRANSDERM-SCOP) 1.3-1.5 MG (1 MG OVER 3 DAYS)    Place 1 patch onto the skin every 72 hours. Strength: 1 mg over 3 days    TEMAZEPAM (RESTORIL) 30 MG CAPSULE    Take 1 capsule (30 mg total) by mouth nightly.    TOPIRAMATE (TOPAMAX) 25 MG TABLET    Take 1 tablet (25 mg total) by mouth once daily.    TRAMADOL (ULTRAM) 50 MG TABLET    Take 1 tablet (50 mg total) by mouth every 6 (six) hours as needed (severe pain).    TRIAMCINOLONE ACETONIDE 0.1% (KENALOG) 0.1 % OINTMENT    Apply topically 2 (two) times daily.    VALACYCLOVIR (VALTREX) 1000 MG TABLET        VALACYCLOVIR (VALTREX) 1000 MG TABLET    Take 1  tablet (1,000 mg total) by mouth 2 (two) times daily.    VITAMIN D 1000 UNITS TAB    Take 1,000 Units by mouth once daily.   Changed and/or Refilled Medications    Modified Medication Previous Medication    ASPIRIN (ECOTRIN) 81 MG EC TABLET aspirin (ECOTRIN) 81 MG EC tablet       Take 1 tablet (81 mg total) by mouth once daily.    Take 1 tablet (81 mg total) by mouth once daily.    CITALOPRAM (CELEXA) 20 MG TABLET citalopram (CELEXA) 20 MG tablet       Take 1.5 tablets (30 mg total) by mouth once daily.    Take 1 tablet (20 mg total) by mouth once daily.    LOSARTAN (COZAAR) 100 MG TABLET losartan (COZAAR) 50 MG tablet       Take 1 tablet (100 mg total) by mouth once daily.    Take 1 tablet (50 mg total) by mouth once daily.    PROMETHAZINE (PHENERGAN) 25 MG TABLET promethazine (PHENERGAN) 25 MG tablet       Take 1 tablet (25 mg total) by mouth every 6 (six) hours as needed for Nausea.    Take 1 tablet (25 mg total) by mouth every 6 (six) hours as needed for Nausea.   Discontinued Medications    GABAPENTIN (NEURONTIN) 100 MG CAPSULE    Take 1 capsule (100 mg total) by mouth 3 (three) times daily as needed (shingles pain).    METOPROLOL TARTRATE (LOPRESSOR) 100 MG TABLET    Take 1 tablet (100 mg total) by mouth 2 (two) times daily.       Bunny Moses M.D.  ==========================================================================  Subjective:   Patient ID: Suzie Dawkins is a 82 y.o. female.  has a past medical history of Anxiety, Arthritis, Cancer (2007), Depression, GERD (gastroesophageal reflux disease), Hypercalcemia, Hyperlipidemia, Hypertension, Osteoporosis (10/7/2013), and Vitamin D deficiency.   Chief Complaint: Hypertension, Headache, and Dizziness (Pt has been having high BP )      Problem List Items Addressed This Visit       Essential hypertension - Primary (Chronic)    Overview     CHRONIC.  December 2022: Blood pressure is difficult to control at home on current regimen.  Patient taking  metoprolol 100 milligrams twice daily and losartan 50 milligrams daily.  March 2022:  Intermittent control. BP Reviewed.  She reports recent headaches.  They do improve with Tylenol.  Compliant with BP medications. No SE reported. JUNE 2020:  Blood pressure is below goal today.  Continue current medication regimen.JANUARY 2020:  Patient currently on metoprolol 100 mg twice a day, losartan 100 mg daily, amlodipine 5 mg daily was recently increased to 10 mg after nursing visit blood pressure check.(-) CP, SOB, palpitations, dizziness, lightheadedness,  arm numbness, tingling or weakness, syncope.  Creatinine   Date Value Ref Range Status   06/23/2022 1.0 0.5 - 1.4 mg/dL Final     Results for orders placed or performed in visit on 12/12/22   EKG 12-lead    Collection Time: 12/12/22  2:15 PM    Narrative    Test Reason : I10,Z79.899,R53.1,R11.2,    Vent. Rate : 052 BPM     Atrial Rate : 052 BPM     P-R Int : 166 ms          QRS Dur : 070 ms      QT Int : 482 ms       P-R-T Axes : 044 -29 035 degrees     QTc Int : 448 ms    Sinus bradycardia  Cannot rule out Anterior infarct ,age undetermined  Abnormal ECG  When compared with ECG of 22-JUL-2020 13:44,  No significant change was found    Referred By: JC BUTTERFIELD           Confirmed By:             Current Assessment & Plan     Changing metoprolol 100 milligram extended release and increasing losartan to 100 milligram daily.  Monitor renal function.  Referral to Cardiology for further evaluation and treatment.Counseled on importance of hypertension disease course, I recommend ongoing Education for DASH-diet and exercise.  Counseled on medication regimen importance of treating high blood pressure.  Please be advised of risk of untreated blood pressure as discussed.  Please advised of ER precautions were given for symptoms of hypertensive urgency and emergency.           Depression (Chronic)    Overview     Chronic.  Comorbid with anxiety.  Patient on low-dose Celexa.   Reports increased symptoms recently.  She would like to go up on the medication.  No HI or SI or hallucinations.           Current Assessment & Plan     Increase Celexa to 30 milligrams daily.Please be advised of condition course.  SNRI/SSRI is first-line treatment for this condition.  Please be advised of the risk of discontinuing this medication without tapering/contacting MD.  Patient has been advised of side effects, and all questions were answered.  Patient voiced understanding.  Patient will follow up routinely and notify us if having any side effects or worsening or persistent symptoms.  ER precautions were given. Antidepressant/Antianxiety Medication Initiation:  Patient informed of risks, benefits, and potential side effects of medication and accepts informed consent.  Common side effects include nausea, fatigue, headache, insomnia, etc see medication insert for complete side effect profile.  Most importantly be advised of the possibility of new or worsening suicidal thoughts/depression/anxiety etcetera.  Please be advised to stop the medication immediately and seek urgent treatment if this occurs.  Therefore please do not to abruptly discontinue medication without physician guidance except in cases of sudden onset or worsening of SI.            Anxiety (Chronic)    Overview     Chronic.  Intermittent control.  Patient using medication appropriately denies any side effects.  See depression problem.         Current Assessment & Plan     See depression problem.         Hyperlipidemia (Chronic)    Overview     CHRONIC. STABLE. Lab analysis reviewed.   (-) CP, SOB, abdominal pain, N/V/D, constipation, jaundice, skin changes.  (-) Myalgias  Lab Results   Component Value Date    CHOL 183 04/01/2022    CHOL 213 (H) 07/22/2020    CHOL 210 (H) 06/23/2020     Lab Results   Component Value Date    HDL 63 04/01/2022    HDL 65 07/22/2020    HDL 58 06/23/2020     Lab Results   Component Value Date    LDLCALC 103.4  04/01/2022    LDLCALC 125.4 07/22/2020    LDLCALC 127.6 06/23/2020     Lab Results   Component Value Date    TRIG 83 04/01/2022    TRIG 113 07/22/2020    TRIG 122 06/23/2020     Lab Results   Component Value Date    CHOLHDL 34.4 04/01/2022    CHOLHDL 30.5 07/22/2020    CHOLHDL 27.6 06/23/2020     Lab Results   Component Value Date    TOTALCHOLEST 2.9 04/01/2022    TOTALCHOLEST 3.3 07/22/2020    TOTALCHOLEST 3.6 06/23/2020     Lab Results   Component Value Date    ALT 51 (H) 06/23/2022    AST 33 06/23/2022    ALKPHOS 109 06/23/2022    BILITOT 0.6 06/23/2022   ======================================================           Current Assessment & Plan     Cholesterol has improved.  Continue   Hyperlipidemia Medications               atorvastatin (LIPITOR) 40 MG tablet Take 1 tablet (40 mg total) by mouth once daily.   Counseled on hyperlipidemia disease course, healthy diet and increased need for exercise.  Please be advised of the risk of cardiovascular disease, increase stroke and heart attack risk with uncontrolled/untreated hyperlipidemia.     Patient voiced understanding and understood the treatment plan. All questions were answered.              Encounter for long-term (current) use of medications (Chronic)    Overview     December 2022: Reviewed labs.  CHRONIC. Stable. Compliant with medications for managed conditions. See medication list. No SE reported. Routine lab analysis is being monitored. Refills were addressed.April 2022:  CHRONIC. Stable. Compliant with medications for managed conditions. See medication list. No SE reported. Routine lab analysis is being monitored. Refills were addressed.  June 2022:  Reviewed labs.  Lab Results   Component Value Date    WBC 9.27 04/01/2022    HGB 13.9 04/01/2022    HCT 44.1 04/01/2022    MCV 99 (H) 04/01/2022     04/01/2022       Chemistry        Component Value Date/Time     06/23/2022 0926    K 4.1 06/23/2022 0926     06/23/2022 0926    CO2 27  06/23/2022 0926    BUN 21 06/23/2022 0926    CREATININE 1.0 06/23/2022 0926    GLU 82 06/23/2022 0926        Component Value Date/Time    CALCIUM 10.7 (H) 06/23/2022 0926    ALKPHOS 109 06/23/2022 0926    AST 33 06/23/2022 0926    ALT 51 (H) 06/23/2022 0926    BILITOT 0.6 06/23/2022 0926    ESTGFRAFRICA >60.0 06/23/2022 0926    EGFRNONAA 53.3 (A) 06/23/2022 0926          Lab Results   Component Value Date    TSH 2.243 06/23/2022    FREET4 0.86 04/30/2021    T3FREE 2.2 (L) 04/30/2021            Current Assessment & Plan     Starting baby aspirin for primary prevention.Complete history and physical was completed today.  Complete and thorough medication reconciliation was performed.  Discussed risks and benefits of medications.  Advised patient on orders and health maintenance.  We discussed old records and old labs if available.  Will request any records not available through epic.  Continue current medications listed on your summary sheet.           Nausea and vomiting (Chronic)    Overview     Associated with dizziness and vertigo.  Patient has tried Zofran but it does not work well.         Current Assessment & Plan     Patient requesting refill on Phenergan.         Bradycardia, drug induced    Overview     December 2022:  Low heart rate noted.  Patient is still feeling weak.  New problem.  Patient is on metoprolol 100 mg twice a day for hypertension.  Patient is asymptomatic in regards to dizziness or lightheadedness.  No falls noted.         Current Assessment & Plan     Possibly causing weakness fatigue.  Reduce metoprolol 100 milligrams once daily.  Follow-up with Cardiology as referred.         Weak    Overview     Chronic.  Intermittent control.  December 2022: Worsening.  Patient states that she does not feel like getting up to do anything lately.  Lab Results   Component Value Date    WBC 9.27 04/01/2022    HGB 13.9 04/01/2022    HCT 44.1 04/01/2022    MCV 99 (H) 04/01/2022     04/01/2022       Lab  Results   Component Value Date    IRON 134 06/27/2012    TIBC 322.0 06/27/2012    FERRITIN 78 06/27/2012     Lab Results   Component Value Date    PGADVURK80 1053 (H) 06/27/2012     Lab Results   Component Value Date    FOLATE 15.3 06/27/2012     Lab Results   Component Value Date    TSH 2.243 06/23/2022    FREET4 0.86 04/30/2021              Current Assessment & Plan     Possibly and depression related.  Increasing Celexa.  Update labs.  Patient also with elevated blood pressure.  Referral to Cardiology for evaluation.             Review of patient's allergies indicates:   Allergen Reactions    Latex Hives    B12 [cyanocobalamin-cobamamide] Other (See Comments)     Caused flushing and caused skin to peel    Codeine      Other reaction(s): Muscle pain    Iodine      Other reaction(s): Hives    Meperidine      Other reaction(s): Muscle pain    Narcotic antagonist      Other reaction(s): Rash    Penicillins Nausea And Vomiting    Propoxyphene      Other reaction(s): Muscle pain    Sulfa (sulfonamide antibiotics)      Other reaction(s): Muscle pain  Other reaction(s): Hives     Current Outpatient Medications   Medication Instructions    alendronate (FOSAMAX) 70 mg, Oral, Every 7 days    ascorbic acid (vitamin C) (VITAMIN C) 500 mg, Oral, Daily    aspirin (ECOTRIN) 81 mg, Oral, Daily    atorvastatin (LIPITOR) 40 mg, Oral, Daily    calcium carbonate (CALCIUM 500 ORAL) 2 tablets, Oral, Daily    citalopram (CELEXA) 30 mg, Oral, Daily    cranberry fruit extract (CRANBERRY ORAL) 1 tablet, Oral, Daily    diphenoxylate-atropine 2.5-0.025 mg (LOMOTIL) 2.5-0.025 mg per tablet 1 tablet, Oral, 4 times daily PRN    ELDERBERRY FRUIT ORAL 1 tablet, Oral, Daily PRN    esomeprazole (NEXIUM) 40 mg, Oral, Before breakfast    estradioL (ESTRACE) 0.01 % (0.1 mg/gram) vaginal cream SMARTSIG:Sparingly Vaginal Every Night    fluticasone propionate (FLONASE) 50 mcg/actuation nasal spray 1 spray, Each Nostril, Daily PRN    ipratropium  (ATROVENT) 21 mcg (0.03 %) nasal spray 2 sprays, Nasal, 2 times daily PRN    L.acidophil,parac-S.therm-Bif. (RISAQUAD) Cap capsule 1 capsule, Oral, Daily    losartan (COZAAR) 100 mg, Oral, Daily    meclizine (ANTIVERT) 25 mg, Oral, 3 times daily PRN    metoprolol succinate (TOPROL-XL) 100 mg, Oral, Daily    MULTIVIT,CALC,MINS/IRON/FOLIC (ONE-A-DAY WOMENS FORMULA ORAL) 1 tablet, Oral, Daily    MYRBETRIQ 25 mg, Oral, Daily    ondansetron (ZOFRAN-ODT) 4 MG TbDL DISSOLVE ONE TABLET BY MOUTH EVERY 6 HOURS AS NEEDED FOR NAUSEA    polyvinyl alcohol, artificial tears, (LIQUIFILM TEARS) 1.4 % ophthalmic solution 1 drop, Both Eyes, As needed (PRN)    promethazine (PHENERGAN) 25 mg, Oral, Every 6 hours PRN    scopolamine (TRANSDERM-SCOP) 1.3-1.5 mg (1 mg over 3 days) Place 1 patch onto the skin every 72 hours. Strength: 1 mg over 3 days    temazepam (RESTORIL) 30 mg, Oral, Nightly    topiramate (TOPAMAX) 25 mg, Oral, Daily    traMADoL (ULTRAM) 50 mg, Oral, Every 6 hours PRN    triamcinolone acetonide 0.1% (KENALOG) 0.1 % ointment Topical (Top), 2 times daily    valACYclovir (VALTREX) 1000 MG tablet No dose, route, or frequency recorded.    valACYclovir (VALTREX) 1,000 mg, Oral, 2 times daily    vitamin D (VITAMIN D3) 1,000 Units, Oral, Daily      I have reviewed the PMH, social history, FamilyHx, surgical history, allergies and medications documented / confirmed by the patient at the time of this visit.  Review of Systems   Constitutional:  Positive for activity change and fatigue. Negative for chills, fever and unexpected weight change.   HENT:  Negative for ear pain and sore throat.    Eyes:  Negative for redness and visual disturbance.   Respiratory:  Negative for cough and shortness of breath.    Cardiovascular:  Negative for chest pain and palpitations.   Gastrointestinal:  Positive for nausea. Negative for vomiting.   Genitourinary:  Negative for difficulty urinating and hematuria.   Musculoskeletal:  Positive for  "arthralgias. Negative for myalgias.   Skin:  Negative for rash and wound.   Neurological:  Positive for headaches. Negative for dizziness and weakness.   Psychiatric/Behavioral:  Positive for dysphoric mood and sleep disturbance (Controlled on medication). The patient is nervous/anxious.    Objective:   BP (!) 167/74 (BP Location: Left arm)   Pulse (!) 51   Temp 98.5 °F (36.9 °C) (Oral)   Resp 18   Ht 4' 11" (1.499 m)   Wt 73.2 kg (161 lb 7.8 oz)   SpO2 97%   BMI 32.62 kg/m²   Physical Exam  Vitals and nursing note reviewed.   Constitutional:       General: She is not in acute distress.     Appearance: She is well-developed. She is not ill-appearing, toxic-appearing or diaphoretic.   HENT:      Head: Normocephalic and atraumatic.      Right Ear: Hearing, ear canal and external ear normal. A middle ear effusion is present. There is no impacted cerumen. Tympanic membrane is bulging. Tympanic membrane is not injected or erythematous.      Left Ear: Hearing, tympanic membrane, ear canal and external ear normal. There is no impacted cerumen. Tympanic membrane is not injected or erythematous.      Nose: Nose normal. No rhinorrhea.      Mouth/Throat:      Mouth: Mucous membranes are not pale.      Pharynx: Uvula midline. No oropharyngeal exudate or posterior oropharyngeal erythema.      Tonsils: No tonsillar exudate or tonsillar abscesses.   Eyes:      General: Lids are normal.      Extraocular Movements: Extraocular movements intact.      Conjunctiva/sclera: Conjunctivae normal.      Pupils: Pupils are equal, round, and reactive to light.   Cardiovascular:      Rate and Rhythm: Bradycardia present.      Pulses: Normal pulses.   Pulmonary:      Effort: Pulmonary effort is normal. No respiratory distress.      Breath sounds: Normal breath sounds.   Abdominal:      General: Bowel sounds are normal.      Palpations: Abdomen is soft.   Musculoskeletal:         General: Tenderness and deformity present. Normal range of " motion.      Cervical back: Normal range of motion and neck supple.   Skin:     General: Skin is warm and dry.      Capillary Refill: Capillary refill takes less than 2 seconds.      Coloration: Skin is not pale.   Neurological:      General: No focal deficit present.      Mental Status: She is alert and oriented to person, place, and time. Mental status is at baseline. She is not disoriented.      Cranial Nerves: No cranial nerve deficit.      Motor: Weakness (generalized) present.      Gait: Gait normal.   Psychiatric:         Attention and Perception: She is attentive.         Mood and Affect: Mood normal. Mood is not anxious or depressed.         Speech: Speech is not rapid and pressured or slurred.         Behavior: Behavior normal. Behavior is not agitated, aggressive or hyperactive. Behavior is cooperative.         Thought Content: Thought content normal. Thought content is not paranoid or delusional. Thought content does not include homicidal or suicidal ideation. Thought content does not include homicidal or suicidal plan.         Cognition and Memory: Memory is not impaired.         Judgment: Judgment normal.        Assessment:     1. Essential hypertension    2. Encounter for long-term (current) use of medications    3. Weak    4. Nausea and vomiting, unspecified vomiting type    5. Hyperlipidemia, unspecified hyperlipidemia type    6. Depression, unspecified depression type    7. Anxiety    8. Bradycardia, drug induced      MDM:   Moderate to high complexity.  Moderate risk.  Total time: 45 minutes.  This includes total time spent on the encounter, which includes face to face time and non-face to face time preparing to see the patient (eg, review of previous medical records, tests), Obtaining and/or reviewing separately obtained history, documenting clinical information in the electronic or other health record, independently interpreting results (not separately reported)/communicating results to the  patient/family/caregiver, and/or care coordination (not separately reported).    I have Reviewed and summarized old records.  I have performed thorough medication reconciliation today and discussed risk and benefits of medications.  I have reviewed labs and discussed with patient.  All questions were answered.  I am requesting old records and will review them once they are available.  PT Milagros Castillo      I have signed for the following orders AND/OR meds.  Orders Placed This Encounter   Procedures    CBC Without Differential     Standing Status:   Future     Number of Occurrences:   1     Standing Expiration Date:   2/10/2024    Comprehensive Metabolic Panel     Standing Status:   Future     Number of Occurrences:   1     Standing Expiration Date:   2/10/2024    TSH     Standing Status:   Future     Number of Occurrences:   1     Standing Expiration Date:   2/10/2024    Hemoglobin A1C     Standing Status:   Future     Number of Occurrences:   1     Standing Expiration Date:   2/10/2024    Lipid Panel     Standing Status:   Future     Number of Occurrences:   1     Standing Expiration Date:   2/10/2024    Urinalysis, Reflex to Urine Culture Urine, Clean Catch     Standing Status:   Future     Number of Occurrences:   1     Standing Expiration Date:   6/12/2024     Order Specific Question:   Preferred Collection Type     Answer:   Urine, Clean Catch     Order Specific Question:   Specimen Source     Answer:   Urine    BNP     Standing Status:   Future     Number of Occurrences:   1     Standing Expiration Date:   12/13/2023    TROPONIN I     Standing Status:   Future     Number of Occurrences:   1     Standing Expiration Date:   2/10/2024    Sedimentation rate     Standing Status:   Future     Number of Occurrences:   1     Standing Expiration Date:   2/10/2024    C-Reactive Protein     Standing Status:   Future     Number of Occurrences:   1     Standing Expiration Date:   2/10/2024    Ambulatory  referral/consult to Cardiology     Standing Status:   Future     Standing Expiration Date:   1/12/2024     Referral Priority:   Routine     Referral Type:   Consultation     Referral Reason:   Specialty Services Required     Requested Specialty:   Cardiology     Number of Visits Requested:   1    EKG 12-lead     Standing Status:   Future     Number of Occurrences:   1     Standing Expiration Date:   12/12/2023     Medications Ordered This Encounter   Medications    aspirin (ECOTRIN) 81 MG EC tablet     Sig: Take 1 tablet (81 mg total) by mouth once daily.     Dispense:  90 tablet     Refill:  3    citalopram (CELEXA) 20 MG tablet     Sig: Take 1.5 tablets (30 mg total) by mouth once daily.     Dispense:  135 tablet     Refill:  4    losartan (COZAAR) 100 MG tablet     Sig: Take 1 tablet (100 mg total) by mouth once daily.     Dispense:  90 tablet     Refill:  4     .    metoprolol succinate (TOPROL-XL) 100 MG 24 hr tablet     Sig: Take 1 tablet (100 mg total) by mouth once daily.     Dispense:  30 tablet     Refill:  11     .    promethazine (PHENERGAN) 25 MG tablet     Sig: Take 1 tablet (25 mg total) by mouth every 6 (six) hours as needed for Nausea.     Dispense:  30 tablet     Refill:  0        Follow up in about 4 weeks (around 1/9/2023), or if symptoms worsen or fail to improve, for Med refills.  Future Appointments       Date Provider Specialty Appt Notes    1/11/2023 René Lutz MD Cardiology     3/20/2023 Bunny Moses MD Family Medicine High blood pressure            If no improvement in symptoms or symptoms worsen, advised to call/follow-up at clinic or go to ER. Patient voiced understanding and all questions/concerns were addressed.   DISCLAIMER: This note was compiled by using a speech recognition dictation system and therefore please be aware that typographical / speech recognition errors can and do occur.  Please contact me if you see any errors specifically.    Bunny Moses,  M.D.       Office: [phone] [address] [place] [address]  FAX: [phone]

## 2022-12-12 NOTE — PATIENT INSTRUCTIONS
Follow up in about 4 weeks (around 1/9/2023), or if symptoms worsen or fail to improve, for Med refills.     Dear patient,   As a result of recent federal legislation (The Federal Cures Act), you may receive lab or pathology results from your visit in your [facility] account before your physician is able to contact you. Your physician or their representative will relay the results to you with their recommendations at their soonest availability.     If no improvement in symptoms or symptoms worsen, please be advised to call MD, follow-up at clinic and/or go to ER if becomes severe.    Bunny Moses M.D.        We Offer TELEHEALTH & Same Day Appointments!   Book your Telehealth appointment with me through my nurse or   Clinic appointments on [employer]!    [address] [place] [address]    Office: [phone]   FAX: [phone]    Check out my Facebook Page and Follow Me at: [email or web address]    Check out my website at [employer] by clicking on: [email or web address]    To Schedule appointments online, go to [employer]: [email or web address]

## 2022-12-12 NOTE — ASSESSMENT & PLAN NOTE
Possibly and depression related.  Increasing Celexa.  Update labs.  Patient also with elevated blood pressure.  Referral to Cardiology for evaluation.

## 2022-12-12 NOTE — ASSESSMENT & PLAN NOTE
Cholesterol has improved.  Continue   Hyperlipidemia Medications             atorvastatin (LIPITOR) 40 MG tablet Take 1 tablet (40 mg total) by mouth once daily.        Counseled on hyperlipidemia disease course, healthy diet and increased need for exercise.  Please be advised of the risk of cardiovascular disease, increase stroke and heart attack risk with uncontrolled/untreated hyperlipidemia.     Patient voiced understanding and understood the treatment plan. All questions were answered.

## 2022-12-14 ENCOUNTER — TELEPHONE (OUTPATIENT)
Dept: FAMILY MEDICINE | Facility: CLINIC | Age: 82
End: 2022-12-14
Payer: MEDICARE

## 2022-12-14 DIAGNOSIS — R42 VERTIGO: ICD-10-CM

## 2022-12-14 RX ORDER — CEFUROXIME AXETIL 250 MG/1
250 TABLET ORAL 2 TIMES DAILY
Qty: 14 TABLET | Refills: 0 | Status: SHIPPED | OUTPATIENT
Start: 2022-12-14 | End: 2023-01-28 | Stop reason: SDUPTHER

## 2022-12-14 RX ORDER — ONDANSETRON 4 MG/1
TABLET, ORALLY DISINTEGRATING ORAL
Qty: 60 TABLET | Refills: 1 | Status: SHIPPED | OUTPATIENT
Start: 2022-12-14 | End: 2023-05-24

## 2022-12-14 NOTE — TELEPHONE ENCOUNTER
----- Message from Bunny Moses MD sent at 12/13/2022  3:50 PM CST -----  Please call to make sure patient get the results.    [phone], Your urinalysis is within normal limits.  Showing there is trace blood and leukocytes on the urinalysis dipstick which is a chronic finding however when looked under the microscope there are no increase red blood cells or white blood cells.  This does not meet criteria for urinary tract infection.  Are you having symptoms of UTI?

## 2022-12-14 NOTE — PROGRESS NOTES
This is still too far out for cardiology appointment.  Patient needs to be seen this week.  Check with another cardiologist office.

## 2022-12-14 NOTE — PROGRESS NOTES
Called pt, i was able to get her scheduled for tomorrow in [place] with a MD, pt denied the appt, stated no one can take her. She stated she feels better and she wants to wait till the 28th....

## 2022-12-14 NOTE — PROGRESS NOTES
Abnormal EKG.  Patient should follow up with Cardiology.  Please make sure that they have an appointment as soon as possible.

## 2022-12-14 NOTE — PROGRESS NOTES
"Spoke to pt over the phone , inform her "Please let the patient/family know that I am sending antibiotic to the pharmacy.  " , per dr oconnor . Pt verbalized undertaning . They were able to move appointment for 12/28/22 with dr diaz. Pt is aware of this appt .   "

## 2022-12-14 NOTE — TELEPHONE ENCOUNTER
"Spoke to pt over the phone, inform her of urine test results , understanding was verbalized . Pt states " I only feel weak but I am definitely doing better since I been taking the antibiotic .  "

## 2022-12-21 ENCOUNTER — PATIENT MESSAGE (OUTPATIENT)
Dept: FAMILY MEDICINE | Facility: CLINIC | Age: 82
End: 2022-12-21
Payer: MEDICARE

## 2022-12-21 NOTE — TELEPHONE ENCOUNTER
Pt verbalized understanding of what has been stated, she is scheduled  with cardiology next week. No sooner appt. She understands to go to ED if further symptoms

## 2022-12-23 ENCOUNTER — OFFICE VISIT (OUTPATIENT)
Dept: CARDIOLOGY | Facility: CLINIC | Age: 82
End: 2022-12-23
Payer: MEDICARE

## 2022-12-23 ENCOUNTER — PATIENT MESSAGE (OUTPATIENT)
Dept: FAMILY MEDICINE | Facility: CLINIC | Age: 82
End: 2022-12-23
Payer: MEDICARE

## 2022-12-23 VITALS
BODY MASS INDEX: 32.49 KG/M2 | SYSTOLIC BLOOD PRESSURE: 138 MMHG | DIASTOLIC BLOOD PRESSURE: 86 MMHG | HEIGHT: 59 IN | HEART RATE: 60 BPM | WEIGHT: 161.19 LBS

## 2022-12-23 DIAGNOSIS — T50.905A BRADYCARDIA, DRUG INDUCED: Primary | ICD-10-CM

## 2022-12-23 DIAGNOSIS — I10 ESSENTIAL HYPERTENSION: ICD-10-CM

## 2022-12-23 DIAGNOSIS — I70.0 ATHEROSCLEROSIS OF ABDOMINAL AORTA: Chronic | ICD-10-CM

## 2022-12-23 DIAGNOSIS — R00.1 BRADYCARDIA, DRUG INDUCED: Primary | ICD-10-CM

## 2022-12-23 DIAGNOSIS — E78.00 PURE HYPERCHOLESTEROLEMIA: Chronic | ICD-10-CM

## 2022-12-23 PROCEDURE — 99999 PR PBB SHADOW E&M-EST. PATIENT-LVL III: CPT | Mod: PBBFAC,,, | Performed by: INTERNAL MEDICINE

## 2022-12-23 PROCEDURE — 99213 OFFICE O/P EST LOW 20 MIN: CPT | Mod: PBBFAC | Performed by: INTERNAL MEDICINE

## 2022-12-23 PROCEDURE — 99205 OFFICE O/P NEW HI 60 MIN: CPT | Mod: S$PBB,,, | Performed by: INTERNAL MEDICINE

## 2022-12-23 PROCEDURE — 99205 PR OFFICE/OUTPT VISIT, NEW, LEVL V, 60-74 MIN: ICD-10-PCS | Mod: S$PBB,,, | Performed by: INTERNAL MEDICINE

## 2022-12-23 PROCEDURE — 99999 PR PBB SHADOW E&M-EST. PATIENT-LVL III: ICD-10-PCS | Mod: PBBFAC,,, | Performed by: INTERNAL MEDICINE

## 2022-12-23 RX ORDER — HYDROXYZINE HYDROCHLORIDE 10 MG/1
10 TABLET, FILM COATED ORAL 2 TIMES DAILY PRN
Qty: 30 TABLET | Refills: 1 | Status: SHIPPED | OUTPATIENT
Start: 2022-12-23 | End: 2023-04-10

## 2022-12-23 RX ORDER — CHLORTHALIDONE 25 MG/1
25 TABLET ORAL DAILY
Qty: 30 TABLET | Refills: 11 | Status: SHIPPED | OUTPATIENT
Start: 2022-12-23 | End: 2023-05-16

## 2022-12-23 NOTE — PROGRESS NOTES
Subjective:   Patient ID:  Suzie Dawkins is a 82 y.o. female who presents for follow-up of Hypertension  Pt referred for labile HTN and bradycardia.  EKG sinus bradycardia, 50s, otherwise normal.-180  Patient denies CP, angina or anginal equivalent.   Pt with weight gain over 5 years. Pt compliant with Na    Hypertension  This is a chronic problem. The current episode started more than 1 year ago. The problem has been gradually improving since onset. The problem is controlled. Pertinent negatives include no chest pain, palpitations or shortness of breath. Past treatments include beta blockers and angiotensin blockers. The current treatment provides moderate improvement. There are no compliance problems.    Hyperlipidemia  This is a chronic problem. The current episode started more than 1 year ago. The problem is controlled. Pertinent negatives include no chest pain or shortness of breath. Current antihyperlipidemic treatment includes statins. The current treatment provides moderate improvement of lipids. There are no compliance problems.  Risk factors for coronary artery disease include hypertension and dyslipidemia.     Review of Systems   Constitutional: Negative. Negative for weight gain.   HENT: Negative.     Eyes: Negative.    Cardiovascular: Negative.  Negative for chest pain, leg swelling and palpitations.   Respiratory: Negative.  Negative for shortness of breath.    Endocrine: Negative.    Hematologic/Lymphatic: Negative.    Skin: Negative.    Musculoskeletal:  Negative for muscle weakness.   Gastrointestinal: Negative.    Genitourinary: Negative.    Neurological: Negative.  Negative for dizziness.   Psychiatric/Behavioral: Negative.     Allergic/Immunologic: Negative.    All other systems reviewed and are negative.  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    Depression     GERD (gastroesophageal reflux disease)     Hypercalcemia     Hyperlipidemia     Hypertension     Osteoporosis 10/7/2013    Vitamin D deficiency      Social History     Socioeconomic History    Marital status: Single   Tobacco Use    Smoking status: Former     Types: Cigarettes     Quit date: 1989     Years since quittin.9    Smokeless tobacco: Never   Substance and Sexual Activity    Alcohol use: No    Drug use: No    Sexual activity: Never     Current Outpatient Medications on File Prior to Visit   Medication Sig Dispense Refill    alendronate (FOSAMAX) 70 MG tablet Take 1 tablet (70 mg total) by mouth every 7 days. 12 tablet 3    aspirin (ECOTRIN) 81 MG EC tablet Take 1 tablet (81 mg total) by mouth once daily. 90 tablet 3    atorvastatin (LIPITOR) 40 MG tablet Take 1 tablet (40 mg total) by mouth once daily. 90 tablet 4    citalopram (CELEXA) 20 MG tablet Take 1.5 tablets (30 mg total) by mouth once daily. 135 tablet 4    fluticasone propionate (FLONASE) 50 mcg/actuation nasal spray 1 spray by Each Nostril route daily as needed for Rhinitis.      ipratropium (ATROVENT) 21 mcg (0.03 %) nasal spray 2 sprays by Nasal route 2 (two) times daily as needed for Rhinitis. 30 mL 5    L.acidophil,parac-S.therm-Bif. (RISAQUAD) Cap capsule Take 1 capsule by mouth once daily.      losartan (COZAAR) 100 MG tablet Take 1 tablet (100 mg total) by mouth once daily. 90 tablet 4    meclizine (ANTIVERT) 25 mg tablet Take 1 tablet (25 mg total) by mouth 3 (three) times daily as needed for Dizziness or Nausea. 30 tablet 12    metoprolol succinate (TOPROL-XL) 100 MG 24 hr tablet Take 1 tablet (100 mg total) by mouth  once daily. 30 tablet 11    MULTIVIT,CALC,MINS/IRON/FOLIC (ONE-A-DAY WOMENS FORMULA ORAL) Take 1 tablet by mouth once daily.      ondansetron (ZOFRAN-ODT) 4 MG TbDL DISSOLVE ONE TABLET BY MOUTH EVERY 6 HOURS AS NEEDED FOR NAUSEA 60 tablet 1    polyvinyl alcohol, artificial tears, (LIQUIFILM TEARS) 1.4 % ophthalmic solution Place 1 drop into both eyes as needed.      promethazine (PHENERGAN) 25 MG tablet Take 1 tablet (25 mg total) by mouth every 6 (six) hours as needed for Nausea. 30 tablet 0    temazepam (RESTORIL) 30 mg capsule Take 1 capsule (30 mg total) by mouth nightly. 90 capsule 1    topiramate (TOPAMAX) 25 MG tablet Take 1 tablet (25 mg total) by mouth once daily. 90 tablet 4    ascorbic acid, vitamin C, (VITAMIN C) 500 MG tablet Take 500 mg by mouth once daily.      calcium carbonate (CALCIUM 500 ORAL) Take 2 tablets by mouth once daily.       cranberry fruit extract (CRANBERRY ORAL) Take 1 tablet by mouth once daily.      diphenoxylate-atropine 2.5-0.025 mg (LOMOTIL) 2.5-0.025 mg per tablet Take 1 tablet by mouth 4 (four) times daily as needed for Diarrhea. 90 tablet 5    ELDERBERRY FRUIT ORAL Take 1 tablet by mouth daily as needed.      esomeprazole (NEXIUM) 40 MG capsule Take 1 capsule (40 mg total) by mouth before breakfast. (Patient not taking: Reported on 12/23/2022) 90 capsule 3    estradioL (ESTRACE) 0.01 % (0.1 mg/gram) vaginal cream SMARTSIG:Sparingly Vaginal Every Night      MYRBETRIQ 25 mg Tb24 ER tablet Take 1 tablet (25 mg total) by mouth once daily. (Patient not taking: Reported on 12/12/2022) 30 tablet 12    scopolamine (TRANSDERM-SCOP) 1.3-1.5 mg (1 mg over 3 days) Place 1 patch onto the skin every 72 hours. Strength: 1 mg over 3 days 4 patch 12    traMADoL (ULTRAM) 50 mg tablet Take 1 tablet (50 mg total) by mouth every 6 (six) hours as needed (severe pain). (Patient not taking: Reported on 12/12/2022) 28 tablet 0    triamcinolone acetonide 0.1% (KENALOG) 0.1 % ointment Apply topically 2  (two) times daily. (Patient not taking: Reported on 12/12/2022) 15 g 0    valACYclovir (VALTREX) 1000 MG tablet       valACYclovir (VALTREX) 1000 MG tablet Take 1 tablet (1,000 mg total) by mouth 2 (two) times daily. (Patient not taking: Reported on 12/12/2022) 20 tablet 0    vitamin D 1000 units Tab Take 1,000 Units by mouth once daily.       No current facility-administered medications on file prior to visit.     Review of patient's allergies indicates:   Allergen Reactions    Latex Hives    B12 [cyanocobalamin-cobamamide] Other (See Comments)     Caused flushing and caused skin to peel    Codeine      Other reaction(s): Muscle pain    Iodine      Other reaction(s): Hives    Meperidine      Other reaction(s): Muscle pain    Narcotic antagonist      Other reaction(s): Rash    Penicillins Nausea And Vomiting    Propoxyphene      Other reaction(s): Muscle pain    Sulfa (sulfonamide antibiotics)      Other reaction(s): Muscle pain  Other reaction(s): Hives       Objective:     Physical Exam  Vitals and nursing note reviewed.   Constitutional:       Appearance: She is well-developed.   HENT:      Head: Normocephalic and atraumatic.   Eyes:      Conjunctiva/sclera: Conjunctivae normal.      Pupils: Pupils are equal, round, and reactive to light.   Cardiovascular:      Rate and Rhythm: Normal rate and regular rhythm.      Pulses: Intact distal pulses.      Heart sounds: Normal heart sounds.   Pulmonary:      Effort: Pulmonary effort is normal.      Breath sounds: Normal breath sounds.   Abdominal:      General: Bowel sounds are normal.      Palpations: Abdomen is soft.   Musculoskeletal:         General: Normal range of motion.      Cervical back: Normal range of motion and neck supple.   Skin:     General: Skin is warm and dry.   Neurological:      Mental Status: She is alert and oriented to person, place, and time.       Assessment:     1. Bradycardia, drug induced    2. Essential hypertension    3. Pure  hypercholesterolemia    4. Atherosclerosis of abdominal aorta        Plan:     Bradycardia, drug induced    Essential hypertension  -     Ambulatory referral/consult to Cardiology    Pure hypercholesterolemia    Atherosclerosis of abdominal aorta      Continue statin-hlp  Continue metoprolol, losartan-htn  Start chlorthalidone , echo

## 2022-12-23 NOTE — TELEPHONE ENCOUNTER
I received your message which was reviewed along with the the medication list and allergies that we have below.  Please review it for accuracy to make sure that we have the most recent records on your history.     Based on this, the following orders were placed AND/OR medicines were sent in.     No orders of the defined types were placed in this encounter.      Medications written and sent at this time include:  Medications Ordered This Encounter   Medications    hydrOXYzine HCL (ATARAX) 10 MG Tab     Sig: Take 1 tablet (10 mg total) by mouth 2 (two) times daily as needed (anxiety).     Dispense:  30 tablet     Refill:  1       Your pharmacy(ies) of choice at this time on record include the list below and any medications would have been sent to the one at the top.    [facility] [address] [address] [facility] [address]  Phone: [phone] Fax: [phone]    [facility]. [facility] [address]  [address] [facility] [address]  Phone: [phone] Fax: [phone]    [facility] [id number] [facility] [address]  [address] [address]  Phone: [phone] Fax: [phone]      Thank you for choosing us as your healthcare provider!  Dr. Bunny Moses    ALLERGY LIST  Review of patient's allergies indicates:   Allergen Reactions    Latex Hives    B12 [cyanocobalamin-cobamamide] Other (See Comments)     Caused flushing and caused skin to peel    Codeine      Other reaction(s): Muscle pain    Iodine      Other reaction(s): Hives    Meperidine      Other reaction(s): Muscle pain    Narcotic antagonist      Other reaction(s): Rash    Penicillins Nausea And Vomiting    Propoxyphene      Other reaction(s): Muscle pain    Sulfa (sulfonamide antibiotics)      Other reaction(s): Muscle pain  Other reaction(s): Hives       MEDICATION LIST  Current Outpatient Medications on File Prior to Visit   Medication  Sig Dispense Refill    alendronate (FOSAMAX) 70 MG tablet Take 1 tablet (70 mg total) by mouth every 7 days. 12 tablet 3    ascorbic acid, vitamin C, (VITAMIN C) 500 MG tablet Take 500 mg by mouth once daily.      aspirin (ECOTRIN) 81 MG EC tablet Take 1 tablet (81 mg total) by mouth once daily. 90 tablet 3    atorvastatin (LIPITOR) 40 MG tablet Take 1 tablet (40 mg total) by mouth once daily. 90 tablet 4    calcium carbonate (CALCIUM 500 ORAL) Take 2 tablets by mouth once daily.       chlorthalidone (HYGROTEN) 25 MG Tab Take 1 tablet (25 mg total) by mouth once daily. 30 tablet 11    citalopram (CELEXA) 20 MG tablet Take 1.5 tablets (30 mg total) by mouth once daily. 135 tablet 4    cranberry fruit extract (CRANBERRY ORAL) Take 1 tablet by mouth once daily.      diphenoxylate-atropine 2.5-0.025 mg (LOMOTIL) 2.5-0.025 mg per tablet Take 1 tablet by mouth 4 (four) times daily as needed for Diarrhea. 90 tablet 5    ELDERBERRY FRUIT ORAL Take 1 tablet by mouth daily as needed.      esomeprazole (NEXIUM) 40 MG capsule Take 1 capsule (40 mg total) by mouth before breakfast. (Patient not taking: Reported on 12/23/2022) 90 capsule 3    estradioL (ESTRACE) 0.01 % (0.1 mg/gram) vaginal cream SMARTSIG:Sparingly Vaginal Every Night      fluticasone propionate (FLONASE) 50 mcg/actuation nasal spray 1 spray by Each Nostril route daily as needed for Rhinitis.      ipratropium (ATROVENT) 21 mcg (0.03 %) nasal spray 2 sprays by Nasal route 2 (two) times daily as needed for Rhinitis. 30 mL 5    L.acidophil,parac-S.therm-Bif. (RISAQUAD) Cap capsule Take 1 capsule by mouth once daily.      losartan (COZAAR) 100 MG tablet Take 1 tablet (100 mg total) by mouth once daily. 90 tablet 4    meclizine (ANTIVERT) 25 mg tablet Take 1 tablet (25 mg total) by mouth 3 (three) times daily as needed for Dizziness or Nausea. 30 tablet 12    metoprolol succinate (TOPROL-XL) 100 MG 24 hr tablet Take 1 tablet (100 mg total) by mouth once daily. 30  tablet 11    MULTIVIT,CALC,MINS/IRON/FOLIC (ONE-A-DAY WOMENS FORMULA ORAL) Take 1 tablet by mouth once daily.      MYRBETRIQ 25 mg Tb24 ER tablet Take 1 tablet (25 mg total) by mouth once daily. (Patient not taking: Reported on 12/12/2022) 30 tablet 12    ondansetron (ZOFRAN-ODT) 4 MG TbDL DISSOLVE ONE TABLET BY MOUTH EVERY 6 HOURS AS NEEDED FOR NAUSEA 60 tablet 1    polyvinyl alcohol, artificial tears, (LIQUIFILM TEARS) 1.4 % ophthalmic solution Place 1 drop into both eyes as needed.      promethazine (PHENERGAN) 25 MG tablet Take 1 tablet (25 mg total) by mouth every 6 (six) hours as needed for Nausea. 30 tablet 0    scopolamine (TRANSDERM-SCOP) 1.3-1.5 mg (1 mg over 3 days) Place 1 patch onto the skin every 72 hours. Strength: 1 mg over 3 days 4 patch 12    temazepam (RESTORIL) 30 mg capsule Take 1 capsule (30 mg total) by mouth nightly. 90 capsule 1    topiramate (TOPAMAX) 25 MG tablet Take 1 tablet (25 mg total) by mouth once daily. 90 tablet 4    traMADoL (ULTRAM) 50 mg tablet Take 1 tablet (50 mg total) by mouth every 6 (six) hours as needed (severe pain). (Patient not taking: Reported on 12/12/2022) 28 tablet 0    triamcinolone acetonide 0.1% (KENALOG) 0.1 % ointment Apply topically 2 (two) times daily. (Patient not taking: Reported on 12/12/2022) 15 g 0    valACYclovir (VALTREX) 1000 MG tablet       valACYclovir (VALTREX) 1000 MG tablet Take 1 tablet (1,000 mg total) by mouth 2 (two) times daily. (Patient not taking: Reported on 12/12/2022) 20 tablet 0    vitamin D 1000 units Tab Take 1,000 Units by mouth once daily.       No current facility-administered medications on file prior to visit.       HEALTH MAINTENANCE THAT IS OVERDUE OR NEEDS TO BE UPDATED ON OUR CHART IS LISTED BELOW.  IF YOU HAVE HAD IT DONE ELSEWHERE, PLEASE SEND US DATES AND RECORDS IF YOU HAVE THEM TO MAKE YOUR CHART ACCURATE.  IF YOU HAVE NOT HAD THESE DONE AND ARE READY FOR US TO SCHEDULE THEM, PLEASE SEND US A MESSAGE.  Health  Maintenance Due   Topic Date Due    TETANUS VACCINE  Never done    Colonoscopy  02/08/2014    Shingles Vaccine (1 of 2) 06/04/2016       DISCLAIMER: This note was compiled by using a speech recognition dictation system and therefore please be aware that typographical / speech recognition errors can and do occur.  Please contact me if you see any errors specifically.    Bunny Moses MD  We Offer Telehealth & Same Day Appointments!   Book your Telehealth appointment with me through my nurse or   Clinic appointments on [employer]!  [phone]-[phone]     Check out my Facebook Page and Follow Me at: CLICK HERE    Check out my website at [facility] by clicking on: CLICK HERE    To Schedule appointments online, go to [employer]: CLICK HERE     Location: [email or web address]    [address] [place] [address]    FAX: [phone]

## 2022-12-26 ENCOUNTER — PATIENT MESSAGE (OUTPATIENT)
Dept: FAMILY MEDICINE | Facility: CLINIC | Age: 82
End: 2022-12-26
Payer: MEDICARE

## 2022-12-29 ENCOUNTER — PATIENT MESSAGE (OUTPATIENT)
Dept: CARDIOLOGY | Facility: CLINIC | Age: 82
End: 2022-12-29
Payer: MEDICARE

## 2022-12-29 ENCOUNTER — HOSPITAL ENCOUNTER (OUTPATIENT)
Dept: CARDIOLOGY | Facility: HOSPITAL | Age: 82
Discharge: HOME | End: 2022-12-29
Attending: INTERNAL MEDICINE
Payer: MEDICARE

## 2022-12-29 VITALS
BODY MASS INDEX: 32.46 KG/M2 | DIASTOLIC BLOOD PRESSURE: 86 MMHG | WEIGHT: 161 LBS | SYSTOLIC BLOOD PRESSURE: 138 MMHG | HEART RATE: 63 BPM | HEIGHT: 59 IN

## 2022-12-29 DIAGNOSIS — R00.1 BRADYCARDIA, DRUG INDUCED: ICD-10-CM

## 2022-12-29 DIAGNOSIS — T50.905A BRADYCARDIA, DRUG INDUCED: ICD-10-CM

## 2022-12-29 PROCEDURE — 93306 ECHO (CUPID ONLY): ICD-10-PCS | Mod: 26,,, | Performed by: INTERNAL MEDICINE

## 2022-12-29 PROCEDURE — 93306 TTE W/DOPPLER COMPLETE: CPT | Mod: PO

## 2022-12-29 PROCEDURE — 93306 TTE W/DOPPLER COMPLETE: CPT | Mod: 26,,, | Performed by: INTERNAL MEDICINE

## 2022-12-30 ENCOUNTER — TELEPHONE (OUTPATIENT)
Dept: CARDIOLOGY | Facility: CLINIC | Age: 82
End: 2022-12-30
Payer: MEDICARE

## 2022-12-30 ENCOUNTER — PATIENT MESSAGE (OUTPATIENT)
Dept: CARDIOLOGY | Facility: CLINIC | Age: 82
End: 2022-12-30
Payer: MEDICARE

## 2022-12-30 LAB
AORTIC ROOT ANNULUS: 2.6 CM
ASCENDING AORTA: 2.88 CM
AV INDEX (PROSTH): 0.45
AV MEAN GRADIENT: 7 MMHG
AV PEAK GRADIENT: 12 MMHG
AV VALVE AREA: 1.46 CM2
AV VELOCITY RATIO: 0.45
BSA FOR ECHO PROCEDURE: 1.74 M2
CV ECHO LV RWT: 1.06 CM
DOP CALC AO PEAK VEL: 1.73 M/S
DOP CALC AO VTI: 40 CM
DOP CALC LVOT AREA: 3.2 CM2
DOP CALC LVOT DIAMETER: 2.03 CM
DOP CALC LVOT PEAK VEL: 0.77 M/S
DOP CALC LVOT STROKE VOLUME: 58.23 CM3
DOP CALC RVOT PEAK VEL: 0.67 M/S
DOP CALC RVOT VTI: 15.7 CM
DOP CALCLVOT PEAK VEL VTI: 18 CM
E WAVE DECELERATION TIME: 304.73 MSEC
E/A RATIO: 0.6
E/E' RATIO: 10.15 M/S
ECHO LV POSTERIOR WALL: 1.66 CM (ref 0.6–1.1)
EJECTION FRACTION: 60 %
FRACTIONAL SHORTENING: 34 % (ref 28–44)
INTERVENTRICULAR SEPTUM: 1.68 CM (ref 0.6–1.1)
IVRT: 128.45 MSEC
LA MAJOR: 6.1 CM
LA MINOR: 4.51 CM
LA WIDTH: 3.3 CM
LEFT ATRIUM SIZE: 3.74 CM
LEFT ATRIUM VOLUME INDEX MOD: 18.7 ML/M2
LEFT ATRIUM VOLUME INDEX: 32.4 ML/M2
LEFT ATRIUM VOLUME MOD: 31.4 CM3
LEFT ATRIUM VOLUME: 54.4 CM3
LEFT INTERNAL DIMENSION IN SYSTOLE: 2.05 CM (ref 2.1–4)
LEFT VENTRICLE DIASTOLIC VOLUME INDEX: 22.96 ML/M2
LEFT VENTRICLE DIASTOLIC VOLUME: 38.57 ML
LEFT VENTRICLE MASS INDEX: 119 G/M2
LEFT VENTRICLE SYSTOLIC VOLUME INDEX: 8.1 ML/M2
LEFT VENTRICLE SYSTOLIC VOLUME: 13.61 ML
LEFT VENTRICULAR INTERNAL DIMENSION IN DIASTOLE: 3.12 CM (ref 3.5–6)
LEFT VENTRICULAR MASS: 199.63 G
LV LATERAL E/E' RATIO: 9.43 M/S
LV SEPTAL E/E' RATIO: 11 M/S
LVOT MG: 1.44 MMHG
LVOT MV: 0.58 CM/S
MV PEAK A VEL: 1.1 M/S
MV PEAK E VEL: 0.66 M/S
MV STENOSIS PRESSURE HALF TIME: 88.37 MS
MV VALVE AREA P 1/2 METHOD: 2.49 CM2
PISA TR MAX VEL: 2.55 M/S
PULM VEIN S/D RATIO: 1.29
PV MEAN GRADIENT: 1.06 MMHG
PV PEAK D VEL: 0.34 M/S
PV PEAK S VEL: 0.44 M/S
PV PEAK VELOCITY: 0.92 CM/S
RA MAJOR: 4.32 CM
RA WIDTH: 2.91 CM
RIGHT VENTRICULAR END-DIASTOLIC DIMENSION: 1.99 CM
SINUS: 2.93 CM
STJ: 2.83 CM
TDI LATERAL: 0.07 M/S
TDI SEPTAL: 0.06 M/S
TDI: 0.07 M/S
TR MAX PG: 26 MMHG
TRICUSPID ANNULAR PLANE SYSTOLIC EXCURSION: 1.79 CM

## 2022-12-30 NOTE — TELEPHONE ENCOUNTER
Called patient to discuss echo results. Pt verbalized understanding. All questions answered. Pt will call back with any other questions or concerns.      ----- Message from René Lutz MD sent at 12/30/2022  4:37 PM CST -----  Echo shows normal systolic function, mild AS

## 2022-12-30 NOTE — TELEPHONE ENCOUNTER
Called patient to discuss lab results. Pt verbalized understanding. All questions answered. Pt will call back with any other questions or concerns.      ----- Message from René Lutz MD sent at 12/30/2022  4:30 PM CST -----  BMP reveiwed  Hold chlorthalidone x 2 days  Start potassium bid x 2 days then q day  Restart chlorthalidone Monday with once day potassium  Bmp 1 week

## 2023-01-06 ENCOUNTER — LAB VISIT (OUTPATIENT)
Dept: LAB | Facility: HOSPITAL | Age: 83
End: 2023-01-06
Attending: INTERNAL MEDICINE
Payer: MEDICARE

## 2023-01-06 DIAGNOSIS — R60.9 EDEMA, UNSPECIFIED TYPE: ICD-10-CM

## 2023-01-06 PROCEDURE — 80048 BASIC METABOLIC PNL TOTAL CA: CPT | Performed by: INTERNAL MEDICINE

## 2023-01-06 PROCEDURE — 36415 COLL VENOUS BLD VENIPUNCTURE: CPT | Mod: PO | Performed by: INTERNAL MEDICINE

## 2023-01-07 LAB
ANION GAP SERPL CALC-SCNC: 11 MMOL/L (ref 8–16)
BUN SERPL-MCNC: 22 MG/DL (ref 8–23)
CALCIUM SERPL-MCNC: 10.8 MG/DL (ref 8.7–10.5)
CHLORIDE SERPL-SCNC: 98 MMOL/L (ref 95–110)
CO2 SERPL-SCNC: 27 MMOL/L (ref 23–29)
CREAT SERPL-MCNC: 1.2 MG/DL (ref 0.5–1.4)
EST. GFR  (NO RACE VARIABLE): 45.5 ML/MIN/1.73 M^2
GLUCOSE SERPL-MCNC: 94 MG/DL (ref 70–110)
POTASSIUM SERPL-SCNC: 3.5 MMOL/L (ref 3.5–5.1)
SODIUM SERPL-SCNC: 136 MMOL/L (ref 136–145)

## 2023-01-11 ENCOUNTER — OFFICE VISIT (OUTPATIENT)
Dept: CARDIOLOGY | Facility: CLINIC | Age: 83
End: 2023-01-11
Payer: MEDICARE

## 2023-01-11 VITALS
OXYGEN SATURATION: 98 % | SYSTOLIC BLOOD PRESSURE: 130 MMHG | DIASTOLIC BLOOD PRESSURE: 66 MMHG | WEIGHT: 160.38 LBS | HEIGHT: 59 IN | HEART RATE: 60 BPM | BODY MASS INDEX: 32.33 KG/M2

## 2023-01-11 DIAGNOSIS — T50.905A BRADYCARDIA, DRUG INDUCED: Primary | ICD-10-CM

## 2023-01-11 DIAGNOSIS — E78.00 PURE HYPERCHOLESTEROLEMIA: Chronic | ICD-10-CM

## 2023-01-11 DIAGNOSIS — R00.1 BRADYCARDIA, DRUG INDUCED: Primary | ICD-10-CM

## 2023-01-11 DIAGNOSIS — I10 ESSENTIAL HYPERTENSION: Chronic | ICD-10-CM

## 2023-01-11 DIAGNOSIS — I70.0 ATHEROSCLEROSIS OF ABDOMINAL AORTA: Chronic | ICD-10-CM

## 2023-01-11 PROCEDURE — 99214 OFFICE O/P EST MOD 30 MIN: CPT | Mod: S$PBB,,, | Performed by: INTERNAL MEDICINE

## 2023-01-11 PROCEDURE — 99999 PR PBB SHADOW E&M-EST. PATIENT-LVL V: CPT | Mod: PBBFAC,,, | Performed by: INTERNAL MEDICINE

## 2023-01-11 PROCEDURE — 99999 PR PBB SHADOW E&M-EST. PATIENT-LVL V: ICD-10-PCS | Mod: PBBFAC,,, | Performed by: INTERNAL MEDICINE

## 2023-01-11 PROCEDURE — 99214 PR OFFICE/OUTPT VISIT, EST, LEVL IV, 30-39 MIN: ICD-10-PCS | Mod: S$PBB,,, | Performed by: INTERNAL MEDICINE

## 2023-01-11 PROCEDURE — 99215 OFFICE O/P EST HI 40 MIN: CPT | Mod: PBBFAC,PO | Performed by: INTERNAL MEDICINE

## 2023-01-11 RX ORDER — METOPROLOL SUCCINATE 50 MG/1
50 TABLET, EXTENDED RELEASE ORAL DAILY
Qty: 30 TABLET | Refills: 11
Start: 2023-01-11 | End: 2023-11-30 | Stop reason: SDUPTHER

## 2023-01-11 NOTE — PROGRESS NOTES
Subjective:   Patient ID:  Suzie Dawkins is a 82 y.o. female who presents for follow-up of No chief complaint on file.  BP improved with diuretic.  Echo nml lv function, mild AS    Hypertension  This is a chronic problem. The current episode started more than 1 year ago. The problem has been gradually improving since onset. The problem is controlled. Pertinent negatives include no chest pain, palpitations or shortness of breath. Past treatments include beta blockers and angiotensin blockers. The current treatment provides moderate improvement. There are no compliance problems.    Hyperlipidemia  This is a chronic problem. The current episode started more than 1 year ago. The problem is controlled. Pertinent negatives include no chest pain or shortness of breath. Current antihyperlipidemic treatment includes statins. The current treatment provides moderate improvement of lipids. There are no compliance problems.  Risk factors for coronary artery disease include hypertension and dyslipidemia.     Review of Systems   Constitutional: Negative. Negative for weight gain.   HENT: Negative.     Eyes: Negative.    Cardiovascular: Negative.  Negative for chest pain, leg swelling and palpitations.   Respiratory: Negative.  Negative for shortness of breath.    Endocrine: Negative.    Hematologic/Lymphatic: Negative.    Skin: Negative.    Musculoskeletal:  Negative for muscle weakness.   Gastrointestinal: Negative.    Genitourinary: Negative.    Neurological: Negative.  Negative for dizziness.   Psychiatric/Behavioral: Negative.     Allergic/Immunologic: Negative.    All other systems reviewed and are negative.  Family History   Problem Relation Age of Onset    Cancer Father         lung    Arthritis Father     Cancer Maternal Aunt         breast    Arthritis Maternal Aunt     Breast cancer Maternal Aunt     Cancer Maternal Uncle     Arthritis Paternal Aunt     Cancer Maternal Grandfather         glands cancer     Arthritis  Paternal Aunt     Arthritis Maternal Aunt      Past Medical History:   Diagnosis Date    Anxiety     Arthritis     Cancer 2007    carcinoid of the terminal ileum    Depression     GERD (gastroesophageal reflux disease)     Hypercalcemia     Hyperlipidemia     Hypertension     Osteoporosis 10/7/2013    Vitamin D deficiency      Social History     Socioeconomic History    Marital status: Single   Tobacco Use    Smoking status: Former     Types: Cigarettes     Quit date: 1989     Years since quittin.0    Smokeless tobacco: Never   Substance and Sexual Activity    Alcohol use: No    Drug use: No    Sexual activity: Never     Current Outpatient Medications on File Prior to Visit   Medication Sig Dispense Refill    alendronate (FOSAMAX) 70 MG tablet Take 1 tablet (70 mg total) by mouth every 7 days. 12 tablet 3    ascorbic acid, vitamin C, (VITAMIN C) 500 MG tablet Take 500 mg by mouth once daily.      aspirin (ECOTRIN) 81 MG EC tablet Take 1 tablet (81 mg total) by mouth once daily. 90 tablet 3    atorvastatin (LIPITOR) 40 MG tablet Take 1 tablet (40 mg total) by mouth once daily. 90 tablet 4    calcium carbonate (CALCIUM 500 ORAL) Take 2 tablets by mouth once daily.       chlorthalidone (HYGROTEN) 25 MG Tab Take 1 tablet (25 mg total) by mouth once daily. 30 tablet 11    citalopram (CELEXA) 20 MG tablet Take 1.5 tablets (30 mg total) by mouth once daily. 135 tablet 4    cranberry fruit extract (CRANBERRY ORAL) Take 1 tablet by mouth once daily.      diphenoxylate-atropine 2.5-0.025 mg (LOMOTIL) 2.5-0.025 mg per tablet Take 1 tablet by mouth 4 (four) times daily as needed for Diarrhea. 90 tablet 5    ELDERBERRY FRUIT ORAL Take 1 tablet by mouth daily as needed.      estradioL (ESTRACE) 0.01 % (0.1 mg/gram) vaginal cream SMARTSIG:Sparingly Vaginal Every Night      fluticasone propionate (FLONASE) 50 mcg/actuation nasal spray 1 spray by Each Nostril route daily as needed for Rhinitis.      hydrOXYzine HCL (ATARAX)  10 MG Tab Take 1 tablet (10 mg total) by mouth 2 (two) times daily as needed (anxiety). 30 tablet 1    ipratropium (ATROVENT) 21 mcg (0.03 %) nasal spray 2 sprays by Nasal route 2 (two) times daily as needed for Rhinitis. 30 mL 5    L.acidophil,parac-S.therm-Bif. (RISAQUAD) Cap capsule Take 1 capsule by mouth once daily.      losartan (COZAAR) 100 MG tablet Take 1 tablet (100 mg total) by mouth once daily. 90 tablet 4    meclizine (ANTIVERT) 25 mg tablet Take 1 tablet (25 mg total) by mouth 3 (three) times daily as needed for Dizziness or Nausea. 30 tablet 12    metoprolol succinate (TOPROL-XL) 100 MG 24 hr tablet Take 1 tablet (100 mg total) by mouth once daily. 30 tablet 11    MULTIVIT,CALC,MINS/IRON/FOLIC (ONE-A-DAY WOMENS FORMULA ORAL) Take 1 tablet by mouth once daily.      ondansetron (ZOFRAN-ODT) 4 MG TbDL DISSOLVE ONE TABLET BY MOUTH EVERY 6 HOURS AS NEEDED FOR NAUSEA 60 tablet 1    polyvinyl alcohol, artificial tears, (LIQUIFILM TEARS) 1.4 % ophthalmic solution Place 1 drop into both eyes as needed.      potassium chloride SA (K-DUR,KLOR-CON) 20 MEQ tablet Take 1 tablet (20 mEq total) by mouth 2 (two) times daily. 60 tablet 11    promethazine (PHENERGAN) 25 MG tablet Take 1 tablet (25 mg total) by mouth every 6 (six) hours as needed for Nausea. 30 tablet 0    scopolamine (TRANSDERM-SCOP) 1.3-1.5 mg (1 mg over 3 days) Place 1 patch onto the skin every 72 hours. Strength: 1 mg over 3 days 4 patch 12    temazepam (RESTORIL) 30 mg capsule Take 1 capsule (30 mg total) by mouth nightly. 90 capsule 1    topiramate (TOPAMAX) 25 MG tablet Take 1 tablet (25 mg total) by mouth once daily. 90 tablet 4    triamcinolone acetonide 0.1% (KENALOG) 0.1 % ointment Apply topically 2 (two) times daily. 15 g 0    vitamin D 1000 units Tab Take 1,000 Units by mouth once daily.      esomeprazole (NEXIUM) 40 MG capsule Take 1 capsule (40 mg total) by mouth before breakfast. (Patient not taking: Reported on 12/23/2022) 90 capsule 3     MYRBETRIQ 25 mg Tb24 ER tablet Take 1 tablet (25 mg total) by mouth once daily. (Patient not taking: Reported on 12/12/2022) 30 tablet 12    traMADoL (ULTRAM) 50 mg tablet Take 1 tablet (50 mg total) by mouth every 6 (six) hours as needed (severe pain). (Patient not taking: Reported on 12/12/2022) 28 tablet 0    valACYclovir (VALTREX) 1000 MG tablet       valACYclovir (VALTREX) 1000 MG tablet Take 1 tablet (1,000 mg total) by mouth 2 (two) times daily. (Patient not taking: Reported on 12/12/2022) 20 tablet 0     No current facility-administered medications on file prior to visit.     Review of patient's allergies indicates:   Allergen Reactions    Latex Hives    B12 [cyanocobalamin-cobamamide] Other (See Comments)     Caused flushing and caused skin to peel    Codeine      Other reaction(s): Muscle pain    Iodine      Other reaction(s): Hives    Meperidine      Other reaction(s): Muscle pain    Narcotic antagonist      Other reaction(s): Rash    Penicillins Nausea And Vomiting    Propoxyphene      Other reaction(s): Muscle pain    Sulfa (sulfonamide antibiotics)      Other reaction(s): Muscle pain  Other reaction(s): Hives       Objective:     Physical Exam  Vitals and nursing note reviewed.   Constitutional:       Appearance: She is well-developed.   HENT:      Head: Normocephalic and atraumatic.   Eyes:      Conjunctiva/sclera: Conjunctivae normal.      Pupils: Pupils are equal, round, and reactive to light.   Cardiovascular:      Rate and Rhythm: Normal rate and regular rhythm.      Pulses: Intact distal pulses.      Heart sounds: Normal heart sounds.   Pulmonary:      Effort: Pulmonary effort is normal.      Breath sounds: Normal breath sounds.   Abdominal:      General: Bowel sounds are normal.      Palpations: Abdomen is soft.   Musculoskeletal:         General: Normal range of motion.      Cervical back: Normal range of motion and neck supple.   Skin:     General: Skin is warm and dry.   Neurological:       Mental Status: She is alert and oriented to person, place, and time.       Assessment:     1. Bradycardia, drug induced    2. Essential hypertension    3. Pure hypercholesterolemia    4. Atherosclerosis of abdominal aorta        Plan:     Bradycardia, drug induced    Essential hypertension    Pure hypercholesterolemia    Atherosclerosis of abdominal aorta    Continue statin-hlp  Continue metoprolol, losartan, chlorthalidone-htn    Decrease metoprolol

## 2023-01-12 ENCOUNTER — PES CALL (OUTPATIENT)
Dept: ADMINISTRATIVE | Facility: CLINIC | Age: 83
End: 2023-01-12
Payer: MEDICARE

## 2023-01-23 ENCOUNTER — PATIENT MESSAGE (OUTPATIENT)
Dept: FAMILY MEDICINE | Facility: CLINIC | Age: 83
End: 2023-01-23
Payer: MEDICARE

## 2023-01-24 ENCOUNTER — OFFICE VISIT (OUTPATIENT)
Dept: FAMILY MEDICINE | Facility: CLINIC | Age: 83
End: 2023-01-24
Payer: MEDICARE

## 2023-01-24 ENCOUNTER — TELEPHONE (OUTPATIENT)
Dept: FAMILY MEDICINE | Facility: CLINIC | Age: 83
End: 2023-01-24
Payer: MEDICARE

## 2023-01-24 VITALS
WEIGHT: 160.19 LBS | BODY MASS INDEX: 32.29 KG/M2 | HEIGHT: 59 IN | DIASTOLIC BLOOD PRESSURE: 65 MMHG | SYSTOLIC BLOOD PRESSURE: 123 MMHG | HEART RATE: 69 BPM

## 2023-01-24 DIAGNOSIS — N30.00 ACUTE CYSTITIS WITHOUT HEMATURIA: ICD-10-CM

## 2023-01-24 DIAGNOSIS — R30.0 DYSURIA: ICD-10-CM

## 2023-01-24 PROCEDURE — 99215 OFFICE O/P EST HI 40 MIN: CPT | Mod: PBBFAC,PO | Performed by: NURSE PRACTITIONER

## 2023-01-24 PROCEDURE — 99999 PR PBB SHADOW E&M-EST. PATIENT-LVL V: ICD-10-PCS | Mod: PBBFAC,,, | Performed by: NURSE PRACTITIONER

## 2023-01-24 PROCEDURE — 99213 OFFICE O/P EST LOW 20 MIN: CPT | Mod: S$PBB,,, | Performed by: NURSE PRACTITIONER

## 2023-01-24 PROCEDURE — 99213 PR OFFICE/OUTPT VISIT, EST, LEVL III, 20-29 MIN: ICD-10-PCS | Mod: S$PBB,,, | Performed by: NURSE PRACTITIONER

## 2023-01-24 PROCEDURE — 99999 PR PBB SHADOW E&M-EST. PATIENT-LVL V: CPT | Mod: PBBFAC,,, | Performed by: NURSE PRACTITIONER

## 2023-01-24 RX ORDER — NITROFURANTOIN 25; 75 MG/1; MG/1
100 CAPSULE ORAL 2 TIMES DAILY
Qty: 14 CAPSULE | Refills: 0 | Status: SHIPPED | OUTPATIENT
Start: 2023-01-24 | End: 2023-01-26

## 2023-01-24 RX ORDER — METHENAMINE, SODIUM PHOSPHATE, MONOBASIC, MONOHYDRATE, PHENYL SALICYLATE, METHYLENE BLUE, AND HYOSCYAMINE SULFATE 118; 40.8; 36; 10; .12 MG/1; MG/1; MG/1; MG/1; MG/1
1 CAPSULE ORAL 2 TIMES DAILY
Qty: 14 CAPSULE | Refills: 0 | Status: SHIPPED | OUTPATIENT
Start: 2023-01-24 | End: 2023-01-31

## 2023-01-24 NOTE — PROGRESS NOTES
Subjective:       Patient ID: Suzie Dawkins is a 82 y.o. female.    Chief Complaint: Dysuria and Urinary Frequency    Dysuria   This is a new problem. The current episode started in the past 7 days. The problem occurs every urination. The problem has been unchanged. The quality of the pain is described as burning. The pain is mild. There has been no fever. Associated symptoms include frequency. Pertinent negatives include no vomiting or rash. She has tried nothing for the symptoms.     Review of Systems   Constitutional:  Negative for fatigue, fever and unexpected weight change.   HENT:  Negative for ear pain and sore throat.    Eyes:  Negative for pain and visual disturbance.   Respiratory:  Negative for cough and shortness of breath.    Cardiovascular:  Negative for chest pain and palpitations.   Gastrointestinal:  Negative for abdominal pain, diarrhea and vomiting.   Genitourinary:  Positive for dysuria and frequency.   Musculoskeletal:  Negative for arthralgias and myalgias.   Skin:  Negative for color change and rash.   Neurological:  Negative for dizziness and headaches.   Psychiatric/Behavioral:  Negative for dysphoric mood and sleep disturbance. The patient is not nervous/anxious.      Vitals:    01/24/23 1402   BP: 123/65   Pulse: 69       Objective:     Current Outpatient Medications   Medication Sig Dispense Refill    alendronate (FOSAMAX) 70 MG tablet Take 1 tablet (70 mg total) by mouth every 7 days. 12 tablet 3    aspirin (ECOTRIN) 81 MG EC tablet Take 1 tablet (81 mg total) by mouth once daily. 90 tablet 3    atorvastatin (LIPITOR) 40 MG tablet Take 1 tablet (40 mg total) by mouth once daily. 90 tablet 4    calcium carbonate (CALCIUM 500 ORAL) Take 2 tablets by mouth once daily.       chlorthalidone (HYGROTEN) 25 MG Tab Take 1 tablet (25 mg total) by mouth once daily. 30 tablet 11    cranberry fruit extract (CRANBERRY ORAL) Take 1 tablet by mouth once daily.      diphenoxylate-atropine 2.5-0.025 mg  (LOMOTIL) 2.5-0.025 mg per tablet Take 1 tablet by mouth 4 (four) times daily as needed for Diarrhea. 90 tablet 5    ELDERBERRY FRUIT ORAL Take 1 tablet by mouth daily as needed.      hydrOXYzine HCL (ATARAX) 10 MG Tab Take 1 tablet (10 mg total) by mouth 2 (two) times daily as needed (anxiety). 30 tablet 1    ipratropium (ATROVENT) 21 mcg (0.03 %) nasal spray 2 sprays by Nasal route 2 (two) times daily as needed for Rhinitis. 30 mL 5    losartan (COZAAR) 100 MG tablet Take 1 tablet (100 mg total) by mouth once daily. 90 tablet 4    meclizine (ANTIVERT) 25 mg tablet Take 1 tablet (25 mg total) by mouth 3 (three) times daily as needed for Dizziness or Nausea. 30 tablet 12    metoprolol succinate (TOPROL-XL) 50 MG 24 hr tablet Take 1 tablet (50 mg total) by mouth once daily. 30 tablet 11    MULTIVIT,CALC,MINS/IRON/FOLIC (ONE-A-DAY WOMENS FORMULA ORAL) Take 1 tablet by mouth once daily.      ondansetron (ZOFRAN-ODT) 4 MG TbDL DISSOLVE ONE TABLET BY MOUTH EVERY 6 HOURS AS NEEDED FOR NAUSEA 60 tablet 1    potassium chloride SA (K-DUR,KLOR-CON) 20 MEQ tablet Take 1 tablet (20 mEq total) by mouth 2 (two) times daily. 60 tablet 11    promethazine (PHENERGAN) 25 MG tablet Take 1 tablet (25 mg total) by mouth every 6 (six) hours as needed for Nausea. 30 tablet 0    scopolamine (TRANSDERM-SCOP) 1.3-1.5 mg (1 mg over 3 days) Place 1 patch onto the skin every 72 hours. Strength: 1 mg over 3 days 4 patch 12    temazepam (RESTORIL) 30 mg capsule Take 1 capsule (30 mg total) by mouth nightly. 90 capsule 1    topiramate (TOPAMAX) 25 MG tablet Take 1 tablet (25 mg total) by mouth once daily. 90 tablet 4    vitamin D 1000 units Tab Take 1,000 Units by mouth once daily.      ascorbic acid, vitamin C, (VITAMIN C) 500 MG tablet Take 500 mg by mouth once daily.      citalopram (CELEXA) 20 MG tablet Take 1.5 tablets (30 mg total) by mouth once daily. 135 tablet 4    esomeprazole (NEXIUM) 40 MG capsule Take 1 capsule (40 mg total) by mouth  before breakfast. (Patient not taking: Reported on 12/23/2022) 90 capsule 3    estradioL (ESTRACE) 0.01 % (0.1 mg/gram) vaginal cream SMARTSIG:Sparingly Vaginal Every Night      fluticasone propionate (FLONASE) 50 mcg/actuation nasal spray 1 spray by Each Nostril route daily as needed for Rhinitis.      L.acidophil,parac-S.therm-Bif. (RISAQUAD) Cap capsule Take 1 capsule by mouth once daily.      methen-m.blue-s.phos-phsal-hyo (URIBEL) 118-10-40.8-36 mg Cap Take 1 capsule by mouth 2 (two) times a day. for 7 days 14 capsule 0    MYRBETRIQ 25 mg Tb24 ER tablet Take 1 tablet (25 mg total) by mouth once daily. (Patient not taking: Reported on 12/12/2022) 30 tablet 12    nitrofurantoin, macrocrystal-monohydrate, (MACROBID) 100 MG capsule Take 1 capsule (100 mg total) by mouth 2 (two) times daily. for 7 days 14 capsule 0    triamcinolone acetonide 0.1% (KENALOG) 0.1 % ointment Apply topically 2 (two) times daily. (Patient not taking: Reported on 1/24/2023) 15 g 0     No current facility-administered medications for this visit.       Physical Exam  Vitals and nursing note reviewed.   Constitutional:       General: She is not in acute distress.     Appearance: She is well-developed.   HENT:      Head: Normocephalic and atraumatic.   Eyes:      Pupils: Pupils are equal, round, and reactive to light.   Cardiovascular:      Rate and Rhythm: Normal rate and regular rhythm.   Pulmonary:      Effort: Pulmonary effort is normal.      Breath sounds: Normal breath sounds.   Musculoskeletal:         General: Normal range of motion.      Cervical back: Normal range of motion and neck supple.   Skin:     General: Skin is warm and dry.      Findings: No rash.   Neurological:      Mental Status: She is alert and oriented to person, place, and time.   Psychiatric:         Judgment: Judgment normal.       Lab Results   Component Value Date    COLORU Orange (A) 01/24/2023    APPEARANCEUA Clear 01/24/2023    GLUCUA SEE COMMENT 01/24/2023     SPECGRAV SEE COMMENT 01/24/2023    PHUR SEE COMMENT 01/24/2023    WBCUR trace 08/09/2018    RBCUR n 08/09/2018    NITRITE SEE COMMENT 01/24/2023    KETONESU SEE COMMENT 01/24/2023    BILIRUBINUA SEE COMMENT 01/24/2023    UROBILINOGEN 0.2 07/22/2020    OCCULTUA SEE COMMENT 01/24/2023    LEUKOCYTESUR SEE COMMENT 01/24/2023    GLUCOSEUR n 08/09/2018       Lab Results   Component Value Date    RBCUA 2 01/24/2023    WBCUA 15 (H) 01/24/2023    BACTERIA Few (A) 01/24/2023    SQUAMEPITHEL 5 01/24/2023    MICROCMT SEE COMMENT 01/24/2023       Assessment:       1. Acute cystitis without hematuria    2. Dysuria          Plan:   Acute cystitis without hematuria    Dysuria  -     Urinalysis, Reflex to Urine Culture Urine, Clean Catch; Future    Other orders  -     nitrofurantoin, macrocrystal-monohydrate, (MACROBID) 100 MG capsule; Take 1 capsule (100 mg total) by mouth 2 (two) times daily. for 7 days  Dispense: 14 capsule; Refill: 0  -     methen-m.blue-s.phos-phsal-hyo (URIBEL) 118-10-40.8-36 mg Cap; Take 1 capsule by mouth 2 (two) times a day. for 7 days  Dispense: 14 capsule; Refill: 0        No follow-ups on file.    There are no Patient Instructions on file for this visit.

## 2023-01-25 ENCOUNTER — PATIENT MESSAGE (OUTPATIENT)
Dept: FAMILY MEDICINE | Facility: CLINIC | Age: 83
End: 2023-01-25
Payer: MEDICARE

## 2023-01-25 ENCOUNTER — TELEPHONE (OUTPATIENT)
Dept: FAMILY MEDICINE | Facility: CLINIC | Age: 83
End: 2023-01-25
Payer: MEDICARE

## 2023-01-26 ENCOUNTER — PATIENT MESSAGE (OUTPATIENT)
Dept: FAMILY MEDICINE | Facility: CLINIC | Age: 83
End: 2023-01-26
Payer: MEDICARE

## 2023-01-26 RX ORDER — CIPROFLOXACIN 500 MG/1
500 TABLET ORAL 2 TIMES DAILY
Qty: 14 TABLET | Refills: 0 | Status: SHIPPED | OUTPATIENT
Start: 2023-01-26 | End: 2023-02-03

## 2023-01-26 NOTE — TELEPHONE ENCOUNTER
I received your message which was reviewed along with the the medication list and allergies that we have below.  Please review it for accuracy to make sure that we have the most recent records on your history.     Based on this, the following orders were placed AND/OR medicines were sent in.     No orders of the defined types were placed in this encounter.      Medications written and sent at this time include:  Medications Ordered This Encounter   Medications    ciprofloxacin HCl (CIPRO) 500 MG tablet     Sig: Take 1 tablet (500 mg total) by mouth 2 (two) times daily. for 7 days     Dispense:  14 tablet     Refill:  0       Your pharmacy(ies) of choice at this time on record include the list below and any medications would have been sent to the one at the top.    [facility] [address] [address]  Phone: [phone] Fax: [phone]    [facility] [address] [facility]  [address] [facility] [address]  Phone: [phone] Fax: [phone]    [id number] [place], [place] - [address] [facility]  [address] [facility] [address]  Phone: [phone] Fax: [phone]      Thank you for choosing us as your healthcare provider!  Dr. Bunny Moses    ALLERGY LIST  Review of patient's allergies indicates:   Allergen Reactions    Latex Hives    B12 [cyanocobalamin-cobamamide] Other (See Comments)     Caused flushing and caused skin to peel    Codeine      Other reaction(s): Muscle pain    Iodine      Other reaction(s): Hives    Meperidine      Other reaction(s): Muscle pain    Narcotic antagonist      Other reaction(s): Rash    Penicillins Nausea And Vomiting    Propoxyphene      Other reaction(s): Muscle pain    Sulfa (sulfonamide antibiotics)      Other reaction(s): Muscle pain  Other reaction(s): Hives       MEDICATION LIST  Current Outpatient Medications on File Prior to Visit   Medication Sig  Dispense Refill    alendronate (FOSAMAX) 70 MG tablet Take 1 tablet (70 mg total) by mouth every 7 days. 12 tablet 3    ascorbic acid, vitamin C, (VITAMIN C) 500 MG tablet Take 500 mg by mouth once daily.      aspirin (ECOTRIN) 81 MG EC tablet Take 1 tablet (81 mg total) by mouth once daily. 90 tablet 3    atorvastatin (LIPITOR) 40 MG tablet Take 1 tablet (40 mg total) by mouth once daily. 90 tablet 4    calcium carbonate (CALCIUM 500 ORAL) Take 2 tablets by mouth once daily.       chlorthalidone (HYGROTEN) 25 MG Tab Take 1 tablet (25 mg total) by mouth once daily. 30 tablet 11    citalopram (CELEXA) 20 MG tablet Take 1.5 tablets (30 mg total) by mouth once daily. 135 tablet 4    cranberry fruit extract (CRANBERRY ORAL) Take 1 tablet by mouth once daily.      diphenoxylate-atropine 2.5-0.025 mg (LOMOTIL) 2.5-0.025 mg per tablet Take 1 tablet by mouth 4 (four) times daily as needed for Diarrhea. 90 tablet 5    ELDERBERRY FRUIT ORAL Take 1 tablet by mouth daily as needed.      estradioL (ESTRACE) 0.01 % (0.1 mg/gram) vaginal cream SMARTSIG:Sparingly Vaginal Every Night      fluticasone propionate (FLONASE) 50 mcg/actuation nasal spray 1 spray by Each Nostril route daily as needed for Rhinitis.      hydrOXYzine HCL (ATARAX) 10 MG Tab Take 1 tablet (10 mg total) by mouth 2 (two) times daily as needed (anxiety). 30 tablet 1    ipratropium (ATROVENT) 21 mcg (0.03 %) nasal spray 2 sprays by Nasal route 2 (two) times daily as needed for Rhinitis. 30 mL 5    L.acidophil,parac-S.therm-Bif. (RISAQUAD) Cap capsule Take 1 capsule by mouth once daily.      losartan (COZAAR) 100 MG tablet Take 1 tablet (100 mg total) by mouth once daily. 90 tablet 4    meclizine (ANTIVERT) 25 mg tablet Take 1 tablet (25 mg total) by mouth 3 (three) times daily as needed for Dizziness or Nausea. 30 tablet 12    methen-mRaeblue-s.phos-phsal-hyo (URIBEL) 118-10-40.8-36 mg Cap Take 1 capsule by mouth 2 (two) times a day. for 7 days 14 capsule 0     metoprolol succinate (TOPROL-XL) 50 MG 24 hr tablet Take 1 tablet (50 mg total) by mouth once daily. 30 tablet 11    MULTIVIT,CALC,MINS/IRON/FOLIC (ONE-A-DAY WOMENS FORMULA ORAL) Take 1 tablet by mouth once daily.      ondansetron (ZOFRAN-ODT) 4 MG TbDL DISSOLVE ONE TABLET BY MOUTH EVERY 6 HOURS AS NEEDED FOR NAUSEA 60 tablet 1    potassium chloride SA (K-DUR,KLOR-CON) 20 MEQ tablet Take 1 tablet (20 mEq total) by mouth 2 (two) times daily. 60 tablet 11    promethazine (PHENERGAN) 25 MG tablet Take 1 tablet (25 mg total) by mouth every 6 (six) hours as needed for Nausea. 30 tablet 0    scopolamine (TRANSDERM-SCOP) 1.3-1.5 mg (1 mg over 3 days) Place 1 patch onto the skin every 72 hours. Strength: 1 mg over 3 days 4 patch 12    temazepam (RESTORIL) 30 mg capsule Take 1 capsule (30 mg total) by mouth nightly. 90 capsule 1    topiramate (TOPAMAX) 25 MG tablet Take 1 tablet (25 mg total) by mouth once daily. 90 tablet 4    vitamin D 1000 units Tab Take 1,000 Units by mouth once daily.      [DISCONTINUED] nitrofurantoin, macrocrystal-monohydrate, (MACROBID) 100 MG capsule Take 1 capsule (100 mg total) by mouth 2 (two) times daily. for 7 days 14 capsule 0     No current facility-administered medications on file prior to visit.       HEALTH MAINTENANCE THAT IS OVERDUE OR NEEDS TO BE UPDATED ON OUR CHART IS LISTED BELOW.  IF YOU HAVE HAD IT DONE ELSEWHERE, PLEASE SEND US DATES AND RECORDS IF YOU HAVE THEM TO MAKE YOUR CHART ACCURATE.  IF YOU HAVE NOT HAD THESE DONE AND ARE READY FOR US TO SCHEDULE THEM, PLEASE SEND US A MESSAGE.  Health Maintenance Due   Topic Date Due    TETANUS VACCINE  Never done    Colonoscopy  02/08/2014    Shingles Vaccine (1 of 2) 06/04/2016       DISCLAIMER: This note was compiled by using a speech recognition dictation system and therefore please be aware that typographical / speech recognition errors can and do occur.  Please contact me if you see any errors specifically.    Bunny Moses  MD  We Offer Telehealth & Same Day Appointments!   Book your Telehealth appointment with me through my nurse or   Clinic appointments on [email or web address]!  [email or web address]-[phone]     Check out my Facebook Page and Follow Me at: CLICK HERE    Check out my website at [facility] by clicking on: CLICK HERE    To Schedule appointments online, go to [facility]: CLICK HERE     Location: [email or web address]    [address] [place] [address]    FAX: [phone]

## 2023-01-28 ENCOUNTER — PATIENT MESSAGE (OUTPATIENT)
Dept: FAMILY MEDICINE | Facility: CLINIC | Age: 83
End: 2023-01-28
Payer: MEDICARE

## 2023-01-28 RX ORDER — CEFUROXIME AXETIL 250 MG/1
250 TABLET ORAL 2 TIMES DAILY
Qty: 14 TABLET | Refills: 0 | Status: SHIPPED | OUTPATIENT
Start: 2023-01-28 | End: 2023-02-03

## 2023-01-29 NOTE — TELEPHONE ENCOUNTER
I received your message which was reviewed along with the the medication list and allergies that we have below.  Please review it for accuracy to make sure that we have the most recent records on your history.     Based on this, the following orders were placed AND/OR medicines were sent in.     No orders of the defined types were placed in this encounter.      Medications written and sent at this time include:  Medications Ordered This Encounter   Medications    cefUROXime (CEFTIN) 250 MG tablet     Sig: Take 1 tablet (250 mg total) by mouth 2 (two) times daily. for 7 days     Dispense:  14 tablet     Refill:  0       Your pharmacy(ies) of choice at this time on record include the list below and any medications would have been sent to the one at the top.    [facility] [address] [address]  Phone: [phone] Fax: [phone]    [facility] [address] [facility] [address]  [address] [facility] [address]  Phone: [phone] Fax: [phone]    [id number] [place], [place] - [address] [facility]  [address] [facility] [address]  Phone: [phone] Fax: [phone]      Thank you for choosing us as your healthcare provider!  Dr. Bunny Moses    ALLERGY LIST  Review of patient's allergies indicates:   Allergen Reactions    Latex Hives    B12 [cyanocobalamin-cobamamide] Other (See Comments)     Caused flushing and caused skin to peel    Codeine      Other reaction(s): Muscle pain    Iodine      Other reaction(s): Hives    Meperidine      Other reaction(s): Muscle pain    Narcotic antagonist      Other reaction(s): Rash    Penicillins Nausea And Vomiting    Propoxyphene      Other reaction(s): Muscle pain    Sulfa (sulfonamide antibiotics)      Other reaction(s): Muscle pain  Other reaction(s): Hives       MEDICATION LIST  Current Outpatient Medications on File Prior to Visit   Medication Sig  Dispense Refill    alendronate (FOSAMAX) 70 MG tablet Take 1 tablet (70 mg total) by mouth every 7 days. 12 tablet 3    ascorbic acid, vitamin C, (VITAMIN C) 500 MG tablet Take 500 mg by mouth once daily.      aspirin (ECOTRIN) 81 MG EC tablet Take 1 tablet (81 mg total) by mouth once daily. 90 tablet 3    atorvastatin (LIPITOR) 40 MG tablet Take 1 tablet (40 mg total) by mouth once daily. 90 tablet 4    calcium carbonate (CALCIUM 500 ORAL) Take 2 tablets by mouth once daily.       chlorthalidone (HYGROTEN) 25 MG Tab Take 1 tablet (25 mg total) by mouth once daily. 30 tablet 11    ciprofloxacin HCl (CIPRO) 500 MG tablet Take 1 tablet (500 mg total) by mouth 2 (two) times daily. for 7 days 14 tablet 0    citalopram (CELEXA) 20 MG tablet Take 1.5 tablets (30 mg total) by mouth once daily. 135 tablet 4    cranberry fruit extract (CRANBERRY ORAL) Take 1 tablet by mouth once daily.      diphenoxylate-atropine 2.5-0.025 mg (LOMOTIL) 2.5-0.025 mg per tablet Take 1 tablet by mouth 4 (four) times daily as needed for Diarrhea. 90 tablet 5    ELDERBERRY FRUIT ORAL Take 1 tablet by mouth daily as needed.      estradioL (ESTRACE) 0.01 % (0.1 mg/gram) vaginal cream SMARTSIG:Sparingly Vaginal Every Night      fluticasone propionate (FLONASE) 50 mcg/actuation nasal spray 1 spray by Each Nostril route daily as needed for Rhinitis.      hydrOXYzine HCL (ATARAX) 10 MG Tab Take 1 tablet (10 mg total) by mouth 2 (two) times daily as needed (anxiety). 30 tablet 1    ipratropium (ATROVENT) 21 mcg (0.03 %) nasal spray 2 sprays by Nasal route 2 (two) times daily as needed for Rhinitis. 30 mL 5    L.acidophil,parac-S.therm-Bif. (RISAQUAD) Cap capsule Take 1 capsule by mouth once daily.      losartan (COZAAR) 100 MG tablet Take 1 tablet (100 mg total) by mouth once daily. 90 tablet 4    meclizine (ANTIVERT) 25 mg tablet Take 1 tablet (25 mg total) by mouth 3 (three) times daily as needed for Dizziness or Nausea. 30 tablet 12     methen-m.blue-s.phos-phsal-hyo (URIBEL) 118-10-40.8-36 mg Cap Take 1 capsule by mouth 2 (two) times a day. for 7 days 14 capsule 0    metoprolol succinate (TOPROL-XL) 50 MG 24 hr tablet Take 1 tablet (50 mg total) by mouth once daily. 30 tablet 11    MULTIVIT,CALC,MINS/IRON/FOLIC (ONE-A-DAY WOMENS FORMULA ORAL) Take 1 tablet by mouth once daily.      ondansetron (ZOFRAN-ODT) 4 MG TbDL DISSOLVE ONE TABLET BY MOUTH EVERY 6 HOURS AS NEEDED FOR NAUSEA 60 tablet 1    potassium chloride SA (K-DUR,KLOR-CON) 20 MEQ tablet Take 1 tablet (20 mEq total) by mouth 2 (two) times daily. 60 tablet 11    promethazine (PHENERGAN) 25 MG tablet Take 1 tablet (25 mg total) by mouth every 6 (six) hours as needed for Nausea. 30 tablet 0    scopolamine (TRANSDERM-SCOP) 1.3-1.5 mg (1 mg over 3 days) Place 1 patch onto the skin every 72 hours. Strength: 1 mg over 3 days 4 patch 12    temazepam (RESTORIL) 30 mg capsule Take 1 capsule (30 mg total) by mouth nightly. 90 capsule 1    topiramate (TOPAMAX) 25 MG tablet Take 1 tablet (25 mg total) by mouth once daily. 90 tablet 4    vitamin D 1000 units Tab Take 1,000 Units by mouth once daily.      [DISCONTINUED] cefUROXime (CEFTIN) 250 MG tablet Take 1 tablet (250 mg total) by mouth 2 (two) times daily. for 7 days 14 tablet 0     No current facility-administered medications on file prior to visit.       HEALTH MAINTENANCE THAT IS OVERDUE OR NEEDS TO BE UPDATED ON OUR CHART IS LISTED BELOW.  IF YOU HAVE HAD IT DONE ELSEWHERE, PLEASE SEND US DATES AND RECORDS IF YOU HAVE THEM TO MAKE YOUR CHART ACCURATE.  IF YOU HAVE NOT HAD THESE DONE AND ARE READY FOR US TO SCHEDULE THEM, PLEASE SEND US A MESSAGE.  Health Maintenance Due   Topic Date Due    TETANUS VACCINE  Never done    Colonoscopy  02/08/2014    Shingles Vaccine (1 of 2) 06/04/2016       DISCLAIMER: This note was compiled by using a speech recognition dictation system and therefore please be aware that typographical / speech recognition errors  can and do occur.  Please contact me if you see any errors specifically.    Bunny Moses MD  We Offer Telehealth & Same Day Appointments!   Book your Telehealth appointment with me through my nurse or   Clinic appointments on [employer]!  [phone]     Check out my Facebook Page and Follow Me at: CLICK HERE    Check out my website at [employer] by clicking on: CLICK HERE    To Schedule appointments online, go to [employer]: CLICK HERE     Location: [email or web address]    [address] [place] [address]    FAX: [phone]

## 2023-02-01 ENCOUNTER — PATIENT MESSAGE (OUTPATIENT)
Dept: FAMILY MEDICINE | Facility: CLINIC | Age: 83
End: 2023-02-01
Payer: MEDICARE

## 2023-02-01 RX ORDER — MIRABEGRON 25 MG/1
TABLET, FILM COATED, EXTENDED RELEASE ORAL
Qty: 30 TABLET | Refills: 12 | OUTPATIENT
Start: 2023-02-01

## 2023-02-01 NOTE — TELEPHONE ENCOUNTER
No new care gaps identified.  [facility] Embedded Care Gaps. Reference number: [id number]. 2/01/2023   4:42:15 PM CST

## 2023-02-02 ENCOUNTER — PATIENT MESSAGE (OUTPATIENT)
Dept: FAMILY MEDICINE | Facility: CLINIC | Age: 83
End: 2023-02-02
Payer: MEDICARE

## 2023-02-02 DIAGNOSIS — N32.81 OVERACTIVE BLADDER: Primary | ICD-10-CM

## 2023-02-02 RX ORDER — MIRABEGRON 25 MG/1
25 TABLET, FILM COATED, EXTENDED RELEASE ORAL DAILY
Qty: 30 TABLET | Refills: 11 | Status: SHIPPED | OUTPATIENT
Start: 2023-02-02 | End: 2023-02-03 | Stop reason: SDUPTHER

## 2023-02-02 NOTE — TELEPHONE ENCOUNTER
Quick DC. Inappropriate Request    Refill Authorization Note   Suzie Dawkins  is requesting a refill authorization.  Brief Assessment and Rationale for Refill:  Quick Discontinue  Medication Therapy Plan:  D/C 1/24/23    Medication Reconciliation Completed:  No      Comments:     Note composed:7:56 PM 02/01/2023

## 2023-02-02 NOTE — TELEPHONE ENCOUNTER
No new care gaps identified.  [facility] Embedded Care Gaps. Reference number: [id number]. 2/02/2023   4:37:43 PM CST

## 2023-02-03 ENCOUNTER — LAB VISIT (OUTPATIENT)
Dept: LAB | Facility: HOSPITAL | Age: 83
End: 2023-02-03
Attending: FAMILY MEDICINE
Payer: MEDICARE

## 2023-02-03 ENCOUNTER — TELEPHONE (OUTPATIENT)
Dept: FAMILY MEDICINE | Facility: CLINIC | Age: 83
End: 2023-02-03
Payer: MEDICARE

## 2023-02-03 ENCOUNTER — OFFICE VISIT (OUTPATIENT)
Dept: FAMILY MEDICINE | Facility: CLINIC | Age: 83
End: 2023-02-03
Payer: MEDICARE

## 2023-02-03 VITALS
OXYGEN SATURATION: 97 % | SYSTOLIC BLOOD PRESSURE: 128 MMHG | HEIGHT: 59 IN | WEIGHT: 155 LBS | BODY MASS INDEX: 31.25 KG/M2 | DIASTOLIC BLOOD PRESSURE: 76 MMHG | HEART RATE: 61 BPM

## 2023-02-03 DIAGNOSIS — N32.81 OVERACTIVE BLADDER: Primary | ICD-10-CM

## 2023-02-03 DIAGNOSIS — N18.30 STAGE 3 CHRONIC KIDNEY DISEASE, UNSPECIFIED WHETHER STAGE 3A OR 3B CKD: ICD-10-CM

## 2023-02-03 DIAGNOSIS — E21.0 PRIMARY HYPERPARATHYROIDISM: ICD-10-CM

## 2023-02-03 DIAGNOSIS — R30.0 DYSURIA: ICD-10-CM

## 2023-02-03 DIAGNOSIS — N32.81 OVERACTIVE BLADDER: ICD-10-CM

## 2023-02-03 DIAGNOSIS — Z79.899 ENCOUNTER FOR LONG-TERM (CURRENT) USE OF MEDICATIONS: ICD-10-CM

## 2023-02-03 LAB
BACTERIA #/AREA URNS AUTO: ABNORMAL /HPF
BILIRUB UR QL STRIP: NEGATIVE
CLARITY UR REFRACT.AUTO: ABNORMAL
COLOR UR AUTO: YELLOW
GLUCOSE UR QL STRIP: NEGATIVE
HGB UR QL STRIP: NEGATIVE
HYALINE CASTS UR QL AUTO: 38 /LPF
KETONES UR QL STRIP: NEGATIVE
LEUKOCYTE ESTERASE UR QL STRIP: ABNORMAL
MICROSCOPIC COMMENT: ABNORMAL
NITRITE UR QL STRIP: NEGATIVE
NON-SQ EPI CELLS #/AREA URNS AUTO: 4 /HPF
PH UR STRIP: 6 [PH] (ref 5–8)
PROT UR QL STRIP: ABNORMAL
RBC #/AREA URNS AUTO: 4 /HPF (ref 0–4)
SP GR UR STRIP: 1.02 (ref 1–1.03)
SQUAMOUS #/AREA URNS AUTO: 4 /HPF
URN SPEC COLLECT METH UR: ABNORMAL
WBC #/AREA URNS AUTO: 52 /HPF (ref 0–5)
WBC CLUMPS UR QL AUTO: ABNORMAL

## 2023-02-03 PROCEDURE — 99999 PR PBB SHADOW E&M-EST. PATIENT-LVL V: ICD-10-PCS | Mod: PBBFAC,,, | Performed by: FAMILY MEDICINE

## 2023-02-03 PROCEDURE — 99999 PR PBB SHADOW E&M-EST. PATIENT-LVL V: CPT | Mod: PBBFAC,,, | Performed by: FAMILY MEDICINE

## 2023-02-03 PROCEDURE — 99215 OFFICE O/P EST HI 40 MIN: CPT | Mod: PBBFAC,PO | Performed by: FAMILY MEDICINE

## 2023-02-03 PROCEDURE — 87088 URINE BACTERIA CULTURE: CPT | Performed by: FAMILY MEDICINE

## 2023-02-03 PROCEDURE — 87186 SC STD MICRODIL/AGAR DIL: CPT | Performed by: FAMILY MEDICINE

## 2023-02-03 PROCEDURE — 81001 URINALYSIS AUTO W/SCOPE: CPT | Performed by: FAMILY MEDICINE

## 2023-02-03 PROCEDURE — 99214 PR OFFICE/OUTPT VISIT, EST, LEVL IV, 30-39 MIN: ICD-10-PCS | Mod: S$PBB,,, | Performed by: FAMILY MEDICINE

## 2023-02-03 PROCEDURE — 87077 CULTURE AEROBIC IDENTIFY: CPT | Performed by: FAMILY MEDICINE

## 2023-02-03 PROCEDURE — 87086 URINE CULTURE/COLONY COUNT: CPT | Performed by: FAMILY MEDICINE

## 2023-02-03 PROCEDURE — 99214 OFFICE O/P EST MOD 30 MIN: CPT | Mod: S$PBB,,, | Performed by: FAMILY MEDICINE

## 2023-02-03 RX ORDER — FLUCONAZOLE 100 MG/1
100 TABLET ORAL DAILY
Qty: 30 TABLET | Refills: 0 | Status: SHIPPED | OUTPATIENT
Start: 2023-02-03 | End: 2023-03-05

## 2023-02-03 RX ORDER — PHENAZOPYRIDINE HYDROCHLORIDE 200 MG/1
200 TABLET, FILM COATED ORAL 3 TIMES DAILY PRN
Qty: 9 TABLET | Refills: 0 | Status: SHIPPED | OUTPATIENT
Start: 2023-02-03 | End: 2023-02-06

## 2023-02-03 RX ORDER — MIRABEGRON 25 MG/1
25 TABLET, FILM COATED, EXTENDED RELEASE ORAL DAILY
Qty: 30 TABLET | Refills: 11 | Status: SHIPPED | OUTPATIENT
Start: 2023-02-03 | End: 2023-05-24

## 2023-02-03 NOTE — PROGRESS NOTES
PLAN:      Problem List Items Addressed This Visit       Primary hyperparathyroidism (Chronic)     Check PTH on next blood work.  Follow-up with Endocrinology.  Hold calcium supplement.         Relevant Orders    PTH, intact    Encounter for long-term (current) use of medications (Chronic)     Complete history and physical was completed today.  Complete and thorough medication reconciliation was performed.  Discussed risks and benefits of medications.  Advised patient on orders and health maintenance.  We discussed old records and old labs if available.  Will request any records not available through epic.  Continue current medications listed on your summary sheet.           Stage 3 chronic kidney disease (Chronic)     Monitoring renal function.  Patient is avoiding anti-inflammatory medications.   Increase hydration with water.    Blood pressure well controlled.  Hypertension Medications               chlorthalidone (HYGROTEN) 25 MG Tab Take 1 tablet (25 mg total) by mouth once daily.    losartan (COZAAR) 100 MG tablet Take 1 tablet (100 mg total) by mouth once daily.    metoprolol succinate (TOPROL-XL) 50 MG 24 hr tablet Take 1 tablet (50 mg total) by mouth once daily.                   Overactive bladder - Primary     Restart mirabegron.  Patient will establish care with Urology here in [place].  [place] Urology [place] of [place] who recommended cystoscopy but was never completed.    Check urinalysis today.  Reflex to culture if needed.    Start fluconazole patient having some symptoms of yeast infection.    Increase hydration with water.         Relevant Medications    fluconazole (DIFLUCAN) 100 MG tablet    mirabegron (MYRBETRIQ) 25 mg Tb24 ER tablet    phenazopyridine (PYRIDIUM) 200 MG tablet    Other Relevant Orders    Ambulatory referral/consult to Urology    CULTURE, URINE    CULTURE, URINE    Dysuria    Relevant Orders    CULTURE, URINE     Future Appointments       Date Provider Specialty Appt Notes     2/3/2023 Bunny Moses MD Family Medicine uti     2/6/2023 Leonie Mathew NP Family Medicine UTI    3/23/2023 Bunny Moses MD Family Medicine High blood pressure    4/4/2023 Marco A Addison MD Urology Overactive bladder     4/12/2023 René Lutz MD Cardiology 3 month f/u           Medication Management for assessment above:   Medication List with Changes/Refills   New Medications    FLUCONAZOLE (DIFLUCAN) 100 MG TABLET    Take 1 tablet (100 mg total) by mouth once daily.    PHENAZOPYRIDINE (PYRIDIUM) 200 MG TABLET    Take 1 tablet (200 mg total) by mouth 3 (three) times daily as needed for Pain.   Current Medications    ALENDRONATE (FOSAMAX) 70 MG TABLET    Take 1 tablet (70 mg total) by mouth every 7 days.    ASCORBIC ACID, VITAMIN C, (VITAMIN C) 500 MG TABLET    Take 500 mg by mouth once daily.    ASPIRIN (ECOTRIN) 81 MG EC TABLET    Take 1 tablet (81 mg total) by mouth once daily.    ATORVASTATIN (LIPITOR) 40 MG TABLET    Take 1 tablet (40 mg total) by mouth once daily.    CHLORTHALIDONE (HYGROTEN) 25 MG TAB    Take 1 tablet (25 mg total) by mouth once daily.    CITALOPRAM (CELEXA) 20 MG TABLET    Take 1.5 tablets (30 mg total) by mouth once daily.    CRANBERRY FRUIT EXTRACT (CRANBERRY ORAL)    Take 1 tablet by mouth once daily.    DIPHENOXYLATE-ATROPINE 2.5-0.025 MG (LOMOTIL) 2.5-0.025 MG PER TABLET    Take 1 tablet by mouth 4 (four) times daily as needed for Diarrhea.    ELDERBERRY FRUIT ORAL    Take 1 tablet by mouth daily as needed.    ESTRADIOL (ESTRACE) 0.01 % (0.1 MG/GRAM) VAGINAL CREAM    SMARTSIG:Sparingly Vaginal Every Night    FLUTICASONE PROPIONATE (FLONASE) 50 MCG/ACTUATION NASAL SPRAY    1 spray by Each Nostril route daily as needed for Rhinitis.    HYDROXYZINE HCL (ATARAX) 10 MG TAB    Take 1 tablet (10 mg total) by mouth 2 (two) times daily as needed (anxiety).    IPRATROPIUM (ATROVENT) 21 MCG (0.03 %) NASAL SPRAY    2 sprays by Nasal route 2 (two) times daily as needed for  Rhinitis.    L.ACIDOPHIL,PARAC-S.THERM-BIF. (RISAQUAD) CAP CAPSULE    Take 1 capsule by mouth once daily.    LOSARTAN (COZAAR) 100 MG TABLET    Take 1 tablet (100 mg total) by mouth once daily.    MECLIZINE (ANTIVERT) 25 MG TABLET    Take 1 tablet (25 mg total) by mouth 3 (three) times daily as needed for Dizziness or Nausea.    METOPROLOL SUCCINATE (TOPROL-XL) 50 MG 24 HR TABLET    Take 1 tablet (50 mg total) by mouth once daily.    MULTIVIT,CALC,MINS/IRON/FOLIC (ONE-A-DAY WOMENS FORMULA ORAL)    Take 1 tablet by mouth once daily.    ONDANSETRON (ZOFRAN-ODT) 4 MG TBDL    DISSOLVE ONE TABLET BY MOUTH EVERY 6 HOURS AS NEEDED FOR NAUSEA    POTASSIUM CHLORIDE SA (K-DUR,KLOR-CON) 20 MEQ TABLET    Take 1 tablet (20 mEq total) by mouth 2 (two) times daily.    PROMETHAZINE (PHENERGAN) 25 MG TABLET    Take 1 tablet (25 mg total) by mouth every 6 (six) hours as needed for Nausea.    SCOPOLAMINE (TRANSDERM-SCOP) 1.3-1.5 MG (1 MG OVER 3 DAYS)    Place 1 patch onto the skin every 72 hours. Strength: 1 mg over 3 days    TEMAZEPAM (RESTORIL) 30 MG CAPSULE    Take 1 capsule (30 mg total) by mouth nightly.    TOPIRAMATE (TOPAMAX) 25 MG TABLET    Take 1 tablet (25 mg total) by mouth once daily.    VITAMIN D 1000 UNITS TAB    Take 1,000 Units by mouth once daily.   Changed and/or Refilled Medications    Modified Medication Previous Medication    MIRABEGRON (MYRBETRIQ) 25 MG TB24 ER TABLET mirabegron (MYRBETRIQ) 25 mg Tb24 ER tablet       Take 1 tablet (25 mg total) by mouth once daily.    Take 1 tablet (25 mg total) by mouth once daily.   Discontinued Medications    CALCIUM CARBONATE (CALCIUM 500 ORAL)    Take 2 tablets by mouth once daily.     CEFUROXIME (CEFTIN) 250 MG TABLET    Take 1 tablet (250 mg total) by mouth 2 (two) times daily. for 7 days    CIPROFLOXACIN HCL (CIPRO) 500 MG TABLET    Take 1 tablet (500 mg total) by mouth 2 (two) times daily. for 7 days       Bunny Moses,  M.D.  ==========================================================================  Subjective:   Patient ID: Suzie Dawkins is a 82 y.o. female.  has a past medical history of Anxiety, Arthritis, Cancer (2007), Depression, GERD (gastroesophageal reflux disease), Hypercalcemia, Hyperlipidemia, Hypertension, Osteoporosis (10/7/2013), and Vitamin D deficiency.   Chief Complaint: Urinary Tract Infection      Problem List Items Addressed This Visit       Primary hyperparathyroidism (Chronic)    Overview     Lab Results   Component Value Date    PTH 60.0 10/24/2018    CALCIUM 10.8 (H) 01/06/2023    CAION 5.4 (H) 11/09/2004    PHOS 3.4 07/23/2020   Chronic.  Control is uncertain.  Patient states that she is following with Endocrinology for this condition.           Current Assessment & Plan     Check PTH on next blood work.  Follow-up with Endocrinology.  Hold calcium supplement.         Encounter for long-term (current) use of medications (Chronic)    Overview     February 2023: Reviewed labs.  December 2022: Reviewed labs.  CHRONIC. Stable. Compliant with medications for managed conditions. See medication list. No SE reported. Routine lab analysis is being monitored. Refills were addressed.April 2022:  CHRONIC. Stable. Compliant with medications for managed conditions. See medication list. No SE reported. Routine lab analysis is being monitored. Refills were addressed.  June 2022:  Reviewed labs.  Lab Results   Component Value Date    WBC 9.62 12/12/2022    HGB 14.9 12/12/2022    HCT 45.2 12/12/2022    MCV 97 12/12/2022     12/12/2022       Chemistry        Component Value Date/Time     01/06/2023 0850    K 3.5 01/06/2023 0850    CL 98 01/06/2023 0850    CO2 27 01/06/2023 0850    BUN 22 01/06/2023 0850    CREATININE 1.2 01/06/2023 0850    GLU 94 01/06/2023 0850        Component Value Date/Time    CALCIUM 10.8 (H) 01/06/2023 0850    ALKPHOS 99 12/12/2022 1446    AST 32 12/12/2022 1446    ALT 35 12/12/2022  1446    BILITOT 0.9 12/12/2022 1446    ESTGFRAFRICA >60.0 06/23/2022 0926    EGFRNONAA 53.3 (A) 06/23/2022 0926          Lab Results   Component Value Date    TSH 1.594 12/12/2022    FREET4 0.86 04/30/2021    T3FREE 2.2 (L) 04/30/2021            Current Assessment & Plan     Complete history and physical was completed today.  Complete and thorough medication reconciliation was performed.  Discussed risks and benefits of medications.  Advised patient on orders and health maintenance.  We discussed old records and old labs if available.  Will request any records not available through epic.  Continue current medications listed on your summary sheet.           Stage 3 chronic kidney disease (Chronic)    Overview       Chronic.  Stable.  Patient is avoiding anti-inflammatory medications.  BMP  Lab Results   Component Value Date     04/30/2021    K 4.0 04/30/2021     04/30/2021    CO2 25 04/30/2021    BUN 11 04/30/2021    CREATININE 0.9 04/30/2021    CALCIUM 10.8 (H) 04/30/2021    ANIONGAP 8 04/30/2021    ESTGFRAFRICA >60.0 04/30/2021    EGFRNONAA >60.0 04/30/2021              Current Assessment & Plan     Monitoring renal function.  Patient is avoiding anti-inflammatory medications.   Increase hydration with water.    Blood pressure well controlled.  Hypertension Medications               chlorthalidone (HYGROTEN) 25 MG Tab Take 1 tablet (25 mg total) by mouth once daily.    losartan (COZAAR) 100 MG tablet Take 1 tablet (100 mg total) by mouth once daily.    metoprolol succinate (TOPROL-XL) 50 MG 24 hr tablet Take 1 tablet (50 mg total) by mouth once daily.                   Overactive bladder - Primary    Overview     Chronic.  Uncontrolled.  Patient was put on mirabegron by Urology previously.  She did take the medication which improved her symptoms however she stopped taking the medication due to cost from insurance.  She is wanting to get back on the medication.  Patient is having symptoms of dysuria and  urinary frequency.  Last urine culture was normal with no growth.         Current Assessment & Plan     Restart mirabegron.  Patient will establish care with Urology here in [place].  Previously saw a Urology out of state who recommended cystoscopy but was never completed.    Check urinalysis today.  Reflex to culture if needed.    Start fluconazole patient having some symptoms of yeast infection.    Increase hydration with water.         Dysuria        Review of patient's allergies indicates:   Allergen Reactions    Latex Hives    B12 [cyanocobalamin-cobamamide] Other (See Comments)     Caused flushing and caused skin to peel    Ciprofloxacin Diarrhea    Codeine      Other reaction(s): Muscle pain    Iodine      Other reaction(s): Hives    Meperidine      Other reaction(s): Muscle pain    Narcotic antagonist      Other reaction(s): Rash    Nitrofuran analogues     Penicillins Nausea And Vomiting    Propoxyphene      Other reaction(s): Muscle pain    Sulfa (sulfonamide antibiotics)      Other reaction(s): Muscle pain  Other reaction(s): Hives     Current Outpatient Medications   Medication Instructions    alendronate (FOSAMAX) 70 mg, Oral, Every 7 days    ascorbic acid (vitamin C) (VITAMIN C) 500 mg, Oral, Daily    aspirin (ECOTRIN) 81 mg, Oral, Daily    atorvastatin (LIPITOR) 40 mg, Oral, Daily    chlorthalidone (HYGROTEN) 25 mg, Oral, Daily    citalopram (CELEXA) 30 mg, Oral, Daily    cranberry fruit extract (CRANBERRY ORAL) 1 tablet, Oral, Daily    diphenoxylate-atropine 2.5-0.025 mg (LOMOTIL) 2.5-0.025 mg per tablet 1 tablet, Oral, 4 times daily PRN    ELDERBERRY FRUIT ORAL 1 tablet, Oral, Daily PRN    estradioL (ESTRACE) 0.01 % (0.1 mg/gram) vaginal cream SMARTSIG:Sparingly Vaginal Every Night    fluconazole (DIFLUCAN) 100 mg, Oral, Daily    fluticasone propionate (FLONASE) 50 mcg/actuation nasal spray 1 spray, Each Nostril, Daily PRN    hydrOXYzine HCL (ATARAX) 10 mg, Oral, 2 times daily PRN    ipratropium  "(ATROVENT) 21 mcg (0.03 %) nasal spray 2 sprays, Nasal, 2 times daily PRN    L.acidophil,parac-S.therm-Bif. (RISAQUAD) Cap capsule 1 capsule, Oral, Daily    losartan (COZAAR) 100 mg, Oral, Daily    meclizine (ANTIVERT) 25 mg, Oral, 3 times daily PRN    metoprolol succinate (TOPROL-XL) 50 mg, Oral, Daily    MULTIVIT,CALC,MINS/IRON/FOLIC (ONE-A-DAY WOMENS FORMULA ORAL) 1 tablet, Oral, Daily    MYRBETRIQ 25 mg, Oral, Daily    ondansetron (ZOFRAN-ODT) 4 MG TbDL DISSOLVE ONE TABLET BY MOUTH EVERY 6 HOURS AS NEEDED FOR NAUSEA    phenazopyridine (PYRIDIUM) 200 mg, Oral, 3 times daily PRN    potassium chloride SA (K-DUR,KLOR-CON) 20 MEQ tablet 20 mEq, Oral, 2 times daily    promethazine (PHENERGAN) 25 mg, Oral, Every 6 hours PRN    scopolamine (TRANSDERM-SCOP) 1.3-1.5 mg (1 mg over 3 days) Place 1 patch onto the skin every 72 hours. Strength: 1 mg over 3 days    temazepam (RESTORIL) 30 mg, Oral, Nightly    topiramate (TOPAMAX) 25 mg, Oral, Daily    vitamin D (VITAMIN D3) 1,000 Units, Oral, Daily      I have reviewed the PMH, social history, FamilyHx, surgical history, allergies and medications documented / confirmed by the patient at the time of this visit.  Review of Systems   Constitutional:  Negative for chills, fatigue, fever and unexpected weight change.   HENT:  Negative for ear pain and sore throat.    Eyes:  Negative for redness and visual disturbance.   Respiratory:  Negative for cough and shortness of breath.    Cardiovascular:  Negative for chest pain and palpitations.   Gastrointestinal:  Negative for nausea and vomiting.   Genitourinary:  Positive for dysuria and urgency. Negative for difficulty urinating and hematuria.   Musculoskeletal:  Negative for arthralgias and myalgias.   Skin:  Negative for rash and wound.   Neurological:  Negative for weakness and headaches.   Psychiatric/Behavioral:  Negative for sleep disturbance. The patient is not nervous/anxious.    Objective:   /76   Pulse 61   Ht 4' 11" " (1.499 m)   Wt 70.3 kg (155 lb)   SpO2 97%   BMI 31.31 kg/m²   Physical Exam  Vitals and nursing note reviewed.   Constitutional:       General: She is not in acute distress.     Appearance: She is well-developed. She is not ill-appearing, toxic-appearing or diaphoretic.   HENT:      Head: Normocephalic and atraumatic.      Right Ear: Hearing and external ear normal.      Left Ear: Hearing and external ear normal.      Nose: Nose normal. No rhinorrhea.   Eyes:      General: Lids are normal.      Extraocular Movements: Extraocular movements intact.      Conjunctiva/sclera: Conjunctivae normal.      Pupils: Pupils are equal, round, and reactive to light.   Cardiovascular:      Rate and Rhythm: Normal rate.      Pulses: Normal pulses.   Pulmonary:      Effort: Pulmonary effort is normal. No respiratory distress.      Breath sounds: Normal breath sounds.   Abdominal:      General: Bowel sounds are normal.      Palpations: Abdomen is soft.      Tenderness: There is no right CVA tenderness or left CVA tenderness.   Musculoskeletal:         General: Normal range of motion.      Cervical back: Normal range of motion and neck supple.   Skin:     General: Skin is warm and dry.      Capillary Refill: Capillary refill takes less than 2 seconds.      Coloration: Skin is not pale.   Neurological:      General: No focal deficit present.      Mental Status: She is alert and oriented to person, place, and time. She is not disoriented.   Psychiatric:         Attention and Perception: She is attentive.         Mood and Affect: Mood normal. Mood is not anxious or depressed.         Speech: Speech is not rapid and pressured or slurred.         Behavior: Behavior normal. Behavior is not agitated, aggressive or hyperactive. Behavior is cooperative.         Thought Content: Thought content normal. Thought content is not paranoid or delusional. Thought content does not include homicidal or suicidal ideation. Thought content does not  include homicidal or suicidal plan.         Cognition and Memory: Memory is not impaired.         Judgment: Judgment normal.       Assessment:     1. Overactive bladder    2. Primary hyperparathyroidism    3. Stage 3 chronic kidney disease, unspecified whether stage 3a or 3b CKD    4. Dysuria    5. Encounter for long-term (current) use of medications      MDM:   Moderate medical complexity.  Moderate risk.    Total time: 31 minutes.  This includes total time spent on the encounter, which includes face to face time and non-face to face time preparing to see the patient (eg, review of previous medical records, tests), Obtaining and/or reviewing separately obtained history, documenting clinical information in the electronic or other health record, independently interpreting results (not separately reported)/communicating results to the patient/family/caregiver, and/or care coordination (not separately reported).    I have Reviewed and summarized old records.  I have performed thorough medication reconciliation today and discussed risk and benefits of medications.  I have reviewed labs and discussed with patient.  All questions were answered.  I am requesting old records and will review them once they are available.Urology    I have signed for the following orders AND/OR meds.  Orders Placed This Encounter   Procedures    CULTURE, URINE          CULTURE, URINE          CULTURE, URINE     Standing Status:   Future     Number of Occurrences:   1     Standing Expiration Date:   4/3/2024    PTH, intact     Standing Status:   Future     Standing Expiration Date:   4/3/2024    Ambulatory referral/consult to Urology     Standing Status:   Future     Standing Expiration Date:   3/3/2024     Referral Priority:   Routine     Referral Type:   Consultation     Referral Reason:   Specialty Services Required     Referred to Provider:   Marco A Addison MD     Requested Specialty:   Urology     Number of Visits Requested:   1      Medications Ordered This Encounter   Medications    fluconazole (DIFLUCAN) 100 MG tablet     Sig: Take 1 tablet (100 mg total) by mouth once daily.     Dispense:  30 tablet     Refill:  0    mirabegron (MYRBETRIQ) 25 mg Tb24 ER tablet     Sig: Take 1 tablet (25 mg total) by mouth once daily.     Dispense:  30 tablet     Refill:  11    phenazopyridine (PYRIDIUM) 200 MG tablet     Sig: Take 1 tablet (200 mg total) by mouth 3 (three) times daily as needed for Pain.     Dispense:  9 tablet     Refill:  0        Follow up if symptoms worsen or fail to improve, for follow up.  Future Appointments       Date Provider Specialty Appt Notes    2/3/2023 Bunny Moses MD Family Medicine uti     2/6/2023 Leonie Mathew NP Family Medicine UTI    3/23/2023 Bunny Moses MD Family Medicine High blood pressure    4/4/2023 Marco A Addison MD Urology Overactive bladder     4/12/2023 René Lutz MD Cardiology 3 month f/u          If no improvement in symptoms or symptoms worsen, advised to call/follow-up at clinic or go to ER. Patient voiced understanding and all questions/concerns were addressed.   DISCLAIMER: This note was compiled by using a speech recognition dictation system and therefore please be aware that typographical / speech recognition errors can and do occur.  Please contact me if you see any errors specifically.    Bunny Moses M.D.       Office: [phone]   [address] [place] [address]  FAX: [phone]

## 2023-02-03 NOTE — PATIENT INSTRUCTIONS
Follow up if symptoms worsen or fail to improve, for follow up.     Dear patient,   As a result of recent federal legislation (The Federal Cures Act), you may receive lab or pathology results from your visit in your [facility] account before your physician is able to contact you. Your physician or their representative will relay the results to you with their recommendations at their soonest availability.     If no improvement in symptoms or symptoms worsen, please be advised to call MD, follow-up at clinic and/or go to ER if becomes severe.    Bunny Moses M.D.        We Offer TELEHEALTH & Same Day Appointments!   Book your Telehealth appointment with me through my nurse or   Clinic appointments on [employer]!    [address] [place] [address]    Office: [phone]   FAX: [phone]    Check out my Facebook Page and Follow Me at: [email or web address]    Check out my website at [employer] by clicking on: [email or web address]    To Schedule appointments online, go to [email or web address]: [email or web address]

## 2023-02-06 DIAGNOSIS — N39.0 E. COLI UTI: Primary | ICD-10-CM

## 2023-02-06 DIAGNOSIS — B96.20 E. COLI UTI: Primary | ICD-10-CM

## 2023-02-06 LAB — BACTERIA UR CULT: ABNORMAL

## 2023-02-06 RX ORDER — NITROFURANTOIN 25; 75 MG/1; MG/1
100 CAPSULE ORAL 2 TIMES DAILY
Qty: 20 CAPSULE | Refills: 0 | Status: SHIPPED | OUTPATIENT
Start: 2023-02-06 | End: 2023-05-24

## 2023-02-06 NOTE — PROGRESS NOTES
" Spoke to pt over the phone, inform her"  MD Josué Recinos Staff 21 minutes ago (1:34 PM)     Please call to make sure patient get the results.     [phone], Your urinalysis is abnormal.  Showing infection and urine culture has returned as E coli that is resistant to a lot of antibiotics.  This is not good because most of them are good antibiotics that we normally use.  The only one that is oral is the nitrofurantoin which you had taken recently but you stated that it gave you some side effects.  I strongly recommend that you try to tolerate this antibiotic to treat this current infection and to follow-up closely with Urology to prevent further infections.    " ,per dr oconnor . Pt vu and will take antibiotic. Appt was schedule with urology    "

## 2023-02-06 NOTE — PROGRESS NOTES
Please call to make sure patient get the results.    [phone], Your urinalysis is abnormal.  Showing infection and urine culture has returned as E coli that is resistant to a lot of antibiotics.  This is not good because most of them are good antibiotics that we normally use.  The only one that is oral is the nitrofurantoin which you had taken recently but you stated that it gave you some side effects.  I strongly recommend that you try to tolerate this antibiotic to treat this current infection and to follow-up closely with Urology to prevent further infections.

## 2023-02-17 ENCOUNTER — PATIENT MESSAGE (OUTPATIENT)
Dept: FAMILY MEDICINE | Facility: CLINIC | Age: 83
End: 2023-02-17
Payer: MEDICARE

## 2023-02-20 ENCOUNTER — TELEPHONE (OUTPATIENT)
Dept: UROLOGY | Facility: CLINIC | Age: 83
End: 2023-02-20
Payer: MEDICARE

## 2023-02-20 NOTE — TELEPHONE ENCOUNTER
----- Message from Ginny Najera, Patient Care Assistant sent at 2/17/2023  4:28 PM CST -----  Contact: self  Pt is calling to be seen by a doctor and see needs a hospital  1 wk  f/u appt. She was discharged on 2/16,. Please call back to advise [phone]  thanks

## 2023-02-22 ENCOUNTER — OFFICE VISIT (OUTPATIENT)
Dept: UROLOGY | Facility: CLINIC | Age: 83
End: 2023-02-22
Payer: MEDICARE

## 2023-02-22 ENCOUNTER — PATIENT MESSAGE (OUTPATIENT)
Dept: UROLOGY | Facility: CLINIC | Age: 83
End: 2023-02-22

## 2023-02-22 VITALS — WEIGHT: 158.5 LBS | BODY MASS INDEX: 31.95 KG/M2 | HEIGHT: 59 IN

## 2023-02-22 DIAGNOSIS — N39.0 E. COLI UTI: ICD-10-CM

## 2023-02-22 DIAGNOSIS — R39.15 URINARY URGENCY: ICD-10-CM

## 2023-02-22 DIAGNOSIS — N39.0 RECURRENT UTI: Primary | ICD-10-CM

## 2023-02-22 DIAGNOSIS — B96.20 E. COLI UTI: ICD-10-CM

## 2023-02-22 DIAGNOSIS — N32.81 OAB (OVERACTIVE BLADDER): ICD-10-CM

## 2023-02-22 DIAGNOSIS — N39.41 URGE INCONTINENCE: ICD-10-CM

## 2023-02-22 DIAGNOSIS — N32.89 BLADDER SPASM: ICD-10-CM

## 2023-02-22 DIAGNOSIS — R35.0 URINARY FREQUENCY: ICD-10-CM

## 2023-02-22 LAB
BACTERIA #/AREA URNS HPF: NORMAL /HPF
BILIRUBIN, UA POC OHS: NEGATIVE
BLOOD, UA POC OHS: ABNORMAL
CLARITY, UA POC OHS: CLEAR
COLOR, UA POC OHS: YELLOW
GLUCOSE, UA POC OHS: NEGATIVE
KETONES, UA POC OHS: NEGATIVE
LEUKOCYTES, UA POC OHS: ABNORMAL
MICROSCOPIC COMMENT: NORMAL
NITRITE, UA POC OHS: NEGATIVE
PH, UA POC OHS: 5.5
PROTEIN, UA POC OHS: NEGATIVE
SPECIFIC GRAVITY, UA POC OHS: 1.01
UROBILINOGEN, UA POC OHS: 0.2

## 2023-02-22 PROCEDURE — 99214 OFFICE O/P EST MOD 30 MIN: CPT | Mod: S$PBB,,,

## 2023-02-22 PROCEDURE — 99999 PR PBB SHADOW E&M-EST. PATIENT-LVL III: ICD-10-PCS | Mod: PBBFAC,,,

## 2023-02-22 PROCEDURE — 99213 OFFICE O/P EST LOW 20 MIN: CPT | Mod: PBBFAC,PO

## 2023-02-22 PROCEDURE — 81003 URINALYSIS AUTO W/O SCOPE: CPT | Mod: PBBFAC,PO

## 2023-02-22 PROCEDURE — 99214 PR OFFICE/OUTPT VISIT, EST, LEVL IV, 30-39 MIN: ICD-10-PCS | Mod: S$PBB,,,

## 2023-02-22 PROCEDURE — 81000 URINALYSIS NONAUTO W/SCOPE: CPT | Mod: 59,PO

## 2023-02-22 PROCEDURE — 99999 PR PBB SHADOW E&M-EST. PATIENT-LVL III: CPT | Mod: PBBFAC,,,

## 2023-02-22 RX ORDER — ESTRADIOL 0.1 MG/G
1 CREAM VAGINAL
Qty: 42.5 G | Refills: 3 | Status: SHIPPED | OUTPATIENT
Start: 2023-02-22 | End: 2023-05-24

## 2023-02-22 NOTE — PROGRESS NOTES
[facility] Note  Staff: Melanie Rosado FNP-C    PCP: MD Josué    Chief Complaint: ED Follow Up UTI    Subjective:        HPI: Suzie Dawkins is a 82 y.o. female NEW PATIENT presents today for ED follow up UTI. She is accompanied by her granddaughter whom is helpful with the interview. Her urine culture from 2/3/2023 was positive for e coli ESBL and she was treated with a course of macrobid. Repeat urine culture from 2/11/2023 was still positive for e coli ESBL and her PCP recommended ED visit for IV antibiotics. She states she frequently gets UTIs and is treated with antibiotics. Prior urine culture from 1/24/2023 was negative for infection. In Epic, there is no other urine culture going back to 2019. She states her usual symptoms are bladder spasms, urgency, frequency, and dysuria. She recently restarted myrbetriq 25mg daily from PCP. She has had full urological work up in 2020 by Dr. Sunshine. She denies a history of kidney stones. She does suffer with diarrhea, and we discussed this may be a contributing factor. She denies fever, flank pain, hematuria, and difficulty urinating. We also discussed adding estrace vaginal cream for recurrent UTIs. Also advised patient she will need to only have catheterized urine samples. If UTIs continue to show resistance to oral antibiotics, will refer to ID.     Questions asked pt during office visit today:  Urgency:Yes, incontinence with urgency? Yes;   DysuriaYes  Gross HematuriaNo  History of UTI: Yes    History of Kidney Stones?:  No    Constipation issues?:  No    REVIEW OF SYSTEMS:  Review of Systems   Constitutional: Negative.  Negative for chills and fever.   HENT: Negative.     Eyes: Negative.    Respiratory: Negative.     Cardiovascular: Negative.    Gastrointestinal: Negative.  Negative for abdominal pain, nausea and vomiting.   Genitourinary:  Positive for dysuria, frequency and urgency. Negative for flank pain and hematuria.    Musculoskeletal: Negative.  Negative for back pain.   Skin: Negative.    Neurological: Negative.    Endo/Heme/Allergies: Negative.    Psychiatric/Behavioral: Negative.       PMHx:  Past Medical History:   Diagnosis Date    Anxiety     Arthritis     Cancer 2007    carcinoid of the terminal ileum    Depression     GERD (gastroesophageal reflux disease)     Hypercalcemia     Hyperlipidemia     Hypertension     Osteoporosis 10/7/2013    Vitamin D deficiency        PSHx:  Past Surgical History:   Procedure Laterality Date    ADENOIDECTOMY       SECTION      two    COLON SURGERY  2007    cancer removal    HEMORRHOID SURGERY      HYSTERECTOMY      OOPHORECTOMY      TONSILLECTOMY      TONSILLECTOMY, ADENOIDECTOMY, BILATERAL MYRINGOTOMY AND TUBES         Fam Hx:   malignancies: No , gyn malignancies: Yes - maternal aunt breast cancer   kidney stones: No     Soc Hx:  Lives in [place]    Allergies:  Latex, B12 [cyanocobalamin-cobamamide], Ciprofloxacin, Codeine, Iodine, Meperidine, Narcotic antagonist, Nitrofuran analogues, Penicillins, Propoxyphene, and Sulfa (sulfonamide antibiotics)    Medications: reviewed     Objective:   There were no vitals filed for this visit.    Physical Exam  Constitutional:       Appearance: Normal appearance.   HENT:      Head: Normocephalic.      Mouth/Throat:      Mouth: Mucous membranes are moist.   Eyes:      Conjunctiva/sclera: Conjunctivae normal.   Pulmonary:      Effort: Pulmonary effort is normal.   Abdominal:      General: There is no distension.      Palpations: Abdomen is soft.      Tenderness: There is no abdominal tenderness. There is no right CVA tenderness or left CVA tenderness.   Musculoskeletal:         General: Normal range of motion.      Cervical back: Normal range of motion.   Skin:     General: Skin is warm.   Neurological:      Mental Status: She is alert and oriented to person, place, and time.   Psychiatric:         Mood and Affect: Mood normal.          Behavior: Behavior normal.       LABS REVIEW:  UA today:   Color:Clear, Yellow  Spec. Grav.  1.010  PH  5.5  Negative for nitrates, protein, glucose, ketones, urobili, bili  Trace blood and leuks    Assessment:       1. Recurrent UTI    2. E. coli UTI    3. Urinary urgency    4. Urge incontinence    5. Urinary frequency    6. OAB (overactive bladder)    7. Bladder spasm            Plan:      Estrace vaginal cream, place 1 fingertip of cream at urethral opening and external vagina 3 times per week prescribed to pt today as trial to see if med improves pt's current LUTS.  Benefits, risks and side affects were thoroughly explained to pt today in office with all questions answered.  Continue myrbetriq 25mg daily as prescribed    Pt to only have catheterized urine samples and urine cultures-- pay need referral to ID    F/u As Needed    [facility]: Active    Melanie Rosado, APRILP-C

## 2023-03-03 ENCOUNTER — PATIENT MESSAGE (OUTPATIENT)
Dept: FAMILY MEDICINE | Facility: CLINIC | Age: 83
End: 2023-03-03
Payer: MEDICARE

## 2023-03-03 DIAGNOSIS — R26.89 BALANCE DISORDER: Primary | ICD-10-CM

## 2023-03-03 DIAGNOSIS — R26.81 UNSTEADY GAIT: ICD-10-CM

## 2023-03-03 NOTE — TELEPHONE ENCOUNTER
I received your message which was reviewed along with the the medication list and allergies that we have below.  Please review it for accuracy to make sure that we have the most recent records on your history.     Based on this, the following orders were placed AND/OR medicines were sent in.     Orders Placed This Encounter   Procedures    Ambulatory referral/consult to Physical/Occupational Therapy     Standing Status:   Future     Standing Expiration Date:   4/3/2024     Referral Priority:   Routine     Referral Type:   Physical Medicine     Referral Reason:   Specialty Services Required     Referred to Provider:   [facility]     Requested Specialty:   Physical Therapy     Number of Visits Requested:   1       Medications written and sent at this time include:       Your pharmacy(ies) of choice at this time on record include the list below and any medications would have been sent to the one at the top.    [facility] [address]  [address] [address]  Phone: [phone] Fax: [phone]    [facility] [address]  [address] [address]  Phone: [phone] Fax: [phone]    [facility] [id number] [place], [place] - [address] [facility] [address]  [address] [facility] [address]  Phone: [phone] Fax: [phone]      Thank you for choosing us as your healthcare provider!  Dr. Bunny Msoes    ALLERGY LIST  Review of patient's allergies indicates:   Allergen Reactions    Latex Hives    B12 [cyanocobalamin-cobamamide] Other (See Comments)     Caused flushing and caused skin to peel    Ciprofloxacin Diarrhea    Codeine      Other reaction(s): Muscle pain    Iodine      Other reaction(s): Hives    Meperidine      Other reaction(s): Muscle pain    Narcotic antagonist      Other reaction(s): Rash    Nitrofuran analogues     Penicillins Nausea And Vomiting    Propoxyphene      Other  reaction(s): Muscle pain    Sulfa (sulfonamide antibiotics)      Other reaction(s): Muscle pain  Other reaction(s): Hives       MEDICATION LIST  Current Outpatient Medications on File Prior to Visit   Medication Sig Dispense Refill    alendronate (FOSAMAX) 70 MG tablet Take 1 tablet (70 mg total) by mouth every 7 days. 12 tablet 3    ascorbic acid, vitamin C, (VITAMIN C) 500 MG tablet Take 500 mg by mouth once daily.      aspirin (ECOTRIN) 81 MG EC tablet Take 1 tablet (81 mg total) by mouth once daily. 90 tablet 3    atorvastatin (LIPITOR) 40 MG tablet Take 1 tablet (40 mg total) by mouth once daily. 90 tablet 4    chlorthalidone (HYGROTEN) 25 MG Tab Take 1 tablet (25 mg total) by mouth once daily. 30 tablet 11    citalopram (CELEXA) 20 MG tablet Take 1.5 tablets (30 mg total) by mouth once daily. 135 tablet 4    cranberry fruit extract (CRANBERRY ORAL) Take 1 tablet by mouth once daily.      diphenoxylate-atropine 2.5-0.025 mg (LOMOTIL) 2.5-0.025 mg per tablet Take 1 tablet by mouth 4 (four) times daily as needed for Diarrhea. 90 tablet 5    ELDERBERRY FRUIT ORAL Take 1 tablet by mouth daily as needed.      estradioL (ESTRACE) 0.01 % (0.1 mg/gram) vaginal cream SMARTSIG:Sparingly Vaginal Every Night      estradioL (ESTRACE) 0.01 % (0.1 mg/gram) vaginal cream Place 1 g vaginally 3 (three) times a week. 42.5 g 3    fluconazole (DIFLUCAN) 100 MG tablet Take 1 tablet (100 mg total) by mouth once daily. 30 tablet 0    fluticasone propionate (FLONASE) 50 mcg/actuation nasal spray 1 spray by Each Nostril route daily as needed for Rhinitis.      hydrOXYzine HCL (ATARAX) 10 MG Tab Take 1 tablet (10 mg total) by mouth 2 (two) times daily as needed (anxiety). 30 tablet 1    ipratropium (ATROVENT) 21 mcg (0.03 %) nasal spray 2 sprays by Nasal route 2 (two) times daily as needed for Rhinitis. 30 mL 5    L.acidophil,parac-S.therm-Bif. (RISAQUAD) Cap capsule Take 1 capsule by mouth once daily.      losartan (COZAAR) 100 MG  tablet Take 1 tablet (100 mg total) by mouth once daily. 90 tablet 4    meclizine (ANTIVERT) 25 mg tablet Take 1 tablet (25 mg total) by mouth 3 (three) times daily as needed for Dizziness or Nausea. 30 tablet 12    metoprolol succinate (TOPROL-XL) 50 MG 24 hr tablet Take 1 tablet (50 mg total) by mouth once daily. 30 tablet 11    mirabegron (MYRBETRIQ) 25 mg Tb24 ER tablet Take 1 tablet (25 mg total) by mouth once daily. 30 tablet 11    MULTIVIT,CALC,MINS/IRON/FOLIC (ONE-A-DAY WOMENS FORMULA ORAL) Take 1 tablet by mouth once daily.      nitrofurantoin, macrocrystal-monohydrate, (MACROBID) 100 MG capsule Take 1 capsule (100 mg total) by mouth 2 (two) times daily. 20 capsule 0    ondansetron (ZOFRAN-ODT) 4 MG TbDL DISSOLVE ONE TABLET BY MOUTH EVERY 6 HOURS AS NEEDED FOR NAUSEA 60 tablet 1    potassium chloride SA (K-DUR,KLOR-CON) 20 MEQ tablet Take 1 tablet (20 mEq total) by mouth 2 (two) times daily. 60 tablet 11    promethazine (PHENERGAN) 25 MG tablet Take 1 tablet (25 mg total) by mouth every 6 (six) hours as needed for Nausea. 30 tablet 0    scopolamine (TRANSDERM-SCOP) 1.3-1.5 mg (1 mg over 3 days) Place 1 patch onto the skin every 72 hours. Strength: 1 mg over 3 days 4 patch 12    temazepam (RESTORIL) 30 mg capsule Take 1 capsule (30 mg total) by mouth nightly. 90 capsule 1    topiramate (TOPAMAX) 25 MG tablet Take 1 tablet (25 mg total) by mouth once daily. 90 tablet 4    vitamin D 1000 units Tab Take 1,000 Units by mouth once daily.       No current facility-administered medications on file prior to visit.       HEALTH MAINTENANCE THAT IS OVERDUE OR NEEDS TO BE UPDATED ON OUR CHART IS LISTED BELOW.  IF YOU HAVE HAD IT DONE ELSEWHERE, PLEASE SEND US DATES AND RECORDS IF YOU HAVE THEM TO MAKE YOUR CHART ACCURATE.  IF YOU HAVE NOT HAD THESE DONE AND ARE READY FOR US TO SCHEDULE THEM, PLEASE SEND US A MESSAGE.  Health Maintenance Due   Topic Date Due    TETANUS VACCINE  Never done    Colonoscopy  02/08/2014     Shingles Vaccine (1 of 2) 06/04/2016       DISCLAIMER: This note was compiled by using a speech recognition dictation system and therefore please be aware that typographical / speech recognition errors can and do occur.  Please contact me if you see any errors specifically.    Bunny Moses MD  We Offer Telehealth & Same Day Appointments!   Book your Telehealth appointment with me through my nurse or   Clinic appointments on [email or web address]!  [phone]     Check out my Facebook Page and Follow Me at: CLICK HERE    Check out my website at [employer] by clicking on: CLICK HERE    To Schedule appointments online, go to [employer]: CLICK HERE     Location: [email or web address]    [address] [place] [address]    FAX: [phone]

## 2023-03-07 ENCOUNTER — PATIENT MESSAGE (OUTPATIENT)
Dept: FAMILY MEDICINE | Facility: CLINIC | Age: 83
End: 2023-03-07
Payer: MEDICARE

## 2023-03-14 ENCOUNTER — PATIENT MESSAGE (OUTPATIENT)
Dept: FAMILY MEDICINE | Facility: CLINIC | Age: 83
End: 2023-03-14
Payer: MEDICARE

## 2023-03-17 ENCOUNTER — PES CALL (OUTPATIENT)
Dept: ADMINISTRATIVE | Facility: CLINIC | Age: 83
End: 2023-03-17
Payer: MEDICARE

## 2023-03-26 ENCOUNTER — PATIENT MESSAGE (OUTPATIENT)
Dept: UROLOGY | Facility: CLINIC | Age: 83
End: 2023-03-26
Payer: MEDICARE

## 2023-03-27 ENCOUNTER — TELEPHONE (OUTPATIENT)
Dept: UROLOGY | Facility: CLINIC | Age: 83
End: 2023-03-27
Payer: MEDICARE

## 2023-03-27 ENCOUNTER — PATIENT MESSAGE (OUTPATIENT)
Dept: FAMILY MEDICINE | Facility: CLINIC | Age: 83
End: 2023-03-27
Payer: MEDICARE

## 2023-03-27 NOTE — TELEPHONE ENCOUNTER
----- Message from Melanie Rosado NP sent at 3/27/2023  2:01 PM CDT -----  Contact: pt  Her urine culture to date shows no infection so she can stop the medication, thank you  ----- Message -----  From: Neal Lambert LPN  Sent: 3/27/2023   1:43 PM CDT  To: Melanie Rosado NP      ----- Message -----  From: Julissa Higginbotham  Sent: 3/27/2023   1:16 PM CDT  To: Eduardo ERIC Staff    Type: Needs Medical Advice    Who Called:  Patient called she send two message thru her [facility] she has not heard back  from the office.  She was seen in the ER this past Saturday from a Uti she was given some medication for the symptoms. It makes her feel jittery and shaky she is asking if you want her to stop taking the medication she has not taken the med since last night. She will not take the med until she hears back from you          Best Call Back Number: [phone] (home)     Additional Information: Please call back to give the patient some advise

## 2023-03-28 ENCOUNTER — PATIENT MESSAGE (OUTPATIENT)
Dept: FAMILY MEDICINE | Facility: CLINIC | Age: 83
End: 2023-03-28

## 2023-03-28 ENCOUNTER — OFFICE VISIT (OUTPATIENT)
Dept: FAMILY MEDICINE | Facility: CLINIC | Age: 83
End: 2023-03-28
Payer: MEDICARE

## 2023-03-28 ENCOUNTER — TELEPHONE (OUTPATIENT)
Dept: FAMILY MEDICINE | Facility: CLINIC | Age: 83
End: 2023-03-28

## 2023-03-28 DIAGNOSIS — R35.0 URINARY FREQUENCY: ICD-10-CM

## 2023-03-28 DIAGNOSIS — R30.0 DYSURIA: Primary | ICD-10-CM

## 2023-03-28 DIAGNOSIS — Z79.899 ENCOUNTER FOR LONG-TERM (CURRENT) USE OF MEDICATIONS: ICD-10-CM

## 2023-03-28 DIAGNOSIS — R19.7 DIARRHEA: ICD-10-CM

## 2023-03-28 PROCEDURE — 99443 PR PHYSICIAN TELEPHONE EVALUATION 21-30 MIN: CPT | Mod: 95,,, | Performed by: FAMILY MEDICINE

## 2023-03-28 PROCEDURE — 99443 PR PHYSICIAN TELEPHONE EVALUATION 21-30 MIN: ICD-10-PCS | Mod: 95,,, | Performed by: FAMILY MEDICINE

## 2023-03-28 RX ORDER — CHOLESTYRAMINE 4 G/4.8G
4 POWDER, FOR SUSPENSION ORAL DAILY PRN
Qty: 42 PACKET | Refills: 3 | Status: SHIPPED | OUTPATIENT
Start: 2023-03-28 | End: 2023-08-02

## 2023-03-28 NOTE — TELEPHONE ENCOUNTER
I received your message which was reviewed along with the the medication list and allergies that we have below.  Please review it for accuracy to make sure that we have the most recent records on your history.     Based on this, the following orders were placed AND/OR medicines were sent in.     No orders of the defined types were placed in this encounter.      Medications written and sent at this time include:  Medications Ordered This Encounter   Medications    cholestyramine-aspartame (CHOLESTYRAMINE LIGHT) 4 gram PwPk     Sig: Take 1 packet (4 g total) by mouth daily as needed.     Dispense:  42 packet     Refill:  3       Your pharmacy(ies) of choice at this time on record include the list below and any medications would have been sent to the one at the top.    [facility] - [place], [place] - [address]   [address] [facility] [address]  Phone: [phone] Fax: [phone]      Thank you for choosing us as your healthcare provider!  Dr. Bunny Moses    ALLERGY LIST  Review of patient's allergies indicates:   Allergen Reactions    Latex Hives    B12 [cyanocobalamin-cobamamide] Other (See Comments)     Caused flushing and caused skin to peel    Ciprofloxacin Diarrhea    Codeine      Other reaction(s): Muscle pain    Iodine      Other reaction(s): Hives    Meperidine      Other reaction(s): Muscle pain    Narcotic antagonist      Other reaction(s): Rash    Nitrofuran analogues     Penicillins Nausea And Vomiting    Propoxyphene      Other reaction(s): Muscle pain    Sulfa (sulfonamide antibiotics)      Other reaction(s): Muscle pain  Other reaction(s): Hives       MEDICATION LIST  Current Outpatient Medications on File Prior to Visit   Medication Sig Dispense Refill    alendronate (FOSAMAX) 70 MG tablet TAKE 1 TABLET ONCE A WEEK ON THURSDAY 4 tablet 0    atorvastatin (LIPITOR) 40 MG tablet TAKE 1 TABLET EVERY EVENING 28 tablet 0    temazepam (RESTORIL) 15 mg Cap TAKE ONE CAPSULE NIGHTLY AT  BEDTIME 28 capsule 0    ascorbic acid, vitamin C, (VITAMIN C) 500 MG tablet Take 500 mg by mouth once daily.      aspirin (ECOTRIN) 81 MG EC tablet Take 1 tablet (81 mg total) by mouth once daily. 90 tablet 3    chlorthalidone (HYGROTEN) 25 MG Tab Take 1 tablet (25 mg total) by mouth once daily. 30 tablet 11    citalopram (CELEXA) 20 MG tablet Take 1.5 tablets (30 mg total) by mouth once daily. 135 tablet 4    cranberry fruit extract (CRANBERRY ORAL) Take 1 tablet by mouth once daily.      diphenoxylate-atropine 2.5-0.025 mg (LOMOTIL) 2.5-0.025 mg per tablet Take 1 tablet by mouth 4 (four) times daily as needed for Diarrhea. 90 tablet 5    doxycycline (VIBRAMYCIN) 100 MG Cap Take 1 capsule (100 mg total) by mouth 2 (two) times daily. for 10 days 20 capsule 0    ELDERBERRY FRUIT ORAL Take 1 tablet by mouth daily as needed.      estradioL (ESTRACE) 0.01 % (0.1 mg/gram) vaginal cream SMARTSIG:Sparingly Vaginal Every Night      estradioL (ESTRACE) 0.01 % (0.1 mg/gram) vaginal cream Place 1 g vaginally 3 (three) times a week. 42.5 g 3    fluticasone propionate (FLONASE) 50 mcg/actuation nasal spray 1 spray by Each Nostril route daily as needed for Rhinitis.      hydrOXYzine HCL (ATARAX) 10 MG Tab Take 1 tablet (10 mg total) by mouth 2 (two) times daily as needed (anxiety). 30 tablet 1    ipratropium (ATROVENT) 21 mcg (0.03 %) nasal spray 2 sprays by Nasal route 2 (two) times daily as needed for Rhinitis. 30 mL 5    L.acidophil,parac-S.therm-Bif. (RISAQUAD) Cap capsule Take 1 capsule by mouth once daily.      losartan (COZAAR) 100 MG tablet Take 1 tablet (100 mg total) by mouth once daily. 90 tablet 4    meclizine (ANTIVERT) 25 mg tablet Take 1 tablet (25 mg total) by mouth 3 (three) times daily as needed for Dizziness or Nausea. 30 tablet 12    metoprolol succinate (TOPROL-XL) 50 MG 24 hr tablet Take 1 tablet (50 mg total) by mouth once daily. 30 tablet 11    mirabegron (MYRBETRIQ) 25 mg Tb24 ER tablet Take 1 tablet (25  mg total) by mouth once daily. 30 tablet 11    MULTIVIT,CALC,MINS/IRON/FOLIC (ONE-A-DAY WOMENS FORMULA ORAL) Take 1 tablet by mouth once daily.      nitrofurantoin, macrocrystal-monohydrate, (MACROBID) 100 MG capsule Take 1 capsule (100 mg total) by mouth 2 (two) times daily. 20 capsule 0    ondansetron (ZOFRAN-ODT) 4 MG TbDL DISSOLVE ONE TABLET BY MOUTH EVERY 6 HOURS AS NEEDED FOR NAUSEA 60 tablet 1    potassium chloride SA (K-DUR,KLOR-CON) 20 MEQ tablet Take 1 tablet (20 mEq total) by mouth 2 (two) times daily. 60 tablet 11    promethazine (PHENERGAN) 25 MG tablet Take 1 tablet (25 mg total) by mouth every 6 (six) hours as needed for Nausea. 30 tablet 0    scopolamine (TRANSDERM-SCOP) 1.3-1.5 mg (1 mg over 3 days) Place 1 patch onto the skin every 72 hours. Strength: 1 mg over 3 days 4 patch 12    temazepam (RESTORIL) 30 mg capsule Take 1 capsule (30 mg total) by mouth nightly. 90 capsule 1    topiramate (TOPAMAX) 25 MG tablet Take 1 tablet (25 mg total) by mouth once daily. 90 tablet 4    vitamin D 1000 units Tab Take 1,000 Units by mouth once daily.       No current facility-administered medications on file prior to visit.       HEALTH MAINTENANCE THAT IS OVERDUE OR NEEDS TO BE UPDATED ON OUR CHART IS LISTED BELOW.  IF YOU HAVE HAD IT DONE ELSEWHERE, PLEASE SEND US DATES AND RECORDS IF YOU HAVE THEM TO MAKE YOUR CHART ACCURATE.  IF YOU HAVE NOT HAD THESE DONE AND ARE READY FOR US TO SCHEDULE THEM, PLEASE SEND US A MESSAGE.  Health Maintenance Due   Topic Date Due    TETANUS VACCINE  Never done    Colonoscopy  02/08/2014    Shingles Vaccine (1 of 2) 06/04/2016       DISCLAIMER: This note was compiled by using a speech recognition dictation system and therefore please be aware that typographical / speech recognition errors can and do occur.  Please contact me if you see any errors specifically.    Bunny Moses MD  We Offer Telehealth & Same Day Appointments!   Book your Telehealth appointment with me through  my nurse or   Clinic appointments on [email or web address]!  [phone]     Check out my Facebook Page and Follow Me at: CLICK HERE    Check out my website at [employer] by clicking on: CLICK HERE    To Schedule appointments online, go to everbill: CLICK HERE     Location: [email or web address]    [address] [place] [address]    FAX: [phone]

## 2023-03-28 NOTE — TELEPHONE ENCOUNTER
----- Message from Bunny Moses MD sent at 3/28/2023  9:44 AM CDT -----  Please fax urology referral to [facility] here in [place] and reach out to them about setting this patient up with an appointment as soon as possible.  Notify patient.  Also provide her with the contact information for them.

## 2023-03-28 NOTE — ASSESSMENT & PLAN NOTE
Patient with recent labs through ER.  No evidence of infection on CBC.Complete history and limited telemedicine physical was completed today.  Complete and thorough medication reconciliation was performed.  Discussed risks and benefits of medications.  Advised patient on orders and health maintenance.  We discussed old records and old labs if available.  Will request any records not available through epic.  Continue current medications listed on your summary sheet.

## 2023-03-28 NOTE — Clinical Note
Please fax urology referral to [facility] here in [place] and reach out to them about setting this patient up with an appointment as soon as possible.  Notify patient.  Also provide her with the contact information for them.

## 2023-03-28 NOTE — PATIENT INSTRUCTIONS
Follow up if symptoms worsen or fail to improve.     Dear patient,   As a result of recent federal legislation (The Federal Cures Act), you may receive lab or pathology results from your visit in your [facility] account before your physician is able to contact you. Your physician or their representative will relay the results to you with their recommendations at their soonest availability.     If no improvement in symptoms or symptoms worsen, please be advised to call MD, follow-up at clinic and/or go to ER if becomes severe.    Bunny Moses M.D.        We Offer TELEHEALTH & Same Day Appointments!   Book your Telehealth appointment with me through my nurse or   Clinic appointments on [employer]!    [address] [place] [address]    Office: [phone]   FAX: [phone]    Check out my Facebook Page and Follow Me at: [email or web address]    Check out my website at [employer] by clicking on: [email or web address]    To Schedule appointments online, go to [employer]: [email or web address]

## 2023-03-28 NOTE — ASSESSMENT & PLAN NOTE
Strongly recommend that she follow-up with Urology as soon as possible for cystoscopy or further evaluation treatment.  Patient states that it is difficult for her to get to [place] for doctor's appointments and would like to be referred to Urology locally here in [place].     Discussed condition course and signs and symptoms to expect.  Patient advised take anti-inflammatories and or Tylenol for pain or fever.  ER precautions.  Call MD or follow-up to clinic if not improving or worsening symptoms.

## 2023-03-28 NOTE — PROGRESS NOTES
Established Patient - Audio Only Telehealth Visit     The patient location is:  Patient's home in [place]  The chief complaint leading to consultation is:  Results  Visit type: Virtual visit with audio only (telephone)  Total time spent with patient:  See MDM       The reason for the audio only service rather than synchronous audio and video virtual visit was related to technical difficulties or patient preference/necessity.     Each patient to whom I provide medical services by telemedicine is:  (1) informed of the relationship between the physician and patient and the respective role of any other health care provider with respect to management of the patient; and (2) notified that they may decline to receive medical services by telemedicine and may withdraw from such care at any time. Patient verbally consented to receive this service via voice-only telephone call.       PLAN:      Problem List Items Addressed This Visit       Encounter for long-term (current) use of medications (Chronic)     Patient with recent labs through ER.  No evidence of infection on CBC.Complete history and limited telemedicine physical was completed today.  Complete and thorough medication reconciliation was performed.  Discussed risks and benefits of medications.  Advised patient on orders and health maintenance.  We discussed old records and old labs if available.  Will request any records not available through epic.  Continue current medications listed on your summary sheet.           Dysuria - Primary    Relevant Orders    Ambulatory referral/consult to Urology    Urinary frequency    Relevant Orders    Ambulatory referral/consult to Urology     Future Appointments       Date Provider Specialty Appt Notes    3/28/2023  Administration     3/28/2023 Bunny Moses MD Family Medicine test results     4/12/2023 René Lutz MD Cardiology 3 month f/u           Medication Management for assessment above:   Medication List with  Changes/Refills   Current Medications    ALENDRONATE (FOSAMAX) 70 MG TABLET    TAKE 1 TABLET ONCE A WEEK ON THURSDAY    ASCORBIC ACID, VITAMIN C, (VITAMIN C) 500 MG TABLET    Take 500 mg by mouth once daily.    ASPIRIN (ECOTRIN) 81 MG EC TABLET    Take 1 tablet (81 mg total) by mouth once daily.    ATORVASTATIN (LIPITOR) 40 MG TABLET    TAKE 1 TABLET EVERY EVENING    CHLORTHALIDONE (HYGROTEN) 25 MG TAB    Take 1 tablet (25 mg total) by mouth once daily.    CITALOPRAM (CELEXA) 20 MG TABLET    Take 1.5 tablets (30 mg total) by mouth once daily.    CRANBERRY FRUIT EXTRACT (CRANBERRY ORAL)    Take 1 tablet by mouth once daily.    DIPHENOXYLATE-ATROPINE 2.5-0.025 MG (LOMOTIL) 2.5-0.025 MG PER TABLET    Take 1 tablet by mouth 4 (four) times daily as needed for Diarrhea.    DOXYCYCLINE (VIBRAMYCIN) 100 MG CAP    Take 1 capsule (100 mg total) by mouth 2 (two) times daily. for 10 days    ELDERBERRY FRUIT ORAL    Take 1 tablet by mouth daily as needed.    ESTRADIOL (ESTRACE) 0.01 % (0.1 MG/GRAM) VAGINAL CREAM    SMARTSIG:Sparingly Vaginal Every Night    ESTRADIOL (ESTRACE) 0.01 % (0.1 MG/GRAM) VAGINAL CREAM    Place 1 g vaginally 3 (three) times a week.    FLUTICASONE PROPIONATE (FLONASE) 50 MCG/ACTUATION NASAL SPRAY    1 spray by Each Nostril route daily as needed for Rhinitis.    HYDROXYZINE HCL (ATARAX) 10 MG TAB    Take 1 tablet (10 mg total) by mouth 2 (two) times daily as needed (anxiety).    IPRATROPIUM (ATROVENT) 21 MCG (0.03 %) NASAL SPRAY    2 sprays by Nasal route 2 (two) times daily as needed for Rhinitis.    L.ACIDOPHIL,PARAC-S.THERM-BIF. (RISAQUAD) CAP CAPSULE    Take 1 capsule by mouth once daily.    LOSARTAN (COZAAR) 100 MG TABLET    Take 1 tablet (100 mg total) by mouth once daily.    MECLIZINE (ANTIVERT) 25 MG TABLET    Take 1 tablet (25 mg total) by mouth 3 (three) times daily as needed for Dizziness or Nausea.    METOPROLOL SUCCINATE (TOPROL-XL) 50 MG 24 HR TABLET    Take 1 tablet (50 mg total) by mouth  once daily.    MIRABEGRON (MYRBETRIQ) 25 MG TB24 ER TABLET    Take 1 tablet (25 mg total) by mouth once daily.    MULTIVIT,CALC,MINS/IRON/FOLIC (ONE-A-DAY WOMENS FORMULA ORAL)    Take 1 tablet by mouth once daily.    NITROFURANTOIN, MACROCRYSTAL-MONOHYDRATE, (MACROBID) 100 MG CAPSULE    Take 1 capsule (100 mg total) by mouth 2 (two) times daily.    ONDANSETRON (ZOFRAN-ODT) 4 MG TBDL    DISSOLVE ONE TABLET BY MOUTH EVERY 6 HOURS AS NEEDED FOR NAUSEA    POTASSIUM CHLORIDE SA (K-DUR,KLOR-CON) 20 MEQ TABLET    Take 1 tablet (20 mEq total) by mouth 2 (two) times daily.    PROMETHAZINE (PHENERGAN) 25 MG TABLET    Take 1 tablet (25 mg total) by mouth every 6 (six) hours as needed for Nausea.    SCOPOLAMINE (TRANSDERM-SCOP) 1.3-1.5 MG (1 MG OVER 3 DAYS)    Place 1 patch onto the skin every 72 hours. Strength: 1 mg over 3 days    TEMAZEPAM (RESTORIL) 15 MG CAP    TAKE ONE CAPSULE NIGHTLY AT BEDTIME    TEMAZEPAM (RESTORIL) 30 MG CAPSULE    Take 1 capsule (30 mg total) by mouth nightly.    TOPIRAMATE (TOPAMAX) 25 MG TABLET    Take 1 tablet (25 mg total) by mouth once daily.    VITAMIN D 1000 UNITS TAB    Take 1,000 Units by mouth once daily.       Bunny Moses M.D.  ==========================================================================  Subjective:   Patient ID: Suzie Dawkins is a 82 y.o. female.  has a past medical history of Anxiety, Arthritis, Cancer (2007), Depression, GERD (gastroesophageal reflux disease), Hypercalcemia, Hyperlipidemia, Hypertension, Osteoporosis (10/7/2013), and Vitamin D deficiency.   Chief Complaint: Dysuria      Problem List Items Addressed This Visit       Encounter for long-term (current) use of medications (Chronic)    Overview     March 2023:  Reviewed labs.  February 2023: Reviewed labs.  December 2022: Reviewed labs.  CHRONIC. Stable. Compliant with medications for managed conditions. See medication list. No SE reported. Routine lab analysis is being monitored. Refills were  addressed.April 2022:  CHRONIC. Stable. Compliant with medications for managed conditions. See medication list. No SE reported. Routine lab analysis is being monitored. Refills were addressed.  June 2022:  Reviewed labs.  Lab Results   Component Value Date    WBC 10.87 03/25/2023    HGB 12.7 03/25/2023    HCT 36.2 (L) 03/25/2023    MCV 91 03/25/2023     03/25/2023       Chemistry        Component Value Date/Time     (L) 03/25/2023 2217    K 3.3 (L) 03/25/2023 2217    CL 94 (L) 03/25/2023 2217    CO2 29 03/25/2023 2217    BUN 22 (H) 03/25/2023 2217    CREATININE 1.22 03/25/2023 2217    GLU 91 03/25/2023 2217        Component Value Date/Time    CALCIUM 10.4 (H) 03/25/2023 2217    ALKPHOS 101 03/25/2023 2217    AST 38 (H) 03/25/2023 2217    ALT 26 03/25/2023 2217    BILITOT 0.6 03/25/2023 2217    ESTGFRAFRICA >60.0 06/23/2022 0926    EGFRNONAA 53.3 (A) 06/23/2022 0926          Lab Results   Component Value Date    TSH 1.594 12/12/2022    FREET4 0.86 04/30/2021    T3FREE 2.2 (L) 04/30/2021            Current Assessment & Plan     Patient with recent labs through ER.  No evidence of infection on CBC.Complete history and limited telemedicine physical was completed today.  Complete and thorough medication reconciliation was performed.  Discussed risks and benefits of medications.  Advised patient on orders and health maintenance.  We discussed old records and old labs if available.  Will request any records not available through epic.  Continue current medications listed on your summary sheet.           Dysuria - Primary    Overview     Chronic.  Recurrent.  Uncontrolled.  Patient went to ER with symptoms of UTI.  Patient had a clean-catch urine specimen and was supposed to have only catheterized urine samples per Urology.  Urine culture was sent off but has returned with no growth.  Patient was put on doxycycline antibiotic which she can not tolerate.  She has taking Macrobid in the past.         Urinary  frequency        Review of patient's allergies indicates:   Allergen Reactions    Latex Hives    B12 [cyanocobalamin-cobamamide] Other (See Comments)     Caused flushing and caused skin to peel    Ciprofloxacin Diarrhea    Codeine      Other reaction(s): Muscle pain    Iodine      Other reaction(s): Hives    Meperidine      Other reaction(s): Muscle pain    Narcotic antagonist      Other reaction(s): Rash    Nitrofuran analogues     Penicillins Nausea And Vomiting    Propoxyphene      Other reaction(s): Muscle pain    Sulfa (sulfonamide antibiotics)      Other reaction(s): Muscle pain  Other reaction(s): Hives     Current Outpatient Medications   Medication Instructions    alendronate (FOSAMAX) 70 MG tablet TAKE 1 TABLET ONCE A WEEK ON THURSDAY    ascorbic acid (vitamin C) (VITAMIN C) 500 mg, Oral, Daily    aspirin (ECOTRIN) 81 mg, Oral, Daily    atorvastatin (LIPITOR) 40 MG tablet TAKE 1 TABLET EVERY EVENING    chlorthalidone (HYGROTEN) 25 mg, Oral, Daily    citalopram (CELEXA) 30 mg, Oral, Daily    cranberry fruit extract (CRANBERRY ORAL) 1 tablet, Oral, Daily    diphenoxylate-atropine 2.5-0.025 mg (LOMOTIL) 2.5-0.025 mg per tablet 1 tablet, Oral, 4 times daily PRN    doxycycline (VIBRAMYCIN) 100 mg, Oral, 2 times daily    ELDERBERRY FRUIT ORAL 1 tablet, Oral, Daily PRN    estradioL (ESTRACE) 0.01 % (0.1 mg/gram) vaginal cream SMARTSIG:Sparingly Vaginal Every Night    estradioL (ESTRACE) 1 g, Vaginal, Three times weekly    fluticasone propionate (FLONASE) 50 mcg/actuation nasal spray 1 spray, Each Nostril, Daily PRN    hydrOXYzine HCL (ATARAX) 10 mg, Oral, 2 times daily PRN    ipratropium (ATROVENT) 21 mcg (0.03 %) nasal spray 2 sprays, Nasal, 2 times daily PRN    L.acidophil,parac-S.therm-Bif. (RISAQUAD) Cap capsule 1 capsule, Oral, Daily    losartan (COZAAR) 100 mg, Oral, Daily    meclizine (ANTIVERT) 25 mg, Oral, 3 times daily PRN    metoprolol succinate (TOPROL-XL) 50 mg, Oral, Daily     MULTIVIT,CALC,MINS/IRON/FOLIC (ONE-A-DAY WOMENS FORMULA ORAL) 1 tablet, Oral, Daily    MYRBETRIQ 25 mg, Oral, Daily    nitrofurantoin, macrocrystal-monohydrate, (MACROBID) 100 MG capsule 100 mg, Oral, 2 times daily    ondansetron (ZOFRAN-ODT) 4 MG TbDL DISSOLVE ONE TABLET BY MOUTH EVERY 6 HOURS AS NEEDED FOR NAUSEA    potassium chloride SA (K-DUR,KLOR-CON) 20 MEQ tablet 20 mEq, Oral, 2 times daily    promethazine (PHENERGAN) 25 mg, Oral, Every 6 hours PRN    scopolamine (TRANSDERM-SCOP) 1.3-1.5 mg (1 mg over 3 days) Place 1 patch onto the skin every 72 hours. Strength: 1 mg over 3 days    temazepam (RESTORIL) 15 mg Cap TAKE ONE CAPSULE NIGHTLY AT BEDTIME    temazepam (RESTORIL) 30 mg, Oral, Nightly    topiramate (TOPAMAX) 25 mg, Oral, Daily    vitamin D (VITAMIN D3) 1,000 Units, Oral, Daily      I have reviewed the PMH, social history, FamilyHx, surgical history, allergies and medications documented / confirmed by the patient at the time of this visit.  Review of Systems   Constitutional:  Negative for chills, fatigue, fever and unexpected weight change.   HENT:  Negative for ear pain and sore throat.    Eyes:  Negative for redness and visual disturbance.   Respiratory:  Negative for cough and shortness of breath.    Cardiovascular:  Negative for chest pain and palpitations.   Gastrointestinal:  Negative for nausea and vomiting.   Genitourinary:  Positive for dysuria and frequency. Negative for difficulty urinating and hematuria.   Musculoskeletal:  Negative for arthralgias and myalgias.   Skin:  Negative for rash and wound.   Neurological:  Negative for weakness and headaches.   Psychiatric/Behavioral:  Negative for sleep disturbance. The patient is not nervous/anxious.    Objective:   There were no vitals taken for this visit.  Limited due to audio only.  Speaking in complete sentences.  No acute distress.    Assessment:     1. Dysuria    2. Urinary frequency    3. Encounter for long-term (current) use of  medications      MDM:   Moderate medical complexity.  Moderate risk.  Total time: 21 minutes.  This includes total time spent on the encounter, which includes face to face time and non-face to face time preparing to see the patient (eg, review of previous medical records, tests), Obtaining and/or reviewing separately obtained history, documenting clinical information in the electronic or other health record, independently interpreting results (not separately reported)/communicating results to the patient/family/caregiver, and/or care coordination (not separately reported).    I have Reviewed and summarized old records.  I have performed thorough medication reconciliation today and discussed risk and benefits of medications.  I have reviewed labs and discussed with patient.  All questions were answered.  I am requesting old records and will review them once they are available.  [facility]    I have signed for the following orders AND/OR meds.  Orders Placed This Encounter   Procedures    Ambulatory referral/consult to Urology     Standing Status:   Future     Standing Expiration Date:   4/28/2024     Referral Priority:   Urgent     Referral Type:   Consultation     Referral Reason:   Specialty Services Required     Requested Specialty:   Urology     Number of Visits Requested:   1           Follow up if symptoms worsen or fail to improve.  Future Appointments       Date Provider Specialty Appt Notes    3/28/2023  Administration     3/28/2023 Bunny Moses MD Family Medicine test results     4/12/2023 René Lutz MD Cardiology 3 month f/u          If no improvement in symptoms or symptoms worsen, advised to call/follow-up at clinic or go to ER. Patient voiced understanding and all questions/concerns were addressed.   DISCLAIMER: This note was compiled by using a speech recognition dictation system and therefore please be aware that typographical / speech recognition errors can and do occur.  Please  contact me if you see any errors specifically.    Bunny Moses M.D.       Office: [phone] [address] [place] [address]  FAX: [phone]                        This service was not originating from a related E/M service provided within the previous 7 days nor will  to an E/M service or procedure within the next 24 hours or my soonest available appointment.  Prevailing standard of care was able to be met in this audio-only visit.

## 2023-04-04 RX ORDER — TEMAZEPAM 15 MG/1
CAPSULE ORAL
Qty: 28 CAPSULE | Refills: 0 | Status: SHIPPED | OUTPATIENT
Start: 2023-04-04 | End: 2023-05-24 | Stop reason: SDUPTHER

## 2023-04-04 NOTE — TELEPHONE ENCOUNTER
No new care gaps identified.  [facility] Embedded Care Gaps. Reference number: [id number]. 4/04/2023   9:41:58 AM ALISSAT

## 2023-04-10 RX ORDER — HYDROXYZINE HYDROCHLORIDE 10 MG/1
TABLET, FILM COATED ORAL
Qty: 30 TABLET | Refills: 0 | Status: SHIPPED | OUTPATIENT
Start: 2023-04-10 | End: 2023-05-24

## 2023-04-29 ENCOUNTER — PATIENT MESSAGE (OUTPATIENT)
Dept: FAMILY MEDICINE | Facility: CLINIC | Age: 83
End: 2023-04-29
Payer: MEDICARE

## 2023-05-01 RX ORDER — PROMETHAZINE HYDROCHLORIDE 25 MG/1
25 SUPPOSITORY RECTAL EVERY 6 HOURS PRN
Qty: 28 SUPPOSITORY | Refills: 0 | Status: SHIPPED | OUTPATIENT
Start: 2023-05-01 | End: 2023-05-24 | Stop reason: SDUPTHER

## 2023-05-01 NOTE — TELEPHONE ENCOUNTER
No care due was identified.  [facility] Embedded Care Due Messages. Reference number: [id number].   5/01/2023 7:28:23 AM CDT

## 2023-05-12 ENCOUNTER — PATIENT MESSAGE (OUTPATIENT)
Dept: FAMILY MEDICINE | Facility: CLINIC | Age: 83
End: 2023-05-12
Payer: MEDICARE

## 2023-05-14 ENCOUNTER — PATIENT MESSAGE (OUTPATIENT)
Dept: FAMILY MEDICINE | Facility: CLINIC | Age: 83
End: 2023-05-14
Payer: MEDICARE

## 2023-05-14 DIAGNOSIS — R42 VERTIGO: Primary | Chronic | ICD-10-CM

## 2023-05-15 NOTE — TELEPHONE ENCOUNTER
I have signed for the following orders AND/OR meds.  Please call the patient and ask the patient to schedule the testing AND/OR inform about any medications that were sent.     Orders Placed This Encounter   Procedures    Ambulatory referral/consult to ENT     Standing Status:   Future     Standing Expiration Date:   6/15/2024     Referral Priority:   Routine     Referral Type:   Consultation     Referral Reason:   Specialty Services Required     Requested Specialty:   Otolaryngology     Number of Visits Requested:   1

## 2023-05-16 ENCOUNTER — TELEPHONE (OUTPATIENT)
Dept: CARDIOLOGY | Facility: CLINIC | Age: 83
End: 2023-05-16
Payer: MEDICARE

## 2023-05-16 NOTE — TELEPHONE ENCOUNTER
Spoke with pt and informed her of Dr. Lutz's med change recommendation. Pt verbalized understanding and will stop chlorthalidone to start the HCTZ. Pt will call back with any further questions or concerns.    ----- Message from René Lutz MD sent at 5/16/2023  3:18 PM CDT -----  Contact: self [phone]  Stop chlorthalidone, start hctz 12.5  ----- Message -----  From: Myra Yuan LPN  Sent: 5/16/2023  11:25 AM CDT  To: René Lutz MD    Pt referring to chlorthalidone. States it is causing itching/rashes. Wanting to switch to something else. Please advise.  ----- Message -----  From: Laura Padilla MA  Sent: 5/16/2023  11:21 AM CDT  To: [facility] Staff    Patient need to speak with the nurse.  States that the new fluid pill that Dr. Lutz has her on has her itching all the time.  States that it is controlling her blood pressure but she cant continue all the itching that she is going.  Please call.  Thanks

## 2023-05-17 ENCOUNTER — TELEPHONE (OUTPATIENT)
Dept: CARDIOLOGY | Facility: CLINIC | Age: 83
End: 2023-05-17
Payer: MEDICARE

## 2023-05-17 NOTE — TELEPHONE ENCOUNTER
Spoke with pt's daughter, Marlena. She requested a virtual visit with Dr. Lutz to discuss pt's weakness and fluid pill concerns. Virtual appt scheduled for today at 2:20 PM. Marlena will call back with any further questions or concerns.  ----- Message from Lyndsay Marshall sent at 5/17/2023 12:35 PM CDT -----  Contact: Suzie  Patient is calling to speak with the nurse in regards to concerns. Patient is very weak and it maybe due to fluid bill. Patient wanting to speak with the doctor and not the nurse.Please give patient a callback at [phone]

## 2023-05-18 ENCOUNTER — OFFICE VISIT (OUTPATIENT)
Dept: CARDIOLOGY | Facility: CLINIC | Age: 83
End: 2023-05-18
Payer: MEDICARE

## 2023-05-18 DIAGNOSIS — N18.30 STAGE 3 CHRONIC KIDNEY DISEASE, UNSPECIFIED WHETHER STAGE 3A OR 3B CKD: Chronic | ICD-10-CM

## 2023-05-18 DIAGNOSIS — I10 ESSENTIAL HYPERTENSION: Primary | Chronic | ICD-10-CM

## 2023-05-18 DIAGNOSIS — E78.00 PURE HYPERCHOLESTEROLEMIA: Chronic | ICD-10-CM

## 2023-05-18 DIAGNOSIS — I70.0 ATHEROSCLEROSIS OF ABDOMINAL AORTA: Chronic | ICD-10-CM

## 2023-05-18 PROCEDURE — 99441 PR PHYSICIAN TELEPHONE EVALUATION 5-10 MIN: ICD-10-PCS | Mod: 95,,, | Performed by: INTERNAL MEDICINE

## 2023-05-18 PROCEDURE — 99441 PR PHYSICIAN TELEPHONE EVALUATION 5-10 MIN: CPT | Mod: 95,,, | Performed by: INTERNAL MEDICINE

## 2023-05-22 ENCOUNTER — PATIENT MESSAGE (OUTPATIENT)
Dept: FAMILY MEDICINE | Facility: CLINIC | Age: 83
End: 2023-05-22
Payer: MEDICARE

## 2023-05-22 PROBLEM — N39.0 RECURRENT UTI: Status: RESOLVED | Noted: 2019-12-03 | Resolved: 2023-05-22

## 2023-05-24 ENCOUNTER — OFFICE VISIT (OUTPATIENT)
Dept: FAMILY MEDICINE | Facility: CLINIC | Age: 83
End: 2023-05-24
Payer: MEDICARE

## 2023-05-24 VITALS
BODY MASS INDEX: 31.85 KG/M2 | WEIGHT: 158 LBS | DIASTOLIC BLOOD PRESSURE: 84 MMHG | OXYGEN SATURATION: 95 % | HEIGHT: 59 IN | SYSTOLIC BLOOD PRESSURE: 130 MMHG | HEART RATE: 70 BPM

## 2023-05-24 DIAGNOSIS — G47.00 INSOMNIA, UNSPECIFIED TYPE: ICD-10-CM

## 2023-05-24 DIAGNOSIS — M81.0 AGE-RELATED OSTEOPOROSIS WITHOUT CURRENT PATHOLOGICAL FRACTURE: Primary | ICD-10-CM

## 2023-05-24 DIAGNOSIS — Z79.899 ENCOUNTER FOR LONG-TERM (CURRENT) USE OF MEDICATIONS: ICD-10-CM

## 2023-05-24 DIAGNOSIS — R42 VERTIGO: ICD-10-CM

## 2023-05-24 DIAGNOSIS — R11.0 CHRONIC NAUSEA: ICD-10-CM

## 2023-05-24 DIAGNOSIS — R11.2 NAUSEA AND VOMITING, UNSPECIFIED VOMITING TYPE: ICD-10-CM

## 2023-05-24 PROBLEM — M51.36 DEGENERATION OF LUMBAR INTERVERTEBRAL DISC: Status: ACTIVE | Noted: 2023-05-24

## 2023-05-24 PROBLEM — M47.817 LUMBOSACRAL SPONDYLOSIS: Status: ACTIVE | Noted: 2023-05-24

## 2023-05-24 PROBLEM — G43.009 MIGRAINE WITHOUT AURA: Status: ACTIVE | Noted: 2023-05-24

## 2023-05-24 PROBLEM — M54.17 LUMBOSACRAL RADICULITIS: Status: ACTIVE | Noted: 2023-05-24

## 2023-05-24 PROBLEM — K58.9 IRRITABLE BOWEL SYNDROME: Status: ACTIVE | Noted: 2023-05-24

## 2023-05-24 PROBLEM — M51.369 DEGENERATION OF LUMBAR INTERVERTEBRAL DISC: Status: ACTIVE | Noted: 2023-05-24

## 2023-05-24 PROBLEM — H81.4 VERTIGO OF CENTRAL ORIGIN: Status: ACTIVE | Noted: 2023-05-24

## 2023-05-24 PROBLEM — Z85.038 HISTORY OF MALIGNANT NEOPLASM OF COLON: Status: ACTIVE | Noted: 2023-05-24

## 2023-05-24 PROBLEM — M54.30 SCIATICA: Status: ACTIVE | Noted: 2023-05-24

## 2023-05-24 PROBLEM — M54.16 LUMBAR RADICULOPATHY: Status: ACTIVE | Noted: 2023-05-24

## 2023-05-24 PROCEDURE — 99215 OFFICE O/P EST HI 40 MIN: CPT | Mod: PBBFAC,PO | Performed by: FAMILY MEDICINE

## 2023-05-24 PROCEDURE — 99999 PR PBB SHADOW E&M-EST. PATIENT-LVL V: CPT | Mod: PBBFAC,,, | Performed by: FAMILY MEDICINE

## 2023-05-24 PROCEDURE — 99999 PR PBB SHADOW E&M-EST. PATIENT-LVL V: ICD-10-PCS | Mod: PBBFAC,,, | Performed by: FAMILY MEDICINE

## 2023-05-24 PROCEDURE — 99214 OFFICE O/P EST MOD 30 MIN: CPT | Mod: S$PBB,,, | Performed by: FAMILY MEDICINE

## 2023-05-24 PROCEDURE — 99214 PR OFFICE/OUTPT VISIT, EST, LEVL IV, 30-39 MIN: ICD-10-PCS | Mod: S$PBB,,, | Performed by: FAMILY MEDICINE

## 2023-05-24 RX ORDER — ALENDRONATE SODIUM 70 MG/1
TABLET ORAL
Qty: 4 TABLET | Refills: 12 | Status: SHIPPED | OUTPATIENT
Start: 2023-05-24 | End: 2023-08-15

## 2023-05-24 RX ORDER — PROMETHAZINE HYDROCHLORIDE 25 MG/1
25 TABLET ORAL EVERY 6 HOURS PRN
Qty: 30 TABLET | Refills: 5 | Status: SHIPPED | OUTPATIENT
Start: 2023-05-24 | End: 2023-12-04 | Stop reason: SDUPTHER

## 2023-05-24 RX ORDER — OXYBUTYNIN CHLORIDE 10 MG/1
10 TABLET, EXTENDED RELEASE ORAL
COMMUNITY
Start: 2023-05-08 | End: 2023-05-24

## 2023-05-24 RX ORDER — TEMAZEPAM 15 MG/1
CAPSULE ORAL
Qty: 28 CAPSULE | Refills: 5 | Status: SHIPPED | OUTPATIENT
Start: 2023-05-24 | End: 2023-08-09 | Stop reason: SDUPTHER

## 2023-05-24 RX ORDER — PROMETHAZINE HYDROCHLORIDE 25 MG/1
25 SUPPOSITORY RECTAL EVERY 6 HOURS PRN
Qty: 28 SUPPOSITORY | Refills: 4 | Status: SHIPPED | OUTPATIENT
Start: 2023-05-24 | End: 2023-12-04 | Stop reason: SDUPTHER

## 2023-05-24 NOTE — PROGRESS NOTES
PLAN:      Problem List Items Addressed This Visit       Osteoporosis - Primary (Chronic)     Restart Fosamax.           Relevant Medications    alendronate (FOSAMAX) 70 MG tablet    Vertigo (Chronic)     Continue current medications on med list.  Avoid adding medication to avoid polypharmacy.  Patient will follow-up/establish care with ENT/audiology if having recurring vertigo symptoms.  Patient has already been to physical therapy for this condition.  She does get significant nausea with this condition however she cannot take Zofran.  Phenergan works best but she cannot swallow the pill when it hits therefore she ends up using the Phenergan suppository.           Insomnia (Chronic)     Refill temazepam.Discussed insomnia condition course.  Advised of first-line medications for this condition.  Also discussed sleep hygiene.  Information was given below.  Good sleep habits (sometimes referred to as sleep hygiene) can help you get a good nights sleep.    Some habits that can improve your sleep health:  -Be consistent. Go to bed at the same time each night and get up at the same time each morning, including on the weekends  -Make sure your bedroom is quiet, dark, relaxing, and at a comfortable temperature  -Remove electronic devices, such as TVs, computers, and smart phones, from the bedroom  -Avoid large meals, caffeine, and alcohol before bedtime  -Get some exercise. Being physically active during the day can help you fall asleep more easily at night.           Relevant Medications    temazepam (RESTORIL) 15 mg Cap    Other Relevant Orders    Ambulatory referral/consult to Home Health    Encounter for long-term (current) use of medications (Chronic)     Complete history and physical was completed today.  Complete and thorough medication reconciliation was performed.  Discussed risks and benefits of medications.  Advised patient on orders and health maintenance.  We discussed old records and old labs if available.   Will request any records not available through epic.  Continue current medications listed on your summary sheet.             Relevant Orders    Ambulatory referral/consult to Home Health    Chronic nausea (Chronic)     Refill Phenergan and meclizine.  Follow-up/establish care with GI if no improvement.  Patient may need to be further evaluated for her nausea with EGD.           Relevant Medications    promethazine (PHENERGAN) 25 MG suppository    promethazine (PHENERGAN) 25 MG tablet    Other Relevant Orders    Ambulatory referral/consult to Home Health     Patient is home bound requiring PT OT and skilled nursing evaluation and treatment.     Medication Management for assessment above:   Medication List with Changes/Refills   Current Medications    ASPIRIN (ECOTRIN) 81 MG EC TABLET    Take 1 tablet (81 mg total) by mouth once daily.    ATORVASTATIN (LIPITOR) 40 MG TABLET    TAKE 1 TABLET EVERY EVENING    CHOLESTYRAMINE-ASPARTAME (CHOLESTYRAMINE LIGHT) 4 GRAM PWPK    Take 1 packet (4 g total) by mouth daily as needed.    CRANBERRY FRUIT EXTRACT (CRANBERRY ORAL)    Take 1 tablet by mouth once daily.    DIPHENOXYLATE-ATROPINE 2.5-0.025 MG (LOMOTIL) 2.5-0.025 MG PER TABLET    Take 1 tablet by mouth 4 (four) times daily as needed for Diarrhea.    ESTRADIOL (ESTRACE) 0.01 % (0.1 MG/GRAM) VAGINAL CREAM    SMARTSIG:Sparingly Vaginal Every Night    FLUTICASONE PROPIONATE (FLONASE) 50 MCG/ACTUATION NASAL SPRAY    1 spray by Each Nostril route daily as needed for Rhinitis.    LOSARTAN (COZAAR) 100 MG TABLET    Take 1 tablet (100 mg total) by mouth once daily.    MECLIZINE (ANTIVERT) 25 MG TABLET    Take 1 tablet (25 mg total) by mouth 3 (three) times daily as needed for Dizziness or Nausea.    METOPROLOL SUCCINATE (TOPROL-XL) 50 MG 24 HR TABLET    Take 1 tablet (50 mg total) by mouth once daily.   Changed and/or Refilled Medications    Modified Medication Previous Medication    ALENDRONATE (FOSAMAX) 70 MG TABLET alendronate  (FOSAMAX) 70 MG tablet       TAKE 1 TABLET ONCE A WEEK ON THURSDAY    TAKE 1 TABLET ONCE A WEEK ON THURSDAY    PROMETHAZINE (PHENERGAN) 25 MG SUPPOSITORY promethazine (PHENERGAN) 25 MG suppository       Place 1 suppository (25 mg total) rectally every 6 (six) hours as needed for Nausea.    Place 1 suppository (25 mg total) rectally every 6 (six) hours as needed for Nausea.    PROMETHAZINE (PHENERGAN) 25 MG TABLET promethazine (PHENERGAN) 25 MG tablet       Take 1 tablet (25 mg total) by mouth every 6 (six) hours as needed for Nausea.    Take 1 tablet (25 mg total) by mouth every 6 (six) hours as needed for Nausea.    TEMAZEPAM (RESTORIL) 15 MG CAP temazepam (RESTORIL) 15 mg Cap       TAKE ONE CAPSULE NIGHTLY AT BEDTIME    TAKE ONE CAPSULE NIGHTLY AT BEDTIME   Discontinued Medications    ASCORBIC ACID, VITAMIN C, (VITAMIN C) 500 MG TABLET    Take 500 mg by mouth once daily.    CITALOPRAM (CELEXA) 20 MG TABLET    Take 1.5 tablets (30 mg total) by mouth once daily.    ELDERBERRY FRUIT ORAL    Take 1 tablet by mouth daily as needed.    ESTRADIOL (ESTRACE) 0.01 % (0.1 MG/GRAM) VAGINAL CREAM    Place 1 g vaginally 3 (three) times a week.    HYDROCHLOROTHIAZIDE (HYDRODIURIL) 12.5 MG TAB    Take 1 tablet (12.5 mg total) by mouth once daily.    HYDROXYZINE HCL (ATARAX) 10 MG TAB    TAKE 1 TABLET TWICE DAILY AS NEEDED FOR ANXIETY *THANK YOU*    IPRATROPIUM (ATROVENT) 21 MCG (0.03 %) NASAL SPRAY    2 sprays by Nasal route 2 (two) times daily as needed for Rhinitis.    L.ACIDOPHIL,PARAC-S.THERM-BIF. (RISAQUAD) CAP CAPSULE    Take 1 capsule by mouth once daily.    MIRABEGRON (MYRBETRIQ) 25 MG TB24 ER TABLET    Take 1 tablet (25 mg total) by mouth once daily.    MULTIVIT,CALC,MINS/IRON/FOLIC (ONE-A-DAY WOMENS FORMULA ORAL)    Take 1 tablet by mouth once daily.    NITROFURANTOIN, MACROCRYSTAL-MONOHYDRATE, (MACROBID) 100 MG CAPSULE    Take 1 capsule (100 mg total) by mouth 2 (two) times daily.    ONDANSETRON (ZOFRAN-ODT) 4 MG TBDL     DISSOLVE ONE TABLET BY MOUTH EVERY 6 HOURS AS NEEDED FOR NAUSEA    OXYBUTYNIN (DITROPAN-XL) 10 MG 24 HR TABLET    Take 10 mg by mouth.    POTASSIUM CHLORIDE SA (K-DUR,KLOR-CON) 20 MEQ TABLET    Take 1 tablet (20 mEq total) by mouth 2 (two) times daily.    SCOPOLAMINE (TRANSDERM-SCOP) 1.3-1.5 MG (1 MG OVER 3 DAYS)    Place 1 patch onto the skin every 72 hours. Strength: 1 mg over 3 days    TEMAZEPAM (RESTORIL) 30 MG CAPSULE    Take 1 capsule (30 mg total) by mouth nightly.    TOPIRAMATE (TOPAMAX) 25 MG TABLET    Take 1 tablet (25 mg total) by mouth once daily.    VITAMIN D 1000 UNITS TAB    Take 1,000 Units by mouth once daily.       Bunny Moses M.D.  ==========================================================================  Subjective:   Patient ID: Suzie Dawkins is a 82 y.o. female.  has a past medical history of Anxiety, Arthritis, Cancer (2007), Depression, GERD (gastroesophageal reflux disease), Hypercalcemia, Hyperlipidemia, Hypertension, Osteoporosis (10/7/2013), and Vitamin D deficiency.   Chief Complaint: Hospital Follow Up      Problem List Items Addressed This Visit       Osteoporosis - Primary (Chronic)    Overview     Chronic.  Patient reports that she stopped previous medication and has been on Prolia in the past.  Patient has been seeing Endocrinology previously.      Narrative & Impression  EXAMINATION:  DEXA BONE DENSITY SPINE HIP     CLINICAL HISTORY:  Age-related osteoporosis without current pathological fracture     TECHNIQUE:  DXA scanning was performed over the left hip and lumbar spine.  Review of the images confirms satisfactory positioning and technique.     COMPARISON:  Comparison to prior study from 03/28/2018 cannot be made as it was performed on a different machine.     FINDINGS:  The L1 to L4 vertebral bone mineral density is equal to 0.966 g/cm squared with a T score of -1.8.     The left femoral neck bone mineral density is equal to 0.618 g/cm squared with a T score of  -3.0.     Impression:     Osteoporosis     Consider FDA approved medical therapies in postmenopausal women and men aged 50 years and older, based on the following:     *A hip or vertebral (clinical or morphometric) fracture  *T score less than or equal to -2.5 at the femoral neck or spine after appropriate evaluation to exclude secondary causes.  *Low bone mass -- also known as osteopenia (T score between -1.0 and -2.5 at the femoral neck or spine) and a 10 year probability of hip fracture greater than or equal to 3% or a 10 year probability of major osteoporosis-related fracture greater than or equal to 20% based on the [place]-adapted WHO algorithm.  *Clinicians judgment and/or patient preference may indicate treatment for people with 10 year fracture probabilities is above or below these levels.        Electronically signed by: Priyank Boo DO  Date:                                            06/23/2020  Time:                                           10:26             Exam Ended: 06/23/20 08:43                    Current Assessment & Plan     Restart Fosamax.           Vertigo (Chronic)    Overview     Chronic.  May 2023:  Patient had appointment to go to ENT however she had to go to the hospital multiple times recently and was admitted to the hospital for monitoring.  Since her medications were adjusted her vertigo has improved.    Previous history:  Intermittent control.  Patient has been to physical therapy but has not had Epley maneuver performed.  She does get nauseated occasionally.  She has Zofran as needed.  She has tried meclizine in the past.  She is currently wearing a wrist device.  June 2022:  Patient reports that she did go to physical therapy and the vestibular therapy did help with her symptoms.  She does have success with meclizine.  It is still associated with nausea and vomiting.  Patient not like Zofran but prefers Phenergan.           Current Assessment & Plan     Continue current medications  on med list.  Avoid adding medication to avoid polypharmacy.  Patient will follow-up/establish care with ENT/audiology if having recurring vertigo symptoms.  Patient has already been to physical therapy for this condition.  She does get significant nausea with this condition however she cannot take Zofran.  Phenergan works best but she cannot swallow the pill when it hits therefore she ends up using the Phenergan suppository.           Insomnia (Chronic)    Overview     Chronic.  Intermittent control.  Patient using temazepam.  She states that she still does not sleep well.  Reports compliance.  No side effects reported.  Symptoms are improved.           Current Assessment & Plan     Refill temazepam.Discussed insomnia condition course.  Advised of first-line medications for this condition.  Also discussed sleep hygiene.  Information was given below.  Good sleep habits (sometimes referred to as sleep hygiene) can help you get a good nights sleep.    Some habits that can improve your sleep health:  -Be consistent. Go to bed at the same time each night and get up at the same time each morning, including on the weekends  -Make sure your bedroom is quiet, dark, relaxing, and at a comfortable temperature  -Remove electronic devices, such as TVs, computers, and smart phones, from the bedroom  -Avoid large meals, caffeine, and alcohol before bedtime  -Get some exercise. Being physically active during the day can help you fall asleep more easily at night.           Encounter for long-term (current) use of medications (Chronic)    Overview     May 2023: Reviewed labs.  March 2023:  Reviewed labs.  February 2023: Reviewed labs.  December 2022: Reviewed labs.  CHRONIC. Stable. Compliant with medications for managed conditions. See medication list. No SE reported. Routine lab analysis is being monitored. Refills were addressed.April 2022:  CHRONIC. Stable. Compliant with medications for managed conditions. See medication  list. No SE reported. Routine lab analysis is being monitored. Refills were addressed.  June 2022:  Reviewed labs.  Lab Results   Component Value Date    WBC 10.87 03/25/2023    HGB 12.7 03/25/2023    HCT 36.2 (L) 03/25/2023    MCV 91 03/25/2023     03/25/2023       Chemistry        Component Value Date/Time     (L) 03/25/2023 2217    K 3.3 (L) 03/25/2023 2217    CL 94 (L) 03/25/2023 2217    CO2 29 03/25/2023 2217    BUN 22 (H) 03/25/2023 2217    CREATININE 1.22 03/25/2023 2217    GLU 91 03/25/2023 2217        Component Value Date/Time    CALCIUM 10.4 (H) 03/25/2023 2217    ALKPHOS 101 03/25/2023 2217    AST 38 (H) 03/25/2023 2217    ALT 26 03/25/2023 2217    BILITOT 0.6 03/25/2023 2217    ESTGFRAFRICA >60.0 06/23/2022 0926    EGFRNONAA 53.3 (A) 06/23/2022 0926          Lab Results   Component Value Date    TSH 1.594 12/12/2022    FREET4 0.86 04/30/2021    T3FREE 2.2 (L) 04/30/2021            Current Assessment & Plan     Complete history and physical was completed today.  Complete and thorough medication reconciliation was performed.  Discussed risks and benefits of medications.  Advised patient on orders and health maintenance.  We discussed old records and old labs if available.  Will request any records not available through epic.  Continue current medications listed on your summary sheet.             Chronic nausea (Chronic)    Overview     Chronic.  Intermittent control.  Associated with dizziness and vertigo.  Patient has tried Zofran but it does not work well.           Current Assessment & Plan     Refill Phenergan and meclizine.  Follow-up/establish care with GI if no improvement.  Patient may need to be further evaluated for her nausea with EGD.               Review of patient's allergies indicates:   Allergen Reactions    Latex Hives    B12 [cyanocobalamin-cobamamide] Other (See Comments)     Caused flushing and caused skin to peel    Ciprofloxacin Diarrhea    Codeine      Other reaction(s):  Muscle pain    Iodine      Other reaction(s): Hives    Meperidine      Other reaction(s): Muscle pain    Narcotic antagonist      Other reaction(s): Rash    Nitrofuran analogues     Penicillins Nausea And Vomiting    Propoxyphene      Other reaction(s): Muscle pain    Sulfa (sulfonamide antibiotics)      Other reaction(s): Muscle pain  Other reaction(s): Hives     Current Outpatient Medications   Medication Instructions    alendronate (FOSAMAX) 70 MG tablet TAKE 1 TABLET ONCE A WEEK ON THURSDAY    aspirin (ECOTRIN) 81 mg, Oral, Daily    atorvastatin (LIPITOR) 40 MG tablet TAKE 1 TABLET EVERY EVENING    cholestyramine-aspartame (CHOLESTYRAMINE LIGHT) 4 gram PwPk 4 g, Oral, Daily PRN    cranberry fruit extract (CRANBERRY ORAL) 1 tablet, Oral, Daily    diphenoxylate-atropine 2.5-0.025 mg (LOMOTIL) 2.5-0.025 mg per tablet 1 tablet, Oral, 4 times daily PRN    estradioL (ESTRACE) 0.01 % (0.1 mg/gram) vaginal cream SMARTSIG:Sparingly Vaginal Every Night    fluticasone propionate (FLONASE) 50 mcg/actuation nasal spray 1 spray, Each Nostril, Daily PRN    losartan (COZAAR) 100 mg, Oral, Daily    meclizine (ANTIVERT) 25 mg, Oral, 3 times daily PRN    metoprolol succinate (TOPROL-XL) 50 mg, Oral, Daily    promethazine (PHENERGAN) 25 mg, Rectal, Every 6 hours PRN    promethazine (PHENERGAN) 25 mg, Oral, Every 6 hours PRN    temazepam (RESTORIL) 15 mg Cap TAKE ONE CAPSULE NIGHTLY AT BEDTIME      I have reviewed the PMH, social history, FamilyHx, surgical history, allergies and medications documented / confirmed by the patient at the time of this visit.  Review of Systems   Constitutional:  Positive for fatigue. Negative for chills, fever and unexpected weight change.   HENT:  Negative for ear pain and sore throat.    Eyes:  Negative for redness and visual disturbance.   Respiratory:  Negative for cough and shortness of breath.    Cardiovascular:  Negative for chest pain and palpitations.   Gastrointestinal:  Positive for nausea  "(chronic). Negative for vomiting.   Genitourinary:  Negative for difficulty urinating, dysuria, hematuria and urgency.   Musculoskeletal:  Negative for arthralgias and myalgias.   Skin:  Negative for rash and wound.   Neurological:  Positive for dizziness (Chronic). Negative for weakness and headaches.   Psychiatric/Behavioral:  Negative for sleep disturbance. The patient is not nervous/anxious.    Objective:   /84   Pulse 70   Ht 4' 11" (1.499 m)   Wt 71.7 kg (158 lb)   SpO2 95%   BMI 31.91 kg/m²   Physical Exam  Vitals and nursing note reviewed.   Constitutional:       General: She is not in acute distress.     Appearance: She is well-developed. She is not ill-appearing, toxic-appearing or diaphoretic.   HENT:      Head: Normocephalic and atraumatic.      Right Ear: Hearing and external ear normal.      Left Ear: Hearing and external ear normal.      Nose: Nose normal. No rhinorrhea.   Eyes:      General: Lids are normal.      Extraocular Movements: Extraocular movements intact.      Conjunctiva/sclera: Conjunctivae normal.      Pupils: Pupils are equal, round, and reactive to light.   Cardiovascular:      Rate and Rhythm: Normal rate.      Pulses: Normal pulses.   Pulmonary:      Effort: Pulmonary effort is normal. No respiratory distress.      Breath sounds: Normal breath sounds.   Abdominal:      General: Bowel sounds are normal.      Palpations: Abdomen is soft.      Tenderness: There is no right CVA tenderness or left CVA tenderness.   Musculoskeletal:         General: Normal range of motion.      Cervical back: Normal range of motion and neck supple.   Skin:     General: Skin is warm and dry.      Capillary Refill: Capillary refill takes less than 2 seconds.      Coloration: Skin is not pale.   Neurological:      General: No focal deficit present.      Mental Status: She is alert and oriented to person, place, and time. Mental status is at baseline. She is not disoriented.      Cranial Nerves: No " cranial nerve deficit.      Motor: No weakness.      Gait: Gait normal.   Psychiatric:         Attention and Perception: She is attentive.         Mood and Affect: Mood normal. Mood is not anxious or depressed.         Speech: Speech is not rapid and pressured or slurred.         Behavior: Behavior normal. Behavior is not agitated, aggressive or hyperactive. Behavior is cooperative.         Thought Content: Thought content normal. Thought content is not paranoid or delusional. Thought content does not include homicidal or suicidal ideation. Thought content does not include homicidal or suicidal plan.         Cognition and Memory: Memory is not impaired.         Judgment: Judgment normal.       Assessment:     1. Age-related osteoporosis without current pathological fracture    2. Insomnia, unspecified type    3. Chronic nausea    4. Encounter for long-term (current) use of medications    5. Nausea and vomiting, unspecified vomiting type    6. Vertigo      MDM:   Moderate medical complexity.  Moderate risk.    Total time: 31 minutes.  This includes total time spent on the encounter, which includes face to face time and non-face to face time preparing to see the patient (eg, review of previous medical records, tests), Obtaining and/or reviewing separately obtained history, documenting clinical information in the electronic or other health record, independently interpreting results (not separately reported)/communicating results to the patient/family/caregiver, and/or care coordination (not separately reported).    I have Reviewed and summarized old records.  I have performed thorough medication reconciliation today and discussed risk and benefits of medications.  I have reviewed labs and discussed with patient.  All questions were answered.  I am requesting old records and will review them once they are available. Urology, [facility]    I have signed for the following orders AND/OR meds.  Orders Placed  This Encounter   Procedures    Ambulatory referral/consult to Home Health     Standing Status:   Future     Standing Expiration Date:   6/24/2024     Referral Priority:   Routine     Referral Type:   Home Health     Referral Reason:   Specialty Services Required     Referred to Provider:   [facility]     Requested Specialty:   Home Health Services     Number of Visits Requested:   1     Medications Ordered This Encounter   Medications    alendronate (FOSAMAX) 70 MG tablet     Sig: TAKE 1 TABLET ONCE A WEEK ON THURSDAY     Dispense:  4 tablet     Refill:  12    promethazine (PHENERGAN) 25 MG suppository     Sig: Place 1 suppository (25 mg total) rectally every 6 (six) hours as needed for Nausea.     Dispense:  28 suppository     Refill:  4    promethazine (PHENERGAN) 25 MG tablet     Sig: Take 1 tablet (25 mg total) by mouth every 6 (six) hours as needed for Nausea.     Dispense:  30 tablet     Refill:  5    temazepam (RESTORIL) 15 mg Cap     Sig: TAKE ONE CAPSULE NIGHTLY AT BEDTIME     Dispense:  28 capsule     Refill:  5        Follow up in about 6 months (around 11/24/2023), or if symptoms worsen or fail to improve, for Med refills.      If no improvement in symptoms or symptoms worsen, advised to call/follow-up at clinic or go to ER. Patient voiced understanding and all questions/concerns were addressed.   DISCLAIMER: This note was compiled by using a speech recognition dictation system and therefore please be aware that typographical / speech recognition errors can and do occur.  Please contact me if you see any errors specifically.    Bunny Moses M.D.       Office: [phone] [address] [place] [address]  FAX: [phone]

## 2023-05-24 NOTE — PATIENT INSTRUCTIONS
Follow up in about 6 months (around 11/24/2023), or if symptoms worsen or fail to improve, for Med refills.     Dear patient,   As a result of recent federal legislation (The Federal Cures Act), you may receive lab or pathology results from your visit in your [facility] account before your physician is able to contact you. Your physician or their representative will relay the results to you with their recommendations at their soonest availability.     If no improvement in symptoms or symptoms worsen, please be advised to call MD, follow-up at clinic and/or go to ER if becomes severe.    Bunny Moses M.D.        We Offer TELEHEALTH & Same Day Appointments!   Book your Telehealth appointment with me through my nurse or   Clinic appointments on [employer]!    [address] [place] [address]    Office: [phone]   FAX: [phone]    Check out my Facebook Page and Follow Me at: [email or web address]    Check out my website at [employer] by clicking on: [email or web address]    To Schedule appointments online, go to [employer]: [email or web address]

## 2023-05-25 ENCOUNTER — EXTERNAL HOSPITAL ADMISSION (OUTPATIENT)
Dept: ADMINISTRATIVE | Facility: CLINIC | Age: 83
End: 2023-05-25
Payer: MEDICARE

## 2023-05-25 ENCOUNTER — PATIENT OUTREACH (OUTPATIENT)
Dept: ADMINISTRATIVE | Facility: CLINIC | Age: 83
End: 2023-05-25
Payer: MEDICARE

## 2023-05-25 PROCEDURE — G0180 MD CERTIFICATION HHA PATIENT: HCPCS | Mod: ,,, | Performed by: FAMILY MEDICINE

## 2023-05-25 PROCEDURE — G0180 PR HOME HEALTH MD CERTIFICATION: ICD-10-PCS | Mod: ,,, | Performed by: FAMILY MEDICINE

## 2023-05-25 NOTE — ASSESSMENT & PLAN NOTE
Refill Phenergan and meclizine.  Follow-up/establish care with GI if no improvement.  Patient may need to be further evaluated for her nausea with EGD.

## 2023-05-25 NOTE — ASSESSMENT & PLAN NOTE
Refill temazepam.Discussed insomnia condition course.  Advised of first-line medications for this condition.  Also discussed sleep hygiene.  Information was given below.  Good sleep habits (sometimes referred to as sleep hygiene) can help you get a good nights sleep.    Some habits that can improve your sleep health:  -Be consistent. Go to bed at the same time each night and get up at the same time each morning, including on the weekends  -Make sure your bedroom is quiet, dark, relaxing, and at a comfortable temperature  -Remove electronic devices, such as TVs, computers, and smart phones, from the bedroom  -Avoid large meals, caffeine, and alcohol before bedtime  -Get some exercise. Being physically active during the day can help you fall asleep more easily at night.

## 2023-05-25 NOTE — ASSESSMENT & PLAN NOTE
Continue current medications on med list.  Avoid adding medication to avoid polypharmacy.  Patient will follow-up/establish care with ENT/audiology if having recurring vertigo symptoms.  Patient has already been to physical therapy for this condition.  She does get significant nausea with this condition however she cannot take Zofran.  Phenergan works best but she cannot swallow the pill when it hits therefore she ends up using the Phenergan suppository.

## 2023-05-30 DIAGNOSIS — G47.00 INSOMNIA, UNSPECIFIED TYPE: ICD-10-CM

## 2023-05-30 RX ORDER — TEMAZEPAM 15 MG/1
CAPSULE ORAL
Qty: 28 CAPSULE | Refills: 0 | OUTPATIENT
Start: 2023-05-30

## 2023-05-30 NOTE — TELEPHONE ENCOUNTER
No care due was identified.  [facility] Embedded Care Due Messages. Reference number: [id number].   5/30/2023 1:21:51 PM CDT

## 2023-06-05 DIAGNOSIS — Z79.899 ENCOUNTER FOR LONG-TERM (CURRENT) USE OF MEDICATIONS: ICD-10-CM

## 2023-06-05 DIAGNOSIS — Z90.49 HISTORY OF BOWEL RESECTION: ICD-10-CM

## 2023-06-05 DIAGNOSIS — Z85.9 PERSONAL HISTORY OF MALIGNANT CARCINOID TUMOR: ICD-10-CM

## 2023-06-05 DIAGNOSIS — K52.9 CHRONIC DIARRHEA: ICD-10-CM

## 2023-06-05 RX ORDER — DIPHENOXYLATE HYDROCHLORIDE AND ATROPINE SULFATE 2.5; .025 MG/1; MG/1
TABLET ORAL
Qty: 90 TABLET | Refills: 0 | Status: SHIPPED | OUTPATIENT
Start: 2023-06-05 | End: 2023-12-04 | Stop reason: SDUPTHER

## 2023-07-10 ENCOUNTER — TELEPHONE (OUTPATIENT)
Dept: FAMILY MEDICINE | Facility: CLINIC | Age: 83
End: 2023-07-10
Payer: MEDICARE

## 2023-07-10 ENCOUNTER — PATIENT MESSAGE (OUTPATIENT)
Dept: FAMILY MEDICINE | Facility: CLINIC | Age: 83
End: 2023-07-10
Payer: MEDICARE

## 2023-07-10 DIAGNOSIS — R11.0 CHRONIC NAUSEA: Primary | ICD-10-CM

## 2023-07-10 NOTE — TELEPHONE ENCOUNTER
----- Message from Joann Valencia sent at 7/10/2023 12:25 PM CDT -----  Pts home health nurse is requesting lab orders to check the pts potassium levels. Call back at [phone]  Thx jm

## 2023-07-10 NOTE — TELEPHONE ENCOUNTER
Spoke with pt, requesting to have her potassium level checked. States she has had this problem in the past that when she starts to feel bad her K level is normally off. States she is having nausea and dizziness. She is asking for the orders to be sent to her [facility] so she doesn't have to come to the office.  [facility]

## 2023-07-10 NOTE — TELEPHONE ENCOUNTER
Called  back and she stated that she will call the patient and let them make the decision on coming in for an appointment.

## 2023-07-10 NOTE — TELEPHONE ENCOUNTER
----- Message from Arlene Montalvo sent at 7/10/2023  9:21 AM CDT -----  Contact: [employer]-[phone]    Caller [employer]-    Patient: Suzie Irizarryrere-    Reason: The patient is requesting a call back from the nurse to get assistance with scheduling an     appointment for a DMP.     Comments: Please call the office back to advise.

## 2023-07-10 NOTE — TELEPHONE ENCOUNTER
----- Message from Bill John MA sent at 7/10/2023 11:53 AM CDT -----  Contact: [phone]  Senait ([facility]) called                In regards to speak back with Ms. Adams.              Call back [phone]

## 2023-07-12 ENCOUNTER — PATIENT MESSAGE (OUTPATIENT)
Dept: FAMILY MEDICINE | Facility: CLINIC | Age: 83
End: 2023-07-12
Payer: MEDICARE

## 2023-07-19 ENCOUNTER — PES CALL (OUTPATIENT)
Dept: ADMINISTRATIVE | Facility: CLINIC | Age: 83
End: 2023-07-19
Payer: MEDICARE

## 2023-07-20 ENCOUNTER — TELEPHONE (OUTPATIENT)
Dept: FAMILY MEDICINE | Facility: CLINIC | Age: 83
End: 2023-07-20
Payer: MEDICARE

## 2023-08-01 ENCOUNTER — OFFICE VISIT (OUTPATIENT)
Dept: HOME HEALTH SERVICES | Facility: CLINIC | Age: 83
End: 2023-08-01
Payer: MEDICARE

## 2023-08-01 VITALS
OXYGEN SATURATION: 97 % | SYSTOLIC BLOOD PRESSURE: 155 MMHG | HEIGHT: 59 IN | HEART RATE: 66 BPM | DIASTOLIC BLOOD PRESSURE: 74 MMHG | BODY MASS INDEX: 31.85 KG/M2 | WEIGHT: 158 LBS

## 2023-08-01 DIAGNOSIS — M54.16 LUMBAR RADICULOPATHY: ICD-10-CM

## 2023-08-01 DIAGNOSIS — N18.31 STAGE 3A CHRONIC KIDNEY DISEASE: Chronic | ICD-10-CM

## 2023-08-01 DIAGNOSIS — Z85.038 HISTORY OF MALIGNANT NEOPLASM OF COLON: ICD-10-CM

## 2023-08-01 DIAGNOSIS — M46.1 INFLAMMATION OF SACROILIAC JOINT: ICD-10-CM

## 2023-08-01 DIAGNOSIS — F33.0 MILD EPISODE OF RECURRENT MAJOR DEPRESSIVE DISORDER: ICD-10-CM

## 2023-08-01 DIAGNOSIS — I70.0 ATHEROSCLEROSIS OF ABDOMINAL AORTA: Chronic | ICD-10-CM

## 2023-08-01 DIAGNOSIS — Z00.00 ENCOUNTER FOR PREVENTIVE HEALTH EXAMINATION: Primary | ICD-10-CM

## 2023-08-01 DIAGNOSIS — E55.9 VITAMIN D DEFICIENCY: ICD-10-CM

## 2023-08-01 DIAGNOSIS — R11.0 CHRONIC NAUSEA: Chronic | ICD-10-CM

## 2023-08-01 DIAGNOSIS — N32.81 OVERACTIVE BLADDER: ICD-10-CM

## 2023-08-01 DIAGNOSIS — E78.00 PURE HYPERCHOLESTEROLEMIA: Chronic | ICD-10-CM

## 2023-08-01 DIAGNOSIS — K21.9 GASTROESOPHAGEAL REFLUX DISEASE, UNSPECIFIED WHETHER ESOPHAGITIS PRESENT: ICD-10-CM

## 2023-08-01 DIAGNOSIS — E21.0 PRIMARY HYPERPARATHYROIDISM: Chronic | ICD-10-CM

## 2023-08-01 DIAGNOSIS — I10 ESSENTIAL HYPERTENSION: Chronic | ICD-10-CM

## 2023-08-01 DIAGNOSIS — M81.0 AGE-RELATED OSTEOPOROSIS WITHOUT CURRENT PATHOLOGICAL FRACTURE: Chronic | ICD-10-CM

## 2023-08-01 PROCEDURE — G0439 PR MEDICARE ANNUAL WELLNESS SUBSEQUENT VISIT: ICD-10-PCS | Mod: S$GLB,,, | Performed by: NURSE PRACTITIONER

## 2023-08-01 PROCEDURE — G0439 PPPS, SUBSEQ VISIT: HCPCS | Mod: S$GLB,,, | Performed by: NURSE PRACTITIONER

## 2023-08-01 RX ORDER — LIFITEGRAST 50 MG/ML
1 SOLUTION/ DROPS OPHTHALMIC 2 TIMES DAILY
COMMUNITY
Start: 2023-06-13 | End: 2024-01-11 | Stop reason: CLARIF

## 2023-08-02 PROBLEM — F32.A DEPRESSION: Chronic | Status: RESOLVED | Noted: 2021-09-07 | Resolved: 2023-08-02

## 2023-08-02 PROBLEM — F33.9 EPISODE OF RECURRENT MAJOR DEPRESSIVE DISORDER: Status: ACTIVE | Noted: 2023-08-02

## 2023-08-02 PROBLEM — R31.9 HEMATURIA: Status: RESOLVED | Noted: 2019-12-03 | Resolved: 2023-08-02

## 2023-08-02 PROBLEM — R31.0 GROSS HEMATURIA: Status: RESOLVED | Noted: 2019-12-09 | Resolved: 2023-08-02

## 2023-08-02 PROBLEM — R33.9 RETENTION OF URINE, UNSPECIFIED: Status: RESOLVED | Noted: 2019-12-09 | Resolved: 2023-08-02

## 2023-08-02 PROBLEM — E87.8 ELECTROLYTE ABNORMALITY: Status: RESOLVED | Noted: 2020-07-23 | Resolved: 2023-08-02

## 2023-08-02 PROBLEM — Z01.810 PREOP CARDIOVASCULAR EXAM: Status: RESOLVED | Noted: 2017-06-13 | Resolved: 2023-08-02

## 2023-08-02 PROBLEM — N30.00 ACUTE CYSTITIS WITHOUT HEMATURIA: Status: RESOLVED | Noted: 2021-09-07 | Resolved: 2023-08-02

## 2023-08-02 PROBLEM — B37.9 YEAST INFECTION: Status: RESOLVED | Noted: 2022-06-23 | Resolved: 2023-08-02

## 2023-08-02 PROBLEM — N17.9 AKI (ACUTE KIDNEY INJURY): Status: RESOLVED | Noted: 2020-07-23 | Resolved: 2023-08-02

## 2023-08-02 PROBLEM — M46.1 INFLAMMATION OF SACROILIAC JOINT: Status: ACTIVE | Noted: 2023-08-02

## 2023-08-02 PROBLEM — U07.1 COVID: Status: RESOLVED | Noted: 2022-06-30 | Resolved: 2023-08-02

## 2023-08-02 PROBLEM — R53.1 WEAK: Status: RESOLVED | Noted: 2022-04-01 | Resolved: 2023-08-02

## 2023-08-02 NOTE — PATIENT INSTRUCTIONS
Counseling and Referral of Other Preventative  (Italic type indicates deductible and co-insurance are waived)    Patient Name: Suzie Dawkins  Today's Date: 8/2/2023    Health Maintenance       Date Due Completion Date    TETANUS VACCINE Never done ---    Shingles Vaccine (1 of 2) 06/04/2016 4/9/2016    COVID-19 Vaccine (6 - Mixed Product series) 01/29/2022 12/4/2021    Influenza Vaccine (1) 09/01/2023 10/25/2022    Lipid Panel 12/12/2023 12/12/2022    DEXA Scan 10/26/2024 10/26/2022        No orders of the defined types were placed in this encounter.    The following information is provided to all patients.  This information is to help you find resources for any of the problems found today that may be affecting your health:                Living healthy guide: www.[facility].[place].[facility]      Understanding Diabetes: www.diabetes.org      Eating healthy: www.cdc.gov/healthyweight      CDC home safety checklist: www.cdc.gov/steadi/patient.html      Agency on Aging: www.goea.[place].[facility]      Alcoholics anonymous (AA): www.aa.org      Physical Activity: www.juliette.nih.gov/kx7qnzm      Tobacco use: www.quitwithusla.org

## 2023-08-02 NOTE — PROGRESS NOTES
"  Suzie Dawkins presented for a  Medicare AWV and comprehensive Health Risk Assessment today. The following components were reviewed and updated:    Medical history  Family History  Social history  Allergies and Current Medications  Health Risk Assessment  Health Maintenance  Care Team         ** See Completed Assessments for Annual Wellness Visit within the encounter summary.**         The following assessments were completed:  Living Situation  CAGE  Depression Screening  Timed Get Up and Go  Whisper Test  Cognitive Function Screening  Nutrition Screening  ADL Screening  PAQ Screening        Vitals:    08/01/23 1037   BP: (!) 155/74   Pulse: 66   SpO2: 97%   Weight: 71.7 kg (158 lb)   Height: 4' 11" (1.499 m)     Body mass index is 31.91 kg/m².  Physical Exam  Constitutional:       Appearance: Normal appearance.   HENT:      Head: Normocephalic and atraumatic.      Nose: Nose normal.   Eyes:      Extraocular Movements: Extraocular movements intact.      Pupils: Pupils are equal, round, and reactive to light.   Cardiovascular:      Rate and Rhythm: Normal rate and regular rhythm.      Pulses: Normal pulses.      Heart sounds: Normal heart sounds.   Pulmonary:      Effort: Pulmonary effort is normal.      Breath sounds: Normal breath sounds.   Musculoskeletal:      Cervical back: Normal range of motion and neck supple.   Neurological:      General: No focal deficit present.      Mental Status: She is alert and oriented to person, place, and time.               Diagnoses and health risks identified today and associated recommendations/orders:    1. Encounter for preventive health examination  Awv completed      2. Inflammation of sacroiliac joint  Chronic and stable. Continue current treatment. Follow with PCP.      3. Lumbar radiculopathy  Chronic and stable. Continue current treatment. Follow with PCP.      4. Atherosclerosis of abdominal aorta  Chronic and stable. Continue current treatment. Follow with PCP.  On " statin and asa      5. Essential hypertension  Chronic and stable. Continue current treatment. Follow with PCP.      6. Pure hypercholesterolemia  Chronic and stable. Continue current treatment. Follow with PCP.o  On statin      7. Overactive bladder  Chronic and stable. Continue current treatment. Follow with PCP.      8. History of malignant neoplasm of colon  Chronic and stable. Continue current treatment. Follow with PCP.  On lomotil PRN      9. Primary hyperparathyroidism  Chronic and stable. Continue current treatment. Follow with PCP.      10. Chronic nausea  Chronic and stable. Continue current treatment. Follow with PCP.  On phenergan PRN      11. Gastroesophageal reflux disease, unspecified whether esophagitis present  Chronic and stable. Continue current treatment. Follow with PCP.      12. Age-related osteoporosis without current pathological fracture  Chronic and stable. Continue current treatment. Follow with PCP.  On fosamax      13. Stage 3a chronic kidney disease  Chronic and stable. Continue current treatment. Follow with PCP.  Monitored with labs      14. Vitamin D deficiency  Chronic and stable. Continue current treatment. Follow with PCP.    15. Mild episode of recurrent major depressive disorder  Chronic and stable. Continue current treatment. Follow with PCP.  On restoril       Provided Suzie with a 5-10 year written screening schedule and personal prevention plan. Recommendations were developed using the USPSTF age appropriate recommendations. Education, counseling, and referrals were provided as needed. After Visit Summary printed and given to patient which includes a list of additional screenings\tests needed.    Fu in 1 yr for Elena Stevenson NP  I offered to discuss advanced care planning, including how to pick a person who would make decisions for you if you were unable to make them for yourself, called a health care power of , and what kind of decisions you might  make such as use of life sustaining treatments such as ventilators and tube feeding when faced with a life limiting illness recorded on a living will that they will need to know. (How you want to be cared for as you near the end of your natural life)     X Patient is interested in learning more about how to make advanced directives.  I provided them paperwork and offered to discuss this with them.

## 2023-08-09 ENCOUNTER — PATIENT MESSAGE (OUTPATIENT)
Dept: FAMILY MEDICINE | Facility: CLINIC | Age: 83
End: 2023-08-09
Payer: MEDICARE

## 2023-08-09 DIAGNOSIS — G47.00 INSOMNIA, UNSPECIFIED TYPE: ICD-10-CM

## 2023-08-09 RX ORDER — TEMAZEPAM 30 MG/1
CAPSULE ORAL
Qty: 30 CAPSULE | Refills: 5 | Status: SHIPPED | OUTPATIENT
Start: 2023-08-09 | End: 2023-08-10 | Stop reason: SDUPTHER

## 2023-08-09 NOTE — TELEPHONE ENCOUNTER
I received your message which was reviewed along with the the medication list and allergies that we have below.  Please review it for accuracy to make sure that we have the most recent records on your history.     Based on this, the following orders were placed AND/OR medicines were sent in.     No orders of the defined types were placed in this encounter.      Medications written and sent at this time include:  Medications Ordered This Encounter   Medications    temazepam (RESTORIL) 30 mg capsule     Sig: TAKE ONE CAPSULE NIGHTLY AT BEDTIME     Dispense:  30 capsule     Refill:  5       Your pharmacy(ies) of choice at this time on record include the list below and any medications would have been sent to the one at the top.    [facility] - [place], [place] - [address]   [address] [facility] [place] [address]  Phone: [phone] Fax: [phone]      Thank you for choosing us as your healthcare provider!  Dr. Bunny Moses    ALLERGY LIST  Review of patient's allergies indicates:   Allergen Reactions    Latex Hives    B12 [cyanocobalamin-cobamamide] Other (See Comments)     Caused flushing and caused skin to peel    Ciprofloxacin Diarrhea    Codeine      Other reaction(s): Muscle pain    Iodine      Other reaction(s): Hives    Meperidine      Other reaction(s): Muscle pain    Narcotic antagonist      Other reaction(s): Rash    Nitrofuran analogues     Penicillins Nausea And Vomiting    Propoxyphene      Other reaction(s): Muscle pain    Sulfa (sulfonamide antibiotics)      Other reaction(s): Muscle pain  Other reaction(s): Hives       MEDICATION LIST  Current Outpatient Medications on File Prior to Visit   Medication Sig Dispense Refill    alendronate (FOSAMAX) 70 MG tablet TAKE 1 TABLET ONCE A WEEK ON THURSDAY 4 tablet 12    aspirin (ECOTRIN) 81 MG EC tablet Take 1 tablet (81 mg total) by mouth once daily. 90 tablet 3    atorvastatin (LIPITOR) 40 MG tablet TAKE 1 TABLET EVERY EVENING 90 tablet 2     cranberry fruit extract (CRANBERRY ORAL) Take 1 tablet by mouth once daily.      diphenoxylate-atropine 2.5-0.025 mg (LOMOTIL) 2.5-0.025 mg per tablet TAKE 1 TABLET 4 TIMES DAILY AS NEEDED FOR DIARRHEA *THANK YOU* ** MAY CAUSE DROWSINESS ** 90 tablet 0    estradioL (ESTRACE) 0.01 % (0.1 mg/gram) vaginal cream SMARTSIG:Sparingly Vaginal Every Night      glucosamine/D3/boswellia alfred (OSTEO BI-FLEX, 5-LOXIN, ORAL) Take by mouth.      meclizine (ANTIVERT) 25 mg tablet Take 1 tablet (25 mg total) by mouth 3 (three) times daily as needed for Dizziness or Nausea. 30 tablet 12    metoprolol succinate (TOPROL-XL) 50 MG 24 hr tablet Take 1 tablet (50 mg total) by mouth once daily. 30 tablet 11    promethazine (PHENERGAN) 25 MG suppository Place 1 suppository (25 mg total) rectally every 6 (six) hours as needed for Nausea. 28 suppository 4    promethazine (PHENERGAN) 25 MG tablet Take 1 tablet (25 mg total) by mouth every 6 (six) hours as needed for Nausea. 30 tablet 5    vit A/vit C/vit E/zinc/copper (ICAPS AREDS ORAL) Take 1 tablet by mouth 2 (two) times a day.      XIIDRA 5 % Dpet Place 1 drop into both eyes 2 (two) times daily.      [DISCONTINUED] temazepam (RESTORIL) 15 mg Cap TAKE ONE CAPSULE NIGHTLY AT BEDTIME 28 capsule 5     No current facility-administered medications on file prior to visit.       HEALTH MAINTENANCE THAT IS OVERDUE OR NEEDS TO BE UPDATED ON OUR CHART IS LISTED BELOW.  IF YOU HAVE HAD IT DONE ELSEWHERE, PLEASE SEND US DATES AND RECORDS IF YOU HAVE THEM TO MAKE YOUR CHART ACCURATE.  IF YOU HAVE NOT HAD THESE DONE AND ARE READY FOR US TO SCHEDULE THEM, PLEASE SEND US A MESSAGE.  Health Maintenance Due   Topic Date Due    TETANUS VACCINE  Never done    Shingles Vaccine (1 of 2) 06/04/2016    COVID-19 Vaccine (6 - Mixed Product series) 01/29/2022       DISCLAIMER: This note was compiled by using a speech recognition dictation system and therefore please be aware that typographical / speech recognition  errors can and do occur.  Please contact me if you see any errors specifically.    Bunny Moses MD  We Offer Telehealth & Same Day Appointments!   Book your Telehealth appointment with me through my nurse or   Clinic appointments on [employer]!  [phone]     Check out my Facebook Page and Follow Me at: CLICK HERE    Check out my website at [facility] by clicking on: CLICK HERE    To Schedule appointments online, go to [employer]: CLICK HERE     Location: [email or web address]    [address] [place] [address]    FAX: [phone]

## 2023-08-10 DIAGNOSIS — G47.00 INSOMNIA, UNSPECIFIED TYPE: ICD-10-CM

## 2023-08-10 RX ORDER — TEMAZEPAM 30 MG/1
CAPSULE ORAL
Qty: 30 CAPSULE | Refills: 5 | Status: CANCELLED | OUTPATIENT
Start: 2023-08-10

## 2023-08-10 RX ORDER — TEMAZEPAM 30 MG/1
CAPSULE ORAL
Qty: 30 CAPSULE | Refills: 5 | OUTPATIENT
Start: 2023-08-10

## 2023-08-10 RX ORDER — TEMAZEPAM 30 MG/1
CAPSULE ORAL
Qty: 30 CAPSULE | Refills: 5 | Status: SHIPPED | OUTPATIENT
Start: 2023-08-10 | End: 2023-12-06 | Stop reason: SDUPTHER

## 2023-08-10 NOTE — TELEPHONE ENCOUNTER
No care due was identified.  [facility] Embedded Care Due Messages. Reference number: [id number].   8/10/2023 7:26:32 AM CDT

## 2023-08-10 NOTE — TELEPHONE ENCOUNTER
No care due was identified.  [facility] Embedded Care Due Messages. Reference number: [id number].   8/10/2023 3:11:37 PM CDT

## 2023-08-10 NOTE — TELEPHONE ENCOUNTER
----- Message from Nel Zurita sent at 8/10/2023 12:35 PM CDT -----  Contact: pt  Type: Needs Medical Advice  Who Called:  pt    Pharmacy name and phone #:        [id number] [place] [place] - [address] [address] [facility] [address] [address]  [address] [facility] [address]  Phone: [phone] Fax: [phone]      Best Call Back Number: [phone]    Additional Information: pt would like her medication temazepam (RESTORIL) 30 mg capsule sent to [place]. She states is it cheaper there. Please advise.

## 2023-08-11 ENCOUNTER — PATIENT MESSAGE (OUTPATIENT)
Dept: FAMILY MEDICINE | Facility: CLINIC | Age: 83
End: 2023-08-11
Payer: MEDICARE

## 2023-08-15 DIAGNOSIS — M81.0 AGE-RELATED OSTEOPOROSIS WITHOUT CURRENT PATHOLOGICAL FRACTURE: ICD-10-CM

## 2023-08-15 RX ORDER — ALENDRONATE SODIUM 70 MG/1
TABLET ORAL
Qty: 4 TABLET | Refills: 4 | Status: SHIPPED | OUTPATIENT
Start: 2023-08-15 | End: 2024-01-04

## 2023-08-15 NOTE — TELEPHONE ENCOUNTER
Refill Routing Note   Medication(s) are not appropriate for processing by [facility] for the following reason(s):      - Outside of protocol    [facility] action(s):  Route          Medication reconciliation completed: No     Appointments  past 12m or future 3m with PCP    Date Provider   Last Visit   5/24/2023 Bunny Moses MD   Next Visit   Visit date not found Bunny Moses MD   ED visits in past 90 days: 0        Note composed:3:20 PM 08/15/2023

## 2023-08-22 ENCOUNTER — TELEPHONE (OUTPATIENT)
Dept: FAMILY MEDICINE | Facility: CLINIC | Age: 83
End: 2023-08-22
Payer: MEDICARE

## 2023-08-22 DIAGNOSIS — N39.0 CHRONIC UTI: Primary | ICD-10-CM

## 2023-08-22 NOTE — TELEPHONE ENCOUNTER
----- Message from Shivani Washington sent at 8/22/2023 10:38 AM CDT -----  Contact: Suzie  Patient is calling in regards to medication updates. Reports sending a request for uti mediation on [employer]) and hasn't gotten a response. Is needing an antibiotic sent in for the infection. Please return call to [phone].     [facility] - [place] [address]   [address] [place] [address]  Phone: [phone] Fax: [phone]

## 2023-08-22 NOTE — TELEPHONE ENCOUNTER
I called this patient and offered her an appointment but she refused she stated that she can not come in because she can't hold her urine bur thinks she has a UTI

## 2023-08-22 NOTE — TELEPHONE ENCOUNTER
I have signed for the following orders AND/OR meds.  Please call the patient and ask the patient to schedule the testing AND/OR inform about any medications that were sent.      Orders Placed This Encounter   Procedures    CULTURE, URINE           Standing Status:   Future     Standing Expiration Date:   10/20/2024    Urinalysis, Reflex to Urine Culture Urine, Clean Catch     Standing Status:   Future     Standing Expiration Date:   2/22/2025     Order Specific Question:   Preferred Collection Type     Answer:   Urine, Clean Catch     Order Specific Question:   Specimen Source     Answer:   Urine

## 2023-08-28 ENCOUNTER — PATIENT MESSAGE (OUTPATIENT)
Dept: FAMILY MEDICINE | Facility: CLINIC | Age: 83
End: 2023-08-28
Payer: MEDICARE

## 2023-09-22 PROCEDURE — G0179 MD RECERTIFICATION HHA PT: HCPCS | Mod: ,,, | Performed by: FAMILY MEDICINE

## 2023-09-22 PROCEDURE — G0179 PR HOME HEALTH MD RECERTIFICATION: ICD-10-PCS | Mod: ,,, | Performed by: FAMILY MEDICINE

## 2023-09-27 ENCOUNTER — EXTERNAL HOME HEALTH (OUTPATIENT)
Dept: HOME HEALTH SERVICES | Facility: HOSPITAL | Age: 83
End: 2023-09-27
Payer: MEDICARE

## 2023-09-29 ENCOUNTER — EXTERNAL HOME HEALTH (OUTPATIENT)
Dept: HOME HEALTH SERVICES | Facility: HOSPITAL | Age: 83
End: 2023-09-29
Payer: MEDICARE

## 2023-10-24 ENCOUNTER — PATIENT MESSAGE (OUTPATIENT)
Dept: FAMILY MEDICINE | Facility: CLINIC | Age: 83
End: 2023-10-24
Payer: MEDICARE

## 2023-11-17 RX ORDER — IPRATROPIUM BROMIDE 21 UG/1
SPRAY, METERED NASAL
Qty: 30 ML | Refills: 0 | Status: SHIPPED | OUTPATIENT
Start: 2023-11-17

## 2023-11-17 NOTE — TELEPHONE ENCOUNTER
Refill Routing Note   Medication(s) are not appropriate for processing by [facility] for the following reason(s):      No active prescription written by provider    [facility] action(s):  Defer Care Due:  Labs due            Appointments  past 12m or future 3m with PCP    Date Provider   Last Visit   5/24/2023 Bunny Moses MD   Next Visit   Visit date not found Bunny Moses MD   ED visits in past 90 days: 0        Note composed:1:18 PM 11/17/2023

## 2023-11-17 NOTE — TELEPHONE ENCOUNTER
Care Due:                  Date            Visit Type   Department     Provider  --------------------------------------------------------------------------------                                [facility]  Last Visit: 05-      FOLLOW UP    MEDICINE       Bunny Moses  Next Visit: None Scheduled  None         None Found                                                            Last  Test          Frequency    Reason                     Performed    Due Date  --------------------------------------------------------------------------------    Lipid Panel.  12 months..  atorvastatin.............  12- 12-    Mg Level....  12 months..  alendronate..............  Not Found    Overdue    Phosphate...  12 months..  alendronate..............  Not Found    Overdue    Vitamin D...  12 months..  alendronate..............  Not Found    Overdue    [employer] Embedded Care Due Messages. Reference number: [id number].   11/17/2023 10:15:19 AM CST

## 2023-11-29 ENCOUNTER — TELEPHONE (OUTPATIENT)
Dept: FAMILY MEDICINE | Facility: CLINIC | Age: 83
End: 2023-11-29
Payer: MEDICARE

## 2023-11-29 ENCOUNTER — PATIENT MESSAGE (OUTPATIENT)
Dept: FAMILY MEDICINE | Facility: CLINIC | Age: 83
End: 2023-11-29
Payer: MEDICARE

## 2023-11-29 NOTE — TELEPHONE ENCOUNTER
Spoke w/ pt . She scheduled herself an appt to see you on 12/20 . I called to offer a sooner appt w/ Kelli and she declined, she prefers to see you . She said that she got injections in her foot a few months ago by podiatry and nothing stops the pain . She has edema in both feet and ankles but mainly in the right . She is asking if you can see her sooner or direct her to whom she needs to see . Please advise, thanks

## 2023-11-29 NOTE — TELEPHONE ENCOUNTER
----- Message from Poonam Cardoza sent at 11/29/2023  7:37 AM CST -----  Contact: Suzie  Type:  Same Day Appointment Request    Caller is requesting a same day appointment.  Caller declined first available appointment listed below.    Name of Caller: Suzie   When is the first available appointment?  Symptoms: feet and ankle swelling  Best Call Back Number:[phone] (home)    Additional Information: MRN: [id number] Dr. Moses

## 2023-11-30 ENCOUNTER — OFFICE VISIT (OUTPATIENT)
Dept: FAMILY MEDICINE | Facility: CLINIC | Age: 83
End: 2023-11-30
Payer: MEDICARE

## 2023-11-30 ENCOUNTER — TELEPHONE (OUTPATIENT)
Dept: FAMILY MEDICINE | Facility: CLINIC | Age: 83
End: 2023-11-30
Payer: MEDICARE

## 2023-11-30 VITALS
WEIGHT: 157 LBS | SYSTOLIC BLOOD PRESSURE: 198 MMHG | HEIGHT: 59 IN | BODY MASS INDEX: 31.65 KG/M2 | DIASTOLIC BLOOD PRESSURE: 101 MMHG

## 2023-11-30 DIAGNOSIS — I10 ESSENTIAL HYPERTENSION: Chronic | ICD-10-CM

## 2023-11-30 DIAGNOSIS — M25.571 CHRONIC PAIN OF BOTH ANKLES: Primary | ICD-10-CM

## 2023-11-30 DIAGNOSIS — R60.9 EDEMA, UNSPECIFIED TYPE: ICD-10-CM

## 2023-11-30 DIAGNOSIS — M25.572 CHRONIC PAIN OF BOTH ANKLES: Primary | ICD-10-CM

## 2023-11-30 DIAGNOSIS — Z79.899 ENCOUNTER FOR LONG-TERM (CURRENT) USE OF MEDICATIONS: ICD-10-CM

## 2023-11-30 DIAGNOSIS — G89.29 CHRONIC PAIN OF BOTH ANKLES: Primary | ICD-10-CM

## 2023-11-30 DIAGNOSIS — L03.90 CELLULITIS, UNSPECIFIED CELLULITIS SITE: ICD-10-CM

## 2023-11-30 PROCEDURE — 99215 PR OFFICE/OUTPT VISIT, EST, LEVL V, 40-54 MIN: ICD-10-PCS | Mod: 95,,, | Performed by: FAMILY MEDICINE

## 2023-11-30 PROCEDURE — 99215 OFFICE O/P EST HI 40 MIN: CPT | Mod: 95,,, | Performed by: FAMILY MEDICINE

## 2023-11-30 RX ORDER — FUROSEMIDE 20 MG/1
20 TABLET ORAL 2 TIMES DAILY PRN
Qty: 60 TABLET | Refills: 11 | Status: SHIPPED | OUTPATIENT
Start: 2023-11-30 | End: 2024-11-29

## 2023-11-30 RX ORDER — METOPROLOL SUCCINATE 50 MG/1
50 TABLET, EXTENDED RELEASE ORAL 2 TIMES DAILY
Qty: 60 TABLET | Refills: 11 | Status: SHIPPED | OUTPATIENT
Start: 2023-11-30 | End: 2024-11-29

## 2023-11-30 RX ORDER — CEPHALEXIN 250 MG/1
250 CAPSULE ORAL EVERY 8 HOURS
Qty: 30 CAPSULE | Refills: 0 | Status: SHIPPED | OUTPATIENT
Start: 2023-11-30 | End: 2023-12-10

## 2023-11-30 NOTE — ASSESSMENT & PLAN NOTE
Increase metoprolol for blood pressure greater than 150/100.  Restart Lasix for lower extremity edema.  Follow-up sooner if extreme changes to blood pressure occur.  Counseled on importance of hypertension disease course, I recommend ongoing Education for DASH-diet and exercise.  Counseled on medication regimen importance of treating high blood pressure.  Please be advised of risk of untreated blood pressure as discussed.  Please advised of ER precautions were given for symptoms of hypertensive urgency and emergency.

## 2023-11-30 NOTE — PATIENT INSTRUCTIONS
Follow up in about 4 weeks (around 12/28/2023), or if symptoms worsen or fail to improve, for Med refills.     Dear patient,   As a result of recent federal legislation (The Federal Cures Act), you may receive lab or pathology results from your visit in your [facility] account before your physician is able to contact you. Your physician or their representative will relay the results to you with their recommendations at their soonest availability.     If no improvement in symptoms or symptoms worsen, please be advised to call MD, follow-up at clinic and/or go to ER if becomes severe.    Bunny Moses M.D.        We Offer TELEHEALTH & Same Day Appointments!   Book your Telehealth appointment with me through my nurse or   Clinic appointments on [employer]!    [address] [place] [address]    Office: [phone]   FAX: [phone]    Check out my Facebook Page and Follow Me at: [email or web address]    Check out my website at [employer] by clicking on: [email or web address]    To Schedule appointments online, go to [employer]: [email or web address]

## 2023-11-30 NOTE — ASSESSMENT & PLAN NOTE
Start Keflex. Discussed condition course and signs and symptoms to expect.  Patient advised take Tylenol for pain or fever.  ER precautions.  Call MD or follow-up to clinic if not improving or worsening symptoms.

## 2023-11-30 NOTE — PROGRESS NOTES
Primary Care Telemedicine Note  The patient location is:  Patient's Home - [place]  The chief complaint leading to consultation is:   Chief Complaint   Patient presents with    Edema    Hypertension      Total time:  see MDM below. The total time spent on the encounter, which includes face to face time and non-face to face time preparing to see the patient (eg, review of previous medical records, tests), Obtaining and/or reviewing separately obtained history, documenting clinical information in the electronic or other health record, independently interpreting results (not separately reported)/communicating results to the patient/family/caregiver, and/or care coordination (not separately reported).    Visit type: Virtual visit with synchronous audio and video  Each patient to whom he or she provides medical services by telemedicine is:  (1) informed of the relationship between the physician and patient and the respective role of any other health care provider with respect to management of the patient; and (2) notified that he or she may decline to receive medical services by telemedicine and may withdraw from such care at any time.  =================================================================  PLAN:      Problem List Items Addressed This Visit       Essential hypertension (Chronic)     Increase metoprolol for blood pressure greater than 150/100.  Restart Lasix for lower extremity edema.  Follow-up sooner if extreme changes to blood pressure occur.  Counseled on importance of hypertension disease course, I recommend ongoing Education for DASH-diet and exercise.  Counseled on medication regimen importance of treating high blood pressure.  Please be advised of risk of untreated blood pressure as discussed.  Please advised of ER precautions were given for symptoms of hypertensive urgency and emergency.           Relevant Medications    metoprolol succinate (TOPROL-XL) 50 MG 24 hr tablet    Other Relevant Orders     Comprehensive Metabolic Panel    TSH    Hemoglobin A1C    Uric Acid    Urinalysis, Reflex to Urine Culture Urine, Clean Catch    CBC Auto Differential    Encounter for long-term (current) use of medications (Chronic)     Update labs.Complete history and physical was completed today.  Complete and thorough medication reconciliation was performed.  Discussed risks and benefits of medications.  Advised patient on orders and health maintenance.  We discussed old records and old labs if available.  Will request any records not available through epic.  Continue current medications listed on your summary sheet.           Relevant Orders    Comprehensive Metabolic Panel    TSH    Hemoglobin A1C    Uric Acid    Urinalysis, Reflex to Urine Culture Urine, Clean Catch    CBC Auto Differential    Chronic pain of both ankles - Primary (Chronic)     Check x-ray and labs.  Referral to Podiatry for 2nd opinion per patient request.         Relevant Orders    X-Ray Ankle Complete Bilateral    Uric Acid    Urinalysis, Reflex to Urine Culture Urine, Clean Catch    CBC Auto Differential    Ambulatory referral/consult to Podiatry    Edema (Chronic)     Restart diuretics temporarily.  Monitor labs.  Check BNP.  Restart compression stockings.         Relevant Medications    furosemide (LASIX) 20 MG tablet    Other Relevant Orders    COMPRESSION STOCKINGS    Comprehensive Metabolic Panel    TSH    Hemoglobin A1C    Uric Acid    Urinalysis, Reflex to Urine Culture Urine, Clean Catch    CBC Auto Differential    BNP    Cellulitis     Start Keflex. Discussed condition course and signs and symptoms to expect.  Patient advised take Tylenol for pain or fever.  ER precautions.  Call MD or follow-up to clinic if not improving or worsening symptoms.           Relevant Medications    cephALEXin (KEFLEX) 250 MG capsule    Other Relevant Orders    Comprehensive Metabolic Panel    TSH    Hemoglobin A1C    Uric Acid    Urinalysis, Reflex to Urine Culture  Urine, Clean Catch    CBC Auto Differential     Future Appointments       Date Specialty Appt Notes    12/1/2023 Lab .    12/1/2023 Lab .    12/1/2023 Radiology .           Medication Management for assessment above:   Medication List with Changes/Refills   New Medications    CEPHALEXIN (KEFLEX) 250 MG CAPSULE    Take 1 capsule (250 mg total) by mouth every 8 (eight) hours. for 10 days    FUROSEMIDE (LASIX) 20 MG TABLET    Take 1 tablet (20 mg total) by mouth 2 (two) times daily as needed (edema).   Current Medications    ALENDRONATE (FOSAMAX) 70 MG TABLET    TAKE 1 TABLET ONCE A WEEK ON THURSDAY    ASPIRIN (ECOTRIN) 81 MG EC TABLET    Take 1 tablet (81 mg total) by mouth once daily.    ATORVASTATIN (LIPITOR) 40 MG TABLET    TAKE 1 TABLET EVERY EVENING    CRANBERRY FRUIT EXTRACT (CRANBERRY ORAL)    Take 1 tablet by mouth once daily.    DIPHENOXYLATE-ATROPINE 2.5-0.025 MG (LOMOTIL) 2.5-0.025 MG PER TABLET    TAKE 1 TABLET 4 TIMES DAILY AS NEEDED FOR DIARRHEA *THANK YOU* ** MAY CAUSE DROWSINESS **    ESTRADIOL (ESTRACE) 0.01 % (0.1 MG/GRAM) VAGINAL CREAM    SMARTSIG:Sparingly Vaginal Every Night    GLUCOSAMINE/D3/BOSWELLIA BESSIE (OSTEO BI-FLEX, 5-LOXIN, ORAL)    Take by mouth.    IPRATROPIUM (ATROVENT) 21 MCG (0.03 %) NASAL SPRAY    INHALE 2 SPRAYS IN EACH NOSTRIL TWICE DAILY AS NEEDED *THANK YOU*    MECLIZINE (ANTIVERT) 25 MG TABLET    Take 1 tablet (25 mg total) by mouth 3 (three) times daily as needed for Dizziness or Nausea.    PROMETHAZINE (PHENERGAN) 25 MG SUPPOSITORY    Place 1 suppository (25 mg total) rectally every 6 (six) hours as needed for Nausea.    PROMETHAZINE (PHENERGAN) 25 MG TABLET    Take 1 tablet (25 mg total) by mouth every 6 (six) hours as needed for Nausea.    TEMAZEPAM (RESTORIL) 30 MG CAPSULE    TAKE ONE CAPSULE NIGHTLY AT BEDTIME    VIT A/VIT C/VIT E/ZINC/COPPER (ICAPS AREDS ORAL)    Take 1 tablet by mouth 2 (two) times a day.    XIIDRA 5 % DPET    Place 1 drop into both eyes 2 (two) times  daily.   Changed and/or Refilled Medications    Modified Medication Previous Medication    METOPROLOL SUCCINATE (TOPROL-XL) 50 MG 24 HR TABLET metoprolol succinate (TOPROL-XL) 50 MG 24 hr tablet       Take 1 tablet (50 mg total) by mouth 2 (two) times daily.    Take 1 tablet (50 mg total) by mouth once daily.       Bunny Moses M.D.  ==========================================================================  Subjective:   Patient ID: Suzie Dawkins is a 83 y.o. female.  has a past medical history of Anxiety, Arthritis, Cancer (2007), Depression, GERD (gastroesophageal reflux disease), Hypercalcemia, Hyperlipidemia, Hypertension, Osteoporosis (10/7/2013), and Vitamin D deficiency.   Chief Complaint: Edema and Hypertension      Problem List Items Addressed This Visit       Essential hypertension (Chronic)    Overview     November 2023:  Patient reports blood pressure has been elevated.  Her metoprolol has been decreased to 50 milligrams once daily.  Patient has been having bilateral lower extremity edema and ankle pain.  She has been on diuretics previously.  Not currently taking any.  Hypertension Medications               furosemide (LASIX) 20 MG tablet Take 1 tablet (20 mg total) by mouth 2 (two) times daily as needed (edema).    metoprolol succinate (TOPROL-XL) 50 MG 24 hr tablet Take 1 tablet (50 mg total) by mouth 2 (two) times daily.        CHRONIC.  December 2022: Blood pressure is difficult to control at home on current regimen.  Patient taking metoprolol 100 milligrams twice daily and losartan 50 milligrams daily.  March 2022:  Intermittent control. BP Reviewed.  She reports recent headaches.  They do improve with Tylenol.  Compliant with BP medications. No SE reported. JUNE 2020:  Blood pressure is below goal today.  Continue current medication regimen.JANUARY 2020:  Patient currently on metoprolol 100 mg twice a day, losartan 100 mg daily, amlodipine 5 mg daily was recently increased to 10 mg after  nursing visit blood pressure check.(-) CP, SOB, palpitations, dizziness, lightheadedness,  arm numbness, tingling or weakness, syncope.  Creatinine   Date Value Ref Range Status   05/18/2023 0.81 0.60 - 1.10 mg/dL Final   03/25/2023 1.22 0.50 - 1.40 mg/dL Final     Results for orders placed or performed in visit on 12/12/22   EKG 12-lead    Collection Time: 12/12/22  2:15 PM    Narrative    Test Reason : I10,Z79.899,R53.1,R11.2,    Vent. Rate : 052 BPM     Atrial Rate : 052 BPM     P-R Int : 166 ms          QRS Dur : 070 ms      QT Int : 482 ms       P-R-T Axes : 044 -29 035 degrees     QTc Int : 448 ms    Sinus bradycardia  Cannot rule out Anterior infarct ,age undetermined  Abnormal ECG  When compared with ECG of 22-JUL-2020 13:44,  No significant change was found  Confirmed by Swapnil Jennings MD ([id number]) on 12/13/2022 9:08:37 PM    Referred By: JC BUTTERFIELD           Confirmed By:Swapnil Jennings MD            Current Assessment & Plan     Increase metoprolol for blood pressure greater than 150/100.  Restart Lasix for lower extremity edema.  Follow-up sooner if extreme changes to blood pressure occur.  Counseled on importance of hypertension disease course, I recommend ongoing Education for DASH-diet and exercise.  Counseled on medication regimen importance of treating high blood pressure.  Please be advised of risk of untreated blood pressure as discussed.  Please advised of ER precautions were given for symptoms of hypertensive urgency and emergency.           Encounter for long-term (current) use of medications (Chronic)    Overview     November 2023: Reviewed labs.  May 2023: Reviewed labs.  March 2023:  Reviewed labs.  February 2023: Reviewed labs.  December 2022: Reviewed labs.  CHRONIC. Stable. Compliant with medications for managed conditions. See medication list. No SE reported. Routine lab analysis is being monitored. Refills were addressed.April 2022:  CHRONIC. Stable. Compliant with medications  for managed conditions. See medication list. No SE reported. Routine lab analysis is being monitored. Refills were addressed.  June 2022:  Reviewed labs.  Lab Results   Component Value Date    WBC 10.87 03/25/2023    HGB 12.7 03/25/2023    HCT 36.2 (L) 03/25/2023    MCV 91 03/25/2023     03/25/2023       Chemistry        Component Value Date/Time     (L) 05/18/2023 1118     (L) 03/25/2023 2217    K 3.7 05/18/2023 1118    K 3.3 (L) 03/25/2023 2217    CL 94 (L) 03/25/2023 2217    CO2 27 05/18/2023 1118    CO2 29 03/25/2023 2217    BUN 12 05/18/2023 1118    BUN 22 (H) 03/25/2023 2217    CREATININE 0.81 05/18/2023 1118    CREATININE 1.22 03/25/2023 2217    GLU 91 03/25/2023 2217        Component Value Date/Time    CALCIUM 10.3 05/18/2023 1118    CALCIUM 10.4 (H) 03/25/2023 2217    ALKPHOS 77 05/18/2023 1118    ALKPHOS 101 03/25/2023 2217    AST 21 05/18/2023 1118    AST 38 (H) 03/25/2023 2217    ALT 21 05/18/2023 1118    ALT 26 03/25/2023 2217    BILITOT 0.6 05/18/2023 1118    BILITOT 0.6 03/25/2023 2217    ESTGFRAFRICA >60.0 06/23/2022 0926    EGFRNONAA 53.3 (A) 06/23/2022 0926        Lab Results   Component Value Date    TSH 1.594 12/12/2022    FREET4 0.86 04/30/2021    T3FREE 2.2 (L) 04/30/2021            Current Assessment & Plan     Update labs.Complete history and physical was completed today.  Complete and thorough medication reconciliation was performed.  Discussed risks and benefits of medications.  Advised patient on orders and health maintenance.  We discussed old records and old labs if available.  Will request any records not available through epic.  Continue current medications listed on your summary sheet.           Chronic pain of both ankles - Primary (Chronic)    Overview     Chronic.  Intermittent control.  Patient has seen Podiatry by Dr. Nuno.  Patient reports that she has not had any imaging of her ankles recently.  She has had injection into the ankle for tendonitis.  She is  been to physical therapy.  She is also having significant swelling of her legs.  She is not wearing compression stockings.         Current Assessment & Plan     Check x-ray and labs.  Referral to Podiatry for 2nd opinion per patient request.         Edema (Chronic)    Overview     Chronic.  Worsening.  Patient is noncompliant with compression stockings.  She has not been taking her diuretics as she was told not take them by another physician.         Current Assessment & Plan     Restart diuretics temporarily.  Monitor labs.  Check BNP.  Restart compression stockings.         Cellulitis    Overview     Patient reports that she has a wound on her right ankle.  Area is not healing well.         Current Assessment & Plan     Start Keflex. Discussed condition course and signs and symptoms to expect.  Patient advised take Tylenol for pain or fever.  ER precautions.  Call MD or follow-up to clinic if not improving or worsening symptoms.               Review of patient's allergies indicates:   Allergen Reactions    Latex Hives    B12 [cyanocobalamin-cobamamide] Other (See Comments)     Caused flushing and caused skin to peel    Ciprofloxacin Diarrhea    Codeine      Other reaction(s): Muscle pain    Iodine      Other reaction(s): Hives    Meperidine      Other reaction(s): Muscle pain    Narcotic antagonist      Other reaction(s): Rash    Nitrofuran analogues     Penicillins Nausea And Vomiting    Propoxyphene      Other reaction(s): Muscle pain    Sulfa (sulfonamide antibiotics)      Other reaction(s): Muscle pain  Other reaction(s): Hives     Current Outpatient Medications   Medication Instructions    alendronate (FOSAMAX) 70 MG tablet TAKE 1 TABLET ONCE A WEEK ON THURSDAY    aspirin (ECOTRIN) 81 mg, Oral, Daily    atorvastatin (LIPITOR) 40 MG tablet TAKE 1 TABLET EVERY EVENING    cephALEXin (KEFLEX) 250 mg, Oral, Every 8 hours    cranberry fruit extract (CRANBERRY ORAL) 1 tablet, Oral, Daily    diphenoxylate-atropine  "2.5-0.025 mg (LOMOTIL) 2.5-0.025 mg per tablet TAKE 1 TABLET 4 TIMES DAILY AS NEEDED FOR DIARRHEA *THANK YOU* ** MAY CAUSE DROWSINESS **    estradioL (ESTRACE) 0.01 % (0.1 mg/gram) vaginal cream SMARTSIG:Sparingly Vaginal Every Night    furosemide (LASIX) 20 mg, Oral, 2 times daily PRN    glucosamine/D3/boswellia alfred (OSTEO BI-FLEX, 5-LOXIN, ORAL) Oral    ipratropium (ATROVENT) 21 mcg (0.03 %) nasal spray INHALE 2 SPRAYS IN EACH NOSTRIL TWICE DAILY AS NEEDED *THANK YOU*    meclizine (ANTIVERT) 25 mg, Oral, 3 times daily PRN    metoprolol succinate (TOPROL-XL) 50 mg, Oral, 2 times daily    promethazine (PHENERGAN) 25 mg, Rectal, Every 6 hours PRN    promethazine (PHENERGAN) 25 mg, Oral, Every 6 hours PRN    temazepam (RESTORIL) 30 mg capsule TAKE ONE CAPSULE NIGHTLY AT BEDTIME    vit A/vit C/vit E/zinc/copper (ICAPS AREDS ORAL) 1 tablet, Oral, 2 times daily    XIIDRA 5 % Dpet 1 drop, Both Eyes, 2 times daily      I have reviewed the PMH, social history, FamilyHx, surgical history, allergies and medications documented / confirmed by the patient at the time of this visit.  Review of Systems   Constitutional:  Positive for activity change and unexpected weight change.   HENT:  Positive for hearing loss. Negative for rhinorrhea and trouble swallowing.    Eyes:  Negative for discharge and visual disturbance.   Respiratory:  Negative for chest tightness and wheezing.    Cardiovascular:  Positive for palpitations. Negative for chest pain.   Gastrointestinal:  Positive for diarrhea. Negative for blood in stool, constipation and vomiting.   Endocrine: Negative for polydipsia and polyuria.   Genitourinary:  Negative for difficulty urinating, dysuria, hematuria and menstrual problem.   Musculoskeletal:  Positive for arthralgias, joint swelling and neck pain.   Neurological:  Positive for weakness. Negative for headaches.   Psychiatric/Behavioral:  Positive for dysphoric mood.      Objective:   BP (!) 198/101   Ht 4' 11" " (1.499 m)   Wt 71.2 kg (157 lb)   BMI 31.71 kg/m²   Physical Exam  Constitutional:       General: She is not in acute distress.     Appearance: She is well-developed. She is not ill-appearing, toxic-appearing or diaphoretic.   HENT:      Head: Normocephalic and atraumatic.      Right Ear: Hearing and external ear normal.      Left Ear: Hearing and external ear normal.   Eyes:      General: Lids are normal.      Conjunctiva/sclera: Conjunctivae normal.   Pulmonary:      Effort: Pulmonary effort is normal. No respiratory distress.   Musculoskeletal:         General: Swelling and tenderness present. Normal range of motion.      Cervical back: Normal range of motion.      Right lower leg: Edema present.      Left lower leg: Edema present.   Skin:     Coloration: Skin is not pale.      Findings: Lesion present.   Neurological:      Mental Status: She is alert. She is not disoriented.   Psychiatric:         Attention and Perception: She is attentive.         Mood and Affect: Mood is not anxious or depressed.         Speech: Speech is not rapid and pressured or slurred.         Behavior: Behavior normal. Behavior is not agitated, aggressive or hyperactive. Behavior is cooperative.         Thought Content: Thought content normal. Thought content is not paranoid or delusional. Thought content does not include homicidal or suicidal ideation. Thought content does not include homicidal or suicidal plan.         Cognition and Memory: Memory is not impaired.         Judgment: Judgment normal.         Assessment:     1. Chronic pain of both ankles    2. Encounter for long-term (current) use of medications    3. Edema, unspecified type    4. Essential hypertension    5. Cellulitis, unspecified cellulitis site      MDM:   High medical complexity.  Moderate to high risk.  Total time: 40 minutes.  This includes total time spent on the encounter, which includes face to face time and non-face to face time preparing to see the patient  (eg, review of previous medical records, tests), Obtaining and/or reviewing separately obtained history, documenting clinical information in the electronic or other health record, independently interpreting results (not separately reported)/communicating results to the patient/family/caregiver, and/or care coordination (not separately reported).    I have Reviewed and summarized old records.  I have performed thorough medication reconciliation today and discussed risk and benefits of medications.  I have reviewed labs and discussed with patient.  All questions were answered.  I am requesting old records and will review them once they are available.Podiatry Yaakov    I have signed for the following orders AND/OR meds.  Orders Placed This Encounter   Procedures    COMPRESSION STOCKINGS     Order Specific Question:   Pressure amount:     Answer:   15-20 mmHg    X-Ray Ankle Complete Bilateral     Standing Status:   Future     Standing Expiration Date:   11/30/2024     Order Specific Question:   May the Radiologist modify the order per protocol to meet the clinical needs of the patient?     Answer:   Yes     Order Specific Question:   Release to patient     Answer:   Immediate    Comprehensive Metabolic Panel     Standing Status:   Future     Standing Expiration Date:   1/28/2025    TSH     Standing Status:   Future     Standing Expiration Date:   1/28/2025    Hemoglobin A1C     Standing Status:   Future     Standing Expiration Date:   1/28/2025    Uric Acid     Standing Status:   Future     Standing Expiration Date:   1/28/2025    Urinalysis, Reflex to Urine Culture Urine, Clean Catch     Standing Status:   Future     Standing Expiration Date:   5/30/2025     Order Specific Question:   Preferred Collection Type     Answer:   Urine, Clean Catch     Order Specific Question:   Specimen Source     Answer:   Urine    CBC Auto Differential     Standing Status:   Future     Standing Expiration Date:   1/28/2025    BNP      Standing Status:   Future     Standing Expiration Date:   11/30/2024    Ambulatory referral/consult to Podiatry     Standing Status:   Future     Standing Expiration Date:   12/30/2024     Referral Priority:   Routine     Referral Type:   Consultation     Referral Reason:   Specialty Services Required     Requested Specialty:   Podiatry     Number of Visits Requested:   1     Medications Ordered This Encounter   Medications    cephALEXin (KEFLEX) 250 MG capsule     Sig: Take 1 capsule (250 mg total) by mouth every 8 (eight) hours. for 10 days     Dispense:  30 capsule     Refill:  0    furosemide (LASIX) 20 MG tablet     Sig: Take 1 tablet (20 mg total) by mouth 2 (two) times daily as needed (edema).     Dispense:  60 tablet     Refill:  11    metoprolol succinate (TOPROL-XL) 50 MG 24 hr tablet     Sig: Take 1 tablet (50 mg total) by mouth 2 (two) times daily.     Dispense:  60 tablet     Refill:  11     .        Follow up in about 4 weeks (around 12/28/2023), or if symptoms worsen or fail to improve, for Med refills.  Future Appointments       Date Specialty Appt Notes    12/1/2023 Lab .    12/1/2023 Lab .    12/1/2023 Radiology .          If no improvement in symptoms or symptoms worsen, advised to call/follow-up at clinic or go to ER. Patient voiced understanding and all questions/concerns were addressed.   DISCLAIMER: This note was compiled by using a speech recognition dictation system and therefore please be aware that typographical / speech recognition errors can and do occur.  Please contact me if you see any errors specifically.    Bunny Moses M.D.       Office: [phone] [address] [place] [address]  FAX: [phone]

## 2023-11-30 NOTE — TELEPHONE ENCOUNTER
Please set up an appointment with me soon so I can further evaluate her pain and coordinate her care appropriately.

## 2023-12-01 ENCOUNTER — HOSPITAL ENCOUNTER (OUTPATIENT)
Dept: RADIOLOGY | Facility: HOSPITAL | Age: 83
Discharge: HOME | End: 2023-12-01
Attending: FAMILY MEDICINE
Payer: MEDICARE

## 2023-12-01 ENCOUNTER — PATIENT MESSAGE (OUTPATIENT)
Dept: FAMILY MEDICINE | Facility: CLINIC | Age: 83
End: 2023-12-01
Payer: MEDICARE

## 2023-12-01 DIAGNOSIS — M25.572 CHRONIC PAIN OF BOTH ANKLES: ICD-10-CM

## 2023-12-01 DIAGNOSIS — G89.29 CHRONIC PAIN OF BOTH ANKLES: ICD-10-CM

## 2023-12-01 DIAGNOSIS — M25.571 CHRONIC PAIN OF BOTH ANKLES: ICD-10-CM

## 2023-12-01 PROCEDURE — 73610 XR ANKLE COMPLETE 3 VIEW BILATERAL: ICD-10-PCS | Mod: 26,50,, | Performed by: RADIOLOGY

## 2023-12-01 PROCEDURE — 73610 X-RAY EXAM OF ANKLE: CPT | Mod: 26,50,, | Performed by: RADIOLOGY

## 2023-12-01 PROCEDURE — 73610 X-RAY EXAM OF ANKLE: CPT | Mod: TC,50,PO

## 2023-12-01 NOTE — PROGRESS NOTES
1st check to see if patient has seen the results.  If not then  CALL patient with results and Document verification.  Schedule follow-up if needed.  [phone]  X-ray of the ankles bilaterally reviewed by radiology.  There is no acute abnormalities noted.  Follow-up with Podiatry if no improvement.  Make sure that you are wearing compression stockings for the swelling.

## 2023-12-02 DIAGNOSIS — M10.9 GOUT, UNSPECIFIED CAUSE, UNSPECIFIED CHRONICITY, UNSPECIFIED SITE: Primary | ICD-10-CM

## 2023-12-02 DIAGNOSIS — E87.6 HYPOKALEMIA: ICD-10-CM

## 2023-12-02 RX ORDER — POTASSIUM CHLORIDE 750 MG/1
10 CAPSULE, EXTENDED RELEASE ORAL DAILY
Qty: 30 CAPSULE | Refills: 11 | Status: SHIPPED | OUTPATIENT
Start: 2023-12-02 | End: 2024-03-06

## 2023-12-02 RX ORDER — COLCHICINE 0.6 MG/1
0.6 TABLET ORAL DAILY
Qty: 30 TABLET | Refills: 11 | Status: SHIPPED | OUTPATIENT
Start: 2023-12-02 | End: 2024-01-11 | Stop reason: CLARIF

## 2023-12-02 RX ORDER — ALLOPURINOL 100 MG/1
100 TABLET ORAL DAILY
Qty: 30 TABLET | Refills: 12 | Status: SHIPPED | OUTPATIENT
Start: 2023-12-02 | End: 2024-01-11 | Stop reason: CLARIF

## 2023-12-03 ENCOUNTER — PATIENT MESSAGE (OUTPATIENT)
Dept: FAMILY MEDICINE | Facility: CLINIC | Age: 83
End: 2023-12-03
Payer: MEDICARE

## 2023-12-04 DIAGNOSIS — R11.0 CHRONIC NAUSEA: ICD-10-CM

## 2023-12-04 DIAGNOSIS — Z79.899 ENCOUNTER FOR LONG-TERM (CURRENT) USE OF MEDICATIONS: ICD-10-CM

## 2023-12-04 DIAGNOSIS — K52.9 CHRONIC DIARRHEA: ICD-10-CM

## 2023-12-04 DIAGNOSIS — R11.2 NAUSEA AND VOMITING, UNSPECIFIED VOMITING TYPE: ICD-10-CM

## 2023-12-04 DIAGNOSIS — Z85.9 PERSONAL HISTORY OF MALIGNANT CARCINOID TUMOR: ICD-10-CM

## 2023-12-04 DIAGNOSIS — Z90.49 HISTORY OF BOWEL RESECTION: ICD-10-CM

## 2023-12-04 RX ORDER — PROMETHAZINE HYDROCHLORIDE 25 MG/1
25 SUPPOSITORY RECTAL EVERY 6 HOURS PRN
Qty: 28 SUPPOSITORY | Refills: 4 | Status: SHIPPED | OUTPATIENT
Start: 2023-12-04 | End: 2023-12-06 | Stop reason: SDUPTHER

## 2023-12-04 RX ORDER — PROMETHAZINE HYDROCHLORIDE 25 MG/1
25 TABLET ORAL EVERY 6 HOURS PRN
Qty: 30 TABLET | Refills: 5 | Status: SHIPPED | OUTPATIENT
Start: 2023-12-04 | End: 2023-12-06 | Stop reason: SDUPTHER

## 2023-12-04 RX ORDER — DIPHENOXYLATE HYDROCHLORIDE AND ATROPINE SULFATE 2.5; .025 MG/1; MG/1
1 TABLET ORAL 3 TIMES DAILY PRN
Qty: 90 TABLET | Refills: 0 | Status: SHIPPED | OUTPATIENT
Start: 2023-12-04 | End: 2023-12-06 | Stop reason: SDUPTHER

## 2023-12-04 NOTE — TELEPHONE ENCOUNTER
No care due was identified.  [facility] Embedded Care Due Messages. Reference number: [id number].   12/04/2023 9:17:38 AM CST

## 2023-12-06 DIAGNOSIS — Z90.49 HISTORY OF BOWEL RESECTION: ICD-10-CM

## 2023-12-06 DIAGNOSIS — Z85.9 PERSONAL HISTORY OF MALIGNANT CARCINOID TUMOR: ICD-10-CM

## 2023-12-06 DIAGNOSIS — K52.9 CHRONIC DIARRHEA: ICD-10-CM

## 2023-12-06 DIAGNOSIS — R11.2 NAUSEA AND VOMITING, UNSPECIFIED VOMITING TYPE: ICD-10-CM

## 2023-12-06 DIAGNOSIS — G47.00 INSOMNIA, UNSPECIFIED TYPE: ICD-10-CM

## 2023-12-06 DIAGNOSIS — R11.0 CHRONIC NAUSEA: ICD-10-CM

## 2023-12-06 DIAGNOSIS — Z79.899 ENCOUNTER FOR LONG-TERM (CURRENT) USE OF MEDICATIONS: ICD-10-CM

## 2023-12-06 RX ORDER — TEMAZEPAM 30 MG/1
CAPSULE ORAL
Qty: 30 CAPSULE | Refills: 5 | Status: SHIPPED | OUTPATIENT
Start: 2023-12-06 | End: 2023-12-07 | Stop reason: SDUPTHER

## 2023-12-06 RX ORDER — PROMETHAZINE HYDROCHLORIDE 25 MG/1
25 TABLET ORAL EVERY 6 HOURS PRN
Qty: 30 TABLET | Refills: 5 | Status: SHIPPED | OUTPATIENT
Start: 2023-12-06

## 2023-12-06 RX ORDER — DIPHENOXYLATE HYDROCHLORIDE AND ATROPINE SULFATE 2.5; .025 MG/1; MG/1
1 TABLET ORAL 3 TIMES DAILY PRN
Qty: 90 TABLET | Refills: 0 | Status: SHIPPED | OUTPATIENT
Start: 2023-12-06

## 2023-12-06 RX ORDER — PROMETHAZINE HYDROCHLORIDE 25 MG/1
25 SUPPOSITORY RECTAL EVERY 6 HOURS PRN
Qty: 28 SUPPOSITORY | Refills: 4 | Status: SHIPPED | OUTPATIENT
Start: 2023-12-06 | End: 2024-01-11 | Stop reason: CLARIF

## 2023-12-06 NOTE — TELEPHONE ENCOUNTER
Refill Routing Note   Medication(s) are not appropriate for processing by [facility] for the following reason(s):        Outside of protocol    [facility] action(s):  Route               Appointments  past 12m or future 3m with PCP    Date Provider   Last Visit   11/30/2023 Bunny Moses MD   Next Visit   Visit date not found Bunny Moses MD   ED visits in past 90 days: 0        Note composed:10:43 AM 12/06/2023

## 2023-12-06 NOTE — TELEPHONE ENCOUNTER
No care due was identified.  [facility] Embedded Care Due Messages. Reference number: [id number].   12/06/2023 9:18:38 AM CST

## 2023-12-06 NOTE — TELEPHONE ENCOUNTER
Refill Routing Note   Medication(s) are not appropriate for processing by [facility] for the following reason(s):        Outside of protocol    [facility] action(s):  Route               Appointments  past 12m or future 3m with PCP    Date Provider   Last Visit   11/30/2023 Bunny Moses MD   Next Visit   12/6/2023 Bunny Moses MD   ED visits in past 90 days: 0        Note composed:11:11 AM 12/06/2023

## 2023-12-07 ENCOUNTER — TELEPHONE (OUTPATIENT)
Dept: FAMILY MEDICINE | Facility: CLINIC | Age: 83
End: 2023-12-07
Payer: MEDICARE

## 2023-12-07 DIAGNOSIS — G47.00 INSOMNIA, UNSPECIFIED TYPE: ICD-10-CM

## 2023-12-07 RX ORDER — TEMAZEPAM 30 MG/1
CAPSULE ORAL
Qty: 30 CAPSULE | Refills: 5 | Status: SHIPPED | OUTPATIENT
Start: 2023-12-07 | End: 2024-03-06 | Stop reason: SDUPTHER

## 2023-12-07 NOTE — TELEPHONE ENCOUNTER
Refill Routing Note   Medication(s) are not appropriate for processing by [facility] for the following reason(s):        Outside of protocol    [facility] action(s):  Route               Appointments  past 12m or future 3m with PCP    Date Provider   Last Visit   11/30/2023 Bunny Moses MD   Next Visit   Visit date not found Bunny Moses MD   ED visits in past 90 days: 0        Note composed:2:22 PM 12/07/2023

## 2023-12-07 NOTE — TELEPHONE ENCOUNTER
----- Message from Dee Dee Zhao sent at 12/7/2023  1:03 PM CST -----  Contact: pt  Type:  Prior Authorization     Who Called:pt     Does the patient know what this is regarding?:temazepam (RESTORIL) 30 mg capsule   Would the patient rather a call back or a response via [facility]? Call   Best Call Back Number:[phone]  Additional Information:      Pt stated the pharmacy needs a prior authorization  for temazepam (RESTORIL) 30 mg capsule

## 2023-12-07 NOTE — TELEPHONE ENCOUNTER
No care due was identified.  Health [facility] Embedded Care Due Messages. Reference number: [id number].   12/07/2023 1:00:29 PM CST

## 2023-12-18 ENCOUNTER — PATIENT MESSAGE (OUTPATIENT)
Dept: FAMILY MEDICINE | Facility: CLINIC | Age: 83
End: 2023-12-18
Payer: MEDICARE

## 2023-12-18 DIAGNOSIS — G47.00 INSOMNIA, UNSPECIFIED TYPE: ICD-10-CM

## 2023-12-18 DIAGNOSIS — R42 VERTIGO: ICD-10-CM

## 2023-12-18 RX ORDER — TEMAZEPAM 30 MG/1
CAPSULE ORAL
Qty: 30 CAPSULE | Refills: 5 | OUTPATIENT
Start: 2023-12-18

## 2023-12-18 NOTE — TELEPHONE ENCOUNTER
No care due was identified.  Health [facility] Embedded Care Due Messages. Reference number: [id number].   12/18/2023 4:00:41 PM CST

## 2023-12-20 ENCOUNTER — PATIENT MESSAGE (OUTPATIENT)
Dept: FAMILY MEDICINE | Facility: CLINIC | Age: 83
End: 2023-12-20
Payer: MEDICARE

## 2023-12-20 ENCOUNTER — OFFICE VISIT (OUTPATIENT)
Dept: PODIATRY | Facility: CLINIC | Age: 83
End: 2023-12-20
Payer: MEDICARE

## 2023-12-20 VITALS — WEIGHT: 157 LBS | HEIGHT: 59 IN | BODY MASS INDEX: 31.65 KG/M2

## 2023-12-20 DIAGNOSIS — G89.29 CHRONIC PAIN OF BOTH ANKLES: ICD-10-CM

## 2023-12-20 DIAGNOSIS — M76.71 PERONEAL TENDONITIS OF RIGHT LOWER EXTREMITY: Primary | ICD-10-CM

## 2023-12-20 DIAGNOSIS — M25.571 CHRONIC PAIN OF BOTH ANKLES: ICD-10-CM

## 2023-12-20 DIAGNOSIS — M25.572 CHRONIC PAIN OF BOTH ANKLES: ICD-10-CM

## 2023-12-20 DIAGNOSIS — M25.571 PAIN IN JOINT INVOLVING RIGHT ANKLE AND FOOT: ICD-10-CM

## 2023-12-20 PROCEDURE — 99999 PR PBB SHADOW E&M-EST. PATIENT-LVL IV: ICD-10-PCS | Mod: PBBFAC,,, | Performed by: PODIATRIST

## 2023-12-20 PROCEDURE — 99204 PR OFFICE/OUTPT VISIT, NEW, LEVL IV, 45-59 MIN: ICD-10-PCS | Mod: S$PBB,,, | Performed by: PODIATRIST

## 2023-12-20 PROCEDURE — 99999 PR PBB SHADOW E&M-EST. PATIENT-LVL IV: CPT | Mod: PBBFAC,,, | Performed by: PODIATRIST

## 2023-12-20 PROCEDURE — 99204 OFFICE O/P NEW MOD 45 MIN: CPT | Mod: S$PBB,,, | Performed by: PODIATRIST

## 2023-12-20 PROCEDURE — 99214 OFFICE O/P EST MOD 30 MIN: CPT | Mod: PBBFAC,PO | Performed by: PODIATRIST

## 2023-12-20 RX ORDER — MELOXICAM 15 MG/1
7.5 TABLET ORAL DAILY
Qty: 30 TABLET | Refills: 0 | Status: SHIPPED | OUTPATIENT
Start: 2023-12-20 | End: 2024-03-06

## 2023-12-20 NOTE — PROGRESS NOTES
Subjective:      Patient ID: Suzie Dawkins is a 83 y.o. female.    Chief Complaint: Ankle Pain    Suzie is a 83 y.o. female who presents to the podiatry clinic  with complaint of  bilateral ankle pain right worse than left. Onset of the symptoms was several months ago. Precipitating event: none known. Current symptoms include: ability to bear weight, but with some pain, worsening symptoms after a period of activity, and continued throbbing pain even at rest keeping her up at night at times . Aggravating factors: any weight bearing. Symptoms have progressed to a point and stayed about the same, not improving. Patient has had no prior foot problems. Evaluation to date: plain films: normal and lab work: High uric acid. Treatment to date:  rest, ice, steroid injection . Patients rates pain 8/10 on pain scale.    Review of Systems   Constitutional: Negative for chills and fever.   Cardiovascular:  Negative for claudication and leg swelling.   Respiratory:  Negative for shortness of breath.    Skin:  Negative for itching and rash.   Musculoskeletal:  Negative for muscle cramps, muscle weakness and myalgias.        Right ankle pain   Gastrointestinal:  Negative for nausea and vomiting.   Neurological:  Negative for focal weakness, loss of balance, numbness and paresthesias.           Objective:      Physical Exam  Constitutional:       General: She is not in acute distress.     Appearance: She is well-developed. She is not diaphoretic.   Cardiovascular:      Pulses:           Dorsalis pedis pulses are 2+ on the right side and 2+ on the left side.        Posterior tibial pulses are 2+ on the right side and 2+ on the left side.      Comments: < 3 sec capillary refill time to toes 1-5 bilateral. Toes and feet are warm to touch proximally with normal distal cooling b/l. There is some hair growth on the feet and toes b/l. There is no edema b/l. No spider veins or varicosities present b/l.     Musculoskeletal:      Comments:  Equinus noted b/l ankles with < 10 deg DF noted. MMT 5/5 in DF/PF/Inv/Ev resistance with no reproduction of pain in any direction. Passive range of motion of ankle and pedal joints is painless b/l.    Right ankle pain with palpation and edema around the peroneal tendons especially posterior to the lateral malleolus. Pain with ankle inversion. Some tenderness also to the lateral ankle gutter   Skin:     General: Skin is warm and dry.      Coloration: Skin is not pale.      Findings: No abrasion, bruising, burn, ecchymosis, erythema, laceration, lesion, petechiae or rash.      Nails: There is no clubbing.      Comments: Skin temperature, texture and turgor within normal limits.   Neurological:      Mental Status: She is alert and oriented to person, place, and time.      Sensory: No sensory deficit.      Motor: No tremor, atrophy or abnormal muscle tone.      Comments: Negative tinel sign bilateral.   Psychiatric:         Behavior: Behavior normal.               Assessment:       Encounter Diagnoses   Name Primary?    Chronic pain of both ankles     Peroneal tendonitis of right lower extremity Yes    Pain in joint involving right ankle and foot          Plan:       Suzie was seen today for ankle pain.    Diagnoses and all orders for this visit:    Peroneal tendonitis of right lower extremity  -     MRI Ankle Without Contrast Right; Future    Chronic pain of both ankles  -     Ambulatory referral/consult to Podiatry  -     MRI Ankle Without Contrast Right; Future    Pain in joint involving right ankle and foot  -     MRI Ankle Without Contrast Right; Future    Other orders  -     meloxicam (MOBIC) 15 MG tablet; Take 0.5 tablets (7.5 mg total) by mouth once daily.      I counseled the patient on her conditions, their implications and medical management.    The patient was advised that NSAID-type medications have two very important potential side effects: gastrointestinal irritation including hemorrhage and renal injuries.  She was asked to take the medication with food and to stop if she experiences any GI upset.     Declined going into a tall orthopedic boot, has already tried rest, ice, physical therapy none of which has helped, has also tried several topical medications    Will get an MRI and return after the MRI to discuss the results    Patient instructed on adequate icing techniques. Patient should ice the affected area at least once per day x 10 minutes for 10 days . I advised the  patient that extra icing would also be beneficial to ensure adequate anti inflammatory effect     Richard Pascual DPM

## 2024-01-04 ENCOUNTER — TELEPHONE (OUTPATIENT)
Dept: FAMILY MEDICINE | Facility: CLINIC | Age: 84
End: 2024-01-04
Payer: MEDICARE

## 2024-01-04 DIAGNOSIS — Z79.899 ENCOUNTER FOR LONG-TERM (CURRENT) USE OF MEDICATIONS: ICD-10-CM

## 2024-01-04 DIAGNOSIS — E78.5 HYPERLIPIDEMIA, UNSPECIFIED HYPERLIPIDEMIA TYPE: ICD-10-CM

## 2024-01-04 DIAGNOSIS — M81.0 AGE-RELATED OSTEOPOROSIS WITHOUT CURRENT PATHOLOGICAL FRACTURE: ICD-10-CM

## 2024-01-04 RX ORDER — ALENDRONATE SODIUM 70 MG/1
TABLET ORAL
Qty: 4 TABLET | Refills: 0 | Status: SHIPPED | OUTPATIENT
Start: 2024-01-04 | End: 2024-02-16 | Stop reason: SDUPTHER

## 2024-01-04 RX ORDER — ATORVASTATIN CALCIUM 40 MG/1
40 TABLET, FILM COATED ORAL
Qty: 28 TABLET | Refills: 0 | Status: SHIPPED | OUTPATIENT
Start: 2024-01-04 | End: 2024-02-14 | Stop reason: SDUPTHER

## 2024-01-04 NOTE — TELEPHONE ENCOUNTER
Refill Routing Note   Medication(s) are not appropriate for processing by [facility] for the following reason(s):        Required labs outdated-Lipid Panel  Outside of protocol-fosamax    [facility] action(s):  Defer  Route               Appointments  past 12m or future 3m with PCP    Date Provider   Last Visit   11/30/2023 Bunny Moses MD   Next Visit   Visit date not found Bunny Moses MD   ED visits in past 90 days: 0        Note composed:4:17 PM 01/04/2024

## 2024-01-04 NOTE — TELEPHONE ENCOUNTER
No care due was identified.  Health [facility] Embedded Care Due Messages. Reference number: [id number].   1/04/2024 8:01:19 AM CST

## 2024-01-05 ENCOUNTER — HOSPITAL ENCOUNTER (OUTPATIENT)
Dept: RADIOLOGY | Facility: HOSPITAL | Age: 84
Discharge: HOME | End: 2024-01-05
Attending: PODIATRIST
Payer: MEDICARE

## 2024-01-05 DIAGNOSIS — M25.571 PAIN IN JOINT INVOLVING RIGHT ANKLE AND FOOT: ICD-10-CM

## 2024-01-05 DIAGNOSIS — M25.572 CHRONIC PAIN OF BOTH ANKLES: ICD-10-CM

## 2024-01-05 DIAGNOSIS — M76.71 PERONEAL TENDONITIS OF RIGHT LOWER EXTREMITY: ICD-10-CM

## 2024-01-05 DIAGNOSIS — G89.29 CHRONIC PAIN OF BOTH ANKLES: ICD-10-CM

## 2024-01-05 DIAGNOSIS — M25.571 CHRONIC PAIN OF BOTH ANKLES: ICD-10-CM

## 2024-01-05 PROCEDURE — 73721 MRI JNT OF LWR EXTRE W/O DYE: CPT | Mod: 26,RT,, | Performed by: RADIOLOGY

## 2024-01-05 PROCEDURE — 73721 MRI JNT OF LWR EXTRE W/O DYE: CPT | Mod: TC,PO,RT

## 2024-01-08 ENCOUNTER — TELEPHONE (OUTPATIENT)
Dept: PODIATRY | Facility: CLINIC | Age: 84
End: 2024-01-08
Payer: MEDICARE

## 2024-01-08 ENCOUNTER — PATIENT MESSAGE (OUTPATIENT)
Dept: PODIATRY | Facility: CLINIC | Age: 84
End: 2024-01-08
Payer: MEDICARE

## 2024-01-08 NOTE — TELEPHONE ENCOUNTER
----- Message from Goyo Garza sent at 1/8/2024  9:19 AM CST -----  Regarding: Appt request  Type:  Appointment Request    Caller is requesting an appointment.     Name of Caller:  Pt    Symptoms:  MRI review    Would the patient rather a call back or a response via [facility]? Call back    Best Call Back Number:  [phone]      Additional Information:  Sts she cancelled her appt today due to the weather but wants to know if possible if it could still be done as a virtual appt she really wants to know her results.   Please advise -- Thank you

## 2024-01-08 NOTE — TELEPHONE ENCOUNTER
I do not do virtual appointments but her MRI does show inflammation around her peroneal tendons as I suspected, there also may be a slight intertendinous tearing at the brevis tendon, I would recommend offloading for 6 weeks in a tall walking boot as I do not recommend surgery for this at her age offloading in the boot would be the best way for this to begin healing. She can come  and be fit for a boot in the office and follow up with me after being in the boot for 6 weeks to follow up

## 2024-01-09 ENCOUNTER — TELEPHONE (OUTPATIENT)
Dept: PODIATRY | Facility: CLINIC | Age: 84
End: 2024-01-09
Payer: MEDICARE

## 2024-01-11 ENCOUNTER — PATIENT MESSAGE (OUTPATIENT)
Dept: FAMILY MEDICINE | Facility: CLINIC | Age: 84
End: 2024-01-11
Payer: MEDICARE

## 2024-01-11 PROBLEM — R55 SYNCOPE: Status: ACTIVE | Noted: 2024-01-11

## 2024-01-11 PROBLEM — Z71.89 ACP (ADVANCE CARE PLANNING): Status: ACTIVE | Noted: 2020-06-02

## 2024-01-18 ENCOUNTER — TELEPHONE (OUTPATIENT)
Dept: PODIATRY | Facility: CLINIC | Age: 84
End: 2024-01-18
Payer: MEDICARE

## 2024-01-22 ENCOUNTER — PATIENT MESSAGE (OUTPATIENT)
Dept: PODIATRY | Facility: CLINIC | Age: 84
End: 2024-01-22
Payer: MEDICARE

## 2024-01-22 ENCOUNTER — TELEPHONE (OUTPATIENT)
Dept: PODIATRY | Facility: CLINIC | Age: 84
End: 2024-01-22
Payer: MEDICARE

## 2024-01-22 NOTE — TELEPHONE ENCOUNTER
----- Message from Raúl Schaeffer sent at 1/22/2024 10:36 AM CST -----  Contact: self/Daughter  Type: Needs Medical Advice  Who Called:  Patient/Daughter    Best Call Back Number: [phone]    Additional Information: States she would like to speak with office regarding pt appt being cancel want to know if the results can be read over the phone or a sooner appt.Please call back

## 2024-01-22 NOTE — TELEPHONE ENCOUNTER
her MRI does show inflammation around her peroneal tendons as I suspected, there also may be a slight intertendinous tearing at the brevis tendon, I would recommend offloading for 6 weeks in a tall walking boot as I do not recommend surgery for this at her age offloading in the boot would be the best way for this to begin healing. She can come  and be fit for a boot in the office and follow up with me after being in the boot for 6 weeks to follow up

## 2024-01-23 ENCOUNTER — CLINICAL SUPPORT (OUTPATIENT)
Dept: PODIATRY | Facility: CLINIC | Age: 84
End: 2024-01-23
Payer: MEDICARE

## 2024-01-23 ENCOUNTER — OUTPATIENT CASE MANAGEMENT (OUTPATIENT)
Dept: ADMINISTRATIVE | Facility: OTHER | Age: 84
End: 2024-01-23
Payer: MEDICARE

## 2024-01-23 ENCOUNTER — PATIENT MESSAGE (OUTPATIENT)
Dept: FAMILY MEDICINE | Facility: CLINIC | Age: 84
End: 2024-01-23
Payer: MEDICARE

## 2024-01-23 VITALS — HEIGHT: 59 IN | BODY MASS INDEX: 31.65 KG/M2 | WEIGHT: 157 LBS

## 2024-01-23 DIAGNOSIS — R26.89 BALANCE PROBLEMS: Primary | ICD-10-CM

## 2024-01-23 DIAGNOSIS — R26.2 DIFFICULTY WALKING: Chronic | ICD-10-CM

## 2024-01-23 DIAGNOSIS — H81.4 VERTIGO OF CENTRAL ORIGIN: ICD-10-CM

## 2024-01-23 PROCEDURE — 99213 OFFICE O/P EST LOW 20 MIN: CPT | Mod: PBBFAC,PO

## 2024-01-23 PROCEDURE — 99999 PR PBB SHADOW E&M-EST. PATIENT-LVL III: CPT | Mod: PBBFAC,,,

## 2024-01-23 RX ORDER — LIFITEGRAST 50 MG/ML
1 SOLUTION/ DROPS OPHTHALMIC 2 TIMES DAILY
COMMUNITY
Start: 2024-01-15 | End: 2024-03-06

## 2024-01-23 NOTE — LETTER
January 24, 2024             Dear Suzie,    Welcome to [facility] Complex [facility].  It was a pleasure talking with you today.  My name is Cary JUANA SegoviaHi, and I look forward to being your Care Manager.  My goal is to help you function at the healthiest and highest level possible.  You can contact me directly at [phone].    As an [facility] patient, some of the services we may be able to provide include:     Development of an individualized care plan with a Registered Nurse   Connection with a   Connection with available resources and services    Coordinate communication among your care team members   Provide coaching and education   Help you understand your doctors treatment plan  Help you obtain information about your insurance coverage.     All services provided by [facility] Complex Care Managers and other care team members are coordinated with and communicated to your primary care team.      As part of your enrollment, you will be receiving education materials and more information about these services in your [facility] account, by phone or through the mail.  If you do not wish to participate or receive information, please contact our office at [phone].      Sincerely,        Cary Hi, RN  [facility]   Out-patient RN Complex Care Manager

## 2024-01-23 NOTE — PROGRESS NOTES
Outpatient Care Management  Initial Patient Assessment    Patient: Suzie Dawkins  MRN: [id number]  Date of Service: 01/23/2024  Completed by: Cary Hi RN  Referral Date: 01/11/2024  Date of Eligibility: 1/12/2024  Program: High Risk  Status: Ongoing  Effective Dates: 1/23/2024 - present  Responsible Staff: Cary Hi RN        Reason for Visit   Patient presents with    [facility] Enrollment Call    Nursing Assessment       Brief Summary:  Suzie Dawkins was referred by Dr FELICE Spears for syncope. Patient qualifies for program based on risk score of 77%.   Active problem list, medical, surgical and social history reviewed. Active comorbidities include DDD, syncope, vertigo, recurring depression with anxiety, HTN, CKD3 and hx of colon cancer with bowel resection. Areas of need identified by patient include issues with home safety related to recent episode of syncope that caused a fall with injuries as well as her ongoing, uncontrolled vertigo symptoms.   Next steps: Will refer her to the pharmacy assistance program to see if there is assistance available for scopolamine that she reports best treats her vertigo, but she cannot afford. Will mail her a medication organizer as she reports she currently takes all med doses out of the bottles and having an organizer may be more convenient. Will send some educational literature about vertigo and fall prevention to her pt portal account and will follow up next week to discuss both. Cary Hi RN    Disability Status  Is the patient alert and oriented (person, place, time, and situation)?: Alert and oriented x 4  Hearing Difficulty or Deaf: yes  Hearing Management: -- (Hearing deficits in cade ears - moderate. No hearing aids as she cannot afford.)  Visual Difficulty or Blind: yes  Visual and Hearing Needs Conclusion: -- (Wears glasses for macular degeneration, but still has issues with focusing.)  Vision Management: -- (Glasses)  Difficulty Concentrating,  Remembering or Making Decisions: yes  Concentration Management: Some memory issues  Communication Difficulty: no  Eating/Swallowing Difficulty: no  Walking or Climbing Stairs Difficulty: yes  Walking or Climbing Stairs: ambulation difficulty, requires equipment; stair climbing difficulty, dependent  Mobility Management: -- (Recently began using a rollator following bad fall in her home causing black eye and hematoma.)  Dressing/Bathing Difficulty: no  Toileting : Independent  Continence : Continence - Not a problem  Difficulty Managing Errands Independently: yes  Errands Management: -- (Adult children do her shopping.)  Equipment Currently Used at Home: rollator; blood pressure machine  ADL Conclusion Statement: -- (Is independent with ADL's and most IADL's, but adult daughters assist her with some needs like shopping and driving.)  Change in Functional Status Since Onset of Current Illness/Injury: yes        Spiritual Beliefs  Spiritual, Cultural Beliefs, Confucianist Practices, Values that Affect Care: no      Social History     Socioeconomic History    Marital status: Single   Tobacco Use    Smoking status: Former     Types: Cigarettes    Smokeless tobacco: Never   Substance and Sexual Activity    Alcohol use: No    Drug use: No    Sexual activity: Never     Social Determinants of Health     Financial Resource Strain: Low Risk  (8/1/2023)    Overall Financial Resource Strain (CARDIA)     Difficulty of Paying Living Expenses: Not hard at all   Food Insecurity: Food Insecurity Present (11/30/2023)    Hunger Vital Sign     Worried About Running Out of Food in the Last Year: Sometimes true     Ran Out of Food in the Last Year: Sometimes true   Transportation Needs: Unmet Transportation Needs (11/30/2023)    PRAPARE - Transportation     Lack of Transportation (Medical): Yes     Lack of Transportation (Non-Medical): Yes   Physical Activity: Unknown (11/30/2023)    Exercise Vital Sign     Days of Exercise per Week: Patient  declined     Minutes of Exercise per Session: 20 min   Stress: No Stress Concern Present (11/30/2023)    [place] [employer] - Occupational Stress Questionnaire     Feeling of Stress : Only a little   Social Connections: Unknown (11/30/2023)    Social Connection and Isolation Panel [NHANES]     Frequency of Communication with Friends and Family: More than three times a week     Frequency of Social Gatherings with Friends and Family: Patient declined     Active Member of Clubs or Organizations: No     Attends Club or Organization Meetings: Never     Marital Status:    Housing Stability: Low Risk  (11/30/2023)    Housing Stability Vital Sign     Unable to Pay for Housing in the Last Year: No     Number of Places Lived in the Last Year: 1     Unstable Housing in the Last Year: No       Roles and Relationships  Primary Source of Support/Comfort: child(mignon)  Name of Support/Comfort Primary Source: -- (Amberly Mendiola and Isa)  Primary Roles/Responsibilities: wage earner, part-time; wage earner, full-time  Secondary Source of Support/Comfort: child(mignon)  Name of Support/Comfort Secondary Source: Amberly Mendiola and Isa           Patient Reported Insurance  Verified current insurance plan:: Medicare; Blue Cross            1/23/2024     3:33 PM 8/1/2023    10:38 AM 1/7/2020    10:51 AM 12/3/2019     1:35 PM   Depression Patient Health Questionnaire   Over the last two weeks how often have you been bothered by little interest or pleasure in doing things Several days Several days Several days Several days   Over the last two weeks how often have you been bothered by feeling down, depressed or hopeless Several days Several days Several days Several days   PHQ-2 Total Score 2 2 2 2       Learning Assessment       01/24/2024 0901 [facility] (1/23/2024 - Present)   Created by Cary Hi, RN - RN (Nurse) Status: Complete                 PRIMARY LEARNER     Primary Learner Name:  Suzie MCINTYRE  01/24/2024 0901    Relationship:  Patient MC - 01/24/2024 0901    Does the primary learner have any barriers to learning?:  No Barriers  - 01/24/2024 0901    What is the preferred language of the primary learner?:  English  - 01/24/2024 0901    Is an  required?:  No  - 01/24/2024 0901    How does the primary learner prefer to learn new concepts?:  Listening, Reading  - 01/24/2024 0901    How often do you need to have someone help you read instructions, pamphlets, or written material from your doctor or pharmacy?:  Sometimes  - 01/24/2024 0901        CO-LEARNER #1     No question answered        CO-LEARNER #2     No question answered        SPECIAL TOPICS     No question answered        ANSWERED BY:     No question answered        Edit Cary Krishnamurthy, RN - RN (Nurse)   01/24/2024 0901

## 2024-01-23 NOTE — PROGRESS NOTES
Patient here to be fitted for tall walking boot to offload tendon tear. She voices understanding to RTC in 6 weeks.    Patient is requesting a walker because of vertigo and syncope and her mobility limitation cannot be sufficiently resolved using a cane.    Patient's functional mobility deficit can be sufficiently resolved with the use of a walker.    Patient's mobility limitation significantly impairs their ability to participate in one or more activities of daily living.    The use of a walker will significantly improve the patient's ability to participate in MRADLS and the patient will use it on regular basis in the home

## 2024-01-24 ENCOUNTER — TELEPHONE (OUTPATIENT)
Dept: FAMILY MEDICINE | Facility: CLINIC | Age: 84
End: 2024-01-24
Payer: MEDICARE

## 2024-01-24 ENCOUNTER — PATIENT MESSAGE (OUTPATIENT)
Dept: ADMINISTRATIVE | Facility: OTHER | Age: 84
End: 2024-01-24
Payer: MEDICARE

## 2024-01-24 ENCOUNTER — PATIENT MESSAGE (OUTPATIENT)
Dept: FAMILY MEDICINE | Facility: CLINIC | Age: 84
End: 2024-01-24
Payer: MEDICARE

## 2024-01-25 ENCOUNTER — TELEPHONE (OUTPATIENT)
Dept: PHARMACY | Facility: CLINIC | Age: 84
End: 2024-01-25
Payer: MEDICARE

## 2024-01-25 NOTE — TELEPHONE ENCOUNTER
We have reviewed Ms. Dawkins current medication list and/or insurance status. Unfortunately, The Pharmacy Patient Assistance Team is unable to assist at this time due to the following reasons      There are no Manufacture or Co-pay Savings Programs available for the requested medication for given indication.       Patient takes for Vertigo and there is no program or fund for that indication. She was encouraged get current prescription, and have pharmacy initiate PA request to provider.           Pharmacy Patient Assistance Team

## 2024-01-29 ENCOUNTER — PATIENT OUTREACH (OUTPATIENT)
Dept: ADMINISTRATIVE | Facility: OTHER | Age: 84
End: 2024-01-29
Payer: MEDICARE

## 2024-01-29 NOTE — PROGRESS NOTES
This Community Health Worker (CHW) completed Social Determinant of Health (SDOH)  Questionnaire with patient/caregiver via telephone today.  Notified OPCM MAGUI FOX RN, of completion.      Patient denied any SDOH needs at this time.

## 2024-01-30 ENCOUNTER — OUTPATIENT CASE MANAGEMENT (OUTPATIENT)
Dept: ADMINISTRATIVE | Facility: OTHER | Age: 84
End: 2024-01-30
Payer: MEDICARE

## 2024-01-30 ENCOUNTER — TELEPHONE (OUTPATIENT)
Dept: PODIATRY | Facility: CLINIC | Age: 84
End: 2024-01-30
Payer: MEDICARE

## 2024-01-30 ENCOUNTER — PATIENT MESSAGE (OUTPATIENT)
Dept: PODIATRY | Facility: CLINIC | Age: 84
End: 2024-01-30
Payer: MEDICARE

## 2024-01-30 NOTE — TELEPHONE ENCOUNTER
I don't know how this works we will have to contact DME and ask them what the process entails, this is for a knee scooter or just a normal walker?

## 2024-01-30 NOTE — TELEPHONE ENCOUNTER
----- Message from Cary Hi RN sent at 1/30/2024 11:31 AM CST -----  Dr Pascual/Staff,     Mrs Larsen reports your staff was going to order a walker for her to use for in home mobility, but she has not been contacted by a DME about delivery. I told her I would message to ask about the status of the order.     Thank you,     Cary Hi RN, [facility]  Outpatient Case Management  [phone]  [facility] [id number]  [email or web address]

## 2024-02-06 ENCOUNTER — PATIENT MESSAGE (OUTPATIENT)
Dept: PODIATRY | Facility: CLINIC | Age: 84
End: 2024-02-06
Payer: MEDICARE

## 2024-02-06 NOTE — TELEPHONE ENCOUNTER
Sorry she was not pleased with the walker I do not know of another walker from our DME that would be what she is looking for we can cancel the order

## 2024-02-13 ENCOUNTER — OUTPATIENT CASE MANAGEMENT (OUTPATIENT)
Dept: ADMINISTRATIVE | Facility: OTHER | Age: 84
End: 2024-02-13
Payer: MEDICARE

## 2024-02-14 ENCOUNTER — TELEPHONE (OUTPATIENT)
Dept: PODIATRY | Facility: CLINIC | Age: 84
End: 2024-02-14
Payer: MEDICARE

## 2024-02-14 DIAGNOSIS — E78.5 HYPERLIPIDEMIA, UNSPECIFIED HYPERLIPIDEMIA TYPE: ICD-10-CM

## 2024-02-14 DIAGNOSIS — Z79.899 ENCOUNTER FOR LONG-TERM (CURRENT) USE OF MEDICATIONS: ICD-10-CM

## 2024-02-14 DIAGNOSIS — M81.0 AGE-RELATED OSTEOPOROSIS WITHOUT CURRENT PATHOLOGICAL FRACTURE: ICD-10-CM

## 2024-02-14 RX ORDER — ALENDRONATE SODIUM 70 MG/1
TABLET ORAL
Qty: 4 TABLET | Refills: 0 | Status: CANCELLED | OUTPATIENT
Start: 2024-02-14

## 2024-02-14 NOTE — TELEPHONE ENCOUNTER
----- Message from Cary Hi RN sent at 2/13/2024  2:01 PM CST -----  Dr Pascual/Staff,     Mrs Larsen reports she has been having increasing pain to her R foot and leg all the way up to her hip that she feels is related to the boot she has been wearing. She also reports numbness to her R leg over the past few days that has made her afraid of falling. She took the boot off today to see if the pain will get better. She reports she believes it is sciatica as she has had sciatica in the past that caused these same symptoms. She reports she cannot tolerate taking pain meds so she takes Tylenol to treat the pain with some temporary relief. Please notify Mrs Larsen of any new instructions or orders.     Thank you,     Cary Hi RN, [facility]  Outpatient Case Management  [phone]  Ext [phone]  [email or web address]

## 2024-02-14 NOTE — TELEPHONE ENCOUNTER
Care Due:                  Date            Visit Type   Department     Provider  --------------------------------------------------------------------------------                                ESTABLISHED                              PATIENT -    [facility]  Last Visit: 11-      [facility]      MEDICINE       Bunny Moses                               -                              PRIMARY      [facility]  Next Visit: 03-      CARE ([facility])   MEDICINE       Bunny Moses                                                            Last  Test          Frequency    Reason                     Performed    Due Date  --------------------------------------------------------------------------------    Lipid Panel.  12 months..  atorvastatin.............  12- 12-    Vitamin D...  12 months..  alendronate..............  Not Found    Overdue    [employer] Embedded Care Due Messages. Reference number: [id number].   2/14/2024 4:12:43 PM CST

## 2024-02-14 NOTE — TELEPHONE ENCOUNTER
No care due was identified.  [facility] Embedded Care Due Messages. Reference number: [id number].   2/14/2024 4:13:12 PM CST

## 2024-02-15 ENCOUNTER — PATIENT MESSAGE (OUTPATIENT)
Dept: FAMILY MEDICINE | Facility: CLINIC | Age: 84
End: 2024-02-15
Payer: MEDICARE

## 2024-02-15 RX ORDER — ATORVASTATIN CALCIUM 40 MG/1
40 TABLET, FILM COATED ORAL
Qty: 28 TABLET | Refills: 0 | OUTPATIENT
Start: 2024-02-15

## 2024-02-15 RX ORDER — ATORVASTATIN CALCIUM 40 MG/1
40 TABLET, FILM COATED ORAL NIGHTLY
Qty: 90 TABLET | Refills: 0 | Status: SHIPPED | OUTPATIENT
Start: 2024-02-15 | End: 2024-03-06 | Stop reason: SDUPTHER

## 2024-02-16 ENCOUNTER — PATIENT MESSAGE (OUTPATIENT)
Dept: PODIATRY | Facility: CLINIC | Age: 84
End: 2024-02-16
Payer: MEDICARE

## 2024-02-16 DIAGNOSIS — M81.0 AGE-RELATED OSTEOPOROSIS WITHOUT CURRENT PATHOLOGICAL FRACTURE: ICD-10-CM

## 2024-02-16 RX ORDER — ALENDRONATE SODIUM 70 MG/1
TABLET ORAL
Qty: 4 TABLET | Refills: 0 | Status: SHIPPED | OUTPATIENT
Start: 2024-02-16 | End: 2024-02-19

## 2024-02-16 NOTE — TELEPHONE ENCOUNTER
No care due was identified.  [facility] Embedded Care Due Messages. Reference number: [id number].   2/16/2024 11:56:22 AM CST

## 2024-02-16 NOTE — TELEPHONE ENCOUNTER
Yes that's fine she just needs to stay off the foot as much as possible then and we can discuss options for an AFO or ankle brace when she comes back in

## 2024-02-16 NOTE — TELEPHONE ENCOUNTER
----- Message from Mp Christopher sent at 2/16/2024  9:14 AM CST -----  Contact: [phone]  Pt is calling regarding medication,alendronate (FOSAMAX) 70 MG tablet  denial . Please call back at [phone] . Thanksdj

## 2024-02-16 NOTE — TELEPHONE ENCOUNTER
Provider Staff:  Action required for this patient    Requires labs      Please see care gap opportunities below in Care Due Message.    Thanks!  [facility]     Appointments      Date Provider   Last Visit   11/30/2023 Bunny Moses MD   Next Visit   2/14/2024 Bunny Moses MD     Refill Decision Note   Suzie Dawkins  is requesting a refill authorization.  Brief Assessment and Rationale for Refill:  Quick Discontinue     Medication Therapy Plan:  Receipt confirmed by pharmacy (2/15/2024  6:13 AM CST)      Comments:     Note composed:7:07 PM 02/15/2024

## 2024-02-18 DIAGNOSIS — M81.0 AGE-RELATED OSTEOPOROSIS WITHOUT CURRENT PATHOLOGICAL FRACTURE: ICD-10-CM

## 2024-02-18 NOTE — TELEPHONE ENCOUNTER
No care due was identified.  Health [facility] Embedded Care Due Messages. Reference number: [id number].   2/18/2024 8:01:07 AM CST

## 2024-02-19 RX ORDER — ALENDRONATE SODIUM 70 MG/1
TABLET ORAL
Qty: 4 TABLET | Refills: 0 | Status: SHIPPED | OUTPATIENT
Start: 2024-02-19 | End: 2024-05-13

## 2024-02-19 NOTE — TELEPHONE ENCOUNTER
Refill Routing Note   Medication(s) are not appropriate for processing by [facility] for the following reason(s):        Outside of protocol: Alendronate    [facility] action(s):  Route               Appointments  past 12m or future 3m with PCP    Date Provider   Last Visit   11/30/2023 Bunny Moses MD   Next Visit   3/6/2024 Bunny Moses MD   ED visits in past 90 days: 0        Note composed:11:33 AM 02/19/2024

## 2024-02-22 ENCOUNTER — TELEPHONE (OUTPATIENT)
Dept: PODIATRY | Facility: CLINIC | Age: 84
End: 2024-02-22
Payer: MEDICARE

## 2024-02-22 ENCOUNTER — PATIENT MESSAGE (OUTPATIENT)
Dept: PODIATRY | Facility: CLINIC | Age: 84
End: 2024-02-22
Payer: MEDICARE

## 2024-02-22 DIAGNOSIS — R26.89 BALANCE PROBLEMS: Primary | ICD-10-CM

## 2024-02-22 DIAGNOSIS — M76.71 PERONEAL TENDONITIS OF RIGHT LOWER EXTREMITY: ICD-10-CM

## 2024-02-22 DIAGNOSIS — M25.571 CHRONIC PAIN OF BOTH ANKLES: ICD-10-CM

## 2024-02-22 DIAGNOSIS — G89.29 CHRONIC PAIN OF BOTH ANKLES: ICD-10-CM

## 2024-02-22 DIAGNOSIS — M25.572 CHRONIC PAIN OF BOTH ANKLES: ICD-10-CM

## 2024-02-22 DIAGNOSIS — R26.2 DIFFICULTY WALKING: ICD-10-CM

## 2024-02-22 NOTE — TELEPHONE ENCOUNTER
Spoke with patient and she voiced understanding that MRI report was copied and pasted as a [employer] message. If she needs a physical copy, she voiced understanding to call XR and request a copy to  locally

## 2024-02-22 NOTE — TELEPHONE ENCOUNTER
----- Message from Uriah Dahl sent at 2/22/2024 12:00 PM CST -----  .Type:  Needs Medical Advice    Who Called: pt    Would the patient rather a call back or a response via [facility]? Call back  Best Call Back Number: [phone]  Additional Information:     Pt would like a call back about getting a copy of her previous MRI

## 2024-02-26 ENCOUNTER — OUTPATIENT CASE MANAGEMENT (OUTPATIENT)
Dept: ADMINISTRATIVE | Facility: OTHER | Age: 84
End: 2024-02-26
Payer: MEDICARE

## 2024-02-29 ENCOUNTER — LAB VISIT (OUTPATIENT)
Dept: LAB | Facility: HOSPITAL | Age: 84
End: 2024-02-29
Attending: FAMILY MEDICINE
Payer: MEDICARE

## 2024-02-29 DIAGNOSIS — Z13.220 ENCOUNTER FOR LIPID SCREENING FOR CARDIOVASCULAR DISEASE: ICD-10-CM

## 2024-02-29 DIAGNOSIS — Z79.899 ENCOUNTER FOR LONG-TERM (CURRENT) USE OF MEDICATIONS: ICD-10-CM

## 2024-02-29 DIAGNOSIS — Z13.6 ENCOUNTER FOR LIPID SCREENING FOR CARDIOVASCULAR DISEASE: ICD-10-CM

## 2024-02-29 LAB
ALBUMIN SERPL BCP-MCNC: 4 G/DL (ref 3.5–5.2)
ALP SERPL-CCNC: 88 U/L (ref 55–135)
ALT SERPL W/O P-5'-P-CCNC: 37 U/L (ref 10–44)
ANION GAP SERPL CALC-SCNC: 8 MMOL/L (ref 8–16)
AST SERPL-CCNC: 36 U/L (ref 10–40)
BILIRUB SERPL-MCNC: 0.6 MG/DL (ref 0.1–1)
BUN SERPL-MCNC: 20 MG/DL (ref 8–23)
CALCIUM SERPL-MCNC: 10.7 MG/DL (ref 8.7–10.5)
CHLORIDE SERPL-SCNC: 106 MMOL/L (ref 95–110)
CHOLEST SERPL-MCNC: 228 MG/DL (ref 120–199)
CHOLEST/HDLC SERPL: 4.4 {RATIO} (ref 2–5)
CO2 SERPL-SCNC: 29 MMOL/L (ref 23–29)
CREAT SERPL-MCNC: 0.9 MG/DL (ref 0.5–1.4)
EST. GFR  (NO RACE VARIABLE): >60 ML/MIN/1.73 M^2
ESTIMATED AVG GLUCOSE: 111 MG/DL (ref 68–131)
GLUCOSE SERPL-MCNC: 97 MG/DL (ref 70–110)
HBA1C MFR BLD: 5.5 % (ref 4–5.6)
HDLC SERPL-MCNC: 52 MG/DL (ref 40–75)
HDLC SERPL: 22.8 % (ref 20–50)
LDLC SERPL CALC-MCNC: 148 MG/DL (ref 63–159)
NONHDLC SERPL-MCNC: 176 MG/DL
POTASSIUM SERPL-SCNC: 4.2 MMOL/L (ref 3.5–5.1)
PROT SERPL-MCNC: 7.3 G/DL (ref 6–8.4)
SODIUM SERPL-SCNC: 143 MMOL/L (ref 136–145)
TRIGL SERPL-MCNC: 140 MG/DL (ref 30–150)

## 2024-02-29 PROCEDURE — 80053 COMPREHEN METABOLIC PANEL: CPT | Performed by: FAMILY MEDICINE

## 2024-02-29 PROCEDURE — 36415 COLL VENOUS BLD VENIPUNCTURE: CPT | Mod: PO | Performed by: FAMILY MEDICINE

## 2024-02-29 PROCEDURE — 80061 LIPID PANEL: CPT | Performed by: FAMILY MEDICINE

## 2024-02-29 PROCEDURE — 83036 HEMOGLOBIN GLYCOSYLATED A1C: CPT | Performed by: FAMILY MEDICINE

## 2024-03-04 ENCOUNTER — OUTPATIENT CASE MANAGEMENT (OUTPATIENT)
Dept: ADMINISTRATIVE | Facility: OTHER | Age: 84
End: 2024-03-04
Payer: MEDICARE

## 2024-03-06 ENCOUNTER — OFFICE VISIT (OUTPATIENT)
Dept: FAMILY MEDICINE | Facility: CLINIC | Age: 84
End: 2024-03-06
Payer: MEDICARE

## 2024-03-06 VITALS
WEIGHT: 156.13 LBS | HEART RATE: 63 BPM | BODY MASS INDEX: 32.77 KG/M2 | DIASTOLIC BLOOD PRESSURE: 72 MMHG | SYSTOLIC BLOOD PRESSURE: 138 MMHG | OXYGEN SATURATION: 99 % | HEIGHT: 58 IN

## 2024-03-06 DIAGNOSIS — Z79.899 ENCOUNTER FOR LONG-TERM (CURRENT) USE OF MEDICATIONS: ICD-10-CM

## 2024-03-06 DIAGNOSIS — E78.5 HYPERLIPIDEMIA, UNSPECIFIED HYPERLIPIDEMIA TYPE: ICD-10-CM

## 2024-03-06 DIAGNOSIS — E78.00 PURE HYPERCHOLESTEROLEMIA: Chronic | ICD-10-CM

## 2024-03-06 DIAGNOSIS — G47.00 INSOMNIA, UNSPECIFIED TYPE: ICD-10-CM

## 2024-03-06 DIAGNOSIS — I70.0 ATHEROSCLEROSIS OF ABDOMINAL AORTA: ICD-10-CM

## 2024-03-06 DIAGNOSIS — F33.0 MILD EPISODE OF RECURRENT MAJOR DEPRESSIVE DISORDER: ICD-10-CM

## 2024-03-06 DIAGNOSIS — M46.1 INFLAMMATION OF SACROILIAC JOINT: ICD-10-CM

## 2024-03-06 DIAGNOSIS — E21.3 HYPERPARATHYROIDISM: Primary | ICD-10-CM

## 2024-03-06 PROBLEM — F33.9 EPISODE OF RECURRENT MAJOR DEPRESSIVE DISORDER: Chronic | Status: ACTIVE | Noted: 2023-08-02

## 2024-03-06 PROCEDURE — 99215 OFFICE O/P EST HI 40 MIN: CPT | Mod: PBBFAC,PO | Performed by: FAMILY MEDICINE

## 2024-03-06 PROCEDURE — 99214 OFFICE O/P EST MOD 30 MIN: CPT | Mod: S$PBB,,, | Performed by: FAMILY MEDICINE

## 2024-03-06 PROCEDURE — 99999 PR PBB SHADOW E&M-EST. PATIENT-LVL V: CPT | Mod: PBBFAC,,, | Performed by: FAMILY MEDICINE

## 2024-03-06 RX ORDER — DICLOFENAC SODIUM 50 MG/1
50 TABLET, DELAYED RELEASE ORAL 2 TIMES DAILY PRN
COMMUNITY
Start: 2024-03-04

## 2024-03-06 RX ORDER — DULOXETIN HYDROCHLORIDE 20 MG/1
20 CAPSULE, DELAYED RELEASE ORAL DAILY
Qty: 30 CAPSULE | Refills: 11 | Status: SHIPPED | OUTPATIENT
Start: 2024-03-06 | End: 2025-03-06

## 2024-03-06 RX ORDER — GABAPENTIN 100 MG/1
100 CAPSULE ORAL
COMMUNITY
Start: 2024-02-29 | End: 2024-03-30

## 2024-03-06 RX ORDER — ATORVASTATIN CALCIUM 40 MG/1
40 TABLET, FILM COATED ORAL NIGHTLY
Qty: 90 TABLET | Refills: 4 | Status: SHIPPED | OUTPATIENT
Start: 2024-03-06

## 2024-03-06 RX ORDER — TEMAZEPAM 30 MG/1
CAPSULE ORAL
Qty: 30 CAPSULE | Refills: 5 | Status: SHIPPED | OUTPATIENT
Start: 2024-03-06 | End: 2024-05-03 | Stop reason: SDUPTHER

## 2024-03-06 NOTE — PROGRESS NOTES
PLAN:    Assessment & Plan  1. Lower back pain.  I do not think she should have surgery. She is scheduled for an MRI of her spine.  She is following with pain management.  Consider pain interventional techniques.    2. Hyperparathyroidism.  Her calcium level keeps going up. Her kidney function looks fine. Her liver function is better than it was in 01/2024. She was advised not to take any calcium or vitamins. I will refer her to Dr. Alvin Breen.    3. Elevated cholesterol.  Her cholesterol has increased. She will continue Lipitor.  Counseled on hyperlipidemia disease course, healthy diet and increased need for exercise.  Please be advised of the risk of cardiovascular disease, increase stroke and heart attack risk with uncontrolled/untreated hyperlipidemia.     Patient voiced understanding and understood the treatment plan. All questions were answered.     4. Depression.  I will start her on Cymbalta. She was advised to take it in the morning.  Please be advised of condition course.  SNRI/SSRI is first-line treatment for this condition.  Please be advised of the risk of discontinuing this medication without tapering/contacting MD.  Patient has been advised of side effects, and all questions were answered.  Patient voiced understanding.  Patient will follow up routinely and notify us if having any side effects or worsening or persistent symptoms.  ER precautions were given. Antidepressant/Antianxiety Medication Initiation:  Patient informed of risks, benefits, and potential side effects of medication and accepts informed consent.  Common side effects include nausea, fatigue, headache, insomnia, etc see medication insert for complete side effect profile.  Most importantly be advised of the possibility of new or worsening suicidal thoughts/depression/anxiety etcetera.  Please be advised to stop the medication immediately and seek urgent treatment if this occurs.  Therefore please do not to abruptly discontinue  medication without physician guidance except in cases of sudden onset or worsening of SI.     5. Health maintenance.  Her blood pressure is slightly elevated today. She will receive her Shingrix vaccine a pharmacy. She will receive her tetanus vaccine in 2 weeks.    6. Insomnia  She will continue temazepam at night.  Discussed insomnia condition course.  Advised of first-line medications for this condition.  Also discussed sleep hygiene.  Information was given below.  Good sleep habits (sometimes referred to as sleep hygiene) can help you get a good nights sleep.    Some habits that can improve your sleep health:  -Be consistent. Go to bed at the same time each night and get up at the same time each morning, including on the weekends  -Make sure your bedroom is quiet, dark, relaxing, and at a comfortable temperature  -Remove electronic devices, such as TVs, computers, and smart phones, from the bedroom  -Avoid large meals, caffeine, and alcohol before bedtime  -Get some exercise. Being physically active during the day can help you fall asleep more easily at night.      Problem List Items Addressed This Visit       Hyperlipidemia (Chronic)     Patient will make lifestyle modification changes.  Continue statin.           Relevant Medications    atorvastatin (LIPITOR) 40 MG tablet    Insomnia (Chronic)     Refill temazepam.Discussed insomnia condition course.  Advised of first-line medications for this condition.  Also discussed sleep hygiene.  Information was given below.  Good sleep habits (sometimes referred to as sleep hygiene) can help you get a good nights sleep.    Some habits that can improve your sleep health:  -Be consistent. Go to bed at the same time each night and get up at the same time each morning, including on the weekends  -Make sure your bedroom is quiet, dark, relaxing, and at a comfortable temperature  -Remove electronic devices, such as TVs, computers, and smart phones, from the  bedroom  -Avoid large meals, caffeine, and alcohol before bedtime  -Get some exercise. Being physically active during the day can help you fall asleep more easily at night.           Relevant Medications    temazepam (RESTORIL) 30 mg capsule    Atherosclerosis of abdominal aorta (Chronic)     Incidental finding.  Patient is on Lipitor and aspirin.  Monitoring.         Encounter for long-term (current) use of medications (Chronic)     Complete history and physical was completed today.  Complete and thorough medication reconciliation was performed.  Discussed risks and benefits of medications.  Advised patient on orders and health maintenance.  We discussed old records and old labs if available.  Will request any records not available through epic.  Continue current medications listed on your summary sheet.             Relevant Medications    atorvastatin (LIPITOR) 40 MG tablet    Inflammation of sacroiliac joint (Chronic)    Episode of recurrent major depressive disorder (Chronic)    Relevant Medications    DULoxetine (CYMBALTA) 20 MG capsule     Other Visit Diagnoses       Hyperparathyroidism    -  Primary    Relevant Orders    PTH, intact    CALCIUM    Ambulatory referral/consult to Endocrinology          Future Appointments       Date Specialty Appt Notes    9/6/2024 Lab            Medication Management for assessment above:   Medication List with Changes/Refills   New Medications    DULOXETINE (CYMBALTA) 20 MG CAPSULE    Take 1 capsule (20 mg total) by mouth once daily.   Current Medications    ALENDRONATE (FOSAMAX) 70 MG TABLET    TAKE 1 TABLET BY MOUTH ONCE WEEKLY ON THURSDAYS    ASPIRIN (ECOTRIN) 81 MG EC TABLET    Take 1 tablet (81 mg total) by mouth once daily.    DICLOFENAC (VOLTAREN) 50 MG EC TABLET    Take 50 mg by mouth 2 (two) times daily as needed.    DIPHENOXYLATE-ATROPINE 2.5-0.025 MG (LOMOTIL) 2.5-0.025 MG PER TABLET    Take 1 tablet by mouth 3 (three) times daily as needed for Diarrhea.     ESTRADIOL (ESTRACE) 0.01 % (0.1 MG/GRAM) VAGINAL CREAM    Place 1 g vaginally every evening.    FUROSEMIDE (LASIX) 20 MG TABLET    Take 1 tablet (20 mg total) by mouth 2 (two) times daily as needed (edema).    GABAPENTIN (NEURONTIN) 100 MG CAPSULE    Take 100 mg by mouth.    IPRATROPIUM (ATROVENT) 21 MCG (0.03 %) NASAL SPRAY    INHALE 2 SPRAYS IN EACH NOSTRIL TWICE DAILY AS NEEDED *THANK YOU*    LACTOBACILLUS ACIDOPHILUS (PROBIOTIC ORAL)    Take 1 tablet by mouth once daily.    MECLIZINE (ANTIVERT) 25 MG TABLET    Take 1 tablet (25 mg total) by mouth 3 (three) times daily as needed for Dizziness or Nausea.    METOPROLOL SUCCINATE (TOPROL-XL) 50 MG 24 HR TABLET    Take 1 tablet (50 mg total) by mouth 2 (two) times daily.    MULTIVITAMIN (ONE DAILY MULTIVITAMIN) PER TABLET    Take 1 tablet by mouth once daily.    PROMETHAZINE (PHENERGAN) 25 MG TABLET    Take 1 tablet (25 mg total) by mouth every 6 (six) hours as needed for Nausea.    UNABLE TO FIND    Take 1 packet by mouth once daily. medication name: immune 24    VIT A/VIT C/VIT E/ZINC/COPPER (ICAPS AREDS ORAL)    Take 1 tablet by mouth 2 (two) times a day.   Changed and/or Refilled Medications    Modified Medication Previous Medication    ATORVASTATIN (LIPITOR) 40 MG TABLET atorvastatin (LIPITOR) 40 MG tablet       Take 1 tablet (40 mg total) by mouth every evening.    Take 1 tablet (40 mg total) by mouth every evening.    TEMAZEPAM (RESTORIL) 30 MG CAPSULE temazepam (RESTORIL) 30 mg capsule       TAKE ONE CAPSULE NIGHTLY AT BEDTIME    TAKE ONE CAPSULE NIGHTLY AT BEDTIME   Discontinued Medications    LEVOCETIRIZINE (XYZAL) 5 MG TABLET    Take 5 mg by mouth once daily.    MELOXICAM (MOBIC) 15 MG TABLET    Take 0.5 tablets (7.5 mg total) by mouth once daily.    PAXLOVID 300 MG (150 MG X 2)-100 MG COPACKAGED TABLETS (EUA)    Take 3 tablets by mouth 2 (two) times a day. For 5 days    POTASSIUM CHLORIDE (MICRO-K) 10 MEQ CPSR    Take 1 capsule (10 mEq total) by mouth  once daily.    XIIDRA 5 % DPET    Place 1 drop into both eyes 2 (two) times daily.       Bunny Moses M.D.  ==========================================================================  Subjective:   Patient ID: Suzie Dawkins is a 83 y.o. female.  has a past medical history of Anxiety, Arthritis, Cancer (), Depression, GERD (gastroesophageal reflux disease), Hypercalcemia, Hyperlipidemia, Hypertension, Osteoporosis (10/7/2013), and Vitamin D deficiency.   Chief Complaint: Annual Exam      History of Present Illness  The patient presents to the office to discuss chronic medical conditions. She is following closely with pain management for her lower back pain. She has also seen orthopedics. She is accompanied by her daughter.    She has had foot issues for a while, but something happened and made it worse. She saw Dr. Castillo, a podiatrist, who put her in a boot, which threw her whole body out of whack and she could barely walk. Her back is out of whack and she can hardly walk. Dr. Varma sent her to Dr. Joshi, who told her that her pain is coming from her spine and not her ankle. He sent her to physical therapy. He told her that the next step would be injections in the back. If none of that worked, then surgery. She does not want to have surgery. She is in the worst pain she has ever been in her life with her back.    She is not taking calcium. She used to go to endocrinology years ago for her parathyroid glands.    She thinks her cholesterol went up. She attributes this to eating cream cheese with jelly and crackers. She is taking Lipitor every day.    Her mood has been bad because of her leg pain. She has not tried Cymbalta. She takes temazepam at night.    She forgot to take her blood pressure medication this morning. She checks her blood pressure at home once in a while.   Her sister  2 years ago with back surgery.   She had COVID-19 infection in 2024. She received her influenza vaccine in  10/2023 at [facility].    Problem List Items Addressed This Visit       Hyperlipidemia (Chronic)    Overview     CHRONIC. STABLE. Lab analysis reviewed.   March 2024:  Hyperlipidemia Medications               atorvastatin (LIPITOR) 40 MG tablet Take 1 tablet (40 mg total) by mouth every evening.          (-) CP, SOB, abdominal pain, N/V/D, constipation, jaundice, skin changes.  (-) Myalgias  Lab Results   Component Value Date    CHOL 228 (H) 02/29/2024    CHOL 179 12/12/2022    CHOL 183 04/01/2022     Lab Results   Component Value Date    HDL 52 02/29/2024    HDL 57 12/12/2022    HDL 63 04/01/2022     Lab Results   Component Value Date    LDLCALC 148.0 02/29/2024    LDLCALC 104.0 12/12/2022    LDLCALC 103.4 04/01/2022     Lab Results   Component Value Date    TRIG 140 02/29/2024    TRIG 90 12/12/2022    TRIG 83 04/01/2022     Lab Results   Component Value Date    CHOLHDL 22.8 02/29/2024    CHOLHDL 31.8 12/12/2022    CHOLHDL 34.4 04/01/2022     Lab Results   Component Value Date    TOTALCHOLEST 4.4 02/29/2024    TOTALCHOLEST 3.1 12/12/2022    TOTALCHOLEST 2.9 04/01/2022     Lab Results   Component Value Date    ALT 37 02/29/2024    AST 36 02/29/2024    ALKPHOS 88 02/29/2024    BILITOT 0.6 02/29/2024   ======================================================           Current Assessment & Plan     Patient will make lifestyle modification changes.  Continue statin.           Insomnia (Chronic)    Overview     Chronic.  Intermittent control.  Patient using temazepam.  She states that she still does not sleep well.  Reports compliance.  No side effects reported.  Symptoms are improved.         Current Assessment & Plan     Refill temazepam.Discussed insomnia condition course.  Advised of first-line medications for this condition.  Also discussed sleep hygiene.  Information was given below.  Good sleep habits (sometimes referred to as sleep hygiene) can help you get a good nights sleep.    Some habits that can improve  your sleep health:  -Be consistent. Go to bed at the same time each night and get up at the same time each morning, including on the weekends  -Make sure your bedroom is quiet, dark, relaxing, and at a comfortable temperature  -Remove electronic devices, such as TVs, computers, and smart phones, from the bedroom  -Avoid large meals, caffeine, and alcohol before bedtime  -Get some exercise. Being physically active during the day can help you fall asleep more easily at night.           Atherosclerosis of abdominal aorta (Chronic)    Overview     Seen on imaging.REASON FOR EXAM: [R31.0]-Gross hematuria / gross hematuria, recurrent UTIs     TECHNICAL FACTORS: Multiple contiguous axial CT images were obtained of the abdomen and pelvis before intravenous contrast. Following administration of IV contrast images were obtained of the kidneys with a 70 second delay and of the abdomen and pelvis   with a10 minute delay. 2D reformatted images were performed. 3D images are reconstructed, requiring an independent work station and permanently archived in PACS. Automated exposure control was utilized for radiation dose reduction.     COMPARISON: None     FINDINGS:     Soft tissues/Musculature: Unremarkable   Osseous Structures: Multilevel degenerative disc disease. There is 7 mm (grade 1/2) anterolisthesis of L4 on L5. Multilevel facet arthropathy.   Lung bases: Mild bibasilar atelectasis. Mitral valve calcification.   Aorta/Vasculature: Moderate atherosclerotic disease.   Lymph nodes/Mesentery: Unremarkable   Liver: Unremarkable   Gallbladder/Biliary System: Unremarkable   Pancreas: Unremarkable   Spleen: Splenic calcifications suggest old granulomatous disease.   Adrenal glands: Unremarkable   Kidneys/Ureters: There is a 2 mm calcification immediately adjacent to the right mid ureter (2-62), likely a phlebolith. There are bilateral renal simple cysts, as well as low-density lesions too small to characterize. The left ureter is  poorly opacified   on the excretory phase, most likely felt to be due to physiologic peristalsis. The left renal papillae appear somewhat irregular, without evidence of focal mass or filling defect.   Urinary bladder: Concentric thickening of the bladder wall is likely at least in part due to underdistention. There is trace air within the bladder.   Reproductive organs: Status post hysterectomy   Bowel/Stomach: Status post right hemicolectomy. Small bowel and stomach are unremarkable.   Appendix: Surgically absent       IMPRESSION:   1. There is no evidence of focal, urothelial mass. The left renal papillae appear somewhat irregular, possibly due to scarring or acute tubular necrosis. Correlate clinically. Note that the distal bilateral ureters are poorly opacified on the excretory   phase.   2. Concentric thickening of the bladder wall is likely at least in part due to underdistention. There is trace air within the bladder, which may be due to recent instrumentation. Correlate with urinalysis.   3. There is 7 mm (Grade 1/2) anterolisthesis of L4 on L5.   4. Additional age-related and incidental findings, as above.         Electronically signed by Gabriela Simons MD on 12/30/2019 9:54 AM           Current Assessment & Plan     Incidental finding.  Patient is on Lipitor and aspirin.  Monitoring.         Encounter for long-term (current) use of medications (Chronic)    Overview     November 2023: Reviewed labs.  May 2023: Reviewed labs.  March 2023:  Reviewed labs.  February 2023: Reviewed labs.  December 2022: Reviewed labs.  CHRONIC. Stable. Compliant with medications for managed conditions. See medication list. No SE reported. Routine lab analysis is being monitored. Refills were addressed.April 2022:  CHRONIC. Stable. Compliant with medications for managed conditions. See medication list. No SE reported. Routine lab analysis is being monitored. Refills were addressed.  June 2022:  Reviewed labs.  Lab Results    Component Value Date    WBC 10.87 03/25/2023    HGB 12.7 03/25/2023    HCT 36.2 (L) 03/25/2023    MCV 91 03/25/2023     03/25/2023       Chemistry        Component Value Date/Time     (L) 05/18/2023 1118     (L) 03/25/2023 2217    K 3.7 05/18/2023 1118    K 3.3 (L) 03/25/2023 2217    CL 94 (L) 03/25/2023 2217    CO2 27 05/18/2023 1118    CO2 29 03/25/2023 2217    BUN 12 05/18/2023 1118    BUN 22 (H) 03/25/2023 2217    CREATININE 0.81 05/18/2023 1118    CREATININE 1.22 03/25/2023 2217    GLU 91 03/25/2023 2217        Component Value Date/Time    CALCIUM 10.3 05/18/2023 1118    CALCIUM 10.4 (H) 03/25/2023 2217    ALKPHOS 77 05/18/2023 1118    ALKPHOS 101 03/25/2023 2217    AST 21 05/18/2023 1118    AST 38 (H) 03/25/2023 2217    ALT 21 05/18/2023 1118    ALT 26 03/25/2023 2217    BILITOT 0.6 05/18/2023 1118    BILITOT 0.6 03/25/2023 2217    ESTGFRAFRICA >60.0 06/23/2022 0926    EGFRNONAA 53.3 (A) 06/23/2022 0926        Lab Results   Component Value Date    TSH 1.594 12/12/2022    FREET4 0.86 04/30/2021    T3FREE 2.2 (L) 04/30/2021            Current Assessment & Plan     Complete history and physical was completed today.  Complete and thorough medication reconciliation was performed.  Discussed risks and benefits of medications.  Advised patient on orders and health maintenance.  We discussed old records and old labs if available.  Will request any records not available through epic.  Continue current medications listed on your summary sheet.             Inflammation of sacroiliac joint (Chronic)    Episode of recurrent major depressive disorder (Chronic)     Other Visit Diagnoses       Hyperparathyroidism    -  Primary             Review of patient's allergies indicates:   Allergen Reactions    Latex Hives    B12 [cyanocobalamin-cobamamide] Other (See Comments)     Caused flushing and caused skin to peel    Ciprofloxacin Diarrhea    Codeine      Other reaction(s): Muscle pain    Iodine      Other  reaction(s): Hives    Meperidine      Other reaction(s): Muscle pain    Narcotic antagonist      Other reaction(s): Rash    Nitrofuran analogues     Penicillins Nausea And Vomiting    Propoxyphene      Other reaction(s): Muscle pain    Sulfa (sulfonamide antibiotics)      Other reaction(s): Muscle pain  Other reaction(s): Hives     Current Outpatient Medications   Medication Instructions    alendronate (FOSAMAX) 70 MG tablet TAKE 1 TABLET BY MOUTH ONCE WEEKLY ON THURSDAYS    aspirin (ECOTRIN) 81 mg, Oral, Daily    atorvastatin (LIPITOR) 40 mg, Oral, Nightly    diclofenac (VOLTAREN) 50 mg, Oral, 2 times daily PRN    diphenoxylate-atropine 2.5-0.025 mg (LOMOTIL) 2.5-0.025 mg per tablet 1 tablet, Oral, 3 times daily PRN    DULoxetine (CYMBALTA) 20 mg, Oral, Daily    estradioL (ESTRACE) 1 g, Vaginal, Nightly    furosemide (LASIX) 20 mg, Oral, 2 times daily PRN    gabapentin (NEURONTIN) 100 mg, Oral    ipratropium (ATROVENT) 21 mcg (0.03 %) nasal spray INHALE 2 SPRAYS IN EACH NOSTRIL TWICE DAILY AS NEEDED *THANK YOU*    Lactobacillus acidophilus (PROBIOTIC ORAL) 1 tablet, Oral, Daily    meclizine (ANTIVERT) 25 mg, Oral, 3 times daily PRN    metoprolol succinate (TOPROL-XL) 50 mg, Oral, 2 times daily    multivitamin (ONE DAILY MULTIVITAMIN) per tablet 1 tablet, Oral, Daily    promethazine (PHENERGAN) 25 mg, Oral, Every 6 hours PRN    temazepam (RESTORIL) 30 mg capsule TAKE ONE CAPSULE NIGHTLY AT BEDTIME    UNABLE TO FIND 1 packet, Oral, Daily, medication name: immune 24    vit A/vit C/vit E/zinc/copper (ICAPS AREDS ORAL) 1 tablet, Oral, 2 times daily      I have reviewed the PMH, social history, FamilyHx, surgical history, allergies and medications documented / confirmed by the patient at the time of this visit.  Review of Systems   Constitutional:  Positive for activity change and unexpected weight change.   HENT:  Positive for hearing loss. Negative for rhinorrhea and trouble swallowing.    Eyes:  Negative for discharge  "and visual disturbance.   Respiratory:  Negative for chest tightness and wheezing.    Cardiovascular:  Positive for palpitations. Negative for chest pain.   Gastrointestinal:  Positive for diarrhea. Negative for blood in stool, constipation and vomiting.   Endocrine: Negative for polydipsia and polyuria.   Genitourinary:  Negative for difficulty urinating, dysuria, hematuria and menstrual problem.   Musculoskeletal:  Positive for arthralgias, joint swelling and neck pain.   Neurological:  Positive for weakness. Negative for headaches.   Psychiatric/Behavioral:  Positive for dysphoric mood.      Objective:   /72 Comment: bp recheck  Pulse 63   Ht 4' 10" (1.473 m)   Wt 70.8 kg (156 lb 1.6 oz)   SpO2 99%   BMI 32.62 kg/m²   Physical Exam  Vitals and nursing note reviewed.   Constitutional:       General: She is not in acute distress.     Appearance: She is well-developed. She is not ill-appearing, toxic-appearing or diaphoretic.   HENT:      Head: Normocephalic and atraumatic.      Right Ear: Hearing and external ear normal.      Left Ear: Hearing and external ear normal.      Nose: Nose normal. No rhinorrhea.   Eyes:      General: Lids are normal.      Extraocular Movements: Extraocular movements intact.      Conjunctiva/sclera: Conjunctivae normal.      Pupils: Pupils are equal, round, and reactive to light.   Cardiovascular:      Rate and Rhythm: Normal rate.      Pulses: Normal pulses.   Pulmonary:      Effort: Pulmonary effort is normal. No respiratory distress.      Breath sounds: Normal breath sounds.   Abdominal:      General: Bowel sounds are normal.      Palpations: Abdomen is soft.      Tenderness: There is no right CVA tenderness or left CVA tenderness.   Musculoskeletal:         General: Tenderness present. Normal range of motion.      Cervical back: Normal range of motion and neck supple.   Skin:     General: Skin is warm and dry.      Capillary Refill: Capillary refill takes less than 2 " seconds.      Coloration: Skin is not pale.   Neurological:      General: No focal deficit present.      Mental Status: She is alert and oriented to person, place, and time. Mental status is at baseline. She is not disoriented.      Cranial Nerves: No cranial nerve deficit.      Motor: No weakness.      Gait: Gait normal.   Psychiatric:         Attention and Perception: She is attentive.         Mood and Affect: Mood normal. Mood is not anxious or depressed.         Speech: Speech is not rapid and pressured or slurred.         Behavior: Behavior normal. Behavior is not agitated, aggressive or hyperactive. Behavior is cooperative.         Thought Content: Thought content normal. Thought content is not paranoid or delusional. Thought content does not include homicidal or suicidal ideation. Thought content does not include homicidal or suicidal plan.         Cognition and Memory: Memory is not impaired.         Judgment: Judgment normal.       Results  Laboratory Studies  Labs were reviewed with the patient.    Assessment:     1. Hyperparathyroidism    2. Pure hypercholesterolemia    3. Encounter for long-term (current) use of medications    4. Hyperlipidemia, unspecified hyperlipidemia type    5. Inflammation of sacroiliac joint    6. Mild episode of recurrent major depressive disorder    7. Atherosclerosis of abdominal aorta    8. Insomnia, unspecified type      MDM:   Moderate medical complexity.  Moderate Risk.  Total time: 21 minutes.  This includes total time spent on the encounter, which includes face to face time and non-face to face time preparing to see the patient (eg, review of previous medical records, tests), Obtaining and/or reviewing separately obtained history, documenting clinical information in the electronic or other health record, independently interpreting results (not separately reported)/communicating results to the patient/family/caregiver, and/or care coordination (not separately reported).     I have Reviewed and summarized old records.  I have performed thorough medication reconciliation today and discussed risk and benefits of medications.  I have reviewed labs and discussed with patient.  All questions were answered.  I am requesting old records and will review them once they are available.    I have signed for the following orders AND/OR meds.  Orders Placed This Encounter   Procedures    PTH, intact     Standing Status:   Future     Standing Expiration Date:   6/4/2025    CALCIUM     Standing Status:   Future     Standing Expiration Date:   6/4/2025    Ambulatory referral/consult to Endocrinology     Standing Status:   Future     Standing Expiration Date:   4/6/2025     Referral Priority:   Routine     Referral Type:   Consultation     Referred to Provider:   Alvin Breen MD     Requested Specialty:   Endocrinology     Number of Visits Requested:   1     Medications Ordered This Encounter   Medications    atorvastatin (LIPITOR) 40 MG tablet     Sig: Take 1 tablet (40 mg total) by mouth every evening.     Dispense:  90 tablet     Refill:  4    DULoxetine (CYMBALTA) 20 MG capsule     Sig: Take 1 capsule (20 mg total) by mouth once daily.     Dispense:  30 capsule     Refill:  11    temazepam (RESTORIL) 30 mg capsule     Sig: TAKE ONE CAPSULE NIGHTLY AT BEDTIME     Dispense:  30 capsule     Refill:  5        Follow up in about 1 year (around 3/6/2025), or if symptoms worsen or fail to improve, for LAB RESULTS, Med refills.  Future Appointments       Date Specialty Appt Notes    9/6/2024 Lab           If no improvement in symptoms or symptoms worsen, advised to call/follow-up at clinic or go to ER. Patient voiced understanding and all questions/concerns were addressed.   DISCLAIMER: This note was compiled by using a speech recognition dictation system and therefore please be aware that typographical / speech recognition errors can and do occur.  Please contact me if you see any errors  specifically.  Consent was obtained for SHIRLENE recording system prior to the visit.    Bunny Moses M.D.       Office: [phone] [address] [place] [address]  FAX: [phone]

## 2024-03-06 NOTE — PATIENT INSTRUCTIONS
Follow up in about 1 year (around 3/6/2025), or if symptoms worsen or fail to improve, for LAB RESULTS, Med refills.     Dear patient,   As a result of recent federal legislation (The Federal Cures Act), you may receive lab or pathology results from your visit in your [facility] account before your physician is able to contact you. Your physician or their representative will relay the results to you with their recommendations at their soonest availability.     If no improvement in symptoms or symptoms worsen, please be advised to call MD, follow-up at clinic and/or go to ER if becomes severe.    Bunny Moses M.D.        We Offer TELEHEALTH & Same Day Appointments!   Book your Telehealth appointment with me through my nurse or   Clinic appointments on [employer]!    [address] [place] [address]    Office: [phone]   FAX: [phone]    Check out my Facebook Page and Follow Me at: [email or web address]    Check out my website at [email or web address] by clicking on: [email or web address]    To Schedule appointments online, go to [employer]: [email or web address]

## 2024-03-22 ENCOUNTER — PATIENT MESSAGE (OUTPATIENT)
Dept: FAMILY MEDICINE | Facility: CLINIC | Age: 84
End: 2024-03-22
Payer: MEDICARE

## 2024-03-22 DIAGNOSIS — G47.00 INSOMNIA, UNSPECIFIED TYPE: Primary | ICD-10-CM

## 2024-03-22 RX ORDER — DIAZEPAM 2 MG/1
2 TABLET ORAL NIGHTLY PRN
Qty: 30 TABLET | Refills: 0 | Status: SHIPPED | OUTPATIENT
Start: 2024-03-22 | End: 2024-04-21

## 2024-03-22 NOTE — TELEPHONE ENCOUNTER
I received your message which was reviewed along with the the medication list and allergies that we have below.  Please review it for accuracy to make sure that we have the most recent records on your history.     Based on this, the following orders were placed AND/OR medicines were sent in.     No orders of the defined types were placed in this encounter.      Medications written and sent at this time include:  Medications Ordered This Encounter   Medications    diazePAM (VALIUM) 2 MG tablet     Sig: Take 1 tablet (2 mg total) by mouth nightly as needed for Anxiety or Insomnia.     Dispense:  30 tablet     Refill:  0       Your pharmacy(ies) of choice at this time on record include the list below and any medications would have been sent to the one at the top.    [id number] - [place], [place] - [address] AT [facility] [address] ([place] [address]  [address] [address]  Phone: [phone] Fax: [phone]      Thank you for choosing us as your healthcare provider!  Dr. Bunny Moses    ALLERGY LIST  Review of patient's allergies indicates:   Allergen Reactions    Latex Hives    B12 [cyanocobalamin-cobamamide] Other (See Comments)     Caused flushing and caused skin to peel    Ciprofloxacin Diarrhea    Codeine      Other reaction(s): Muscle pain    Iodine      Other reaction(s): Hives    Meperidine      Other reaction(s): Muscle pain    Narcotic antagonist      Other reaction(s): Rash    Nitrofuran analogues     Penicillins Nausea And Vomiting    Propoxyphene      Other reaction(s): Muscle pain    Sulfa (sulfonamide antibiotics)      Other reaction(s): Muscle pain  Other reaction(s): Hives       MEDICATION LIST  Current Outpatient Medications on File Prior to Visit   Medication Sig Dispense Refill    alendronate (FOSAMAX) 70 MG tablet TAKE 1 TABLET BY MOUTH ONCE WEEKLY ON THURSDAYS 4 tablet 0    aspirin (ECOTRIN) 81 MG EC tablet Take 1 tablet (81 mg total) by mouth once daily. 90 tablet 3     atorvastatin (LIPITOR) 40 MG tablet Take 1 tablet (40 mg total) by mouth every evening. 90 tablet 4    diclofenac (VOLTAREN) 50 MG EC tablet Take 50 mg by mouth 2 (two) times daily as needed.      diphenoxylate-atropine 2.5-0.025 mg (LOMOTIL) 2.5-0.025 mg per tablet Take 1 tablet by mouth 3 (three) times daily as needed for Diarrhea. 90 tablet 0    DULoxetine (CYMBALTA) 20 MG capsule Take 1 capsule (20 mg total) by mouth once daily. 30 capsule 11    estradioL (ESTRACE) 0.01 % (0.1 mg/gram) vaginal cream Place 1 g vaginally every evening.      furosemide (LASIX) 20 MG tablet Take 1 tablet (20 mg total) by mouth 2 (two) times daily as needed (edema). (Patient not taking: Reported on 1/23/2024) 60 tablet 11    gabapentin (NEURONTIN) 100 MG capsule Take 100 mg by mouth.      ipratropium (ATROVENT) 21 mcg (0.03 %) nasal spray INHALE 2 SPRAYS IN EACH NOSTRIL TWICE DAILY AS NEEDED *THANK YOU* (Patient taking differently: 2 sprays by Each Nostril route 2 (two) times daily as needed (allergies).) 30 mL 0    Lactobacillus acidophilus (PROBIOTIC ORAL) Take 1 tablet by mouth once daily.      meclizine (ANTIVERT) 25 mg tablet Take 1 tablet (25 mg total) by mouth 3 (three) times daily as needed for Dizziness or Nausea. (Patient taking differently: Take 25 mg by mouth 2 (two) times a day.) 30 tablet 12    metoprolol succinate (TOPROL-XL) 50 MG 24 hr tablet Take 1 tablet (50 mg total) by mouth 2 (two) times daily. 60 tablet 11    multivitamin (ONE DAILY MULTIVITAMIN) per tablet Take 1 tablet by mouth once daily.      promethazine (PHENERGAN) 25 MG tablet Take 1 tablet (25 mg total) by mouth every 6 (six) hours as needed for Nausea. 30 tablet 5    temazepam (RESTORIL) 30 mg capsule TAKE ONE CAPSULE NIGHTLY AT BEDTIME 30 capsule 5    UNABLE TO FIND Take 1 packet by mouth once daily. medication name: immune 24      vit A/vit C/vit E/zinc/copper (ICAPS AREDS ORAL) Take 1 tablet by mouth 2 (two) times a day.       No current  facility-administered medications on file prior to visit.       HEALTH MAINTENANCE THAT IS OVERDUE OR NEEDS TO BE UPDATED ON OUR CHART IS LISTED BELOW.  IF YOU HAVE HAD IT DONE ELSEWHERE, PLEASE SEND US DATES AND RECORDS IF YOU HAVE THEM TO MAKE YOUR CHART ACCURATE.  IF YOU HAVE NOT HAD THESE DONE AND ARE READY FOR US TO SCHEDULE THEM, PLEASE SEND US A MESSAGE.  Health Maintenance Due   Topic Date Due    TETANUS VACCINE  Never done    RSV Vaccine (Age 60+ and Pregnant patients) (1 - 1-dose 60+ series) Never done    Shingles Vaccine (1 of 2) 06/04/2016    Influenza Vaccine (1) 09/01/2023    COVID-19 Vaccine (7 - 2023-24 season) 09/01/2023       DISCLAIMER: This note was compiled by using a speech recognition dictation system and therefore please be aware that typographical / speech recognition errors can and do occur.  Please contact me if you see any errors specifically.    Bunny Moses MD  We Offer Telehealth & Same Day Appointments!   Book your Telehealth appointment with me through my nurse or   Clinic appointments on [employer]!  [phone]     Check out my Facebook Page and Follow Me at: CLICK HERE    Check out my website at [employer] by clicking on: CLICK HERE    To Schedule appointments online, go to [employer]: CLICK HERE     Location: [email or web address]    [address] [place] [address]    FAX: [phone]

## 2024-03-25 ENCOUNTER — TELEPHONE (OUTPATIENT)
Dept: FAMILY MEDICINE | Facility: CLINIC | Age: 84
End: 2024-03-25
Payer: MEDICARE

## 2024-03-25 ENCOUNTER — OUTPATIENT CASE MANAGEMENT (OUTPATIENT)
Dept: ADMINISTRATIVE | Facility: OTHER | Age: 84
End: 2024-03-25
Payer: MEDICARE

## 2024-03-25 NOTE — TELEPHONE ENCOUNTER
"----- Message from Cary Hi RN sent at 3/25/2024  2:32 PM CDT -----  Dr Moses/Staff,     I spoke to Mrs Larsen this afternoon and she asked that I let you know she is having uncontrolled back pain and R leg pain. She gets no relief from any meds or from the therapy her pain provider is advising she take. She reports she threw the duloxetine in the toilet because it "wired her out for 2 days". She has not yet picked up the Valium you ordered and I encouraged her to go get them and take them as ordered.    Thank you,     Cary Hi RN, [facility]  Outpatient Case Management  [phone]  Ext [phone]  [email or web address]      "

## 2024-03-25 NOTE — TELEPHONE ENCOUNTER
Called pt to check on her states she still in pain. Stated she did not want to make a follow up with Dr. Moses because she doesn't know what he can do for her. She would just like to know if there is anything else she can do or anyone else she can see? She will not go back to the dr in [place] which was podiatry and she doesn't want pain medication. So please advise.

## 2024-04-17 ENCOUNTER — PATIENT MESSAGE (OUTPATIENT)
Dept: FAMILY MEDICINE | Facility: CLINIC | Age: 84
End: 2024-04-17
Payer: MEDICARE

## 2024-04-21 ENCOUNTER — PATIENT MESSAGE (OUTPATIENT)
Dept: FAMILY MEDICINE | Facility: CLINIC | Age: 84
End: 2024-04-21
Payer: MEDICARE

## 2024-04-21 DIAGNOSIS — I10 ESSENTIAL HYPERTENSION: Chronic | ICD-10-CM

## 2024-04-21 NOTE — TELEPHONE ENCOUNTER
No care due was identified.  [facility] Embedded Care Due Messages. Reference number: [id number].   4/21/2024 2:52:20 PM CDT

## 2024-04-22 RX ORDER — METOPROLOL SUCCINATE 50 MG/1
50 TABLET, EXTENDED RELEASE ORAL 2 TIMES DAILY
Qty: 180 TABLET | Refills: 3 | Status: SHIPPED | OUTPATIENT
Start: 2024-04-22

## 2024-04-22 NOTE — TELEPHONE ENCOUNTER
Refill Decision Note   Suzie Dawkins  is requesting a refill authorization.  Brief Assessment and Rationale for Refill:  Approve     Medication Therapy Plan:         Pharmacist review requested: Yes   Extended chart review required: Yes   Comments:     Note composed:5:17 PM 04/22/2024

## 2024-04-22 NOTE — TELEPHONE ENCOUNTER
Refill Routing Note   Medication(s) are not appropriate for processing by [facility] for the following reason(s):        Drug-disease interaction: metoprolol succinate and Sinus bradycardia by electrocardiogram; Bradycardia, drug induced     [facility] action(s):  Defer      Medication Therapy Plan:       Pharmacist review requested: Yes     Appointments  past 12m or future 3m with PCP    Date Provider   Last Visit   3/6/2024 Bunny Moses MD   Next Visit   Visit date not found Bunny Moses MD   ED visits in past 90 days: 0        Note composed:4:23 PM 04/22/2024

## 2024-05-03 DIAGNOSIS — G47.00 INSOMNIA, UNSPECIFIED TYPE: ICD-10-CM

## 2024-05-03 RX ORDER — IPRATROPIUM BROMIDE 21 UG/1
2 SPRAY, METERED NASAL 2 TIMES DAILY
Qty: 30 ML | Refills: 0 | Status: SHIPPED | OUTPATIENT
Start: 2024-05-03

## 2024-05-03 RX ORDER — TEMAZEPAM 30 MG/1
CAPSULE ORAL
Qty: 30 CAPSULE | Refills: 5 | Status: SHIPPED | OUTPATIENT
Start: 2024-05-03

## 2024-05-03 NOTE — TELEPHONE ENCOUNTER
No care due was identified.  [facility] Embedded Care Due Messages. Reference number: [id number].   5/03/2024 7:25:36 AM CDT

## 2024-05-13 DIAGNOSIS — M81.0 AGE-RELATED OSTEOPOROSIS WITHOUT CURRENT PATHOLOGICAL FRACTURE: ICD-10-CM

## 2024-05-13 RX ORDER — ALENDRONATE SODIUM 70 MG/1
TABLET ORAL
Qty: 12 TABLET | Refills: 0 | Status: SHIPPED | OUTPATIENT
Start: 2024-05-13

## 2024-05-18 ENCOUNTER — PATIENT MESSAGE (OUTPATIENT)
Dept: FAMILY MEDICINE | Facility: CLINIC | Age: 84
End: 2024-05-18
Payer: MEDICARE

## 2024-06-03 NOTE — TELEPHONE ENCOUNTER
Called [facility] back...they had no records or messages on who called for questions about this patient.    Assisted Living Facility

## 2024-06-06 NOTE — ASSESSMENT & PLAN NOTE
Check PTH on next blood work.  Follow-up with Endocrinology.  Hold calcium supplement.  
Complete history and physical was completed today.  Complete and thorough medication reconciliation was performed.  Discussed risks and benefits of medications.  Advised patient on orders and health maintenance.  We discussed old records and old labs if available.  Will request any records not available through epic.  Continue current medications listed on your summary sheet.    
Monitoring renal function.  Patient is avoiding anti-inflammatory medications.   Increase hydration with water.    Blood pressure well controlled.  Hypertension Medications             chlorthalidone (HYGROTEN) 25 MG Tab Take 1 tablet (25 mg total) by mouth once daily.    losartan (COZAAR) 100 MG tablet Take 1 tablet (100 mg total) by mouth once daily.    metoprolol succinate (TOPROL-XL) 50 MG 24 hr tablet Take 1 tablet (50 mg total) by mouth once daily.          
Restart mirabegron.  Patient will establish care with Urology here in [place].  [place] Urology [place] who recommended cystoscopy but was never completed.    Check urinalysis today.  Reflex to culture if needed.    Start fluconazole patient having some symptoms of yeast infection.    Increase hydration with water.  
1

## 2024-07-17 ENCOUNTER — PATIENT MESSAGE (OUTPATIENT)
Dept: FAMILY MEDICINE | Facility: CLINIC | Age: 84
End: 2024-07-17
Payer: MEDICARE

## 2024-07-22 ENCOUNTER — PATIENT MESSAGE (OUTPATIENT)
Dept: FAMILY MEDICINE | Facility: CLINIC | Age: 84
End: 2024-07-22
Payer: MEDICARE

## 2024-07-26 ENCOUNTER — HOSPITAL ENCOUNTER (OUTPATIENT)
Dept: RADIOLOGY | Facility: HOSPITAL | Age: 84
Discharge: HOME | End: 2024-07-26
Attending: FAMILY MEDICINE
Payer: MEDICARE

## 2024-07-26 ENCOUNTER — OFFICE VISIT (OUTPATIENT)
Dept: FAMILY MEDICINE | Facility: CLINIC | Age: 84
End: 2024-07-26
Payer: MEDICARE

## 2024-07-26 VITALS
WEIGHT: 153.13 LBS | BODY MASS INDEX: 32.14 KG/M2 | DIASTOLIC BLOOD PRESSURE: 82 MMHG | OXYGEN SATURATION: 96 % | HEIGHT: 58 IN | HEART RATE: 66 BPM | SYSTOLIC BLOOD PRESSURE: 138 MMHG

## 2024-07-26 DIAGNOSIS — Z79.899 ENCOUNTER FOR LONG-TERM (CURRENT) USE OF MEDICATIONS: ICD-10-CM

## 2024-07-26 DIAGNOSIS — I10 ESSENTIAL HYPERTENSION: ICD-10-CM

## 2024-07-26 DIAGNOSIS — M54.16 SPINAL STENOSIS OF LUMBAR REGION WITH RADICULOPATHY: ICD-10-CM

## 2024-07-26 DIAGNOSIS — Z01.818 PREOP EXAMINATION: Primary | ICD-10-CM

## 2024-07-26 DIAGNOSIS — Z01.818 PREOP EXAMINATION: ICD-10-CM

## 2024-07-26 DIAGNOSIS — N39.0 RECURRENT UTI: ICD-10-CM

## 2024-07-26 DIAGNOSIS — M48.061 SPINAL STENOSIS OF LUMBAR REGION WITH RADICULOPATHY: ICD-10-CM

## 2024-07-26 PROBLEM — M54.17 LUMBOSACRAL RADICULITIS: Chronic | Status: ACTIVE | Noted: 2023-05-24

## 2024-07-26 LAB
BILIRUB UR QL STRIP: NEGATIVE
CLARITY UR: CLEAR
COLOR UR: YELLOW
GLUCOSE UR QL STRIP: NEGATIVE
HGB UR QL STRIP: ABNORMAL
KETONES UR QL STRIP: NEGATIVE
LEUKOCYTE ESTERASE UR QL STRIP: ABNORMAL
MICROSCOPIC COMMENT: NORMAL
NITRITE UR QL STRIP: NEGATIVE
PH UR STRIP: 6 [PH] (ref 5–8)
PROT UR QL STRIP: NEGATIVE
RBC #/AREA URNS HPF: 3 /HPF (ref 0–4)
SP GR UR STRIP: 1.01 (ref 1–1.03)
SQUAMOUS #/AREA URNS HPF: 1 /HPF
URN SPEC COLLECT METH UR: ABNORMAL
WBC #/AREA URNS HPF: 3 /HPF (ref 0–5)

## 2024-07-26 PROCEDURE — 99999 PR PBB SHADOW E&M-EST. PATIENT-LVL V: CPT | Mod: PBBFAC,,, | Performed by: FAMILY MEDICINE

## 2024-07-26 PROCEDURE — 71046 X-RAY EXAM CHEST 2 VIEWS: CPT | Mod: TC,PO

## 2024-07-26 PROCEDURE — 71046 X-RAY EXAM CHEST 2 VIEWS: CPT | Mod: 26,,, | Performed by: RADIOLOGY

## 2024-07-26 PROCEDURE — 81000 URINALYSIS NONAUTO W/SCOPE: CPT | Mod: PO | Performed by: FAMILY MEDICINE

## 2024-07-26 PROCEDURE — 99215 OFFICE O/P EST HI 40 MIN: CPT | Mod: PBBFAC,25,PO | Performed by: FAMILY MEDICINE

## 2024-07-26 RX ORDER — METHOCARBAMOL 500 MG/1
500 TABLET, FILM COATED ORAL 2 TIMES DAILY
COMMUNITY
Start: 2024-07-17

## 2024-07-26 RX ORDER — FAMOTIDINE 20 MG/1
20 TABLET, FILM COATED ORAL 2 TIMES DAILY PRN
COMMUNITY

## 2024-07-26 NOTE — ASSESSMENT & PLAN NOTE
Check urinalysis.  Patient has had resistance to antibiotics in the past.  Follow-up with Urology referral placed.

## 2024-07-26 NOTE — ASSESSMENT & PLAN NOTE
Continue current blood pressure medication.Counseled on importance of hypertension disease course, I recommend ongoing Education for DASH-diet and exercise.  Counseled on medication regimen importance of treating high blood pressure.  Please be advised of risk of untreated blood pressure as discussed.  Please advised of ER precautions were given for symptoms of hypertensive urgency and emergency.

## 2024-07-26 NOTE — ASSESSMENT & PLAN NOTE
Patient here for medical preoperative evaluation.  History and physical exam reviewed and performed.  Lab data and imaging that is available was reviewed.  Based off of this patient is CLEARED with class I risk which correlates to a 3.9 % 30-day risk of death, MI, or cardiac arrest based off of modified Mann cardiac Risk index.    From Ducjamisonpe 2017, based on pooled data from 5 high quality external validations (4 prospective). These numbers are higher than those often quoted from the now-outdated original study (Maksim 1999). See Evidence for details.

## 2024-07-26 NOTE — PROGRESS NOTES
PLAN:    Assessment & Plan  1. Preoperative clearance.  The patient's blood pressure is well-regulated. A chest x-ray, EKG, and urine test will be conducted today.    Problem List Items Addressed This Visit       Essential hypertension (Chronic)     Continue current blood pressure medication.Counseled on importance of hypertension disease course, I recommend ongoing Education for DASH-diet and exercise.  Counseled on medication regimen importance of treating high blood pressure.  Please be advised of risk of untreated blood pressure as discussed.  Please advised of ER precautions were given for symptoms of hypertensive urgency and emergency.           Encounter for long-term (current) use of medications (Chronic)     Complete history and physical was completed today.  Complete and thorough medication reconciliation was performed.  Discussed risks and benefits of medications.  Advised patient on orders and health maintenance.  We discussed old records and old labs if available.  Will request any records not available through epic.  Continue current medications listed on your summary sheet.           Relevant Orders    CBC Auto Differential    Comprehensive Metabolic Panel    Hemoglobin A1C    Urinalysis, Reflex to Urine Culture Urine, Clean Catch (Completed)    Spinal stenosis of lumbar region with radiculopathy (Chronic)    Relevant Orders    CBC Auto Differential    Comprehensive Metabolic Panel    Hemoglobin A1C    Urinalysis, Reflex to Urine Culture Urine, Clean Catch (Completed)    Recurrent UTI     Check urinalysis.  Patient has had resistance to antibiotics in the past.  Follow-up with Urology referral placed.         Relevant Orders    Ambulatory referral/consult to Urology    Preop examination - Primary     Patient here for medical preoperative evaluation.  History and physical exam reviewed and performed.  Lab data and imaging that is available was reviewed.  Based off of this patient is CLEARED with class  I risk which correlates to a 3.9 % 30-day risk of death, MI, or cardiac arrest based off of modified Mann cardiac Risk index.    From [employer] 2017, based on pooled data from 5 high quality external validations (4 prospective). These numbers are higher than those often quoted from the now-outdated original study (Maksim 1999). See Evidence for details.         Relevant Orders    X-Ray Chest PA And Lateral    EKG 12-lead     Future Appointments       Date Specialty Appt Notes    9/6/2024 Lab            Medication Management for assessment above:   Medication List with Changes/Refills   Current Medications    ALENDRONATE (FOSAMAX) 70 MG TABLET    TAKE ONE TABLET BY MOUTH ONCE WEEKLY ON THURSDAYS    ASPIRIN (ECOTRIN) 81 MG EC TABLET    Take 1 tablet (81 mg total) by mouth once daily.    ATORVASTATIN (LIPITOR) 40 MG TABLET    Take 1 tablet (40 mg total) by mouth every evening.    DIAZEPAM (VALIUM) 2 MG TABLET    Take 1 tablet (2 mg total) by mouth nightly as needed for Anxiety or Insomnia.    DICLOFENAC (VOLTAREN) 50 MG EC TABLET    Take 50 mg by mouth 2 (two) times daily as needed.    DIPHENOXYLATE-ATROPINE 2.5-0.025 MG (LOMOTIL) 2.5-0.025 MG PER TABLET    Take 1 tablet by mouth 3 (three) times daily as needed for Diarrhea.    DULOXETINE (CYMBALTA) 20 MG CAPSULE    Take 1 capsule (20 mg total) by mouth once daily.    ESTRADIOL (ESTRACE) 0.01 % (0.1 MG/GRAM) VAGINAL CREAM    Place 1 g vaginally every evening.    FAMOTIDINE (PEPCID) 20 MG TABLET    Take 20 mg by mouth 2 (two) times daily as needed.    FUROSEMIDE (LASIX) 20 MG TABLET    Take 1 tablet (20 mg total) by mouth 2 (two) times daily as needed (edema).    IPRATROPIUM (ATROVENT) 21 MCG (0.03 %) NASAL SPRAY    2 sprays by Each Nostril route 2 (two) times daily.    LACTOBACILLUS ACIDOPHILUS (PROBIOTIC ORAL)    Take 1 tablet by mouth once daily.    MECLIZINE (ANTIVERT) 25 MG TABLET    Take 1 tablet (25 mg total) by mouth 3 (three) times daily as needed for Dizziness  or Nausea.    METHOCARBAMOL (ROBAXIN) 500 MG TAB    Take 500 mg by mouth 2 (two) times daily.    METOPROLOL SUCCINATE (TOPROL-XL) 50 MG 24 HR TABLET    Take 1 tablet (50 mg total) by mouth 2 (two) times daily.    MULTIVITAMIN (ONE DAILY MULTIVITAMIN) PER TABLET    Take 1 tablet by mouth once daily.    PROMETHAZINE (PHENERGAN) 25 MG TABLET    Take 1 tablet (25 mg total) by mouth every 6 (six) hours as needed for Nausea.    TEMAZEPAM (RESTORIL) 30 MG CAPSULE    TAKE ONE CAPSULE NIGHTLY AT BEDTIME    UNABLE TO FIND    Take 1 packet by mouth once daily. medication name: immune 24    VIT A/VIT C/VIT E/ZINC/COPPER (ICAPS AREDS ORAL)    Take 1 tablet by mouth 2 (two) times a day.       Bunny Moses M.D.  ==========================================================================  Subjective:   Patient ID: Suzie Dawkins is a 83 y.o. female.  has a past medical history of Anxiety, Arthritis, Cancer (2007), Depression, GERD (gastroesophageal reflux disease), Hypercalcemia, Hyperlipidemia, Hypertension, Osteoporosis (10/7/2013), and Vitamin D deficiency.   Chief Complaint: Pre-op Exam      History of Present Illness  The patient is an 83-year-old female here for preoperative clearance from orthopedic spine surgery. She is having Coflex L3-L5 bilateral foraminectomy L5-S1 on 12/10/2024. She is here for preoperative clearance.    The patient has yet to consult a cardiologist. She recently experienced a urinary tract infection (UTI) and sought treatment at [facility] due to hematuria. She has not consulted a urologist for an extended period.    Problem List Items Addressed This Visit       Essential hypertension (Chronic)    Overview     July 2024:  Blood pressure well controlled today.  Hypertension Medications               furosemide (LASIX) 20 MG tablet Take 1 tablet (20 mg total) by mouth 2 (two) times daily as needed (edema).    metoprolol succinate (TOPROL-XL) 50 MG 24 hr tablet Take 1 tablet (50 mg total) by mouth 2  (two) times daily.        November 2023:  Patient reports blood pressure has been elevated.  Her metoprolol has been decreased to 50 milligrams once daily.  Patient has been having bilateral lower extremity edema and ankle pain.  She has been on diuretics previously.  Not currently taking any.CHRONIC.  December 2022: Blood pressure is difficult to control at home on current regimen.  Patient taking metoprolol 100 milligrams twice daily and losartan 50 milligrams daily.  March 2022:  Intermittent control. BP Reviewed.  She reports recent headaches.  They do improve with Tylenol.  Compliant with BP medications. No SE reported. JUNE 2020:  Blood pressure is below goal today.  Continue current medication regimen.JANUARY 2020:  Patient currently on metoprolol 100 mg twice a day, losartan 100 mg daily, amlodipine 5 mg daily was recently increased to 10 mg after nursing visit blood pressure check.(-) CP, SOB, palpitations, dizziness, lightheadedness,  arm numbness, tingling or weakness, syncope.  Creatinine   Date Value Ref Range Status   05/18/2023 0.81 0.60 - 1.10 mg/dL Final   03/25/2023 1.22 0.50 - 1.40 mg/dL Final     Results for orders placed or performed in visit on 12/12/22   EKG 12-lead    Collection Time: 12/12/22  2:15 PM    Narrative    Test Reason : I10,Z79.899,R53.1,R11.2,    Vent. Rate : 052 BPM     Atrial Rate : 052 BPM     P-R Int : 166 ms          QRS Dur : 070 ms      QT Int : 482 ms       P-R-T Axes : 044 -29 035 degrees     QTc Int : 448 ms    Sinus bradycardia  Cannot rule out Anterior infarct ,age undetermined  Abnormal ECG  When compared with ECG of 22-JUL-2020 13:44,  No significant change was found  Confirmed by Swapnil Jennings MD ([id number]) on 12/13/2022 9:08:37 PM    Referred By: JC BUTTERFIELD           Confirmed By:Swapnil Jennings MD              Current Assessment & Plan     Continue current blood pressure medication.Counseled on importance of hypertension disease course, I recommend  ongoing Education for DASH-diet and exercise.  Counseled on medication regimen importance of treating high blood pressure.  Please be advised of risk of untreated blood pressure as discussed.  Please advised of ER precautions were given for symptoms of hypertensive urgency and emergency.           Encounter for long-term (current) use of medications (Chronic)    Overview     July 2024:  Reviewed labs.  November 2023: Reviewed labs.  May 2023: Reviewed labs.  March 2023:  Reviewed labs.  February 2023: Reviewed labs.  December 2022: Reviewed labs.  CHRONIC. Stable. Compliant with medications for managed conditions. See medication list. No SE reported. Routine lab analysis is being monitored. Refills were addressed.April 2022:  CHRONIC. Stable. Compliant with medications for managed conditions. See medication list. No SE reported. Routine lab analysis is being monitored. Refills were addressed.  June 2022:  Reviewed labs.  Lab Results   Component Value Date    WBC 8.36 01/11/2024    HGB 14.5 01/11/2024    HCT 44.2 01/11/2024    MCV 93 01/11/2024     01/11/2024         Chemistry        Component Value Date/Time     03/28/2024 0959     02/29/2024 1021    K 3.9 03/28/2024 0959    K 4.2 02/29/2024 1021     02/29/2024 1021    CO2 30 03/28/2024 0959    CO2 29 02/29/2024 1021    BUN 24 (H) 03/28/2024 0959    BUN 20 02/29/2024 1021    CREATININE 1.00 03/28/2024 0959    CREATININE 0.9 02/29/2024 1021    GLU 97 02/29/2024 1021        Component Value Date/Time    CALCIUM 10.6 (H) 03/28/2024 0959    CALCIUM 10.7 (H) 02/29/2024 1021    ALKPHOS 102 03/28/2024 0959    ALKPHOS 88 02/29/2024 1021    AST 37 (H) 03/28/2024 0959    AST 36 02/29/2024 1021    ALT 40 03/28/2024 0959    ALT 37 02/29/2024 1021    BILITOT 0.6 03/28/2024 0959    BILITOT 0.6 02/29/2024 1021    ESTGFRAFRICA >60.0 06/23/2022 0926    EGFRNONAA 53.3 (A) 06/23/2022 0926          Lab Results   Component Value Date    TSH 1.521 12/01/2023     FREET4 0.86 04/30/2021    T3FREE 2.2 (L) 04/30/2021              Current Assessment & Plan     Complete history and physical was completed today.  Complete and thorough medication reconciliation was performed.  Discussed risks and benefits of medications.  Advised patient on orders and health maintenance.  We discussed old records and old labs if available.  Will request any records not available through epic.  Continue current medications listed on your summary sheet.           Spinal stenosis of lumbar region with radiculopathy (Chronic)    Overview     Chronic.  Uncontrolled.  Patient planning for surgery with orthopedics spine specialist.         Recurrent UTI    Overview     Chronic.  Recurrent.  Patient reports she has chronic urinary tract symptoms.  She has not followed up with Urology.         Current Assessment & Plan     Check urinalysis.  Patient has had resistance to antibiotics in the past.  Follow-up with Urology referral placed.         Preop examination - Primary    Overview     See HPI.           Current Assessment & Plan     Patient here for medical preoperative evaluation.  History and physical exam reviewed and performed.  Lab data and imaging that is available was reviewed.  Based off of this patient is CLEARED with class I risk which correlates to a 3.9 % 30-day risk of death, MI, or cardiac arrest based off of modified Mann cardiac Risk index.    From [employer] 2017, based on pooled data from 5 high quality external validations (4 prospective). These numbers are higher than those often quoted from the now-outdated original study (Maksim 1999). See Evidence for details.             Review of patient's allergies indicates:   Allergen Reactions    Latex Hives    B12 [cyanocobalamin-cobamamide] Other (See Comments)     Caused flushing and caused skin to peel    Ciprofloxacin Diarrhea    Codeine      Other reaction(s): Muscle pain    Iodine      Other reaction(s): Hives    Meperidine      Other  reaction(s): Muscle pain    Narcotic antagonist      Other reaction(s): Rash    Nitrofuran analogues     Ondansetron hcl Other (See Comments)    Penicillins Nausea And Vomiting    Propoxyphene      Other reaction(s): Muscle pain    Sulfa (sulfonamide antibiotics)      Other reaction(s): Muscle pain  Other reaction(s): Hives     Current Outpatient Medications   Medication Instructions    alendronate (FOSAMAX) 70 MG tablet TAKE ONE TABLET BY MOUTH ONCE WEEKLY ON THURSDAYS    aspirin (ECOTRIN) 81 mg, Oral, Daily    atorvastatin (LIPITOR) 40 mg, Oral, Nightly    diazePAM (VALIUM) 2 mg, Oral, Nightly PRN    diclofenac (VOLTAREN) 50 mg, Oral, 2 times daily PRN    diphenoxylate-atropine 2.5-0.025 mg (LOMOTIL) 2.5-0.025 mg per tablet 1 tablet, Oral, 3 times daily PRN    DULoxetine (CYMBALTA) 20 mg, Oral, Daily    estradioL (ESTRACE) 1 g, Vaginal, Nightly    famotidine (PEPCID) 20 mg, Oral, 2 times daily PRN    furosemide (LASIX) 20 mg, Oral, 2 times daily PRN    ipratropium (ATROVENT) 21 mcg (0.03 %) nasal spray 2 sprays, Each Nostril, 2 times daily    Lactobacillus acidophilus (PROBIOTIC ORAL) 1 tablet, Oral, Daily    meclizine (ANTIVERT) 25 mg, Oral, 3 times daily PRN    methocarbamoL (ROBAXIN) 500 mg, Oral, 2 times daily    metoprolol succinate (TOPROL-XL) 50 mg, Oral, 2 times daily    multivitamin (ONE DAILY MULTIVITAMIN) per tablet 1 tablet, Oral, Daily    promethazine (PHENERGAN) 25 mg, Oral, Every 6 hours PRN    temazepam (RESTORIL) 30 mg capsule TAKE ONE CAPSULE NIGHTLY AT BEDTIME    UNABLE TO FIND 1 packet, Oral, Daily, medication name: immune 24    vit A/vit C/vit E/zinc/copper (ICAPS AREDS ORAL) 1 tablet, Oral, 2 times daily      I have reviewed the PMH, social history, FamilyHx, surgical history, allergies and medications documented / confirmed by the patient at the time of this visit.  Review of Systems   Constitutional:  Negative for chills, fatigue, fever and unexpected weight change.   HENT:  Negative for  "ear pain and sore throat.    Eyes:  Negative for redness and visual disturbance.   Respiratory:  Negative for cough and shortness of breath.    Cardiovascular:  Negative for chest pain and palpitations.   Gastrointestinal:  Negative for nausea and vomiting.   Genitourinary:  Positive for dysuria. Negative for difficulty urinating and hematuria.   Musculoskeletal:  Positive for arthralgias and back pain. Negative for myalgias.   Skin:  Negative for rash and wound.   Neurological:  Negative for weakness and headaches.   Psychiatric/Behavioral:  Negative for sleep disturbance. The patient is not nervous/anxious.      Objective:   /82   Pulse 66   Ht 4' 10" (1.473 m)   Wt 69.4 kg (153 lb 1.6 oz)   SpO2 96%   BMI 32.00 kg/m²   Physical Exam  Vitals and nursing note reviewed.   Constitutional:       General: She is not in acute distress.     Appearance: She is well-developed. She is not ill-appearing, toxic-appearing or diaphoretic.   HENT:      Head: Normocephalic and atraumatic.      Right Ear: Hearing and external ear normal.      Left Ear: Hearing and external ear normal.      Nose: Nose normal. No rhinorrhea.   Eyes:      General: Lids are normal.      Extraocular Movements: Extraocular movements intact.      Conjunctiva/sclera: Conjunctivae normal.      Pupils: Pupils are equal, round, and reactive to light.   Cardiovascular:      Rate and Rhythm: Normal rate.      Pulses: Normal pulses.   Pulmonary:      Effort: Pulmonary effort is normal. No respiratory distress.      Breath sounds: Normal breath sounds.   Abdominal:      General: Bowel sounds are normal.      Palpations: Abdomen is soft.      Tenderness: There is no right CVA tenderness or left CVA tenderness.   Musculoskeletal:         General: Tenderness and deformity present. Normal range of motion.      Cervical back: Normal range of motion and neck supple.   Skin:     General: Skin is warm and dry.      Capillary Refill: Capillary refill takes " less than 2 seconds.      Coloration: Skin is not pale.   Neurological:      General: No focal deficit present.      Mental Status: She is alert and oriented to person, place, and time. Mental status is at baseline. She is not disoriented.      Cranial Nerves: No cranial nerve deficit.      Motor: No weakness.      Gait: Gait normal.   Psychiatric:         Attention and Perception: She is attentive.         Mood and Affect: Mood normal. Mood is not anxious or depressed.         Speech: Speech is not rapid and pressured or slurred.         Behavior: Behavior normal. Behavior is not agitated, aggressive or hyperactive. Behavior is cooperative.         Thought Content: Thought content normal. Thought content is not paranoid or delusional. Thought content does not include homicidal or suicidal ideation. Thought content does not include homicidal or suicidal plan.         Cognition and Memory: Memory is not impaired.         Judgment: Judgment normal.       Physical Exam      Results      Assessment:     1. Preop examination    2. Spinal stenosis of lumbar region with radiculopathy    3. Encounter for long-term (current) use of medications    4. Essential hypertension    5. Recurrent UTI      MDM:   Moderate medical complexity.  Moderate risk.  Total time: 21 minutes.  This includes total time spent on the encounter, which includes face to face time and non-face to face time preparing to see the patient (eg, review of previous medical records, tests), Obtaining and/or reviewing separately obtained history, documenting clinical information in the electronic or other health record, independently interpreting results (not separately reported)/communicating results to the patient/family/caregiver, and/or care coordination (not separately reported).    I have Reviewed and summarized old records.  I have performed thorough medication reconciliation today and discussed risk and benefits of medications.  I have reviewed labs and  discussed with patient.  All questions were answered.  I am requesting old records and will review them once they are available.  Visit today included increased complexity associated with the care of the episodic problem see above assessment addressed and managing the longitudinal care of the patient due to the serious and/or complex managed problem(s) see above.  I have signed for the following orders AND/OR meds.  Orders Placed This Encounter   Procedures    X-Ray Chest PA And Lateral     Standing Status:   Future     Number of Occurrences:   1     Standing Expiration Date:   7/26/2025     Order Specific Question:   May the Radiologist modify the order per protocol to meet the clinical needs of the patient?     Answer:   Yes     Order Specific Question:   Release to patient     Answer:   Immediate    CBC Auto Differential     Standing Status:   Future     Standing Expiration Date:   10/24/2025    Comprehensive Metabolic Panel     Standing Status:   Future     Standing Expiration Date:   9/24/2025    Hemoglobin A1C     Standing Status:   Future     Standing Expiration Date:   10/24/2025    Urinalysis, Reflex to Urine Culture Urine, Clean Catch     Standing Status:   Future     Number of Occurrences:   1     Standing Expiration Date:   9/24/2025     Order Specific Question:   Preferred Collection Type     Answer:   Urine, Clean Catch     Order Specific Question:   Specimen Source     Answer:   Urine    Urinalysis Microscopic     Specimen Source->Urine    Ambulatory referral/consult to Urology     Standing Status:   Future     Standing Expiration Date:   8/26/2025     Referral Priority:   Routine     Referral Type:   Consultation     Referral Reason:   Specialty Services Required     Referred to Provider:   John Sanchez MD     Requested Specialty:   Urology     Number of Visits Requested:   1    EKG 12-lead     Standing Status:   Future     Number of Occurrences:   1     Standing Expiration Date:   7/26/2025            Follow up in about 6 months (around 1/26/2025), or if symptoms worsen or fail to improve, for Med refills, LAB RESULTS.  Future Appointments       Date Specialty Appt Notes    9/6/2024 Lab           If no improvement in symptoms or symptoms worsen, advised to call/follow-up at clinic or go to ER. Patient voiced understanding and all questions/concerns were addressed.   DISCLAIMER: This note was compiled by using a speech recognition dictation system and therefore please be aware that typographical / speech recognition errors can and do occur.  Please contact me if you see any errors specifically.  Consent was obtained for [employer]IR[employer] recording system prior to the visit.    Bunny Moses M.D.       Office: [phone] [address] [place] [address]  FAX: [phone]

## 2024-07-26 NOTE — PATIENT INSTRUCTIONS
Helder Larsen,     If you are due for any health screening(s) below please notify me so we can arrange them to be ordered and scheduled. Most healthy patients at your age complete them, but you are free to accept or refuse.     If you can't do it, I'll definitely understand. If you can, I'd certainly appreciate it!    All of your core healthy metrics are met.

## 2024-08-03 ENCOUNTER — PATIENT MESSAGE (OUTPATIENT)
Dept: CARDIOLOGY | Facility: CLINIC | Age: 84
End: 2024-08-03
Payer: MEDICARE

## 2024-08-03 DIAGNOSIS — R94.31 ABNORMAL EKG: Primary | ICD-10-CM

## 2024-08-04 ENCOUNTER — PATIENT MESSAGE (OUTPATIENT)
Dept: FAMILY MEDICINE | Facility: CLINIC | Age: 84
End: 2024-08-04
Payer: MEDICARE

## 2024-08-05 DIAGNOSIS — R94.31 NONSPECIFIC ABNORMAL ELECTROCARDIOGRAM (ECG) (EKG): Primary | ICD-10-CM

## 2024-08-07 ENCOUNTER — HOSPITAL ENCOUNTER (OUTPATIENT)
Dept: CARDIOLOGY | Facility: HOSPITAL | Age: 84
Discharge: HOME | End: 2024-08-07
Attending: INTERNAL MEDICINE
Payer: MEDICARE

## 2024-08-07 DIAGNOSIS — R94.31 NONSPECIFIC ABNORMAL ELECTROCARDIOGRAM (ECG) (EKG): ICD-10-CM

## 2024-08-07 PROCEDURE — 93005 ELECTROCARDIOGRAM TRACING: CPT | Mod: PO

## 2024-08-07 PROCEDURE — 93010 ELECTROCARDIOGRAM REPORT: CPT | Mod: ,,, | Performed by: INTERNAL MEDICINE

## 2024-08-08 ENCOUNTER — PATIENT MESSAGE (OUTPATIENT)
Dept: CARDIOLOGY | Facility: CLINIC | Age: 84
End: 2024-08-08
Payer: MEDICARE

## 2024-08-08 LAB
OHS QRS DURATION: 70 MS
OHS QTC CALCULATION: 420 MS

## 2024-08-09 ENCOUNTER — PATIENT MESSAGE (OUTPATIENT)
Dept: CARDIOLOGY | Facility: CLINIC | Age: 84
End: 2024-08-09
Payer: MEDICARE

## 2024-08-11 DIAGNOSIS — M81.0 AGE-RELATED OSTEOPOROSIS WITHOUT CURRENT PATHOLOGICAL FRACTURE: ICD-10-CM

## 2024-08-12 RX ORDER — ALENDRONATE SODIUM 70 MG/1
TABLET ORAL
Qty: 12 TABLET | Refills: 0 | Status: SHIPPED | OUTPATIENT
Start: 2024-08-12

## 2024-08-12 NOTE — TELEPHONE ENCOUNTER
Refill Routing Note   Medication(s) are not appropriate for processing by [facility] for the following reason(s):        Outside of protocol    [facility] action(s):  Route               Appointments  past 12m or future 3m with PCP    Date Provider   Last Visit   7/26/2024 Bunny Moses MD   Next Visit   Visit date not found Bunny Moses MD   ED visits in past 90 days: 0        Note composed:10:55 AM 08/12/2024

## 2024-08-14 DIAGNOSIS — G47.00 INSOMNIA, UNSPECIFIED TYPE: ICD-10-CM

## 2024-08-14 RX ORDER — DIAZEPAM 2 MG/1
2 TABLET ORAL NIGHTLY PRN
Qty: 30 TABLET | Refills: 0 | Status: SHIPPED | OUTPATIENT
Start: 2024-08-14 | End: 2024-09-13

## 2024-08-14 NOTE — TELEPHONE ENCOUNTER
No care due was identified.  [facility] Embedded Care Due Messages. Reference number: [id number].   8/14/2024 11:11:39 AM CDT

## 2024-08-15 ENCOUNTER — PATIENT MESSAGE (OUTPATIENT)
Dept: ADMINISTRATIVE | Facility: CLINIC | Age: 84
End: 2024-08-15
Payer: MEDICARE

## 2024-08-28 ENCOUNTER — PATIENT MESSAGE (OUTPATIENT)
Dept: FAMILY MEDICINE | Facility: CLINIC | Age: 84
End: 2024-08-28
Payer: MEDICARE

## 2024-08-28 DIAGNOSIS — G47.00 INSOMNIA, UNSPECIFIED TYPE: ICD-10-CM

## 2024-08-28 RX ORDER — DIAZEPAM 2 MG/1
TABLET ORAL
Qty: 30 TABLET | OUTPATIENT
Start: 2024-08-28

## 2024-08-28 RX ORDER — DIAZEPAM 2 MG/1
2 TABLET ORAL NIGHTLY PRN
Qty: 30 TABLET | Refills: 0 | Status: SHIPPED | OUTPATIENT
Start: 2024-08-28 | End: 2024-09-27

## 2024-08-28 NOTE — TELEPHONE ENCOUNTER
No care due was identified.  [facility] Embedded Care Due Messages. Reference number: [id number].   8/28/2024 1:05:59 PM CDT

## 2024-08-28 NOTE — TELEPHONE ENCOUNTER
No care due was identified.  [facility] Embedded Care Due Messages. Reference number: [id number].   8/28/2024 12:29:53 PM CDT

## 2024-08-29 NOTE — TELEPHONE ENCOUNTER
Please have the patient check with the pharmacy or call the pharmacy to see what her cash pay option would be.  This is a generic medication it should not be very expensive.

## 2024-08-30 ENCOUNTER — PATIENT MESSAGE (OUTPATIENT)
Dept: FAMILY MEDICINE | Facility: CLINIC | Age: 84
End: 2024-08-30
Payer: MEDICARE

## 2024-08-30 DIAGNOSIS — R11.2 NAUSEA AND VOMITING, UNSPECIFIED VOMITING TYPE: ICD-10-CM

## 2024-08-31 NOTE — TELEPHONE ENCOUNTER
No care due was identified.  [facility] Embedded Care Due Messages. Reference number: [id number].   8/30/2024 10:30:33 PM CDT

## 2024-09-03 ENCOUNTER — PATIENT MESSAGE (OUTPATIENT)
Dept: FAMILY MEDICINE | Facility: CLINIC | Age: 84
End: 2024-09-03
Payer: MEDICARE

## 2024-09-03 DIAGNOSIS — R11.2 NAUSEA AND VOMITING, UNSPECIFIED VOMITING TYPE: ICD-10-CM

## 2024-09-03 RX ORDER — PROMETHAZINE HYDROCHLORIDE 25 MG/1
25 TABLET ORAL EVERY 6 HOURS PRN
Qty: 30 TABLET | Refills: 5 | Status: SHIPPED | OUTPATIENT
Start: 2024-09-03 | End: 2024-09-03 | Stop reason: SDUPTHER

## 2024-09-03 RX ORDER — PROMETHAZINE HYDROCHLORIDE 25 MG/1
25 TABLET ORAL EVERY 6 HOURS PRN
Qty: 30 TABLET | Refills: 5 | Status: SHIPPED | OUTPATIENT
Start: 2024-09-03

## 2024-09-03 NOTE — TELEPHONE ENCOUNTER
No care due was identified.  [facility] Embedded Care Due Messages. Reference number: [id number].   9/03/2024 9:40:43 AM CDT

## 2024-09-03 NOTE — TELEPHONE ENCOUNTER
Refill Routing Note   Medication(s) are not appropriate for processing by [facility] for the following reason(s):        Outside of protocol    [facility] action(s):  Route             Appointments  past 12m or future 3m with PCP    Date Provider   Last Visit   7/26/2024 Bunny Moses MD   Next Visit   Visit date not found Bunny Moses MD   ED visits in past 90 days: 0        Note composed:8:41 AM 09/03/2024

## 2024-09-04 ENCOUNTER — PATIENT MESSAGE (OUTPATIENT)
Dept: FAMILY MEDICINE | Facility: CLINIC | Age: 84
End: 2024-09-04
Payer: MEDICARE

## 2024-09-04 DIAGNOSIS — G47.00 INSOMNIA, UNSPECIFIED TYPE: ICD-10-CM

## 2024-09-04 RX ORDER — TEMAZEPAM 30 MG/1
CAPSULE ORAL
Qty: 30 CAPSULE | Refills: 5 | Status: SHIPPED | OUTPATIENT
Start: 2024-09-04

## 2024-09-04 RX ORDER — DIAZEPAM 2 MG/1
2 TABLET ORAL NIGHTLY PRN
Qty: 30 TABLET | Refills: 0 | Status: SHIPPED | OUTPATIENT
Start: 2024-09-04 | End: 2024-10-04

## 2024-09-04 NOTE — TELEPHONE ENCOUNTER
No care due was identified.  [facility] Embedded Care Due Messages. Reference number: [id number].   9/04/2024 2:18:11 PM CDT

## 2024-09-04 NOTE — TELEPHONE ENCOUNTER
----- Message from Nicole Diaz sent at 9/4/2024  3:33 PM CDT -----  Contact: [phone]  Type:  RX Refill Request    Who Called: PHARMACY  Refill or New Rx:refill  RX Name and Strength: #diazePAM (VALIUM) 2 MG tablet/ temazepam (RESTORIL) 30 mg capsule  How is the patient currently taking it? (ex. 1XDay):  Is this a 30 day or 90 day RX:  Preferred Pharmacy with phone number: [phone] Fax: [phone]    Local or Mail Order:local  Ordering Provider:SCOUT  Would the patient rather a call back or a response via [facility]? Call back  Best Call Back Number: [phone]  Additional Information: [id number]... NEED DIAGNOSIS CODE        [facility] [address] [place] [address]  [address] [facility] [address]  Phone: [phone] Fax: [phone]

## 2024-09-04 NOTE — TELEPHONE ENCOUNTER
No care due was identified.  [facility] Embedded Care Due Messages. Reference number: [id number].   9/04/2024 4:14:17 PM CDT

## 2024-09-05 ENCOUNTER — TELEPHONE (OUTPATIENT)
Dept: FAMILY MEDICINE | Facility: CLINIC | Age: 84
End: 2024-09-05
Payer: MEDICARE

## 2024-09-05 ENCOUNTER — PATIENT MESSAGE (OUTPATIENT)
Dept: FAMILY MEDICINE | Facility: CLINIC | Age: 84
End: 2024-09-05
Payer: MEDICARE

## 2024-09-05 NOTE — TELEPHONE ENCOUNTER
----- Message from Otilio Bernardo sent at 9/5/2024 11:00 AM CDT -----  Name of Who is Calling:URIEL REDDING [[id number]]        What is the request in detail:Pt would like a callback from the office in regards to getting status on PA  for the following medication temazepam (RESTORIL) 30 mg capsule.Pt stated she is currently out of medication.Please advise thank you     [facility] [address] [place] [id number]  Phone: [phone] Fax: [phone]        Can the clinic reply by [facility]:NO        What Number to Call Back if not in [facility]:Telephone Information:  Mobile          [phone]

## 2024-09-09 ENCOUNTER — LAB VISIT (OUTPATIENT)
Dept: LAB | Facility: HOSPITAL | Age: 84
End: 2024-09-09
Attending: FAMILY MEDICINE
Payer: MEDICARE

## 2024-09-09 ENCOUNTER — OFFICE VISIT (OUTPATIENT)
Dept: CARDIOLOGY | Facility: CLINIC | Age: 84
End: 2024-09-09
Payer: MEDICARE

## 2024-09-09 VITALS
BODY MASS INDEX: 33.13 KG/M2 | OXYGEN SATURATION: 97 % | DIASTOLIC BLOOD PRESSURE: 88 MMHG | SYSTOLIC BLOOD PRESSURE: 142 MMHG | HEART RATE: 68 BPM | WEIGHT: 157.81 LBS | HEIGHT: 58 IN

## 2024-09-09 DIAGNOSIS — M10.9 GOUT, UNSPECIFIED CAUSE, UNSPECIFIED CHRONICITY, UNSPECIFIED SITE: ICD-10-CM

## 2024-09-09 DIAGNOSIS — I35.8 AORTIC VALVE SCLEROSIS: ICD-10-CM

## 2024-09-09 DIAGNOSIS — E21.3 HYPERPARATHYROIDISM: ICD-10-CM

## 2024-09-09 DIAGNOSIS — Z13.220 ENCOUNTER FOR LIPID SCREENING FOR CARDIOVASCULAR DISEASE: ICD-10-CM

## 2024-09-09 DIAGNOSIS — Z01.818 PREOP EXAMINATION: ICD-10-CM

## 2024-09-09 DIAGNOSIS — R09.89 BILATERAL CAROTID BRUITS: ICD-10-CM

## 2024-09-09 DIAGNOSIS — R00.1 BRADYCARDIA, DRUG INDUCED: Primary | ICD-10-CM

## 2024-09-09 DIAGNOSIS — Z79.899 ENCOUNTER FOR LONG-TERM (CURRENT) USE OF MEDICATIONS: ICD-10-CM

## 2024-09-09 DIAGNOSIS — I10 ESSENTIAL HYPERTENSION: Chronic | ICD-10-CM

## 2024-09-09 DIAGNOSIS — I35.0 NONRHEUMATIC AORTIC VALVE STENOSIS: ICD-10-CM

## 2024-09-09 DIAGNOSIS — E87.6 HYPOKALEMIA: ICD-10-CM

## 2024-09-09 DIAGNOSIS — Z13.6 ENCOUNTER FOR LIPID SCREENING FOR CARDIOVASCULAR DISEASE: ICD-10-CM

## 2024-09-09 DIAGNOSIS — E78.00 PURE HYPERCHOLESTEROLEMIA: Chronic | ICD-10-CM

## 2024-09-09 DIAGNOSIS — T50.905A BRADYCARDIA, DRUG INDUCED: Primary | ICD-10-CM

## 2024-09-09 DIAGNOSIS — I70.0 ATHEROSCLEROSIS OF ABDOMINAL AORTA: Chronic | ICD-10-CM

## 2024-09-09 DIAGNOSIS — R94.31 ABNORMAL EKG: ICD-10-CM

## 2024-09-09 LAB
ALBUMIN SERPL BCP-MCNC: 4 G/DL (ref 3.5–5.2)
ALP SERPL-CCNC: 90 U/L (ref 55–135)
ALT SERPL W/O P-5'-P-CCNC: 26 U/L (ref 10–44)
ANION GAP SERPL CALC-SCNC: 10 MMOL/L (ref 8–16)
AST SERPL-CCNC: 24 U/L (ref 10–40)
BILIRUB SERPL-MCNC: 0.7 MG/DL (ref 0.1–1)
BUN SERPL-MCNC: 16 MG/DL (ref 8–23)
CALCIUM SERPL-MCNC: 11.2 MG/DL (ref 8.7–10.5)
CALCIUM SERPL-MCNC: 11.2 MG/DL (ref 8.7–10.5)
CHLORIDE SERPL-SCNC: 105 MMOL/L (ref 95–110)
CHOLEST SERPL-MCNC: 169 MG/DL (ref 120–199)
CHOLEST/HDLC SERPL: 3.4 {RATIO} (ref 2–5)
CO2 SERPL-SCNC: 25 MMOL/L (ref 23–29)
CREAT SERPL-MCNC: 0.9 MG/DL (ref 0.5–1.4)
EST. GFR  (NO RACE VARIABLE): >60 ML/MIN/1.73 M^2
ESTIMATED AVG GLUCOSE: 108 MG/DL (ref 68–131)
GLUCOSE SERPL-MCNC: 93 MG/DL (ref 70–110)
HBA1C MFR BLD: 5.4 % (ref 4–5.6)
HDLC SERPL-MCNC: 50 MG/DL (ref 40–75)
HDLC SERPL: 29.6 % (ref 20–50)
LDLC SERPL CALC-MCNC: 101.4 MG/DL (ref 63–159)
NONHDLC SERPL-MCNC: 119 MG/DL
POTASSIUM SERPL-SCNC: 4.2 MMOL/L (ref 3.5–5.1)
POTASSIUM SERPL-SCNC: 4.2 MMOL/L (ref 3.5–5.1)
PROT SERPL-MCNC: 7.4 G/DL (ref 6–8.4)
PTH-INTACT SERPL-MCNC: 137.3 PG/ML (ref 9–77)
SODIUM SERPL-SCNC: 140 MMOL/L (ref 136–145)
TRIGL SERPL-MCNC: 88 MG/DL (ref 30–150)
URATE SERPL-MCNC: 4.9 MG/DL (ref 2.4–5.7)

## 2024-09-09 PROCEDURE — 99204 OFFICE O/P NEW MOD 45 MIN: CPT | Mod: S$PBB,,, | Performed by: INTERNAL MEDICINE

## 2024-09-09 PROCEDURE — 99999 PR PBB SHADOW E&M-EST. PATIENT-LVL V: CPT | Mod: PBBFAC,,, | Performed by: INTERNAL MEDICINE

## 2024-09-09 PROCEDURE — 83970 ASSAY OF PARATHORMONE: CPT | Performed by: FAMILY MEDICINE

## 2024-09-09 PROCEDURE — 99215 OFFICE O/P EST HI 40 MIN: CPT | Mod: PBBFAC,PO | Performed by: INTERNAL MEDICINE

## 2024-09-09 PROCEDURE — 83036 HEMOGLOBIN GLYCOSYLATED A1C: CPT | Performed by: FAMILY MEDICINE

## 2024-09-09 PROCEDURE — 84550 ASSAY OF BLOOD/URIC ACID: CPT | Performed by: FAMILY MEDICINE

## 2024-09-09 PROCEDURE — 80061 LIPID PANEL: CPT | Performed by: FAMILY MEDICINE

## 2024-09-09 PROCEDURE — 80053 COMPREHEN METABOLIC PANEL: CPT | Performed by: FAMILY MEDICINE

## 2024-09-09 NOTE — PROGRESS NOTES
Subjective   Patient ID:  Suzie Dawkins is a 84 y.o. female who presents for evaluation of Pre-op Exam      HPI84 yo WF with spinal stenosis possibly having surgery. Has hx of aortic valve sclerosis without significant stenosis but last echo 2022. Denies CP, SOB or edema. Activity has been limited. EKG unremarkable.     Review of Systems   Constitutional: Negative for decreased appetite, fever, malaise/fatigue, weight gain and weight loss.   HENT:  Negative for hearing loss and nosebleeds.    Eyes:  Negative for visual disturbance.   Cardiovascular:  Negative for chest pain, claudication, cyanosis, dyspnea on exertion, irregular heartbeat, leg swelling, near-syncope, orthopnea, palpitations, paroxysmal nocturnal dyspnea and syncope.   Respiratory:  Negative for cough, hemoptysis, shortness of breath, sleep disturbances due to breathing, snoring and wheezing.    Endocrine: Negative for cold intolerance, heat intolerance, polydipsia and polyuria.   Hematologic/Lymphatic: Negative for adenopathy and bleeding problem. Does not bruise/bleed easily.   Skin:  Negative for color change, itching, poor wound healing, rash and suspicious lesions.   Musculoskeletal:  Positive for arthritis and back pain. Negative for falls, joint pain, joint swelling, muscle cramps, muscle weakness and myalgias.   Gastrointestinal:  Negative for bloating, abdominal pain, change in bowel habit, constipation, flatus, heartburn, hematemesis, hematochezia, hemorrhoids, jaundice, melena, nausea and vomiting.   Genitourinary:  Negative for bladder incontinence, decreased libido, frequency, hematuria, hesitancy and urgency.   Neurological:  Negative for brief paralysis, difficulty with concentration, excessive daytime sleepiness, dizziness, focal weakness, headaches, light-headedness, loss of balance, numbness, vertigo and weakness.   Psychiatric/Behavioral:  Negative for altered mental status, depression and memory loss. The patient does not have  "insomnia and is not nervous/anxious.    Allergic/Immunologic: Negative for environmental allergies, hives and persistent infections.          Objective     Physical Exam  Vitals and nursing note reviewed.   Constitutional:       Appearance: She is well-developed. She is obese.      Comments: BP (!) 142/88   Pulse 68   Ht 4' 10" (1.473 m)   Wt 71.6 kg (157 lb 12.8 oz)   SpO2 97%   BMI 32.98 kg/m²      HENT:      Head: Normocephalic and atraumatic.      Right Ear: External ear normal.      Left Ear: External ear normal.      Nose: Nose normal.   Eyes:      General: Lids are normal. No scleral icterus.        Right eye: No discharge.         Left eye: No discharge.      Conjunctiva/sclera: Conjunctivae normal.      Right eye: No hemorrhage.     Pupils: Pupils are equal, round, and reactive to light.   Neck:      Thyroid: No thyromegaly.      Vascular: No JVD.      Trachea: No tracheal deviation.   Cardiovascular:      Rate and Rhythm: Normal rate and regular rhythm.      Pulses: Intact distal pulses.      Heart sounds: Murmur heard.      Harsh midsystolic murmur is present with a grade of 2/6 at the upper right sternal border radiating to the neck.      No friction rub. No gallop.   Pulmonary:      Effort: Pulmonary effort is normal. No respiratory distress.      Breath sounds: Normal breath sounds. No wheezing or rales.   Chest:      Chest wall: No tenderness.   Breasts:     Breasts are symmetrical.   Abdominal:      General: Bowel sounds are normal. There is no distension.      Palpations: Abdomen is soft. There is no hepatomegaly or mass.      Tenderness: There is no abdominal tenderness. There is no guarding or rebound.   Musculoskeletal:         General: No tenderness. Normal range of motion.      Cervical back: Normal range of motion and neck supple.   Lymphadenopathy:      Cervical: No cervical adenopathy.   Skin:     General: Skin is warm and dry.      Coloration: Skin is not pale.      Findings: No " erythema or rash.   Neurological:      Mental Status: She is alert and oriented to person, place, and time.      Cranial Nerves: No cranial nerve deficit.      Coordination: Coordination normal.      Deep Tendon Reflexes: Reflexes are normal and symmetric. Reflexes normal.   Psychiatric:         Behavior: Behavior normal.         Thought Content: Thought content normal.         Judgment: Judgment normal.            Assessment and Plan     1. Bradycardia, drug induced    2. Abnormal EKG    3. Essential hypertension    4. Atherosclerosis of abdominal aorta    5. Pure hypercholesterolemia    6. Aortic valve sclerosis    7. Preop examination    8. Nonrheumatic aortic valve stenosis    9. Bilateral carotid bruits        Plan:  Will repeat echo and carotids. If no significant change There are no absolute contraindications to planned surgery from a cardiac standpoint. Will defer non-cardiac medical issues to patient's other physicians. Cardiac and hemodynamic monitoring during surgery and post operative period consistent with regions standard of care should be initiated. Patient should be aware that all surgeries carry risk and unpredictable cardiac events may still occur.       Orders Placed This Encounter   Procedures    CV Ultrasound Bilateral Doppler Carotid    Echo     Follow up in about 6 months (around 3/9/2025).     Advance Care Planning     Date: 09/09/2024

## 2024-09-10 ENCOUNTER — PATIENT MESSAGE (OUTPATIENT)
Dept: FAMILY MEDICINE | Facility: CLINIC | Age: 84
End: 2024-09-10
Payer: MEDICARE

## 2024-09-10 NOTE — PROGRESS NOTES
Make follow-up lab appointment per recommendation below.  Check to see if patient has seen the results through my chart.  If not then,  #CALL THE PATIENT# to discuss results/see if they have questions and document verification of contact. Make F/U appt if needed. [phone]    #My interpretation that was sent to them through [employer]:  Suzie, I have reviewed your recent blood work.     Calcium level remains elevated higher than previous.  Please make sure that you are not take any calcium supplements.  PTH level is elevated.  Please follow-up to discuss further evaluation and treatment for hypercalcemia.  You can follow-up either virtual visit or in office to discuss.  Uric acid level is improved from previous.  Your metabolic panel which shows your glucose, kidney function, electrolytes, and liver function is stable except for calcium level see above.  Potassium level is now normal.  Your cholesterol is improved from previous.  Up the great work!  Your hemoglobin A1c is normal.  This test is gold standard screening test for diabetes.  It is a measures 3 months of your average blood sugar.  =========================  Also please address any outstanding health maintenance that may be due: RSV Vaccine (Age 60+ and Pregnant patients)(1 - 1-dose 60+ series) Never done  Shingles Vaccine(1 of 2) due on 06/04/2016  Influenza Vaccine(1) due on 09/01/2024  COVID-19 Vaccine(7 - 2023-24 season) due on 09/01/2024  DEXA Scan due on 10/26/2024

## 2024-09-11 ENCOUNTER — PATIENT MESSAGE (OUTPATIENT)
Dept: FAMILY MEDICINE | Facility: CLINIC | Age: 84
End: 2024-09-11
Payer: MEDICARE

## 2024-09-18 ENCOUNTER — PATIENT MESSAGE (OUTPATIENT)
Dept: CARDIOLOGY | Facility: CLINIC | Age: 84
End: 2024-09-18

## 2024-09-18 ENCOUNTER — HOSPITAL ENCOUNTER (OUTPATIENT)
Dept: CARDIOLOGY | Facility: HOSPITAL | Age: 84
Discharge: HOME | End: 2024-09-18
Attending: INTERNAL MEDICINE
Payer: MEDICARE

## 2024-09-18 VITALS
BODY MASS INDEX: 32.95 KG/M2 | SYSTOLIC BLOOD PRESSURE: 181 MMHG | DIASTOLIC BLOOD PRESSURE: 94 MMHG | WEIGHT: 157 LBS | WEIGHT: 157 LBS | BODY MASS INDEX: 32.95 KG/M2 | SYSTOLIC BLOOD PRESSURE: 181 MMHG | HEIGHT: 58 IN | DIASTOLIC BLOOD PRESSURE: 94 MMHG | HEART RATE: 62 BPM | HEART RATE: 62 BPM | HEIGHT: 58 IN

## 2024-09-18 DIAGNOSIS — I35.8 AORTIC VALVE SCLEROSIS: ICD-10-CM

## 2024-09-18 DIAGNOSIS — R09.89 BILATERAL CAROTID BRUITS: ICD-10-CM

## 2024-09-18 LAB
AORTIC ROOT ANNULUS: 2.32 CM
AORTIC VALVE CUSP SEPERATION: 0.81 CM
ASCENDING AORTA: 2.65 CM
AV INDEX (PROSTH): 0.57
AV MEAN GRADIENT: 8 MMHG
AV PEAK GRADIENT: 13 MMHG
AV VALVE AREA BY VELOCITY RATIO: 2.27 CM²
AV VALVE AREA: 2.66 CM²
AV VELOCITY RATIO: 0.49
BSA FOR ECHO PROCEDURE: 1.71 M2
CV ECHO LV RWT: 0.72 CM
DOP CALC AO PEAK VEL: 1.81 M/S
DOP CALC AO VTI: 44.1 CM
DOP CALC LVOT AREA: 4.7 CM2
DOP CALC LVOT DIAMETER: 2.44 CM
DOP CALC LVOT PEAK VEL: 0.88 M/S
DOP CALC LVOT STROKE VOLUME: 117.31 CM3
DOP CALC MV VTI: 38.2 CM
DOP CALC RVOT PEAK VEL: 0.62 M/S
DOP CALC RVOT VTI: 14.3 CM
DOP CALCLVOT PEAK VEL VTI: 25.1 CM
E WAVE DECELERATION TIME: 297.65 MSEC
E/A RATIO: 0.6
E/E' RATIO: 14.8 M/S
ECHO LV POSTERIOR WALL: 1.16 CM (ref 0.6–1.1)
EJECTION FRACTION: 60 %
FRACTIONAL SHORTENING: 34 % (ref 28–44)
INTERVENTRICULAR SEPTUM: 1.3 CM (ref 0.6–1.1)
IVC DIAMETER: 1.5 CM
LA MAJOR: 4.7 CM
LA MINOR: 4.04 CM
LA WIDTH: 4.4 CM
LEFT ARM DIASTOLIC BLOOD PRESSURE: 81 MMHG
LEFT ARM SYSTOLIC BLOOD PRESSURE: 179 MMHG
LEFT ATRIUM AREA SYSTOLIC (APICAL 2 CHAMBER): 14.78 CM2
LEFT ATRIUM AREA SYSTOLIC (APICAL 4 CHAMBER): 18.27 CM2
LEFT ATRIUM SIZE: 4.27 CM
LEFT ATRIUM VOLUME INDEX MOD: 30.6 ML/M2
LEFT ATRIUM VOLUME INDEX: 42.3 ML/M2
LEFT ATRIUM VOLUME MOD: 50.22 CM3
LEFT ATRIUM VOLUME: 69.39 CM3
LEFT CBA DIAS: 11 CM/S
LEFT CBA SYS: 55 CM/S
LEFT CCA DIST DIAS: 10 CM/S
LEFT CCA DIST SYS: 39 CM/S
LEFT CCA MID DIAS: 10 CM/S
LEFT CCA MID SYS: 68 CM/S
LEFT CCA PROX DIAS: 8 CM/S
LEFT CCA PROX SYS: 81 CM/S
LEFT ECA DIAS: 10 CM/S
LEFT ECA SYS: 108 CM/S
LEFT ICA DIST DIAS: 13 CM/S
LEFT ICA DIST SYS: 51 CM/S
LEFT ICA MID DIAS: 15 CM/S
LEFT ICA MID SYS: 55 CM/S
LEFT ICA PROX DIAS: 10 CM/S
LEFT ICA PROX SYS: 56 CM/S
LEFT INTERNAL DIMENSION IN SYSTOLE: 2.15 CM (ref 2.1–4)
LEFT VENTRICLE DIASTOLIC VOLUME INDEX: 25.8 ML/M2
LEFT VENTRICLE DIASTOLIC VOLUME: 42.32 ML
LEFT VENTRICLE END SYSTOLIC VOLUME APICAL 2 CHAMBER: 39.99 ML
LEFT VENTRICLE END SYSTOLIC VOLUME APICAL 4 CHAMBER: 55.62 ML
LEFT VENTRICLE MASS INDEX: 77 G/M2
LEFT VENTRICLE SYSTOLIC VOLUME INDEX: 9.4 ML/M2
LEFT VENTRICLE SYSTOLIC VOLUME: 15.34 ML
LEFT VENTRICULAR INTERNAL DIMENSION IN DIASTOLE: 3.24 CM (ref 3.5–6)
LEFT VENTRICULAR MASS: 126.38 G
LEFT VERTEBRAL DIAS: 7 CM/S
LEFT VERTEBRAL SYS: 36 CM/S
LV LATERAL E/E' RATIO: 18.5 M/S
LV SEPTAL E/E' RATIO: 12.33 M/S
LVED V (TEICH): 42.32 ML
LVES V (TEICH): 15.34 ML
LVOT MG: 1.77 MMHG
LVOT MV: 0.63 CM/S
MV A" WAVE DURATION": 134.16 MSEC
MV MEAN GRADIENT: 3 MMHG
MV PEAK A VEL: 1.24 M/S
MV PEAK E VEL: 0.74 M/S
MV PEAK GRADIENT: 7 MMHG
MV STENOSIS PRESSURE HALF TIME: 86.32 MS
MV VALVE AREA BY CONTINUITY EQUATION: 3.07 CM2
MV VALVE AREA P 1/2 METHOD: 2.55 CM2
OHS CV CAROTID RIGHT ICA EDV HIGHEST: 17
OHS CV CAROTID ULTRASOUND LEFT ICA/CCA RATIO: 1.44
OHS CV CAROTID ULTRASOUND RIGHT ICA/CCA RATIO: 1.35
OHS CV PV CAROTID LEFT HIGHEST CCA: 81
OHS CV PV CAROTID LEFT HIGHEST ICA: 56
OHS CV PV CAROTID RIGHT HIGHEST CCA: 63
OHS CV PV CAROTID RIGHT HIGHEST ICA: 65
OHS CV RV/LV RATIO: 0.8 CM
OHS CV US CAROTID LEFT HIGHEST EDV: 15
PISA MRMAX VEL: 6.08 M/S
PISA TR MAX VEL: 2.93 M/S
PULM VEIN S/D RATIO: 1.7
PV MEAN GRADIENT: 1 MMHG
PV MV: 0.57 M/S
PV PEAK D VEL: 0.37 M/S
PV PEAK GRADIENT: 2 MMHG
PV PEAK S VEL: 0.63 M/S
PV PEAK VELOCITY: 0.64 M/S
RA MAJOR: 3.73 CM
RA PRESSURE ESTIMATED: 3 MMHG
RA WIDTH: 3.27 CM
RIGHT ARM DIASTOLIC BLOOD PRESSURE: 94 MMHG
RIGHT ARM SYSTOLIC BLOOD PRESSURE: 181 MMHG
RIGHT CBA DIAS: 9 CM/S
RIGHT CBA SYS: 41 CM/S
RIGHT CCA DIST DIAS: 7 CM/S
RIGHT CCA DIST SYS: 48 CM/S
RIGHT CCA MID DIAS: 9 CM/S
RIGHT CCA MID SYS: 63 CM/S
RIGHT CCA PROX DIAS: 7 CM/S
RIGHT CCA PROX SYS: 61 CM/S
RIGHT ECA DIAS: 0 CM/S
RIGHT ECA SYS: 46 CM/S
RIGHT ICA DIST DIAS: 17 CM/S
RIGHT ICA DIST SYS: 63 CM/S
RIGHT ICA MID DIAS: 16 CM/S
RIGHT ICA MID SYS: 65 CM/S
RIGHT ICA PROX DIAS: 13 CM/S
RIGHT ICA PROX SYS: 54 CM/S
RIGHT VENTRICLE DIASTOLIC BASEL DIMENSION: 2.9 CM
RIGHT VENTRICULAR END-DIASTOLIC DIMENSION: 2.6 CM
RIGHT VERTEBRAL DIAS: 9 CM/S
RIGHT VERTEBRAL SYS: 34 CM/S
RV TB RVSP: 6 MMHG
STJ: 2.7 CM
TDI LATERAL: 0.04 M/S
TDI SEPTAL: 0.06 M/S
TDI: 0.05 M/S
TR MAX PG: 34 MMHG
TRICUSPID ANNULAR PLANE SYSTOLIC EXCURSION: 1.84 CM
TV REST PULMONARY ARTERY PRESSURE: 37 MMHG
Z-SCORE OF LEFT VENTRICULAR DIMENSION IN END DIASTOLE: -3.54
Z-SCORE OF LEFT VENTRICULAR DIMENSION IN END SYSTOLE: -2.26

## 2024-09-18 PROCEDURE — 93306 TTE W/DOPPLER COMPLETE: CPT | Mod: 26,,, | Performed by: INTERNAL MEDICINE

## 2024-09-18 PROCEDURE — 93880 EXTRACRANIAL BILAT STUDY: CPT | Mod: PO

## 2024-09-18 PROCEDURE — 93306 TTE W/DOPPLER COMPLETE: CPT | Mod: PO

## 2024-09-18 PROCEDURE — 93880 EXTRACRANIAL BILAT STUDY: CPT | Mod: 26,,, | Performed by: INTERNAL MEDICINE

## 2024-09-19 ENCOUNTER — PATIENT MESSAGE (OUTPATIENT)
Dept: CARDIOLOGY | Facility: CLINIC | Age: 84
End: 2024-09-19
Payer: MEDICARE

## 2024-09-26 ENCOUNTER — TELEPHONE (OUTPATIENT)
Dept: FAMILY MEDICINE | Facility: CLINIC | Age: 84
End: 2024-09-26
Payer: MEDICARE

## 2024-09-26 ENCOUNTER — PATIENT MESSAGE (OUTPATIENT)
Dept: FAMILY MEDICINE | Facility: CLINIC | Age: 84
End: 2024-09-26
Payer: MEDICARE

## 2024-09-26 DIAGNOSIS — E83.52 HYPERCALCEMIA: Primary | ICD-10-CM

## 2024-09-26 DIAGNOSIS — Z01.818 PREOP EXAMINATION: Primary | ICD-10-CM

## 2024-09-26 NOTE — TELEPHONE ENCOUNTER
I have signed for the following orders AND/OR meds.  Please call the patient and ask the patient to schedule the testing AND/OR inform about any medications that were sent.      Orders Placed This Encounter   Procedures    PROTIME-INR     Standing Status:   Future     Standing Expiration Date:   12/25/2025

## 2024-09-27 ENCOUNTER — LAB VISIT (OUTPATIENT)
Dept: LAB | Facility: HOSPITAL | Age: 84
End: 2024-09-27
Attending: FAMILY MEDICINE
Payer: MEDICARE

## 2024-09-27 DIAGNOSIS — Z01.818 PREOP EXAMINATION: ICD-10-CM

## 2024-09-27 DIAGNOSIS — E83.52 HYPERCALCEMIA: ICD-10-CM

## 2024-09-27 LAB
25(OH)D3+25(OH)D2 SERPL-MCNC: 72 NG/ML (ref 30–96)
CALCIUM SERPL-MCNC: 10.4 MG/DL (ref 8.7–10.5)
INR PPP: 1 (ref 0.8–1.2)
PROTHROMBIN TIME: 11.4 SEC (ref 9–12.5)
PTH-INTACT SERPL-MCNC: 142.3 PG/ML (ref 9–77)

## 2024-09-27 PROCEDURE — 36415 COLL VENOUS BLD VENIPUNCTURE: CPT | Mod: PO | Performed by: FAMILY MEDICINE

## 2024-09-27 PROCEDURE — 82310 ASSAY OF CALCIUM: CPT | Performed by: FAMILY MEDICINE

## 2024-09-27 PROCEDURE — 83970 ASSAY OF PARATHORMONE: CPT | Performed by: FAMILY MEDICINE

## 2024-09-27 PROCEDURE — 82306 VITAMIN D 25 HYDROXY: CPT | Performed by: FAMILY MEDICINE

## 2024-09-27 PROCEDURE — 85610 PROTHROMBIN TIME: CPT | Performed by: FAMILY MEDICINE

## 2024-09-27 NOTE — TELEPHONE ENCOUNTER
I have signed for the following orders AND/OR meds.  Please call the patient and ask the patient to schedule the testing AND/OR inform about any medications that were sent.      Orders Placed This Encounter   Procedures    CALCIUM     Standing Status:   Future     Standing Expiration Date:   12/26/2025    Vitamin D     Standing Status:   Future     Standing Expiration Date:   9/27/2025    PTH, intact     Standing Status:   Future     Standing Expiration Date:   12/26/2025

## 2024-09-28 ENCOUNTER — PATIENT MESSAGE (OUTPATIENT)
Dept: FAMILY MEDICINE | Facility: CLINIC | Age: 84
End: 2024-09-28
Payer: MEDICARE

## 2024-09-28 NOTE — PROGRESS NOTES
Make follow-up lab appointment per recommendation below.  Check to see if patient has seen the results through my chart.  If not then,  #CALL THE PATIENT# to discuss results/see if they have questions and document verification of contact. Make F/U appt if needed. [phone]    #My interpretation that was sent to them through [employer]:  Suzie, I have reviewed your recent blood work.     Parathyroid level is elevated.  Follow-up with Endocrinology.  Vitamin-D level is within normal limits.  Calcium level is now normal.  Normal PT INR.    =========================  Also please address any outstanding health maintenance that may be due: Shingles Vaccine(1 of 2) due on 06/04/2016  RSV Vaccine (Age 60+ and Pregnant patients)(1 - 1-dose 75+ series) Never done  Influenza Vaccine(1) due on 09/01/2024  COVID-19 Vaccine(7 - 2024-25 season) due on 09/01/2024  DEXA Scan due on 10/26/2024

## 2024-09-30 ENCOUNTER — TELEPHONE (OUTPATIENT)
Dept: FAMILY MEDICINE | Facility: CLINIC | Age: 84
End: 2024-09-30
Payer: MEDICARE

## 2024-09-30 DIAGNOSIS — G47.00 INSOMNIA, UNSPECIFIED TYPE: ICD-10-CM

## 2024-09-30 RX ORDER — TEMAZEPAM 30 MG/1
CAPSULE ORAL
Qty: 30 CAPSULE | Refills: 5 | OUTPATIENT
Start: 2024-09-30

## 2024-09-30 NOTE — TELEPHONE ENCOUNTER
Dimple luong. Pt was advised that we have to confirm w/Dr. Moses to make sure that all her pre op testing is still active prior to faxing pre op clearance. Pt verbalized understanding and was told that we will touch basis with her as soon as we can.

## 2024-09-30 NOTE — TELEPHONE ENCOUNTER
Call returned. Pt wants to know if Dr. Moses nurse received the pre op form thyat was dropped off here in the office last week to be signed off on. Please advise. Thank you.

## 2024-09-30 NOTE — TELEPHONE ENCOUNTER
No care due was identified.  Health [facility] Embedded Care Due Messages. Reference number: [id number].   9/30/2024 10:51:41 AM CDT

## 2024-09-30 NOTE — TELEPHONE ENCOUNTER
----- Message from Catriskmethodstemi sent at 9/30/2024  1:46 PM CDT -----  Contact: self  ..Type:  Needs Medical Advice    Who Called: .Suzie Dawkins  Would the patient rather a call back or a response via [facility]? Call back   Best Call Back Number: .[phone]   Additional Information: pt is asking for an return call in reference to needing clearance for procedure she hs on 10/21

## 2024-09-30 NOTE — TELEPHONE ENCOUNTER
----- Message from Michelle sent at 9/30/2024  8:38 AM CDT -----  Regarding: elvin nurse  Type:  Needs Medical Advice    Who Called: URIEL REDDING [[id number]]  Symptoms (please be specific):    How long has patient had these symptoms:    Pharmacy name and phone #:    Would the patient rather a call back or a response via [facility]?   Best Call Back Number: [phone]  Additional Information: Patient call in regards to a test that was taking already. Patient is receiving message on her my chart in regards to taking blood test. Patient brought a release form from her back surgery dr on the Sept 25th and patient and would like to know have it been sent back surgery dr. Patient will like a call

## 2024-10-02 ENCOUNTER — PATIENT MESSAGE (OUTPATIENT)
Dept: FAMILY MEDICINE | Facility: CLINIC | Age: 84
End: 2024-10-02
Payer: MEDICARE

## 2024-10-09 ENCOUNTER — TELEPHONE (OUTPATIENT)
Dept: FAMILY MEDICINE | Facility: CLINIC | Age: 84
End: 2024-10-09
Payer: MEDICARE

## 2024-10-09 NOTE — TELEPHONE ENCOUNTER
Hey called pharmacy and they stated that  picked up rx on 10/04/2024 please pull    before I call patient back!!!!!!!!!!!!!!!!!!!!!!!!!!!!!!!!!!!!!!!!!!!!!!!!!!!!

## 2024-10-09 NOTE — TELEPHONE ENCOUNTER
----- Message from Vijaya sent at 10/9/2024 11:06 AM CDT -----  Contact: Suzie Larsen is calling to speak about the denial of her medication...Temazepam (RESTORIL) 30 mg capsule. Please call her at [phone].    Thanks  Sl

## 2024-10-10 ENCOUNTER — TELEPHONE (OUTPATIENT)
Dept: FAMILY MEDICINE | Facility: CLINIC | Age: 84
End: 2024-10-10
Payer: MEDICARE

## 2024-10-10 NOTE — TELEPHONE ENCOUNTER
----- Message from Keri sent at 10/10/2024 10:56 AM CDT -----  Type:  Needs Medical Advice    Who Called: Lisa w/ [facility]  Symptoms (please be specific): -   How long has patient had these symptoms:  -  Pharmacy name and phone #:  -  Would the patient rather a call back or a response via [facility]? CB  Best Call Back Number: [phone]  Additional Information: needs to speak w/ someone about a PA for medication temazepam (RESTORIL) 30 mg capsule

## 2024-10-28 PROBLEM — N39.0 RECURRENT UTI: Status: RESOLVED | Noted: 2019-12-03 | Resolved: 2024-10-28

## 2024-12-04 DIAGNOSIS — Z78.0 MENOPAUSE: ICD-10-CM

## 2025-01-17 VITALS — DIASTOLIC BLOOD PRESSURE: 83 MMHG | SYSTOLIC BLOOD PRESSURE: 186 MMHG

## 2025-01-17 DIAGNOSIS — E78.5 HYPERLIPIDEMIA, UNSPECIFIED HYPERLIPIDEMIA TYPE: ICD-10-CM

## 2025-01-17 DIAGNOSIS — Z79.899 ENCOUNTER FOR LONG-TERM (CURRENT) USE OF MEDICATIONS: ICD-10-CM

## 2025-01-17 DIAGNOSIS — I10 ESSENTIAL HYPERTENSION: Chronic | ICD-10-CM

## 2025-01-17 RX ORDER — ATORVASTATIN CALCIUM 40 MG/1
40 TABLET, FILM COATED ORAL
Qty: 90 TABLET | Refills: 1 | Status: SHIPPED | OUTPATIENT
Start: 2025-01-17

## 2025-01-17 RX ORDER — METOPROLOL SUCCINATE 50 MG/1
50 TABLET, EXTENDED RELEASE ORAL 2 TIMES DAILY
Qty: 180 TABLET | Refills: 0 | Status: SHIPPED | OUTPATIENT
Start: 2025-01-17

## 2025-01-17 NOTE — TELEPHONE ENCOUNTER
Refill Decision Note   Suzie Dawkins  is requesting a refill authorization.  Brief Assessment and Rationale for Refill:  Approve     Medication Therapy Plan:         Comments:     Note composed:3:24 PM 01/17/2025

## 2025-01-17 NOTE — TELEPHONE ENCOUNTER
No care due was identified.  Health [facility] Embedded Care Due Messages. Reference number: [id number].   1/17/2025 11:23:45 AM CST

## 2025-01-17 NOTE — TELEPHONE ENCOUNTER
No care due was identified.  [facility] Embedded Care Due Messages. Reference number: [id number].   1/17/2025 1:21:20 PM CST

## 2025-01-17 NOTE — TELEPHONE ENCOUNTER
Refill Routing Note   Medication(s) are not appropriate for processing by [facility] for the following reason(s):        Required vitals abnormal: 181/94     [facility] action(s):  Defer             Appointments  past 12m or future 3m with PCP    Date Provider   Last Visit   7/26/2024 Bunny Moses MD   Next Visit   1/17/2025 Bunny Moses MD   ED visits in past 90 days: 0        Note composed:2:41 PM 01/17/2025

## 2025-01-17 NOTE — TELEPHONE ENCOUNTER
This is patients home Bp reading is he states that she just had two back surgery's and is in a lot of pain that is the reason her bp is so high. 186 19

## 2025-01-24 NOTE — TELEPHONE ENCOUNTER
Called and spoke with pt and advised her that per Dr. Moses, she will need a two week f/u on her HTN. Pt stated that an e visit would work for. E visit sent to pt chart and further dated for, 01/31/2025. Pt notified.

## 2025-01-29 NOTE — PROGRESS NOTES
Assessment/Plan:  Problem List Items Addressed This Visit    None     Visit Diagnoses     Rash of face    -  Primary    Relevant Medications    predniSONE (DELTASONE) 20 MG tablet    triamcinolone acetonide 0.1% (KENALOG) 0.1 % ointment      Start steroids as prescribed. Risk of corticosteroids reviewed (elevated BP/glucose, insomnia, psychosis, bone loss, etc) and patient expressed understanding.   Trial of topical triamcinolone  Follow up with dermatologist if no improvement  ER precautions for severe symptoms     Follow up if symptoms worsen or fail to improve.    Leonie Mathew, JAZMÍN  _____________________________________________________________________________________________________________________________________________________    CC: rash    HPI: Patient is an 81-year-old female who presents in clinic today as an established patient here for rash. Patient complains of rash involving the forehead. Rash started x2 days ago. Appearance of rash at onset: small erythematous papules. Rash has progressively worsened and is extending into scalp. Discomfort associated with rash: is pruritic. There is no burning, stinging, pain associated with rash. She has had no visual disturbances, abdominal pain, arthralgia, fever, headache, irritability, myalgia, sore throat and vomiting. Patient has not had previous evaluation of rash. Patient has not had previous treatment. Patient has not had contacts with similar rash. Patient has not identified precipitant. Patient has not had new exposures (soaps, lotions, laundry detergents, foods, medications, plants, insects or animals.). She has seen dermatologist, Dr. Monsalve in the past.     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    Depression     GERD (gastroesophageal reflux disease)     Hypercalcemia     Hyperlipidemia     Hypertension     Osteoporosis 10/7/2013    Vitamin D  Problem List Items Addressed This Visit          ENT    Viral upper respiratory tract infection    Relevant Medications    azithromycin (Zithromax Z-Levar) 250 MG tablet    Other Relevant Orders    COVID/Flu/RSV panel       Endocrine and Metabolic    Vitamin D deficiency    Relevant Orders    Vitamin D -25 Hydroxy       Gastrointestinal and Abdominal    Epigastric pain    Relevant Medications    omeprazole (PriLOSEC) 40 MG capsule       Genitourinary and Reproductive    Hot flashes, menopausal     Continues to have hot flashes          Pap smear for cervical cancer screening    Relevant Orders    Pap Test    Menopause    Relevant Orders    BD DEXA SCAN AXIAL SKELETON    Visit for screening mammogram    Relevant Orders    MAMMO SCREENING BILATERAL W BLAS       Health Encounters    Annual physical exam - Primary     1/29/2025  =============================================  Patient care team---dr corea--surg and dr nancy umanzor did the gb, dr escoto --hand ortho    Colonoscopy--2014 -- colon and egd with Dr Franco --needs screening colonoscopy and need to consider an EGD/upper endoscopy    Gastroenterology---[phone] --Dr Ravi Coronado, Dr Nirav Diaz, Dr. Ranjeet Dumont, Dr Roberto Bhandari, Dr Ernesto Townsend, Dr Alessandra Montenegro (woman)      Mammogram--8/29/2023--due now    Pap smear--5/16/2022 ---normal and HPV negative --recheck in 3 years --2025  LMP--51 yo --no vaginal bleeding since menopause     Bone density--needs now          Relevant Orders    CBC with Automated Differential    Comprehensive Metabolic Panel    Lipid Panel Without Reflex    Pap Test    Thyroid Stimulating Hormone Reflex    Urinalysis With Microscopy & Culture If Indicated    Vitamin D -25 Hydroxy       Infectious Diseases    Immunization due     Check with your insurance company/member services to make sure they cover each vaccine for your age and medical conditions and where you are required to have them given: in the doctors office or a  deficiency      Past Surgical History:   Procedure Laterality Date    ADENOIDECTOMY       SECTION      two    COLON SURGERY  2007    cancer removal    HEMORRHOID SURGERY      HYSTERECTOMY      OOPHORECTOMY      TONSILLECTOMY      TONSILLECTOMY, ADENOIDECTOMY, BILATERAL MYRINGOTOMY AND TUBES       Review of patient's allergies indicates:   Allergen Reactions    Latex Hives    B12 [cyanocobalamin-cobamamide] Other (See Comments)     Caused flushing and caused skin to peel    Codeine      Other reaction(s): Muscle pain    Iodine      Other reaction(s): Hives    Meperidine      Other reaction(s): Muscle pain    Narcotic antagonist      Other reaction(s): Rash    Penicillins Nausea And Vomiting    Propoxyphene      Other reaction(s): Muscle pain    Sulfa (sulfonamide antibiotics)      Other reaction(s): Muscle pain  Other reaction(s): Hives     Social History     Tobacco Use    Smoking status: Former Smoker     Types: Cigarettes     Quit date: 1989     Years since quittin.5    Smokeless tobacco: Never Used   Substance Use Topics    Alcohol use: No    Drug use: No     Family History   Problem Relation Age of Onset    Cancer Father         lung    Arthritis Father     Cancer Maternal Aunt         breast    Arthritis Maternal Aunt     Breast cancer Maternal Aunt     Cancer Maternal Uncle     Arthritis Paternal Aunt     Cancer Maternal Grandfather         glands cancer     Arthritis Paternal Aunt     Arthritis Maternal Aunt      Current Outpatient Medications on File Prior to Visit   Medication Sig Dispense Refill    alendronate (FOSAMAX) 70 MG tablet Take 1 tablet (70 mg total) by mouth every 7 days. 12 tablet 3    ascorbic acid, vitamin C, (VITAMIN C) 500 MG tablet Take 500 mg by mouth once daily.      atorvastatin (LIPITOR) 40 MG tablet Take 1 tablet (40 mg total) by mouth once daily. 90 tablet 4    calcium carbonate (CALCIUM 500 ORAL) Take 2 tablets by mouth once daily.  pharmacy     Consider the SHINGRIX shingles vaccine--It is a dead vaccine and is a series of 2 shots  by 2-6 months.      Consider the tdap booster --it should be given every 10 years     Consider prevnar 20 pneumonia vaccine if 51 yo or other medical issues --check with your insurance to make sure they cover for those over 50    Consider the flu shot each fall --check to see if had at [facility]     Consider the RSV vaccine after 59 yo            Mental Health    Anxiety disorder    Relevant Medications    escitalopram (LEXAPRO) 10 MG tablet    Mild major depression (CMD)    Relevant Medications    escitalopram (LEXAPRO) 10 MG tablet       Musculoskeletal and Injuries    Chronic pain of both shoulders     Consider physical therapy or massage  Uses CBD oil   Use the cyclobenzaprine  Use voltaren gel or use celebrex         Relevant Medications    celecoxib (CeleBREX) 200 MG capsule    cyclobenzaprine (FLEXERIL) 10 MG tablet       Pulmonary and Pneumonias    Subacute cough    Relevant Medications    azithromycin (Zithromax Z-Levar) 250 MG tablet       These are the doctors I am recommending and there will also be a new doctor coming on board in our [place] office in the next few months as well :  Dr Shiloh Knott--Abimael Wilkes--Abimael Gaffney--Abimael Messina--Abimael Spencer--Abimael Oliver--rufino Pires--[place] Dr Marnie Goldberg--Jasmin Cowan --[facility]  Dr Sweetie Walls--Sammie flores --Rabia Alfaro--Sammie flores --Rabia Karimi           citalopram (CELEXA) 20 MG tablet Take 1 tablet (20 mg total) by mouth once daily. 90 tablet 4    cranberry fruit extract (CRANBERRY ORAL) Take 1 tablet by mouth once daily.      diphenoxylate-atropine 2.5-0.025 mg (LOMOTIL) 2.5-0.025 mg per tablet Take 1 tablet by mouth 4 (four) times daily as needed for Diarrhea. 90 tablet 5    ELDERBERRY FRUIT ORAL Take 1 tablet by mouth daily as needed.      estradioL (ESTRACE) 0.01 % (0.1 mg/gram) vaginal cream SMARTSIG:Sparingly Vaginal Every Night      fluticasone propionate (FLONASE) 50 mcg/actuation nasal spray 1 spray by Each Nostril route daily as needed for Rhinitis.      ipratropium (ATROVENT) 21 mcg (0.03 %) nasal spray 2 sprays by Nasal route 2 (two) times daily as needed for Rhinitis. 30 mL 5    L.acidophil,parac-S.therm-Bif. (RISAQUAD) Cap capsule Take 1 capsule by mouth once daily.      losartan (COZAAR) 50 MG tablet Take 1 tablet (50 mg total) by mouth once daily. 90 tablet 4    meclizine (ANTIVERT) 25 mg tablet Take 1 tablet (25 mg total) by mouth 3 (three) times daily as needed for Dizziness or Nausea. 30 tablet 12    metoprolol tartrate (LOPRESSOR) 100 MG tablet TAKE ONE TABLET BY MOUTH TWICE DAILY 180 tablet 3    MULTIVIT,CALC,MINS/IRON/FOLIC (ONE-A-DAY WOMENS FORMULA ORAL) Take 1 tablet by mouth once daily.      MYRBETRIQ 25 mg Tb24 ER tablet Take 1 tablet (25 mg total) by mouth once daily. 30 tablet 12    ondansetron (ZOFRAN-ODT) 4 MG TbDL DISSOLVE ONE TABLET BY MOUTH EVERY 6 HOURS AS NEEDED FOR NAUSEA 60 tablet 1    polyvinyl alcohol, artificial tears, (LIQUIFILM TEARS) 1.4 % ophthalmic solution Place 1 drop into both eyes as needed.      promethazine (PHENERGAN) 25 MG tablet Take 1 tablet (25 mg total) by mouth every 6 (six) hours as needed for Nausea. 30 tablet 0    temazepam (RESTORIL) 30 mg capsule Take 1 capsule (30 mg total) by mouth nightly. 90 capsule 1    topiramate (TOPAMAX) 25 MG tablet Take 1 tablet (25 mg total) by mouth  "once daily. 90 tablet 4    TRANSDERM-SCOP 1 mg over 3 days Place 1 patch onto the skin every 72 hours. 4 patch 0    vitamin D 1000 units Tab Take 1,000 Units by mouth once daily.      aspirin (ECOTRIN) 81 MG EC tablet Take 1 tablet (81 mg total) by mouth once daily. 90 tablet 3    esomeprazole (NEXIUM) 40 MG capsule Take 1 capsule (40 mg total) by mouth before breakfast. 90 capsule 3    [] promethazine-dextromethorphan (PROMETHAZINE-DM) 6.25-15 mg/5 mL Syrp Take 5 mLs by mouth every 4 (four) hours as needed (cough). 240 mL 0     No current facility-administered medications on file prior to visit.     Review of Systems   Constitutional: Negative for chills, fatigue, fever and unexpected weight change.   HENT: Negative for ear pain and sore throat.    Eyes: Negative for redness and visual disturbance.   Respiratory: Negative for cough and shortness of breath.    Cardiovascular: Negative for chest pain and palpitations.   Gastrointestinal: Negative for nausea and vomiting.   Genitourinary: Negative for difficulty urinating and hematuria.   Musculoskeletal: Negative for arthralgias and myalgias.   Skin: Positive for rash. Negative for wound.   Neurological: Negative for weakness and headaches.   Psychiatric/Behavioral: Positive for dysphoric mood and sleep disturbance (Controlled on medication). The patient is nervous/anxious.      Vitals:    22 1336   BP: 132/72   BP Location: Right arm   Pulse: 60   SpO2: 98%   Weight: 71.2 kg (157 lb)   Height: 4' 11" (1.499 m)     Wt Readings from Last 3 Encounters:   22 71.2 kg (157 lb)   22 71.7 kg (158 lb)   22 70.1 kg (154 lb 9.6 oz)     Physical Exam  Vitals reviewed.   Constitutional:       General: She is not in acute distress.     Appearance: Normal appearance. She is not ill-appearing.   HENT:      Head: Normocephalic and atraumatic.      Right Ear: External ear normal.      Left Ear: External ear normal.   Eyes:      Extraocular " Movements: Extraocular movements intact.      Conjunctiva/sclera: Conjunctivae normal.   Cardiovascular:      Rate and Rhythm: Normal rate.      Heart sounds: Normal heart sounds.   Pulmonary:      Effort: Pulmonary effort is normal. No respiratory distress.      Breath sounds: Normal breath sounds.   Abdominal:      General: Abdomen is flat. There is no distension.   Musculoskeletal:         General: Normal range of motion.      Cervical back: Normal range of motion.   Skin:     General: Skin is warm and dry.      Coloration: Skin is not pale.      Findings: Rash (Scattered erythematous papules to forehead extending into scalp.) present.   Neurological:      Mental Status: She is alert and oriented to person, place, and time. Mental status is at baseline.   Psychiatric:         Mood and Affect: Mood normal.         Speech: Speech normal.         Behavior: Behavior normal. Behavior is cooperative.       Health Maintenance   Topic Date Due    TETANUS VACCINE  Never done    Colonoscopy  02/08/2014    DEXA Scan  06/23/2022    Lipid Panel  04/01/2023

## 2025-02-22 DIAGNOSIS — Z00.00 ENCOUNTER FOR MEDICARE ANNUAL WELLNESS EXAM: ICD-10-CM

## 2025-02-26 ENCOUNTER — SSC ENCOUNTER (OUTPATIENT)
Dept: ADMINISTRATIVE | Facility: OTHER | Age: 85
End: 2025-02-26
Payer: MEDICARE

## 2025-02-27 ENCOUNTER — OUTPATIENT CASE MANAGEMENT (OUTPATIENT)
Dept: ADMINISTRATIVE | Facility: OTHER | Age: 85
End: 2025-02-27
Payer: MEDICARE

## 2025-02-27 NOTE — PROGRESS NOTES
2/27/2025  1st attempt to complete Initial Assessment  for [facility], left message. Attempt letter sent thru [facility] portal.

## 2025-03-03 ENCOUNTER — OUTPATIENT CASE MANAGEMENT (OUTPATIENT)
Dept: ADMINISTRATIVE | Facility: OTHER | Age: 85
End: 2025-03-03
Payer: MEDICARE

## 2025-03-06 ENCOUNTER — PATIENT MESSAGE (OUTPATIENT)
Dept: ADMINISTRATIVE | Facility: HOSPITAL | Age: 85
End: 2025-03-06
Payer: MEDICARE

## 2025-03-18 ENCOUNTER — PATIENT MESSAGE (OUTPATIENT)
Dept: FAMILY MEDICINE | Facility: CLINIC | Age: 85
End: 2025-03-18
Payer: MEDICARE

## 2025-03-18 DIAGNOSIS — G47.00 INSOMNIA, UNSPECIFIED TYPE: ICD-10-CM

## 2025-03-18 RX ORDER — TEMAZEPAM 30 MG/1
CAPSULE ORAL
Qty: 30 CAPSULE | Refills: 0 | OUTPATIENT
Start: 2025-03-18

## 2025-03-18 RX ORDER — TEMAZEPAM 30 MG/1
CAPSULE ORAL
Qty: 30 CAPSULE | Refills: 5 | OUTPATIENT
Start: 2025-03-18

## 2025-03-18 NOTE — TELEPHONE ENCOUNTER
Refill Routing Note   Medication(s) are not appropriate for processing by [facility] for the following reason(s):        Outside of protocol    [facility] action(s):  Route             Appointments  past 12m or future 3m with PCP    Date Provider   Last Visit   7/26/2024 Bunny Moses MD   Next Visit   3/18/2025 Bunny Moses MD   ED visits in past 90 days: 0        Note composed:10:48 AM 03/18/2025

## 2025-03-18 NOTE — TELEPHONE ENCOUNTER
Patient is due for an appointment.  Please schedule appointment with [facility] for medication  refill.

## 2025-03-18 NOTE — TELEPHONE ENCOUNTER
No care due was identified.  Health [facility] Embedded Care Due Messages. Reference number: [id number].   3/18/2025 10:39:50 AM CDT

## 2025-03-18 NOTE — TELEPHONE ENCOUNTER
No care due was identified.  Health [facility] Embedded Care Due Messages. Reference number: [id number].   3/18/2025 10:28:41 AM CDT

## 2025-07-12 DIAGNOSIS — Z79.899 ENCOUNTER FOR LONG-TERM (CURRENT) USE OF MEDICATIONS: ICD-10-CM

## 2025-07-12 DIAGNOSIS — I10 ESSENTIAL HYPERTENSION: Chronic | ICD-10-CM

## 2025-07-12 DIAGNOSIS — E78.5 HYPERLIPIDEMIA, UNSPECIFIED HYPERLIPIDEMIA TYPE: ICD-10-CM

## 2025-07-12 NOTE — TELEPHONE ENCOUNTER
Care Due:                  Date            Visit Type   Department     Provider  --------------------------------------------------------------------------------                                ESTABLISHED   [facility] FAMILY  Last Visit: 07-      PATIENT      MEDICINE       Bunny Moses  Next Visit: None Scheduled  None         None Found                                                            Last  Test          Frequency    Reason                     Performed    Due Date  --------------------------------------------------------------------------------    CMP.........  12 months..  DULoxetine, atorvastatin.  09- 09-    Lipid Panel.  12 months..  atorvastatin.............  09- 09-    Health [facility] Embedded Care Due Messages. Reference number: [id number].   7/12/2025 6:01:55 AM CDT

## 2025-07-13 RX ORDER — METOPROLOL SUCCINATE 50 MG/1
50 TABLET, EXTENDED RELEASE ORAL 2 TIMES DAILY
Qty: 180 TABLET | Refills: 0 | Status: SHIPPED | OUTPATIENT
Start: 2025-07-13

## 2025-07-13 RX ORDER — ATORVASTATIN CALCIUM 40 MG/1
40 TABLET, FILM COATED ORAL
Qty: 90 TABLET | Refills: 0 | Status: SHIPPED | OUTPATIENT
Start: 2025-07-13

## 2025-07-13 NOTE — TELEPHONE ENCOUNTER
Refill Routing Note   Medication(s) are not appropriate for processing by [facility] for the following reason(s):        Required vitals abnormal    [facility] action(s):  Approve  Defer     Requires labs : Yes             Appointments  past 12m or future 3m with PCP    Date Provider   Last Visit   7/26/2024 Bunny Moses MD   Next Visit   Visit date not found Bunny Moses MD   ED visits in past 90 days: 0        Note composed:6:50 PM 07/13/2025